# Patient Record
Sex: FEMALE | Race: WHITE | NOT HISPANIC OR LATINO | Employment: FULL TIME | URBAN - METROPOLITAN AREA
[De-identification: names, ages, dates, MRNs, and addresses within clinical notes are randomized per-mention and may not be internally consistent; named-entity substitution may affect disease eponyms.]

---

## 2017-01-09 ENCOUNTER — OFFICE VISIT (OUTPATIENT)
Dept: OBSTETRICS AND GYNECOLOGY | Facility: CLINIC | Age: 47
End: 2017-01-09
Payer: COMMERCIAL

## 2017-01-09 VITALS
WEIGHT: 167.56 LBS | SYSTOLIC BLOOD PRESSURE: 116 MMHG | DIASTOLIC BLOOD PRESSURE: 78 MMHG | BODY MASS INDEX: 27.04 KG/M2

## 2017-01-09 DIAGNOSIS — Z01.419 ENCOUNTER FOR GYNECOLOGICAL EXAMINATION WITHOUT ABNORMAL FINDING: Primary | ICD-10-CM

## 2017-01-09 DIAGNOSIS — Z79.890 POSTMENOPAUSAL HRT (HORMONE REPLACEMENT THERAPY): ICD-10-CM

## 2017-01-09 DIAGNOSIS — Z12.31 VISIT FOR SCREENING MAMMOGRAM: ICD-10-CM

## 2017-01-09 DIAGNOSIS — Z12.4 CERVICAL CANCER SCREENING: ICD-10-CM

## 2017-01-09 DIAGNOSIS — Z11.51 SCREENING FOR HPV (HUMAN PAPILLOMAVIRUS): ICD-10-CM

## 2017-01-09 PROCEDURE — 99396 PREV VISIT EST AGE 40-64: CPT | Mod: S$GLB,,, | Performed by: OBSTETRICS & GYNECOLOGY

## 2017-01-09 PROCEDURE — 87624 HPV HI-RISK TYP POOLED RSLT: CPT

## 2017-01-09 PROCEDURE — 88175 CYTOPATH C/V AUTO FLUID REDO: CPT

## 2017-01-09 PROCEDURE — 99999 PR PBB SHADOW E&M-EST. PATIENT-LVL III: CPT | Mod: PBBFAC,,, | Performed by: OBSTETRICS & GYNECOLOGY

## 2017-01-09 RX ORDER — PROGESTERONE 100 MG/1
100 CAPSULE ORAL NIGHTLY
Qty: 30 CAPSULE | Refills: 11 | Status: SHIPPED | OUTPATIENT
Start: 2017-01-09 | End: 2017-09-19 | Stop reason: SDUPTHER

## 2017-01-09 RX ORDER — ESTRADIOL 0.5 MG/.5G
1 GEL TOPICAL DAILY
Qty: 30 PACKET | Refills: 11 | Status: SHIPPED | OUTPATIENT
Start: 2017-01-09 | End: 2018-01-11 | Stop reason: SDUPTHER

## 2017-01-09 NOTE — PROGRESS NOTES
Chief Complaint   Patient presents with    Well Woman    Hot Flashes       History of Present Illness: Steph Lemus is a 46 y.o. female that presents today 1/9/2017 for well gyn visit.    Past Medical History   Diagnosis Date    Menarche      age of onset 10       Past Surgical History   Procedure Laterality Date    Dilation and curettage of uterus       for uterine polyps    Endometrial ablation         Current Outpatient Prescriptions   Medication Sig Dispense Refill    multivitamin (MULTIVITAMIN) per tablet Take 1 tablet by mouth once daily.        estradiol (DIVIGEL) 0.5 mg (0.1 %) GlPk Place 1 application onto the skin once daily. 30 packet 11    mupirocin calcium 2% (BACTROBAN) 2 % cream Apply topically 3 (three) times daily.      progesterone (PROMETRIUM) 100 MG capsule Take 1 capsule (100 mg total) by mouth nightly. 30 capsule 11    valacyclovir (VALTREX) 1000 MG tablet Take 1 tablet (1,000 mg total) by mouth 3 (three) times daily. 21 tablet 0     No current facility-administered medications for this visit.        Review of patient's allergies indicates:   Allergen Reactions    No known drug allergies        Family History   Problem Relation Age of Onset    Cancer Maternal Grandfather      lung    Diabetes Maternal Grandfather     Colon cancer Neg Hx     Ovarian cancer Neg Hx     Breast cancer Neg Hx        Social History     Social History    Marital status:      Spouse name: N/A    Number of children: N/A    Years of education: N/A     Social History Main Topics    Smoking status: Current Some Day Smoker     Packs/day: 0.30     Types: Cigarettes     Last attempt to quit: 10/2/2015    Smokeless tobacco: Never Used    Alcohol use 0.0 oz/week     0 Standard drinks or equivalent per week      Comment: Drinks wine socially    Drug use: No    Sexual activity: Yes     Partners: Male     Birth control/ protection: Other-see comments     Other Topics Concern    None     Social  History Narrative       OB History    Para Term  AB SAB TAB Ectopic Multiple Living   3 3              # Outcome Date GA Lbr Randal/2nd Weight Sex Delivery Anes PTL Lv   3 Para            2 Para            1 Para                   Review of Symptoms:  GENERAL: Denies weight gain or weight loss. Feeling well overall.   SKIN: Denies rash or lesions.   HEAD: Denies head injury or headache.   NODES: Denies enlarged lymph nodes.   CHEST: Denies chest pain or shortness of breath.   CARDIOVASCULAR: Denies palpitations or left sided chest pain.   ABDOMEN: No abdominal pain, constipation, diarrhea, nausea, vomiting or rectal bleeding.   URINARY: No frequency, dysuria, hematuria, or burning on urination.  HEMATOLOGIC: No easy bruisability or excessive bleeding.   MUSCULOSKELETAL: Denies joint pain or swelling.     Visit Vitals    /78    Wt 76 kg (167 lb 8.8 oz)     Physical Exam:  APPEARANCE: Well nourished, well developed, in no acute distress.  SKIN: Normal skin turgor, no lesions.  NECK: Neck symmetric without masses   RESPIRATORY: Normal respiratory effort with no retractions or use of accessory muscles  CARDIOVASCULAR: Peripheral vascular system with no swelling no varicosities and palpation of pulses normal  LYMPHATIC: No enlargements of the lymph nodes noted in the neck, axillae, or groin  ABDOMEN: Soft. No tenderness or masses. No hepatosplenomegaly. No hernias.  BREASTS: Symmetrical, no skin changes or visible lesions. No palpable masses, nipple discharge or adenopathy bilaterally.  PELVIC: Normal external female genitalia without lesions. Normal hair distribution. Adequate perineal body, normal urethral meatus. Urethra with no masses.  Bladder nontender. Vagina moist and well rugated without lesions or discharge. Cervix pink and without lesions. No significant cystocele or rectocele. Bimanual exam showed uterus normal size, shape, position, mobile and nontender. Adnexa without masses or  tenderness. Urethra and bladder normal. PAP DONE  EXTREMITIES: No clubbing cyanosis or edema.    ASSESSMENT/PLAN:  Encounter for gynecological examination without abnormal finding    Cervical cancer screening  -     Liquid-based pap smear, screening    Screening for HPV (human papillomavirus)  -     HPV DNA probe, amplified    Visit for screening mammogram  -     Mammo Digital Screening Bilat With CAD; Future; Expected date: 1/9/17    Postmenopausal HRT (hormone replacement therapy)  -     estradiol (DIVIGEL) 0.5 mg (0.1 %) GlPk; Place 1 application onto the skin once daily.  Dispense: 30 packet; Refill: 11  -     progesterone (PROMETRIUM) 100 MG capsule; Take 1 capsule (100 mg total) by mouth nightly.  Dispense: 30 capsule; Refill: 11          Patient was counseled today on Pap guidelines, recommendation for pelvic exams, mammograms every other year after the age of 40 and annually after the age of 50, Colonoscopy after the age of 50, Dexa Bone Scan and calcium and vitamin D supplementation in menopause and to see her PCP for other health maintenance.   FOLLOW-UP:prn

## 2017-01-09 NOTE — MR AVS SNAPSHOT
[facility]  [address] [place] [address]  Phone: [phone]                  Steph Lemus   2017 9:00 AM   Office Visit    Description:  Female : 1970   Provider:  Brittani Alatorre MD   Department:  [facility]           Reason for Visit     Well Woman           Diagnoses this Visit        Comments    Cervical cancer screening    -  Primary     Screening for HPV (human papillomavirus)         Visit for screening mammogram                To Do List           Goals (5 Years of Data)     None      [facility]     [facility] Care [facility] -  Assistance  Registered nurses in the [facility] Call Center provide clinical advisement, health education, appointment booking, and other advisory services.  Call for this free service at [phone].             Medications           Message regarding Medications     Verify the changes and/or additions to your medication regime listed below are the same as discussed with your clinician today.  If any of these changes or additions are incorrect, please notify your healthcare provider.             Verify that the below list of medications is an accurate representation of the medications you are currently taking.  If none reported, the list may be blank. If incorrect, please contact your healthcare provider. Carry this list with you in case of emergency.           Current Medications     multivitamin (MULTIVITAMIN) per tablet Take 1 tablet by mouth once daily.      mupirocin calcium 2% (BACTROBAN) 2 % cream Apply topically 3 (three) times daily.    valacyclovir (VALTREX) 1000 MG tablet Take 1 tablet (1,000 mg total) by mouth 3 (three) times daily.           Clinical Reference Information           Vital Signs - Last Recorded  Most recent update: 2017  9:13 AM by Cookie Martinez LPN    BP Wt BMI          116/78 76 kg (167 lb 8.8 oz) 27.04 kg/m2        Blood Pressure          Most Recent Value     BP  116/78      Allergies as of 1/9/2017     No Known Drug Allergies      Immunizations Administered on Date of Encounter - 1/9/2017     None      Orders Placed During Today's Visit      Normal Orders This Visit    HPV DNA probe, amplified     Liquid-based pap smear, screening     Future Labs/Procedures Expected by Expires    Mammo Digital Screening Bilat With CAD  1/9/2017 3/9/2018      Smoking Cessation     If you would like to quit smoking:   You may be eligible for free services if you are a [place] resident and started smoking cigarettes before September 1, 1988.  Call the Smoking Cessation Trust (SCT) toll free at [phone] or [phone].   Call 8-422-QUIT-NOW if you do not meet the above criteria.

## 2017-01-13 LAB — HUMAN PAPILLOMAVIRUS (HPV): NOT DETECTED

## 2017-01-17 ENCOUNTER — TELEPHONE (OUTPATIENT)
Dept: OBSTETRICS AND GYNECOLOGY | Facility: CLINIC | Age: 47
End: 2017-01-17

## 2017-01-20 ENCOUNTER — HOSPITAL ENCOUNTER (OUTPATIENT)
Dept: RADIOLOGY | Facility: CLINIC | Age: 47
Discharge: HOME | End: 2017-01-20
Attending: OBSTETRICS & GYNECOLOGY
Payer: COMMERCIAL

## 2017-01-20 DIAGNOSIS — Z12.31 VISIT FOR SCREENING MAMMOGRAM: ICD-10-CM

## 2017-01-20 PROCEDURE — 77067 SCR MAMMO BI INCL CAD: CPT | Mod: 26,,, | Performed by: RADIOLOGY

## 2017-01-20 PROCEDURE — 77067 SCR MAMMO BI INCL CAD: CPT | Mod: TC

## 2017-01-20 PROCEDURE — 77063 BREAST TOMOSYNTHESIS BI: CPT | Mod: 26,,, | Performed by: RADIOLOGY

## 2017-01-23 ENCOUNTER — OFFICE VISIT (OUTPATIENT)
Dept: PRIMARY CARE CLINIC | Facility: CLINIC | Age: 47
End: 2017-01-23
Payer: COMMERCIAL

## 2017-01-23 VITALS
HEART RATE: 91 BPM | OXYGEN SATURATION: 98 % | BODY MASS INDEX: 26.79 KG/M2 | DIASTOLIC BLOOD PRESSURE: 78 MMHG | HEIGHT: 66 IN | TEMPERATURE: 98 F | WEIGHT: 166.69 LBS | SYSTOLIC BLOOD PRESSURE: 100 MMHG

## 2017-01-23 DIAGNOSIS — R10.9 FLANK PAIN: ICD-10-CM

## 2017-01-23 DIAGNOSIS — R50.9 FEVER, UNSPECIFIED FEVER CAUSE: Primary | ICD-10-CM

## 2017-01-23 DIAGNOSIS — R82.90 ABNORMAL URINALYSIS: ICD-10-CM

## 2017-01-23 DIAGNOSIS — R10.32 LLQ ABDOMINAL PAIN: ICD-10-CM

## 2017-01-23 LAB
BACTERIA #/AREA URNS HPF: ABNORMAL /HPF
BILIRUB UR QL STRIP: ABNORMAL
CLARITY UR: CLEAR
COLOR UR: YELLOW
GLUCOSE UR QL STRIP: NEGATIVE
HGB UR QL STRIP: ABNORMAL
HYALINE CASTS #/AREA URNS LPF: 5 /LPF
KETONES UR QL STRIP: ABNORMAL
LEUKOCYTE ESTERASE UR QL STRIP: NEGATIVE
MICROSCOPIC COMMENT: ABNORMAL
NITRITE UR QL STRIP: NEGATIVE
PH UR STRIP: 6 [PH] (ref 5–8)
PROT UR QL STRIP: ABNORMAL
RBC #/AREA URNS HPF: 50 /HPF (ref 0–4)
SP GR UR STRIP: >=1.03 (ref 1–1.03)
SQUAMOUS #/AREA URNS HPF: 5 /HPF
URN SPEC COLLECT METH UR: ABNORMAL
WBC #/AREA URNS HPF: 5 /HPF (ref 0–5)

## 2017-01-23 PROCEDURE — 99214 OFFICE O/P EST MOD 30 MIN: CPT | Mod: S$GLB,,, | Performed by: NURSE PRACTITIONER

## 2017-01-23 PROCEDURE — 81000 URINALYSIS NONAUTO W/SCOPE: CPT | Mod: PO

## 2017-01-23 PROCEDURE — 99999 PR PBB SHADOW E&M-EST. PATIENT-LVL IV: CPT | Mod: PBBFAC,,, | Performed by: NURSE PRACTITIONER

## 2017-01-23 PROCEDURE — 1159F MED LIST DOCD IN RCRD: CPT | Mod: S$GLB,,, | Performed by: NURSE PRACTITIONER

## 2017-01-23 RX ORDER — SULFAMETHOXAZOLE AND TRIMETHOPRIM 800; 160 MG/1; MG/1
1 TABLET ORAL 2 TIMES DAILY
Qty: 20 TABLET | Refills: 0 | Status: SHIPPED | OUTPATIENT
Start: 2017-01-23 | End: 2017-01-23 | Stop reason: SDUPTHER

## 2017-01-23 RX ORDER — SULFAMETHOXAZOLE AND TRIMETHOPRIM 800; 160 MG/1; MG/1
1 TABLET ORAL 2 TIMES DAILY
Qty: 20 TABLET | Refills: 0 | Status: SHIPPED | OUTPATIENT
Start: 2017-01-23 | End: 2017-02-02

## 2017-01-23 NOTE — PATIENT INSTRUCTIONS
URINARY TRACT INFECTION    Urinary tract infection is a nonspecific term for an infection somewhere in the urine-making system (kidneys, ureters, bladder or urethra).  They are most often caused by bacterial infections.      You are advised to:    1.  Drink plenty of liquids.  2.  Take the antibiotic, Bactrim , as directed.  3.  Use over-the-counter Azo or prescription Pyridium for symptom relief.         Over-the-counter AZO or the prescription tablets will cause the urine to be dark       orange.    Call your regular care provider and follow up if symptoms persist for more than 2-3 days or if symptoms worsen significantly.     Go to ER right away if fever, chills, more severe back pain, nausea or vomiting develop.      Tips that may help you prevent UTIs:    Drink plenty of fluids to continually flush out bacteria trying to enter the urinary tract.  Up to 1/2 gallon today.   Dont hold urine.   Wipe from front to back after bowel movements.   If you are sexually active, urinate after sex to wash any germs out of the opening of the urinary tract.   If you are sexually active, use lubricant during sex to prevent irritation to the opening of the urinary tract.    If you have any questions, please call.  You can reach us at [phone] Monday through Friday (except holidays) 12 nooon to 6 p.m.    Thank you for using the [facility]!    MAGGI Lynch, CNP, FNP-BC  [facility]  [facility]

## 2017-01-23 NOTE — PROGRESS NOTES
Steph Lemus is a 46 y.o. female patient of Adeel Ledesma MD who presents to the clinic today for   Chief Complaint   Patient presents with    Flank Pain     since Saturday    Fever     100.4 last night    Chills    Nausea   .    HPI    Pt, who is not known to me, reports with a new problem to me, as follows:  Cloudy urine, fever, chills, headache, feeling ill, low back ache, increased freq r/t inc fluid intake    These symptoms began 2 days  ago and status is worsening.     Pt denies the following symptoms:  Dysuria    Aggravating factors include nothing .    Relieving factors include ibuprofen--helped some .    OTC Medications tried are ibuprofen.    Prescription medications taken for symptoms are none.    Pertinent history:  H/o UTI but with dysuria, not these sxs,  Last UTI approx 2 yrs ago, likely [facility] seen at [facility]    ROS    Constitutional: 100.4  Fever--now afebrile and chills gone, + fatigue, decrease change in appetite.    GI:  LLQ abd stomach ache, + nausea, no vomiting, no diarrhea, no constipation, no heartburn.    Urinary:  No dysuria, + frequency, no urgency, +bilat flank pain.     HEENT/Heart/Lung/MS/Skin:  No symptoms or no changes except body aches      ALLERGIES AND MEDICATIONS: updated and reviewed.  Review of patient's allergies indicates:   Allergen Reactions    No known drug allergies      Current Outpatient Prescriptions   Medication Sig Dispense Refill    estradiol (DIVIGEL) 0.5 mg (0.1 %) GlPk Place 1 application onto the skin once daily. 30 packet 11    multivitamin (MULTIVITAMIN) per tablet Take 1 tablet by mouth once daily.        progesterone (PROMETRIUM) 100 MG capsule Take 1 capsule (100 mg total) by mouth nightly. 30 capsule 11     No current facility-administered medications for this visit.        PHYSICAL EXAM    Alert, coop 46 y.o. female patient in no acute distress, is ill-appearing.    Vitals:    01/23/17 0947   BP: 100/78   BP Location: Right arm   Patient  "Position: Sitting   BP Method: Manual   Pulse: 91   Temp: 98 °F (36.7 °C)   TempSrc: Oral   SpO2: 98%   Weight: 75.6 kg (166 lb 10.7 oz)   Height: 5' 6" (1.676 m)    Body mass index is 26.9 kg/(m^2).  VS reviewed.  VS stable.  CC, nursing note, medications, PMH, PSH, FH and Soc hx all reviewed today.    Head:  Normocephalic, atraumatic.    EENT:  Ext nose/ears normal.               Eye lids normal, no discharge present.                       Resp:  Respirations even, unlabored    Heart:  Heart rate normal.  RRR, no MRG on ausculation.    ABD:  Soft, round, mildly tender in LLQ.  Normal BS in all 4 quadrants.  No rebound or organomegaly.              No peritoneal signs.  No CVAT    MS:  Ambulates normally.      NEURO:  Alert and oriented x 4.  Responds appropriately during interaction.    Skin:  Warm, dry, color good..    Psych:  Responds appropriately throughout the visit.               Relaxed.  Well-groomed.    Fever, unspecified fever cause  -     Urinalysis  -     Urine culture    Flank pain  -     Urinalysis  -     Urine culture    LLQ abdominal pain  -     Urinalysis  -     Urine culture    Abnormal urinalysis  -     Urine culture  -     Discontinue: sulfamethoxazole-trimethoprim 800-160mg (BACTRIM DS) 800-160 mg Tab; Take 1 tablet by mouth 2 (two) times daily.  Dispense: 20 tablet; Refill: 0  -     sulfamethoxazole-trimethoprim 800-160mg (BACTRIM DS) 800-160 mg Tab; Take 1 tablet by mouth 2 (two) times daily.  Dispense: 20 tablet; Refill: 0    Other orders  -     Urinalysis Microscopic        Pt today presents with report of very cloudy urine 2 days ago followed by fever, bilat low back ache, chills.    This is a new problem to me and the following work up is planned.    Lab & Radiological Tests Ordered: urinalysis.  The results are abnormal urine (RBC, bact).  With her fever, low back pain and cloudy urine, these are all consistent with UTI.  Urine culture pending.    I have reviewed the following " additional tests today: previous urinalysis 2010        Pt advised to perform comfort measures recommended on patient instruction sheet .    If not better in 2 days, the patient is advised to call us.  If worse or concerns, the patient is advised to call us.  Explained exam findings, diagnosis and treatment plan to patient.  Questions answered and patient states understanding.

## 2017-01-23 NOTE — MR AVS SNAPSHOT
[facility]  [address] [facility] [address] [place] [address]  Phone: [phone]                  Steph Lemus   2017 9:40 AM   Office Visit    Description:  Female : 1970   Provider:  Qing Blackman NP   Department:  [facility]           Reason for Visit     Flank Pain     Fever     Chills     Nausea           Diagnoses this Visit        Comments    Fever, unspecified fever cause    -  Primary     Flank pain         LLQ abdominal pain         Abnormal urinalysis                To Do List           Future Appointments        Provider Department Dept Phone    2017 6:20 PM Adeel Ledesma MD [facility] [phone]      Goals (5 Years of Data)     None       These Medications        Disp Refills Start End    sulfamethoxazole-trimethoprim 800-160mg (BACTRIM DS) 800-160 mg Tab 20 tablet 0 2017    Take 1 tablet by mouth 2 (two) times daily. - Oral    Pharmacy: [facility] [address] [facility] [address] Ph #: [phone]         [facility] On Call     [facility] Nurse Care [facility] -  Assistance  Registered nurses in the [facility] Center provide clinical advisement, health education, appointment booking, and other advisory services.  Call for this free service at [phone].             Medications           Message regarding Medications     Verify the changes and/or additions to your medication regime listed below are the same as discussed with your clinician today.  If any of these changes or additions are incorrect, please notify your healthcare provider.        START taking these NEW medications        Refills    sulfamethoxazole-trimethoprim 800-160mg (BACTRIM DS) 800-160 mg Tab 0    Sig: Take 1 tablet by mouth 2 (two) times daily.    Class: Normal    Route: Oral      STOP taking these medications     mupirocin calcium 2% (BACTROBAN) 2 % cream Apply topically 3 (three) times daily.     "valacyclovir (VALTREX) 1000 MG tablet Take 1 tablet (1,000 mg total) by mouth 3 (three) times daily.           Verify that the below list of medications is an accurate representation of the medications you are currently taking.  If none reported, the list may be blank. If incorrect, please contact your healthcare provider. Carry this list with you in case of emergency.           Current Medications     estradiol (DIVIGEL) 0.5 mg (0.1 %) GlPk Place 1 application onto the skin once daily.    multivitamin (MULTIVITAMIN) per tablet Take 1 tablet by mouth once daily.      progesterone (PROMETRIUM) 100 MG capsule Take 1 capsule (100 mg total) by mouth nightly.    sulfamethoxazole-trimethoprim 800-160mg (BACTRIM DS) 800-160 mg Tab Take 1 tablet by mouth 2 (two) times daily.           Clinical Reference Information           Vital Signs - Last Recorded  Most recent update: 1/23/2017  9:51 AM by Paz Armenta LPN    BP Pulse Temp Ht Wt SpO2    100/78 (BP Location: Right arm, Patient Position: Sitting, BP Method: Manual) 91 98 °F (36.7 °C) (Oral) 5' 6" (1.676 m) 75.6 kg (166 lb 10.7 oz) 98%    BMI                26.9 kg/m2          Blood Pressure          Most Recent Value    BP  100/78      Allergies as of 1/23/2017     No Known Drug Allergies      Immunizations Administered on Date of Encounter - 1/23/2017     None      Orders Placed During Today's Visit      Normal Orders This Visit    Urinalysis Microscopic     Urinalysis     Urine culture       Instructions    URINARY TRACT INFECTION    Urinary tract infection is a nonspecific term for an infection somewhere in the urine-making system (kidneys, ureters, bladder or urethra).  They are most often caused by bacterial infections.      You are advised to:    1.  Drink plenty of liquids.  2.  Take the antibiotic, Bactrim , as directed.  3.  Use over-the-counter Azo or prescription Pyridium for symptom relief.         Over-the-counter AZO or the prescription tablets " will cause the urine to be dark       orange.    Call your regular care provider and follow up if symptoms persist for more than 2-3 days or if symptoms worsen significantly.     Go to ER right away if fever, chills, more severe back pain, nausea or vomiting develop.      Tips that may help you prevent UTIs:    Drink plenty of fluids to continually flush out bacteria trying to enter the urinary tract.  Up to 1/2 gallon today.   Dont hold urine.   Wipe from front to back after bowel movements.   If you are sexually active, urinate after sex to wash any germs out of the opening of the urinary tract.   If you are sexually active, use lubricant during sex to prevent irritation to the opening of the urinary tract.    If you have any questions, please call.  You can reach us at [phone] Monday through Friday (except holidays) 12 nooon to 6 p.m.    Thank you for using the [facility]!    MAGGI Lynch, CNP, FNP-BC  [facility]  [facility]             Smoking Cessation     If you would like to quit smoking:   You may be eligible for free services if you are a [place] resident and started smoking cigarettes before September 1, 1988.  Call the Smoking Cessation Trust (SCT) toll free at [phone] or [phone] [phone].   Call 8-983-QUIT-NOW if you do not meet the above criteria.

## 2017-01-23 NOTE — Clinical Note
Adeel Ledesma MD,  I saw your patient today in the [facility].  If you have any questions, please do not hesitate to contact me.  Thank you!  ALIYA Lynch

## 2017-02-06 ENCOUNTER — OFFICE VISIT (OUTPATIENT)
Dept: FAMILY MEDICINE | Facility: CLINIC | Age: 47
End: 2017-02-06
Payer: COMMERCIAL

## 2017-02-06 VITALS
DIASTOLIC BLOOD PRESSURE: 70 MMHG | RESPIRATION RATE: 18 BRPM | WEIGHT: 173.5 LBS | BODY MASS INDEX: 28 KG/M2 | SYSTOLIC BLOOD PRESSURE: 130 MMHG | HEART RATE: 56 BPM | OXYGEN SATURATION: 97 %

## 2017-02-06 DIAGNOSIS — E78.5 HYPERLIPIDEMIA, UNSPECIFIED HYPERLIPIDEMIA TYPE: ICD-10-CM

## 2017-02-06 DIAGNOSIS — N39.0 URINARY TRACT INFECTION WITHOUT HEMATURIA, SITE UNSPECIFIED: Primary | ICD-10-CM

## 2017-02-06 PROCEDURE — 99214 OFFICE O/P EST MOD 30 MIN: CPT | Mod: S$GLB,,, | Performed by: FAMILY MEDICINE

## 2017-02-06 PROCEDURE — 99999 PR PBB SHADOW E&M-EST. PATIENT-LVL III: CPT | Mod: PBBFAC,,, | Performed by: FAMILY MEDICINE

## 2017-02-06 NOTE — MR AVS SNAPSHOT
[facility]  [address] [facility] [address]  [place] [address]  Phone: [phone]  Fax: [phone]                  Steph Lemus   2017 6:20 PM   Office Visit    Description:  Female : 1970   Provider:  Adeel Ledesma MD   Department:  [facility]           Diagnoses this Visit        Comments    Hyperlipidemia, unspecified hyperlipidemia type    -  Primary     Urinary tract infection without hematuria, site unspecified                To Do List           Goals (5 Years of Data)     None      [facility]     [facility] Nurse Care [facility] -  Assistance  Registered nurses in the [facility] On Call Center provide clinical advisement, health education, appointment booking, and other advisory services.  Call for this free service at [phone].             Medications           Message regarding Medications     Verify the changes and/or additions to your medication regime listed below are the same as discussed with your clinician today.  If any of these changes or additions are incorrect, please notify your healthcare provider.             Verify that the below list of medications is an accurate representation of the medications you are currently taking.  If none reported, the list may be blank. If incorrect, please contact your healthcare provider. Carry this list with you in case of emergency.           Current Medications     estradiol (DIVIGEL) 0.5 mg (0.1 %) GlPk Place 1 application onto the skin once daily.    multivitamin (MULTIVITAMIN) per tablet Take 1 tablet by mouth once daily.      progesterone (PROMETRIUM) 100 MG capsule Take 1 capsule (100 mg total) by mouth nightly.           Clinical Reference Information           Your Vitals Were     BP Pulse Resp Weight SpO2 BMI    130/70 56 18 78.7 kg (173 lb 8 oz) 97% 28 kg/m2      Blood Pressure          Most Recent Value    BP  130/70      Allergies as of 2017     No Known Drug Allergies       Immunizations Administered on Date of Encounter - 2/6/2017     None      Orders Placed During Today's Visit     Future Labs/Procedures Expected by Expires    Urine culture  2/6/2017 4/7/2018    Comprehensive metabolic panel  8/5/2017 4/7/2018    Lipid panel  8/5/2017 2/6/2018      Smoking Cessation     If you would like to quit smoking:   You may be eligible for free services if you are a [place] resident and started smoking cigarettes before September 1, 1988.  Call the Smoking Cessation Trust ([facility]) toll free at [phone] or [phone] [phone].   Call 4-803-QUIT-NOW if you do not meet the above criteria.            Language Assistance Services     ATTENTION: Language assistance services are available, free of charge. Please call [phone].      ATENCIÓN: Si bethla venessa, tiene a wilde disposición servicios gratuitos de asistencia lingüística. Llame al [phone].     CHÚ Ý: N?u b?n nói Ti?ng Vi?t, có các d?ch v? h? tr? ngôn ng? mi?n phí dành cho b?n. G?i s? [phone].         [facility] complies with applicable Federal civil rights laws and does not discriminate on the basis of race, color, national origin, age, disability, or sex.

## 2017-02-07 ENCOUNTER — LAB VISIT (OUTPATIENT)
Dept: LAB | Facility: HOSPITAL | Age: 47
End: 2017-02-07
Attending: FAMILY MEDICINE
Payer: COMMERCIAL

## 2017-02-07 ENCOUNTER — PATIENT MESSAGE (OUTPATIENT)
Dept: FAMILY MEDICINE | Facility: CLINIC | Age: 47
End: 2017-02-07

## 2017-02-07 DIAGNOSIS — R10.9 FLANK PAIN: Primary | ICD-10-CM

## 2017-02-07 DIAGNOSIS — E78.5 HYPERLIPIDEMIA, UNSPECIFIED HYPERLIPIDEMIA TYPE: ICD-10-CM

## 2017-02-07 LAB
ALBUMIN SERPL BCP-MCNC: 3.6 G/DL
ALP SERPL-CCNC: 65 U/L
ALT SERPL W/O P-5'-P-CCNC: 33 U/L
ANION GAP SERPL CALC-SCNC: 6 MMOL/L
AST SERPL-CCNC: 26 U/L
BILIRUB SERPL-MCNC: 0.3 MG/DL
BUN SERPL-MCNC: 12 MG/DL
CALCIUM SERPL-MCNC: 9 MG/DL
CHLORIDE SERPL-SCNC: 108 MMOL/L
CHOLEST/HDLC SERPL: 3.7 {RATIO}
CO2 SERPL-SCNC: 27 MMOL/L
CREAT SERPL-MCNC: 0.8 MG/DL
EST. GFR  (AFRICAN AMERICAN): >60 ML/MIN/1.73 M^2
EST. GFR  (NON AFRICAN AMERICAN): >60 ML/MIN/1.73 M^2
GLUCOSE SERPL-MCNC: 82 MG/DL
HDL/CHOLESTEROL RATIO: 26.9 %
HDLC SERPL-MCNC: 227 MG/DL
HDLC SERPL-MCNC: 61 MG/DL
LDLC SERPL CALC-MCNC: 145.2 MG/DL
NONHDLC SERPL-MCNC: 166 MG/DL
POTASSIUM SERPL-SCNC: 4.7 MMOL/L
PROT SERPL-MCNC: 6.4 G/DL
SODIUM SERPL-SCNC: 141 MMOL/L
TRIGL SERPL-MCNC: 104 MG/DL

## 2017-02-07 PROCEDURE — 80053 COMPREHEN METABOLIC PANEL: CPT

## 2017-02-07 PROCEDURE — 80061 LIPID PANEL: CPT

## 2017-02-07 PROCEDURE — 36415 COLL VENOUS BLD VENIPUNCTURE: CPT | Mod: PO

## 2017-02-08 NOTE — TELEPHONE ENCOUNTER
----- Message from Imelda Bernard sent at 2/8/2017  8:55 AM CST -----  Patient requesting to speak with nurse concerning issues she is experiencing with back pain/please call back at [phone] to advise.

## 2017-02-09 ENCOUNTER — TELEPHONE (OUTPATIENT)
Dept: FAMILY MEDICINE | Facility: CLINIC | Age: 47
End: 2017-02-09

## 2017-02-09 ENCOUNTER — HOSPITAL ENCOUNTER (OUTPATIENT)
Dept: RADIOLOGY | Facility: HOSPITAL | Age: 47
Discharge: HOME | End: 2017-02-09
Attending: FAMILY MEDICINE
Payer: COMMERCIAL

## 2017-02-09 DIAGNOSIS — R10.9 FLANK PAIN: ICD-10-CM

## 2017-02-09 PROCEDURE — 74176 CT ABD & PELVIS W/O CONTRAST: CPT | Mod: 26,,, | Performed by: RADIOLOGY

## 2017-02-09 PROCEDURE — 74176 CT ABD & PELVIS W/O CONTRAST: CPT | Mod: TC,PO

## 2017-02-09 NOTE — TELEPHONE ENCOUNTER
----- Message from Imelda Bernard sent at 2/9/2017  1:19 PM CST -----  Patient stated she left several messages requesting test results and discuss issues she is having with lower back pain/has not heard from anyone/please call patient back at [phone] to advise.

## 2017-02-09 NOTE — TELEPHONE ENCOUNTER
----- Message from Imelda Bernard sent at 2/9/2017  1:19 PM CST -----  Patient stated she left several messages requesting test results and discuss issues she is having with lower back pain/has not heard from anyone/please call patient back at [phone] to advise.

## 2017-02-09 NOTE — TELEPHONE ENCOUNTER
----- Message from Monisha Rodriguez sent at 2/9/2017  9:53 AM CST -----  Contact: self: [phone]  Patient is calling because she is having back pains and she would like to get her test results.  Please call back.

## 2017-02-13 NOTE — PROGRESS NOTES
HISTORY OF PRESENT ILLNESS:  A pleasant female here today.  She has had urinary   tract infection and hyperlipidemia.  She has no history of kidney stones.  He is   a smoker, .    PAST MEDICAL, SURGICAL AND SOCIAL HISTORY:  Reviewed.      PHYSICAL EXAMINATION:  ABDOMINAL:  No CVA, suprapubic or abdominal tenderness.  CHEST:  Clear.  VITAL SIGNS:  Normal.  HEART:  Regular rhythm.  No murmur or gallop.  SKIN:  No acute skin rash.    ASSESSMENT:  Urinary tract infection and hyperlipidemia.    PLAN:  We discussed diet, exercise, weight loss, ____ CMP and lipids.  We will   repeat urine culture.  We will follow up by phone, pending test results.      BARRETT/BHAVESH  dd: 02/12/2017 21:07:27 (CST)  td: 02/12/2017 21:43:12 (CST)  Doc ID   #[id number]  Job ID #[id number]    CC:

## 2017-06-09 ENCOUNTER — OFFICE VISIT (OUTPATIENT)
Dept: OBSTETRICS AND GYNECOLOGY | Facility: CLINIC | Age: 47
End: 2017-06-09
Payer: COMMERCIAL

## 2017-06-09 ENCOUNTER — TELEPHONE (OUTPATIENT)
Dept: OBSTETRICS AND GYNECOLOGY | Facility: CLINIC | Age: 47
End: 2017-06-09

## 2017-06-09 VITALS
WEIGHT: 170 LBS | SYSTOLIC BLOOD PRESSURE: 124 MMHG | HEIGHT: 66 IN | BODY MASS INDEX: 27.32 KG/M2 | DIASTOLIC BLOOD PRESSURE: 78 MMHG

## 2017-06-09 DIAGNOSIS — N63.10 BREAST MASS, RIGHT: Primary | ICD-10-CM

## 2017-06-09 PROCEDURE — 99999 PR PBB SHADOW E&M-EST. PATIENT-LVL III: CPT | Mod: PBBFAC,,, | Performed by: OBSTETRICS & GYNECOLOGY

## 2017-06-09 PROCEDURE — 99213 OFFICE O/P EST LOW 20 MIN: CPT | Mod: S$GLB,,, | Performed by: OBSTETRICS & GYNECOLOGY

## 2017-06-09 RX ORDER — PRAVASTATIN SODIUM 10 MG/1
10 TABLET ORAL
COMMUNITY
End: 2018-01-08

## 2017-06-09 NOTE — TELEPHONE ENCOUNTER
----- Message from Adarsh SCHREIBER Frisard sent at 6/9/2017  8:31 AM CDT -----  Contact: same  Patient called in and wanted to see if Dr. Alatorre could see her today Friday 6/9/17 as she has found a lump in her breast.  Patient call back number is [phone]

## 2017-06-09 NOTE — PROGRESS NOTES
Chief Complaint   Patient presents with    Breast Mass     felt lump in right breast last night       History of Present Illness: Steph Lemus is a 46 y.o. female that presents today 2017 for   Chief Complaint   Patient presents with    Breast Mass     felt lump in right breast last night         Past Medical History:   Diagnosis Date    Menarche     age of onset 10       Past Surgical History:   Procedure Laterality Date    DILATION AND CURETTAGE OF UTERUS      for uterine polyps    ENDOMETRIAL ABLATION         Current Outpatient Prescriptions   Medication Sig Dispense Refill    multivitamin (MULTIVITAMIN) per tablet Take 1 tablet by mouth once daily.        pravastatin (PRAVACHOL) 10 MG tablet Take 10 mg by mouth every Mon, Wed, Fri.      estradiol (DIVIGEL) 0.5 mg (0.1 %) GlPk Place 1 application onto the skin once daily. 30 packet 11    progesterone (PROMETRIUM) 100 MG capsule Take 1 capsule (100 mg total) by mouth nightly. 30 capsule 11     No current facility-administered medications for this visit.        Review of patient's allergies indicates:   Allergen Reactions    No known drug allergies        Family History   Problem Relation Age of Onset    Cancer Maternal Grandfather      lung    Diabetes Maternal Grandfather     Colon cancer Neg Hx     Ovarian cancer Neg Hx     Breast cancer Neg Hx        Social History   Substance Use Topics    Smoking status: Current Some Day Smoker     Packs/day: 0.30     Types: Cigarettes     Last attempt to quit: 10/2/2015    Smokeless tobacco: Never Used    Alcohol use 0.0 oz/week      Comment: Drinks wine socially       OB History    Para Term  AB Living   3 3       SAB TAB Ectopic Multiple Live Births             # Outcome Date GA Lbr Randal/2nd Weight Sex Delivery Anes PTL Lv   3 Para            2 Para            1 Para                   Review of Symptoms:  GENERAL: Denies weight gain or weight loss. Feeling well overall.   SKIN:  "Denies rash or lesions.   HEAD: Denies head injury or headache.   NODES: Denies enlarged lymph nodes.   CHEST: Denies chest pain or shortness of breath.   CARDIOVASCULAR: Denies palpitations or left sided chest pain.   ABDOMEN: No abdominal pain, constipation, diarrhea, nausea, vomiting or rectal bleeding.   URINARY: No frequency, dysuria, hematuria, or burning on urination.  HEMATOLOGIC: No easy bruisability or excessive bleeding.   MUSCULOSKELETAL: Denies joint pain or swelling.     /78   Ht 5' 6" (1.676 m)   Wt 77.1 kg (169 lb 15.6 oz)   Physical Exam:  APPEARANCE: Well nourished, well developed, in no acute distress.  SKIN: Normal skin turgor, no lesions.  NECK: Neck symmetric without masses   RESPIRATORY: Normal respiratory effort with no retractions or use of accessory muscles  CARDIOVASCULAR: Peripheral vascular system with no swelling no varicosities and palpation of pulses normal  LYMPHATIC: No enlargements of the lymph nodes noted in the neck, axillae, or groin  ABDOMEN: Soft. No tenderness or masses. No hepatosplenomegaly. No hernias.  BREASTS: Symmetrical, no skin changes or visible lesions. ++ 10 o'clock 1 cm mobile and 2 cm from areola++  palpable masses, no nipple discharge or adenopathy bilaterally.  EXTREMITIES: No clubbing cyanosis or edema.    ASSESSMENT/PLAN:  Breast mass, right  -     US Breast Right Complete; Future; Expected date: 06/09/2017  -     Mammo Digital Diagnostic Right With CAD; Future; Expected date: 06/09/2017        15 minutes spent today with this patient. Greater than half spent in counseling today.     "

## 2017-06-16 ENCOUNTER — HOSPITAL ENCOUNTER (OUTPATIENT)
Dept: RADIOLOGY | Facility: HOSPITAL | Age: 47
Discharge: HOME | End: 2017-06-16
Attending: OBSTETRICS & GYNECOLOGY
Payer: COMMERCIAL

## 2017-06-16 DIAGNOSIS — N63.10 BREAST MASS, RIGHT: ICD-10-CM

## 2017-06-16 PROCEDURE — 76642 ULTRASOUND BREAST LIMITED: CPT | Mod: 26,RT,, | Performed by: RADIOLOGY

## 2017-06-16 PROCEDURE — 77061 BREAST TOMOSYNTHESIS UNI: CPT | Mod: 26,,, | Performed by: RADIOLOGY

## 2017-06-16 PROCEDURE — 77065 DX MAMMO INCL CAD UNI: CPT | Mod: 26,,, | Performed by: RADIOLOGY

## 2017-06-16 PROCEDURE — 76642 ULTRASOUND BREAST LIMITED: CPT | Mod: TC,PO,RT

## 2017-06-16 PROCEDURE — 77061 BREAST TOMOSYNTHESIS UNI: CPT | Mod: TC

## 2017-06-19 DIAGNOSIS — R92.8 ABNORMAL MAMMOGRAM: Primary | ICD-10-CM

## 2017-09-19 ENCOUNTER — TELEPHONE (OUTPATIENT)
Dept: OBSTETRICS AND GYNECOLOGY | Facility: CLINIC | Age: 47
End: 2017-09-19

## 2017-09-19 DIAGNOSIS — Z79.890 POSTMENOPAUSAL HRT (HORMONE REPLACEMENT THERAPY): ICD-10-CM

## 2017-09-19 RX ORDER — ESTRADIOL 0.5 MG/1
0.5 TABLET ORAL DAILY
Qty: 30 TABLET | Refills: 11 | Status: SHIPPED | OUTPATIENT
Start: 2017-09-19 | End: 2018-01-08

## 2017-09-19 RX ORDER — PROGESTERONE 100 MG/1
100 CAPSULE ORAL NIGHTLY
Qty: 30 CAPSULE | Refills: 11 | Status: SHIPPED | OUTPATIENT
Start: 2017-09-19 | End: 2018-04-09 | Stop reason: SDUPTHER

## 2017-09-19 NOTE — TELEPHONE ENCOUNTER
Patient is requesting estradiol orally as suggested by pharmacist. She does not like the gel and the pill form will be cheaper. Please advise.

## 2017-09-19 NOTE — TELEPHONE ENCOUNTER
----- Message from Rea Lewis sent at 9/19/2017 11:51 AM CDT -----  Contact: Patient  Patient called advising that she has been speaking with Cookie throughout the day, 9/19/17.  She would like to speak with Cookie again.  Please call patient back at [phone].  Thank you!

## 2017-09-19 NOTE — TELEPHONE ENCOUNTER
----- Message from Nazario Corona sent at 9/19/2017 10:39 AM CDT -----  Contact: pt   Pt is requesting a callback(following up with a rejecting msg that pharmacy sent over)  Call Back#[phone]  Thanks

## 2018-01-08 ENCOUNTER — LAB VISIT (OUTPATIENT)
Dept: LAB | Facility: HOSPITAL | Age: 48
End: 2018-01-08
Attending: FAMILY MEDICINE
Payer: COMMERCIAL

## 2018-01-08 ENCOUNTER — OFFICE VISIT (OUTPATIENT)
Dept: FAMILY MEDICINE | Facility: CLINIC | Age: 48
End: 2018-01-08
Payer: COMMERCIAL

## 2018-01-08 VITALS
SYSTOLIC BLOOD PRESSURE: 96 MMHG | HEART RATE: 62 BPM | HEIGHT: 66 IN | BODY MASS INDEX: 29.12 KG/M2 | DIASTOLIC BLOOD PRESSURE: 72 MMHG | WEIGHT: 181.19 LBS

## 2018-01-08 DIAGNOSIS — Z00.00 ROUTINE HEALTH MAINTENANCE: Primary | ICD-10-CM

## 2018-01-08 DIAGNOSIS — Z00.00 ROUTINE HEALTH MAINTENANCE: ICD-10-CM

## 2018-01-08 LAB
ALBUMIN SERPL BCP-MCNC: 3.9 G/DL
ALP SERPL-CCNC: 63 U/L
ALT SERPL W/O P-5'-P-CCNC: 33 U/L
ANION GAP SERPL CALC-SCNC: 5 MMOL/L
AST SERPL-CCNC: 29 U/L
BILIRUB SERPL-MCNC: 0.5 MG/DL
BUN SERPL-MCNC: 18 MG/DL
CALCIUM SERPL-MCNC: 9.5 MG/DL
CHLORIDE SERPL-SCNC: 106 MMOL/L
CHOLEST SERPL-MCNC: 266 MG/DL
CHOLEST/HDLC SERPL: 3.8 {RATIO}
CO2 SERPL-SCNC: 29 MMOL/L
CREAT SERPL-MCNC: 0.9 MG/DL
EST. GFR  (AFRICAN AMERICAN): >60 ML/MIN/1.73 M^2
EST. GFR  (NON AFRICAN AMERICAN): >60 ML/MIN/1.73 M^2
GLUCOSE SERPL-MCNC: 86 MG/DL
HDLC SERPL-MCNC: 70 MG/DL
HDLC SERPL: 26.3 %
LDLC SERPL CALC-MCNC: 178 MG/DL
NONHDLC SERPL-MCNC: 196 MG/DL
POTASSIUM SERPL-SCNC: 4.4 MMOL/L
PROT SERPL-MCNC: 7.1 G/DL
SODIUM SERPL-SCNC: 140 MMOL/L
TRIGL SERPL-MCNC: 90 MG/DL

## 2018-01-08 PROCEDURE — 80053 COMPREHEN METABOLIC PANEL: CPT

## 2018-01-08 PROCEDURE — 90686 IIV4 VACC NO PRSV 0.5 ML IM: CPT | Mod: S$GLB,,, | Performed by: FAMILY MEDICINE

## 2018-01-08 PROCEDURE — 99999 PR PBB SHADOW E&M-EST. PATIENT-LVL III: CPT | Mod: PBBFAC,,, | Performed by: FAMILY MEDICINE

## 2018-01-08 PROCEDURE — 36415 COLL VENOUS BLD VENIPUNCTURE: CPT | Mod: PO

## 2018-01-08 PROCEDURE — 99396 PREV VISIT EST AGE 40-64: CPT | Mod: 25,S$GLB,, | Performed by: FAMILY MEDICINE

## 2018-01-08 PROCEDURE — 80061 LIPID PANEL: CPT

## 2018-01-08 PROCEDURE — 90471 IMMUNIZATION ADMIN: CPT | Mod: S$GLB,,, | Performed by: FAMILY MEDICINE

## 2018-01-08 NOTE — PROGRESS NOTES
HPI  Steph Lemus is a 47 y.o. female with multiple medical diagnoses as listed in the medical history and problem list that presents for Establish Care (hm due: pneumovax, flu)  .      HPI  Here today for routine health maintenance and to establish with a new physician.    PAST MEDICAL HISTORY:  Past Medical History:   Diagnosis Date    Hyperlipidemia        PAST SURGICAL HISTORY:  Past Surgical History:   Procedure Laterality Date    DILATION AND CURETTAGE OF UTERUS      for uterine polyps    ENDOMETRIAL ABLATION         SOCIAL HISTORY:  Social History     Social History    Marital status:      Spouse name: N/A    Number of children: N/A    Years of education: N/A     Occupational History    Not on file.     Social History Main Topics    Smoking status: Former Smoker     Packs/day: 0.30     Types: Cigarettes     Quit date: 6/8/2017    Smokeless tobacco: Never Used    Alcohol use 0.0 oz/week      Comment: Drinks wine socially    Drug use: No    Sexual activity: Yes     Partners: Male     Birth control/ protection: Other-see comments     Other Topics Concern    Not on file     Social History Narrative    No narrative on file       FAMILY HISTORY:  Family History   Problem Relation Age of Onset    Cancer Maternal Grandfather      lung    Diabetes Maternal Grandfather     Colon cancer Neg Hx     Ovarian cancer Neg Hx     Breast cancer Neg Hx        ALLERGIES AND MEDICATIONS: updated and reviewed.  Review of patient's allergies indicates:   Allergen Reactions    No known drug allergies      Current Outpatient Prescriptions   Medication Sig Dispense Refill    estradiol (DIVIGEL) 0.5 mg (0.1 %) GlPk Place 1 application onto the skin once daily. 30 packet 11    multivitamin (MULTIVITAMIN) per tablet Take 1 tablet by mouth once daily.        progesterone (PROMETRIUM) 100 MG capsule Take 1 capsule (100 mg total) by mouth nightly. 30 capsule 11     No current facility-administered  "medications for this visit.        ROS  Review of Systems   Constitutional: Positive for unexpected weight change. Negative for activity change.   HENT: Negative for hearing loss, rhinorrhea and trouble swallowing.    Eyes: Negative for discharge and visual disturbance.   Respiratory: Negative for chest tightness and wheezing.    Cardiovascular: Negative for chest pain and palpitations.   Gastrointestinal: Negative for blood in stool, constipation, diarrhea and vomiting.   Endocrine: Negative for polydipsia and polyuria.   Genitourinary: Negative for difficulty urinating, dysuria, hematuria and menstrual problem.   Musculoskeletal: Negative for arthralgias, joint swelling and neck pain.   Neurological: Negative for weakness and headaches.   Psychiatric/Behavioral: Negative for confusion and dysphoric mood.       Physical Exam  Vitals:    01/08/18 0827   BP: 96/72   Pulse: 62    Body mass index is 29.25 kg/m².  Weight: 82.2 kg (181 lb 3.5 oz)   Height: 5' 6" (167.6 cm)     Physical Exam   Constitutional: She is oriented to person, place, and time. She appears well-developed and well-nourished.   HENT:   Head: Normocephalic and atraumatic.   Right Ear: External ear normal.   Left Ear: External ear normal.   Nose: Nose normal.   Mouth/Throat: Oropharynx is clear and moist.   Eyes: Conjunctivae and EOM are normal. Pupils are equal, round, and reactive to light. No scleral icterus.   Neck: Normal range of motion. Neck supple. No JVD present. No thyromegaly present.   Cardiovascular: Normal rate, regular rhythm and normal heart sounds.  Exam reveals no gallop and no friction rub.    No murmur heard.  Pulmonary/Chest: Effort normal and breath sounds normal. She has no wheezes. She has no rales.   Abdominal: Soft. Bowel sounds are normal. She exhibits no distension and no mass. There is no tenderness. There is no rebound and no guarding.   Musculoskeletal: Normal range of motion. She exhibits no edema.   Lymphadenopathy:    "  She has no cervical adenopathy.   Neurological: She is alert and oriented to person, place, and time. She has normal strength. No cranial nerve deficit or sensory deficit.   Skin: Skin is warm and dry. No rash noted.   Vitals reviewed.      Health Maintenance       Date Due Completion Date    Pneumococcal PPSV23 (Medium Risk) (1) 07/17/1986 ---    Influenza Vaccine 08/01/2017 9/6/2016 (Declined)    Override on 9/6/2016: Declined (dont want today)    Mammogram 06/16/2019 6/16/2017    Pap Smear with HPV Cotest 01/09/2020 1/9/2017    Lipid Panel 02/07/2022 2/7/2017    TETANUS VACCINE 09/01/2026 9/1/2016          Assessment & Plan    Routine health maintenance  -     Comprehensive metabolic panel; Future; Expected date: 01/08/2018  -     Lipid panel; Future; Expected date: 01/08/2018  - Health maintenance reviewed  - Diet and exercise education.        Return in about 1 year (around 1/8/2019).

## 2018-01-09 DIAGNOSIS — E78.5 HYPERLIPIDEMIA, UNSPECIFIED HYPERLIPIDEMIA TYPE: Primary | ICD-10-CM

## 2018-01-09 RX ORDER — ATORVASTATIN CALCIUM 10 MG/1
10 TABLET, FILM COATED ORAL DAILY
Qty: 30 TABLET | Refills: 11 | Status: SHIPPED | OUTPATIENT
Start: 2018-01-09 | End: 2018-11-06 | Stop reason: SDUPTHER

## 2018-01-11 ENCOUNTER — PATIENT MESSAGE (OUTPATIENT)
Dept: OBSTETRICS AND GYNECOLOGY | Facility: CLINIC | Age: 48
End: 2018-01-11

## 2018-01-11 DIAGNOSIS — Z79.890 POSTMENOPAUSAL HRT (HORMONE REPLACEMENT THERAPY): ICD-10-CM

## 2018-01-12 ENCOUNTER — HOSPITAL ENCOUNTER (OUTPATIENT)
Dept: RADIOLOGY | Facility: HOSPITAL | Age: 48
Discharge: HOME | End: 2018-01-12
Attending: OBSTETRICS & GYNECOLOGY
Payer: COMMERCIAL

## 2018-01-12 DIAGNOSIS — R92.8 ABNORMAL MAMMOGRAM: ICD-10-CM

## 2018-01-12 DIAGNOSIS — Z12.31 VISIT FOR SCREENING MAMMOGRAM: ICD-10-CM

## 2018-01-12 DIAGNOSIS — N63.10 BREAST MASS, RIGHT: ICD-10-CM

## 2018-01-12 PROCEDURE — 77062 BREAST TOMOSYNTHESIS BI: CPT | Mod: TC,PO

## 2018-01-12 PROCEDURE — 77062 BREAST TOMOSYNTHESIS BI: CPT | Mod: 26,,, | Performed by: RADIOLOGY

## 2018-01-12 PROCEDURE — 77066 DX MAMMO INCL CAD BI: CPT | Mod: 26,,, | Performed by: RADIOLOGY

## 2018-01-12 RX ORDER — ESTRADIOL 0.5 MG/.5G
1 GEL TOPICAL DAILY
Qty: 30 PACKET | Refills: 3 | Status: SHIPPED | OUTPATIENT
Start: 2018-01-12 | End: 2018-05-10 | Stop reason: SDUPTHER

## 2018-03-02 ENCOUNTER — OFFICE VISIT (OUTPATIENT)
Dept: FAMILY MEDICINE | Facility: CLINIC | Age: 48
End: 2018-03-02
Payer: COMMERCIAL

## 2018-03-02 VITALS
WEIGHT: 180.75 LBS | HEIGHT: 66 IN | HEART RATE: 72 BPM | BODY MASS INDEX: 29.05 KG/M2 | SYSTOLIC BLOOD PRESSURE: 110 MMHG | DIASTOLIC BLOOD PRESSURE: 80 MMHG

## 2018-03-02 DIAGNOSIS — H61.23 BILATERAL IMPACTED CERUMEN: ICD-10-CM

## 2018-03-02 DIAGNOSIS — J01.00 ACUTE NON-RECURRENT MAXILLARY SINUSITIS: Primary | ICD-10-CM

## 2018-03-02 DIAGNOSIS — H65.02 ACUTE SEROUS OTITIS MEDIA OF LEFT EAR, RECURRENCE NOT SPECIFIED: ICD-10-CM

## 2018-03-02 PROCEDURE — 96372 THER/PROPH/DIAG INJ SC/IM: CPT | Mod: S$GLB,,, | Performed by: FAMILY MEDICINE

## 2018-03-02 PROCEDURE — 99999 PR PBB SHADOW E&M-EST. PATIENT-LVL III: CPT | Mod: PBBFAC,,, | Performed by: PHYSICIAN ASSISTANT

## 2018-03-02 PROCEDURE — 99214 OFFICE O/P EST MOD 30 MIN: CPT | Mod: 25,S$GLB,, | Performed by: PHYSICIAN ASSISTANT

## 2018-03-02 RX ORDER — BETAMETHASONE SODIUM PHOSPHATE AND BETAMETHASONE ACETATE 3; 3 MG/ML; MG/ML
6 INJECTION, SUSPENSION INTRA-ARTICULAR; INTRALESIONAL; INTRAMUSCULAR; SOFT TISSUE
Status: COMPLETED | OUTPATIENT
Start: 2018-03-02 | End: 2018-03-02

## 2018-03-02 RX ORDER — METHYLPREDNISOLONE 4 MG/1
TABLET ORAL
Qty: 1 PACKAGE | Refills: 0 | Status: SHIPPED | OUTPATIENT
Start: 2018-03-02 | End: 2018-03-23

## 2018-03-02 RX ORDER — DOXYCYCLINE 100 MG/1
100 CAPSULE ORAL 2 TIMES DAILY
Qty: 20 CAPSULE | Refills: 0 | Status: SHIPPED | OUTPATIENT
Start: 2018-03-02 | End: 2018-03-12

## 2018-03-02 RX ADMIN — BETAMETHASONE SODIUM PHOSPHATE AND BETAMETHASONE ACETATE 6 MG: 3; 3 INJECTION, SUSPENSION INTRA-ARTICULAR; INTRALESIONAL; INTRAMUSCULAR; SOFT TISSUE at 11:03

## 2018-03-02 NOTE — PROGRESS NOTES
Subjective:       Patient ID: Steph Lemus is a 47 y.o. female    Chief Complaint: Nasal Congestion (congestion and both ears clogged.Symptoms for 2 weeks.No fever,chills or bodyaches.Austin taking Mucinex D at night as needed)    HPI  Mrs Lemus presents today CO nasal congestion, sinus pressure and ear popping that began two weeks ago.  She has been taking Mucinex D over the counter with some relief.      Review of Systems   Constitutional: Negative for chills and fever.   HENT: Positive for congestion, postnasal drip and sinus pressure.    Eyes: Negative for visual disturbance.   Respiratory: Positive for cough.    Cardiovascular: Negative for chest pain.   Gastrointestinal: Negative for abdominal pain, diarrhea, nausea and vomiting.   Skin: Negative for rash.   Neurological: Negative for headaches.        Objective:   Physical Exam   Constitutional: She is oriented to person, place, and time. She appears well-developed and well-nourished. No distress.   HENT:   Head: Normocephalic and atraumatic.   Bilateral cerumen impaction   Eyes: EOM are normal. Pupils are equal, round, and reactive to light.   Neck: Normal range of motion. Neck supple.   Cardiovascular: Normal rate, regular rhythm, normal heart sounds and intact distal pulses.  Exam reveals no gallop and no friction rub.    No murmur heard.  Pulmonary/Chest: Effort normal and breath sounds normal. No respiratory distress. She has no wheezes. She has no rales. She exhibits no tenderness.   Musculoskeletal: Normal range of motion.   Neurological: She is alert and oriented to person, place, and time.   Skin: Skin is warm and dry. No rash noted. She is not diaphoretic.   Psychiatric: She has a normal mood and affect. Her behavior is normal. Judgment and thought content normal.        Assessment:       1. Acute non-recurrent maxillary sinusitis  doxycycline (VIBRAMYCIN) 100 MG Cap    methylPREDNISolone (MEDROL DOSEPACK) 4 mg tablet   2. Acute serous otitis  media of left ear, recurrence not specified  betamethasone acetate-betamethasone sodium phosphate injection 6 mg   3. Bilateral impacted cerumen          Plan:       Acute non-recurrent maxillary sinusitis  -     doxycycline (VIBRAMYCIN) 100 MG Cap; Take 1 capsule (100 mg total) by mouth 2 (two) times daily.  Dispense: 20 capsule; Refill: 0  -     methylPREDNISolone (MEDROL DOSEPACK) 4 mg tablet; use as directed  Dispense: 1 Package; Refill: 0    Acute serous otitis media of left ear, recurrence not specified  -     betamethasone acetate-betamethasone sodium phosphate injection 6 mg; Inject 1 mL (6 mg total) into the muscle one time.    Bilateral impacted cerumen  - wax was partially removed today, recommended patient use Debrox over the counter   - recommended follow up with her ENT

## 2018-04-09 ENCOUNTER — LAB VISIT (OUTPATIENT)
Dept: LAB | Facility: HOSPITAL | Age: 48
End: 2018-04-09
Attending: FAMILY MEDICINE
Payer: COMMERCIAL

## 2018-04-09 DIAGNOSIS — E78.5 HYPERLIPIDEMIA, UNSPECIFIED HYPERLIPIDEMIA TYPE: ICD-10-CM

## 2018-04-09 DIAGNOSIS — Z79.890 POSTMENOPAUSAL HRT (HORMONE REPLACEMENT THERAPY): ICD-10-CM

## 2018-04-09 LAB
AST SERPL-CCNC: 18 U/L
CHOLEST SERPL-MCNC: 169 MG/DL
CHOLEST/HDLC SERPL: 2.7 {RATIO}
HDLC SERPL-MCNC: 62 MG/DL
HDLC SERPL: 36.7 %
LDLC SERPL CALC-MCNC: 95 MG/DL
NONHDLC SERPL-MCNC: 107 MG/DL
TRIGL SERPL-MCNC: 60 MG/DL

## 2018-04-09 PROCEDURE — 84450 TRANSFERASE (AST) (SGOT): CPT

## 2018-04-09 PROCEDURE — 80061 LIPID PANEL: CPT

## 2018-04-09 PROCEDURE — 36415 COLL VENOUS BLD VENIPUNCTURE: CPT | Mod: PO

## 2018-04-09 RX ORDER — PROGESTERONE 100 MG/1
100 CAPSULE ORAL NIGHTLY
Qty: 30 CAPSULE | Refills: 11 | Status: SHIPPED | OUTPATIENT
Start: 2018-04-09 | End: 2018-06-18

## 2018-04-09 NOTE — TELEPHONE ENCOUNTER
Spoke with pt scheduled appt to discuss HRT with Dr. Alatorre.   Sent refill request for progesterone to Dr. Alatorre

## 2018-04-09 NOTE — TELEPHONE ENCOUNTER
----- Message from aMri Morales sent at 4/9/2018  9:28 AM CDT -----  Patient states she is wanting to speak to office regarding problems with her hormones, please call [phone]

## 2018-04-19 ENCOUNTER — OFFICE VISIT (OUTPATIENT)
Dept: OBSTETRICS AND GYNECOLOGY | Facility: CLINIC | Age: 48
End: 2018-04-19
Payer: COMMERCIAL

## 2018-04-19 ENCOUNTER — LAB VISIT (OUTPATIENT)
Dept: LAB | Facility: HOSPITAL | Age: 48
End: 2018-04-19
Attending: OBSTETRICS & GYNECOLOGY
Payer: COMMERCIAL

## 2018-04-19 VITALS — BODY MASS INDEX: 29.07 KG/M2 | WEIGHT: 180.13 LBS

## 2018-04-19 DIAGNOSIS — R63.5 WEIGHT GAIN: Primary | ICD-10-CM

## 2018-04-19 DIAGNOSIS — R53.83 FATIGUE, UNSPECIFIED TYPE: ICD-10-CM

## 2018-04-19 DIAGNOSIS — Z79.890 POSTMENOPAUSAL HRT (HORMONE REPLACEMENT THERAPY): ICD-10-CM

## 2018-04-19 DIAGNOSIS — R23.2 HOT FLASHES: ICD-10-CM

## 2018-04-19 DIAGNOSIS — Z78.0 MENOPAUSE: ICD-10-CM

## 2018-04-19 DIAGNOSIS — L70.0 ACNE VULGARIS: ICD-10-CM

## 2018-04-19 DIAGNOSIS — R63.5 WEIGHT GAIN: ICD-10-CM

## 2018-04-19 LAB
ESTRADIOL SERPL-MCNC: 43 PG/ML
TSH SERPL DL<=0.005 MIU/L-ACNC: 1.8 UIU/ML

## 2018-04-19 PROCEDURE — 84443 ASSAY THYROID STIM HORMONE: CPT

## 2018-04-19 PROCEDURE — 99213 OFFICE O/P EST LOW 20 MIN: CPT | Mod: S$GLB,,, | Performed by: OBSTETRICS & GYNECOLOGY

## 2018-04-19 PROCEDURE — 36415 COLL VENOUS BLD VENIPUNCTURE: CPT | Mod: PO

## 2018-04-19 PROCEDURE — 82670 ASSAY OF TOTAL ESTRADIOL: CPT

## 2018-04-19 PROCEDURE — 99999 PR PBB SHADOW E&M-EST. PATIENT-LVL II: CPT | Mod: PBBFAC,,, | Performed by: OBSTETRICS & GYNECOLOGY

## 2018-04-19 RX ORDER — MEDROXYPROGESTERONE ACETATE 5 MG/1
5 TABLET ORAL DAILY
Qty: 30 TABLET | Refills: 11 | Status: SHIPPED | OUTPATIENT
Start: 2018-04-19 | End: 2018-06-18 | Stop reason: SDUPTHER

## 2018-04-19 NOTE — PROGRESS NOTES
Chief Complaint   Patient presents with    Weight Gain     pt states shes gained 20# since August 2018    Medication Management     discuss progesterone, is she getting too much, co-worker only takes 10mg     Acne     chest       History of Present Illness: Steph Lemus is a 47 y.o. female that presents today 4/19/2018 for   Chief Complaint   Patient presents with    Weight Gain     pt states shes gained 20# since August 2018    Medication Management     discuss progesterone, is she getting too much, co-worker only takes 10mg     Acne     chest         Past Medical History:   Diagnosis Date    Hyperlipidemia        Past Surgical History:   Procedure Laterality Date    DILATION AND CURETTAGE OF UTERUS      for uterine polyps    ENDOMETRIAL ABLATION         Current Outpatient Prescriptions   Medication Sig Dispense Refill    atorvastatin (LIPITOR) 10 MG tablet Take 1 tablet (10 mg total) by mouth once daily. 30 tablet 11    DIVIGEL 0.5 mg/0.5 gram (0.1 %) GlPk PLACE 1 APPLICATION ONTO THE SKIN ONCE DAILY. 30 packet 3    multivitamin (MULTIVITAMIN) per tablet Take 1 tablet by mouth once daily.        progesterone (PROMETRIUM) 100 MG capsule Take 1 capsule (100 mg total) by mouth nightly. 30 capsule 11    medroxyPROGESTERone (PROVERA) 5 MG tablet Take 1 tablet (5 mg total) by mouth once daily. 30 tablet 11     No current facility-administered medications for this visit.        Review of patient's allergies indicates:   Allergen Reactions    No known drug allergies        Family History   Problem Relation Age of Onset    Cancer Maternal Grandfather      lung    Diabetes Maternal Grandfather     BRCA 1/2 Cousin     Colon cancer Neg Hx     Ovarian cancer Neg Hx     Breast cancer Neg Hx        Social History   Substance Use Topics    Smoking status: Former Smoker     Packs/day: 0.30     Types: Cigarettes     Quit date: 6/8/2017    Smokeless tobacco: Never Used    Alcohol use 0.0 oz/week       Comment: Drinks wine socially       OB History    Para Term  AB Living   3 3 3         SAB TAB Ectopic Multiple Live Births                  # Outcome Date GA Lbr Randal/2nd Weight Sex Delivery Anes PTL Lv   3 Term            2 Term            1 Term                   Review of Symptoms:  GENERAL:++ weight gain no weight loss. Feeling well overall.   SKIN: Denies rash or lesions.   HEAD: Denies head injury or headache.   NODES: Denies enlarged lymph nodes.   CHEST: Denies chest pain or shortness of breath.   CARDIOVASCULAR: Denies palpitations or left sided chest pain.   ABDOMEN: No abdominal pain, constipation, diarrhea, nausea, vomiting or rectal bleeding.   URINARY: No frequency, dysuria, hematuria, or burning on urination.  HEMATOLOGIC: No easy bruisability or excessive bleeding.   MUSCULOSKELETAL: Denies joint pain or swelling.     Wt 81.7 kg (180 lb 1.9 oz)   Physical Exam:  APPEARANCE: Well nourished, well developed, in no acute distress.  SKIN: Normal skin turgor, no lesions.  NECK: Neck symmetric without masses   RESPIRATORY: Normal respiratory effort with no retractions or use of accessory muscles  CARDIOVASCULAR: Peripheral vascular system with no swelling no varicosities and palpation of pulses normal  LYMPHATIC: No enlargements of the lymph nodes noted in the neck, axillae, or groin  ABDOMEN: Soft. No tenderness or masses. No hepatosplenomegaly. No hernias.EXTREMITIES: No clubbing cyanosis or edema.    ASSESSMENT/PLAN:  Weight gain  -     TSH; Future; Expected date: 2018  -     medroxyPROGESTERone (PROVERA) 5 MG tablet; Take 1 tablet (5 mg total) by mouth once daily.  Dispense: 30 tablet; Refill: 11    Postmenopausal HRT (hormone replacement therapy)    Menopause    Acne vulgaris    Hot flashes  -     medroxyPROGESTERone (PROVERA) 5 MG tablet; Take 1 tablet (5 mg total) by mouth once daily.  Dispense: 30 tablet; Refill: 11  -     Estradiol; Future; Expected date: 2018    Fatigue,  unspecified type  -     TSH; Future; Expected date: 04/19/2018        15 minutes spent today with this patient. Greater than half spent in counseling today.

## 2018-05-10 DIAGNOSIS — Z79.890 POSTMENOPAUSAL HRT (HORMONE REPLACEMENT THERAPY): ICD-10-CM

## 2018-05-11 RX ORDER — ESTRADIOL 0.5 MG/.5G
1 GEL TOPICAL DAILY
Qty: 30 PACKET | Refills: 3 | Status: SHIPPED | OUTPATIENT
Start: 2018-05-11 | End: 2018-06-18 | Stop reason: SDUPTHER

## 2018-06-18 ENCOUNTER — OFFICE VISIT (OUTPATIENT)
Dept: OBSTETRICS AND GYNECOLOGY | Facility: CLINIC | Age: 48
End: 2018-06-18
Payer: COMMERCIAL

## 2018-06-18 VITALS
WEIGHT: 179 LBS | BODY MASS INDEX: 28.77 KG/M2 | SYSTOLIC BLOOD PRESSURE: 122 MMHG | HEIGHT: 66 IN | DIASTOLIC BLOOD PRESSURE: 72 MMHG

## 2018-06-18 DIAGNOSIS — Z78.0 MENOPAUSE: ICD-10-CM

## 2018-06-18 DIAGNOSIS — R63.5 WEIGHT GAIN: ICD-10-CM

## 2018-06-18 DIAGNOSIS — Z01.419 ENCOUNTER FOR GYNECOLOGICAL EXAMINATION WITHOUT ABNORMAL FINDING: Primary | ICD-10-CM

## 2018-06-18 DIAGNOSIS — Z79.890 POSTMENOPAUSAL HRT (HORMONE REPLACEMENT THERAPY): ICD-10-CM

## 2018-06-18 DIAGNOSIS — R23.2 HOT FLASHES: ICD-10-CM

## 2018-06-18 DIAGNOSIS — Z12.31 VISIT FOR SCREENING MAMMOGRAM: ICD-10-CM

## 2018-06-18 PROCEDURE — 99396 PREV VISIT EST AGE 40-64: CPT | Mod: S$GLB,,, | Performed by: OBSTETRICS & GYNECOLOGY

## 2018-06-18 PROCEDURE — 99999 PR PBB SHADOW E&M-EST. PATIENT-LVL III: CPT | Mod: PBBFAC,,, | Performed by: OBSTETRICS & GYNECOLOGY

## 2018-06-18 RX ORDER — ESTRADIOL 0.5 MG/.5G
1 GEL TOPICAL DAILY
Qty: 30 PACKET | Refills: 11 | Status: SHIPPED | OUTPATIENT
Start: 2018-06-18 | End: 2018-09-18

## 2018-06-18 RX ORDER — MEDROXYPROGESTERONE ACETATE 5 MG/1
5 TABLET ORAL DAILY
Qty: 30 TABLET | Refills: 11 | Status: SHIPPED | OUTPATIENT
Start: 2018-06-18 | End: 2018-09-18

## 2018-06-18 NOTE — PROGRESS NOTES
Chief Complaint   Patient presents with    Well Woman       History of Present Illness: Steph Lemus is a 47 y.o. female that presents today 2018 for well gyn visit.    Past Medical History:   Diagnosis Date    Hyperlipidemia        Past Surgical History:   Procedure Laterality Date    DILATION AND CURETTAGE OF UTERUS      for uterine polyps    ENDOMETRIAL ABLATION      TONSILLECTOMY         Current Outpatient Prescriptions   Medication Sig Dispense Refill    atorvastatin (LIPITOR) 10 MG tablet Take 1 tablet (10 mg total) by mouth once daily. 30 tablet 11    estradiol (DIVIGEL) 0.5 mg/0.5 gram (0.1 %) GlPk Place 1 application onto the skin once daily. 30 packet 11    medroxyPROGESTERone (PROVERA) 5 MG tablet Take 1 tablet (5 mg total) by mouth once daily. 30 tablet 11    multivitamin (MULTIVITAMIN) per tablet Take 1 tablet by mouth once daily.         No current facility-administered medications for this visit.        Review of patient's allergies indicates:   Allergen Reactions    No known drug allergies        Family History   Problem Relation Age of Onset    Cancer Maternal Grandfather         lung    Diabetes Maternal Grandfather     BRCA 1/2 Cousin     Colon cancer Neg Hx     Ovarian cancer Neg Hx     Breast cancer Neg Hx        Social History     Social History    Marital status:      Spouse name: N/A    Number of children: N/A    Years of education: N/A     Social History Main Topics    Smoking status: Former Smoker     Packs/day: 0.30     Types: Cigarettes     Quit date: 2017    Smokeless tobacco: Never Used    Alcohol use 0.0 oz/week      Comment: Drinks wine socially    Drug use: No    Sexual activity: Yes     Partners: Male     Birth control/ protection: Other-see comments     Other Topics Concern    None     Social History Narrative    None       OB History    Para Term  AB Living   3 3 3         SAB TAB Ectopic Multiple Live Births            "3      # Outcome Date GA Lbr Randal/2nd Weight Sex Delivery Anes PTL Lv   3 Term            2 Term            1 Term                   Review of Symptoms:  GENERAL: Denies weight gain or weight loss. Feeling well overall.   SKIN: Denies rash or lesions.   HEAD: Denies head injury or headache.   NODES: Denies enlarged lymph nodes.   CHEST: Denies chest pain or shortness of breath.   CARDIOVASCULAR: Denies palpitations or left sided chest pain.   ABDOMEN: No abdominal pain, constipation, diarrhea, nausea, vomiting or rectal bleeding.   URINARY: No frequency, dysuria, hematuria, or burning on urination.  HEMATOLOGIC: No easy bruisability or excessive bleeding.   MUSCULOSKELETAL: Denies joint pain or swelling.     /72   Ht 5' 6" (1.676 m)   Wt 81.2 kg (179 lb 0.2 oz)   Physical Exam:  APPEARANCE: Well nourished, well developed, in no acute distress.  SKIN: Normal skin turgor, no lesions.  NECK: Neck symmetric without masses   RESPIRATORY: Normal respiratory effort with no retractions or use of accessory muscles  CARDIOVASCULAR: Peripheral vascular system with no swelling no varicosities and palpation of pulses normal  LYMPHATIC: No enlargements of the lymph nodes noted in the neck, axillae, or groin  ABDOMEN: Soft. No tenderness or masses. No hepatosplenomegaly. No hernias.  BREASTS: Symmetrical, no skin changes or visible lesions. No palpable masses, nipple discharge or adenopathy bilaterally.  PELVIC: Normal external female genitalia without lesions. Normal hair distribution. Adequate perineal body, normal urethral meatus. Urethra with no masses.  Bladder nontender. Vagina moist and well rugated without lesions or discharge. Cervix pink and without lesions. No significant cystocele or rectocele. Bimanual exam showed uterus normal size, shape, position, mobile and nontender. Adnexa without masses or tenderness. Urethra and bladder normal.   EXTREMITIES: No clubbing cyanosis or edema.    ASSESSMENT/PLAN:  Encounter " for gynecological examination without abnormal finding    Postmenopausal HRT (hormone replacement therapy)  -     estradiol (DIVIGEL) 0.5 mg/0.5 gram (0.1 %) GlPk; Place 1 application onto the skin once daily.  Dispense: 30 packet; Refill: 11  -     medroxyPROGESTERone (PROVERA) 5 MG tablet; Take 1 tablet (5 mg total) by mouth once daily.  Dispense: 30 tablet; Refill: 11    Menopause    Hot flashes  -     estradiol (DIVIGEL) 0.5 mg/0.5 gram (0.1 %) GlPk; Place 1 application onto the skin once daily.  Dispense: 30 packet; Refill: 11  -     medroxyPROGESTERone (PROVERA) 5 MG tablet; Take 1 tablet (5 mg total) by mouth once daily.  Dispense: 30 tablet; Refill: 11    Visit for screening mammogram    Weight gain  -     estradiol (DIVIGEL) 0.5 mg/0.5 gram (0.1 %) GlPk; Place 1 application onto the skin once daily.  Dispense: 30 packet; Refill: 11  -     medroxyPROGESTERone (PROVERA) 5 MG tablet; Take 1 tablet (5 mg total) by mouth once daily.  Dispense: 30 tablet; Refill: 11          Patient was counseled today on Pap guidelines, recommendation for pelvic exams, mammograms every other year after the age of 40 and annually after the age of 50, Colonoscopy after the age of 50, Dexa Bone Scan and calcium and vitamin D supplementation in menopause and to see her PCP for other health maintenance.   FOLLOW-UP:prn

## 2018-07-20 DIAGNOSIS — Z12.11 COLON CANCER SCREENING: ICD-10-CM

## 2018-09-18 ENCOUNTER — PATIENT MESSAGE (OUTPATIENT)
Dept: OBSTETRICS AND GYNECOLOGY | Facility: CLINIC | Age: 48
End: 2018-09-18

## 2018-09-18 RX ORDER — ESTRADIOL 0.25 MG/.25G
1 GEL TOPICAL DAILY
Qty: 30 PACKET | Refills: 11 | Status: SHIPPED | OUTPATIENT
Start: 2018-09-18 | End: 2019-06-10

## 2018-09-18 RX ORDER — MEDROXYPROGESTERONE ACETATE 2.5 MG/1
2.5 TABLET ORAL DAILY
Qty: 30 TABLET | Refills: 11 | Status: SHIPPED | OUTPATIENT
Start: 2018-09-18 | End: 2019-06-10

## 2018-10-09 ENCOUNTER — PATIENT MESSAGE (OUTPATIENT)
Dept: ADMINISTRATIVE | Facility: HOSPITAL | Age: 48
End: 2018-10-09

## 2018-11-06 RX ORDER — ATORVASTATIN CALCIUM 10 MG/1
10 TABLET, FILM COATED ORAL DAILY
Qty: 30 TABLET | Refills: 10 | Status: SHIPPED | OUTPATIENT
Start: 2018-11-06 | End: 2018-11-07 | Stop reason: SDUPTHER

## 2018-11-07 RX ORDER — ATORVASTATIN CALCIUM 10 MG/1
10 TABLET, FILM COATED ORAL DAILY
Qty: 30 TABLET | Refills: 10 | Status: SHIPPED | OUTPATIENT
Start: 2018-11-07 | End: 2019-07-27 | Stop reason: SDUPTHER

## 2018-12-26 ENCOUNTER — LAB VISIT (OUTPATIENT)
Dept: LAB | Facility: HOSPITAL | Age: 48
End: 2018-12-26
Attending: FAMILY MEDICINE
Payer: COMMERCIAL

## 2018-12-26 DIAGNOSIS — Z12.11 COLON CANCER SCREENING: ICD-10-CM

## 2018-12-26 LAB — HEMOCCULT STL QL IA: NEGATIVE

## 2018-12-26 PROCEDURE — 82274 ASSAY TEST FOR BLOOD FECAL: CPT

## 2019-01-21 ENCOUNTER — OFFICE VISIT (OUTPATIENT)
Dept: FAMILY MEDICINE | Facility: CLINIC | Age: 49
End: 2019-01-21
Payer: COMMERCIAL

## 2019-01-21 ENCOUNTER — LAB VISIT (OUTPATIENT)
Dept: LAB | Facility: HOSPITAL | Age: 49
End: 2019-01-21
Attending: FAMILY MEDICINE
Payer: COMMERCIAL

## 2019-01-21 VITALS
WEIGHT: 171.94 LBS | DIASTOLIC BLOOD PRESSURE: 72 MMHG | HEART RATE: 70 BPM | SYSTOLIC BLOOD PRESSURE: 106 MMHG | BODY MASS INDEX: 27.63 KG/M2 | HEIGHT: 66 IN

## 2019-01-21 DIAGNOSIS — Z00.00 ROUTINE HEALTH MAINTENANCE: Primary | ICD-10-CM

## 2019-01-21 DIAGNOSIS — E78.5 HYPERLIPIDEMIA, UNSPECIFIED HYPERLIPIDEMIA TYPE: ICD-10-CM

## 2019-01-21 DIAGNOSIS — Z00.00 ROUTINE HEALTH MAINTENANCE: ICD-10-CM

## 2019-01-21 LAB
ALBUMIN SERPL BCP-MCNC: 3.8 G/DL
ALP SERPL-CCNC: 65 U/L
ALT SERPL W/O P-5'-P-CCNC: 25 U/L
ANION GAP SERPL CALC-SCNC: 8 MMOL/L
AST SERPL-CCNC: 22 U/L
BILIRUB SERPL-MCNC: 0.3 MG/DL
BUN SERPL-MCNC: 18 MG/DL
CALCIUM SERPL-MCNC: 9.2 MG/DL
CHLORIDE SERPL-SCNC: 108 MMOL/L
CHOLEST SERPL-MCNC: 142 MG/DL
CHOLEST/HDLC SERPL: 2.4 {RATIO}
CO2 SERPL-SCNC: 25 MMOL/L
CREAT SERPL-MCNC: 0.8 MG/DL
EST. GFR  (AFRICAN AMERICAN): >60 ML/MIN/1.73 M^2
EST. GFR  (NON AFRICAN AMERICAN): >60 ML/MIN/1.73 M^2
GLUCOSE SERPL-MCNC: 93 MG/DL
HDLC SERPL-MCNC: 60 MG/DL
HDLC SERPL: 42.3 %
LDLC SERPL CALC-MCNC: 73.4 MG/DL
NONHDLC SERPL-MCNC: 82 MG/DL
POTASSIUM SERPL-SCNC: 4.1 MMOL/L
PROT SERPL-MCNC: 6.8 G/DL
SODIUM SERPL-SCNC: 141 MMOL/L
TRIGL SERPL-MCNC: 43 MG/DL
TSH SERPL DL<=0.005 MIU/L-ACNC: 1.04 UIU/ML

## 2019-01-21 PROCEDURE — 99999 PR PBB SHADOW E&M-EST. PATIENT-LVL III: CPT | Mod: PBBFAC,,, | Performed by: FAMILY MEDICINE

## 2019-01-21 PROCEDURE — 99396 PR PREVENTIVE VISIT,EST,40-64: ICD-10-PCS | Mod: S$GLB,,, | Performed by: FAMILY MEDICINE

## 2019-01-21 PROCEDURE — 36415 COLL VENOUS BLD VENIPUNCTURE: CPT | Mod: PO

## 2019-01-21 PROCEDURE — 80053 COMPREHEN METABOLIC PANEL: CPT

## 2019-01-21 PROCEDURE — 99999 PR PBB SHADOW E&M-EST. PATIENT-LVL III: ICD-10-PCS | Mod: PBBFAC,,, | Performed by: FAMILY MEDICINE

## 2019-01-21 PROCEDURE — 84443 ASSAY THYROID STIM HORMONE: CPT

## 2019-01-21 PROCEDURE — 80061 LIPID PANEL: CPT

## 2019-01-21 PROCEDURE — 99396 PREV VISIT EST AGE 40-64: CPT | Mod: S$GLB,,, | Performed by: FAMILY MEDICINE

## 2019-01-21 NOTE — PROGRESS NOTES
HPI  Steph Lemus is a 48 y.o. female with multiple medical diagnoses as listed in the medical history and problem list that presents for Annual Exam  .      HPI  Here today for routine health maintenance.    PAST MEDICAL HISTORY:  Past Medical History:   Diagnosis Date    Hyperlipidemia        PAST SURGICAL HISTORY:  Past Surgical History:   Procedure Laterality Date    DILATION AND CURETTAGE OF UTERUS      for uterine polyps    ENDOMETRIAL ABLATION      TONSILLECTOMY         SOCIAL HISTORY:  Social History     Socioeconomic History    Marital status:      Spouse name: Not on file    Number of children: Not on file    Years of education: Not on file    Highest education level: Not on file   Social Needs    Financial resource strain: Not on file    Food insecurity - worry: Not on file    Food insecurity - inability: Not on file    Transportation needs - medical: Not on file    Transportation needs - non-medical: Not on file   Occupational History    Not on file   Tobacco Use    Smoking status: Former Smoker     Packs/day: 0.30     Types: Cigarettes     Last attempt to quit: 2017     Years since quittin.6    Smokeless tobacco: Never Used   Substance and Sexual Activity    Alcohol use: Yes     Alcohol/week: 0.0 oz     Comment: Drinks wine socially    Drug use: No    Sexual activity: Yes     Partners: Male     Birth control/protection: Other-see comments   Other Topics Concern    Not on file   Social History Narrative    Not on file       FAMILY HISTORY:  Family History   Problem Relation Age of Onset    Cancer Maternal Grandfather         lung    Diabetes Maternal Grandfather     BRCA 1/2 Cousin     Colon cancer Neg Hx     Ovarian cancer Neg Hx     Breast cancer Neg Hx        ALLERGIES AND MEDICATIONS: updated and reviewed.  Review of patient's allergies indicates:   Allergen Reactions    No known drug allergies      Current Outpatient Medications   Medication Sig  "Dispense Refill    atorvastatin (LIPITOR) 10 MG tablet TAKE 1 TABLET (10 MG TOTAL) BY MOUTH ONCE DAILY. 30 tablet 10    estradiol (DIVIGEL) 0.25 mg/0.25 gram (0.1 %) GlPk Place 1 application onto the skin once daily. 30 packet 11    medroxyPROGESTERone (PROVERA) 2.5 MG tablet Take 1 tablet (2.5 mg total) by mouth once daily. 30 tablet 11    multivitamin (MULTIVITAMIN) per tablet Take 1 tablet by mouth once daily.         No current facility-administered medications for this visit.        ROS  Review of Systems   Constitutional: Negative for fatigue, fever and unexpected weight change.   HENT: Negative for congestion, hearing loss, rhinorrhea and sore throat.    Eyes: Negative for visual disturbance.   Respiratory: Negative for cough, chest tightness, shortness of breath and wheezing.    Cardiovascular: Negative for chest pain, palpitations and leg swelling.   Gastrointestinal: Negative for abdominal distention, abdominal pain, blood in stool, constipation, diarrhea, nausea and vomiting.   Genitourinary: Negative for difficulty urinating, dysuria, frequency, hematuria, menstrual problem, pelvic pain, urgency and vaginal bleeding.   Musculoskeletal: Negative for back pain, joint swelling and neck pain.   Skin: Negative for rash.   Neurological: Negative for dizziness, tremors, weakness, light-headedness, numbness and headaches.   Psychiatric/Behavioral: Negative for confusion, dysphoric mood and sleep disturbance. The patient is not nervous/anxious.        Physical Exam  Vitals:    01/21/19 0900   BP: 106/72   Pulse: 70    Body mass index is 27.75 kg/m².  Weight: 78 kg (171 lb 15.3 oz)   Height: 5' 6" (167.6 cm)     Physical Exam   Constitutional: She is oriented to person, place, and time. She appears well-developed and well-nourished.   HENT:   Head: Normocephalic and atraumatic.   Right Ear: External ear normal.   Left Ear: External ear normal.   Nose: Nose normal.   Mouth/Throat: Oropharynx is clear and moist. "   Eyes: Conjunctivae and EOM are normal. Pupils are equal, round, and reactive to light. No scleral icterus.   Neck: Normal range of motion. Neck supple. No JVD present. No thyromegaly present.   Cardiovascular: Normal rate, regular rhythm and normal heart sounds. Exam reveals no gallop and no friction rub.   No murmur heard.  Pulmonary/Chest: Effort normal and breath sounds normal. She has no wheezes. She has no rales.   Abdominal: Soft. Bowel sounds are normal. She exhibits no distension and no mass. There is no tenderness. There is no rebound and no guarding.   Musculoskeletal: Normal range of motion. She exhibits no edema.   Lymphadenopathy:     She has no cervical adenopathy.   Neurological: She is alert and oriented to person, place, and time. She has normal strength. No cranial nerve deficit or sensory deficit.   Skin: Skin is warm and dry. No rash noted.   Vitals reviewed.      Health Maintenance       Date Due Completion Date    Influenza Vaccine 08/01/2018 1/8/2018    Override on 9/6/2016: Declined (dont want today)    Fecal Occult Blood Test (FOBT)/[facility] 12/26/2019 12/26/2018    Pap Smear with HPV Cotest 01/09/2020 1/9/2017    Mammogram 01/12/2020 1/12/2018    Lipid Panel 04/09/2023 4/9/2018    TETANUS VACCINE 09/01/2026 9/1/2016          Assessment & Plan    Routine health maintenance  -     Comprehensive metabolic panel; Future; Expected date: 01/21/2019  -     Lipid panel; Future; Expected date: 01/21/2019  -     TSH; Future; Expected date: 01/21/2019  -     Case request GI: COLONOSCOPY  - Health maintenance reviewed  - Diet and exercise education.    Hyperlipidemia, unspecified hyperlipidemia type  - Continue current therapy        Follow-up in about 1 year (around 1/21/2020).

## 2019-04-08 ENCOUNTER — PATIENT MESSAGE (OUTPATIENT)
Dept: OBSTETRICS AND GYNECOLOGY | Facility: CLINIC | Age: 49
End: 2019-04-08

## 2019-04-12 ENCOUNTER — ANESTHESIA EVENT (OUTPATIENT)
Dept: ENDOSCOPY | Facility: HOSPITAL | Age: 49
End: 2019-04-12
Payer: COMMERCIAL

## 2019-04-15 ENCOUNTER — HOSPITAL ENCOUNTER (OUTPATIENT)
Facility: HOSPITAL | Age: 49
Discharge: HOME | End: 2019-04-15
Attending: INTERNAL MEDICINE | Admitting: INTERNAL MEDICINE
Payer: COMMERCIAL

## 2019-04-15 ENCOUNTER — ANESTHESIA (OUTPATIENT)
Dept: ENDOSCOPY | Facility: HOSPITAL | Age: 49
End: 2019-04-15
Payer: COMMERCIAL

## 2019-04-15 VITALS
HEIGHT: 66 IN | SYSTOLIC BLOOD PRESSURE: 133 MMHG | BODY MASS INDEX: 26.52 KG/M2 | DIASTOLIC BLOOD PRESSURE: 81 MMHG | TEMPERATURE: 98 F | WEIGHT: 165 LBS | HEART RATE: 60 BPM | RESPIRATION RATE: 10 BRPM | OXYGEN SATURATION: 100 %

## 2019-04-15 DIAGNOSIS — Z12.11 COLON CANCER SCREENING: ICD-10-CM

## 2019-04-15 LAB
B-HCG UR QL: NEGATIVE
CTP QC/QA: YES

## 2019-04-15 PROCEDURE — D9220A PRA ANESTHESIA: Mod: ANES,,, | Performed by: ANESTHESIOLOGY

## 2019-04-15 PROCEDURE — G0121 COLON CA SCRN NOT HI RSK IND: HCPCS | Mod: PO | Performed by: INTERNAL MEDICINE

## 2019-04-15 PROCEDURE — D9220A PRA ANESTHESIA: ICD-10-PCS | Mod: ANES,,, | Performed by: ANESTHESIOLOGY

## 2019-04-15 PROCEDURE — 25000003 PHARM REV CODE 250: Mod: PO | Performed by: INTERNAL MEDICINE

## 2019-04-15 PROCEDURE — 81025 URINE PREGNANCY TEST: CPT | Mod: PO | Performed by: INTERNAL MEDICINE

## 2019-04-15 PROCEDURE — G0121 COLON CA SCRN NOT HI RSK IND: HCPCS | Mod: ,,, | Performed by: INTERNAL MEDICINE

## 2019-04-15 PROCEDURE — 37000009 HC ANESTHESIA EA ADD 15 MINS: Mod: PO | Performed by: INTERNAL MEDICINE

## 2019-04-15 PROCEDURE — G0121 COLON CA SCRN NOT HI RSK IND: ICD-10-PCS | Mod: ,,, | Performed by: INTERNAL MEDICINE

## 2019-04-15 PROCEDURE — 63600175 PHARM REV CODE 636 W HCPCS: Mod: PO | Performed by: NURSE ANESTHETIST, CERTIFIED REGISTERED

## 2019-04-15 PROCEDURE — D9220A PRA ANESTHESIA: Mod: CRNA,,, | Performed by: NURSE ANESTHETIST, CERTIFIED REGISTERED

## 2019-04-15 PROCEDURE — D9220A PRA ANESTHESIA: ICD-10-PCS | Mod: CRNA,,, | Performed by: NURSE ANESTHETIST, CERTIFIED REGISTERED

## 2019-04-15 PROCEDURE — 37000008 HC ANESTHESIA 1ST 15 MINUTES: Mod: PO | Performed by: INTERNAL MEDICINE

## 2019-04-15 RX ORDER — PROPOFOL 10 MG/ML
VIAL (ML) INTRAVENOUS
Status: DISCONTINUED | OUTPATIENT
Start: 2019-04-15 | End: 2019-04-15

## 2019-04-15 RX ORDER — SODIUM CHLORIDE 0.9 % (FLUSH) 0.9 %
10 SYRINGE (ML) INJECTION
Status: DISCONTINUED | OUTPATIENT
Start: 2019-04-15 | End: 2019-04-15 | Stop reason: HOSPADM

## 2019-04-15 RX ORDER — SODIUM CHLORIDE, SODIUM LACTATE, POTASSIUM CHLORIDE, CALCIUM CHLORIDE 600; 310; 30; 20 MG/100ML; MG/100ML; MG/100ML; MG/100ML
INJECTION, SOLUTION INTRAVENOUS CONTINUOUS
Status: DISCONTINUED | OUTPATIENT
Start: 2019-04-15 | End: 2019-04-15 | Stop reason: HOSPADM

## 2019-04-15 RX ORDER — LIDOCAINE HCL/PF 100 MG/5ML
SYRINGE (ML) INTRAVENOUS
Status: DISCONTINUED | OUTPATIENT
Start: 2019-04-15 | End: 2019-04-15

## 2019-04-15 RX ADMIN — LIDOCAINE HYDROCHLORIDE 100 MG: 20 INJECTION, SOLUTION INTRAVENOUS at 09:04

## 2019-04-15 RX ADMIN — SODIUM CHLORIDE, SODIUM LACTATE, POTASSIUM CHLORIDE, AND CALCIUM CHLORIDE: .6; .31; .03; .02 INJECTION, SOLUTION INTRAVENOUS at 09:04

## 2019-04-15 RX ADMIN — PROPOFOL 100 MG: 10 INJECTION, EMULSION INTRAVENOUS at 09:04

## 2019-04-15 RX ADMIN — PROPOFOL 20 MG: 10 INJECTION, EMULSION INTRAVENOUS at 09:04

## 2019-04-15 NOTE — TRANSFER OF CARE
"Anesthesia Transfer of Care Note    Patient: Steph Lemus    Procedure(s) Performed: Procedure(s) (LRB):  COLONOSCOPY (N/A)    Patient location: PACU    Anesthesia Type: general    Transport from OR: Transported from OR on room air with adequate spontaneous ventilation    Post pain: adequate analgesia    Post assessment: no apparent anesthetic complications and tolerated procedure well    Post vital signs: stable    Level of consciousness: sedated    Nausea/Vomiting: no nausea/vomiting    Complications: none    Transfer of care protocol was followed      Last vitals:   Visit Vitals  /66 (BP Location: Right arm, Patient Position: Sitting)   Pulse 65   Temp 36.5 °C (97.7 °F) (Skin)   Resp 16   Ht 5' 6" (1.676 m)   Wt 74.8 kg (165 lb)   LMP 03/22/2019 (Approximate)   SpO2 98%   Breastfeeding? No   BMI 26.63 kg/m²     "

## 2019-04-15 NOTE — [employer] PATIENT INSTRUCTIONS
Discharge Summary/Instructions for after Colonoscopy without   Biopsy/Polypectomy  Steph Lemus    Monday, April 15, 2019  Tad Suarez MD  RESTRICTIONS ON ACTIVITY:  - Do not drive a car or operate machinery until the day after the procedure.      - The following day: return to full activity including work.  - For  3 days: No heavy lifting, straining or running.  - Diet: You may eat solid foods, but no gassy foods (i.e. beans, broccoli,   cabbage, etc).  TREATMENT FOR COMMON SIDE EFFECTS:  - Mild abdominal pain and bloating or excessive gas: rest, eat lightly and   use a heating pad.  SYMPTOMS TO WATCH FOR AND REPORT TO YOUR PHYSICIAN:  1. Severe abdominal pain.  2. Fever within 24 hours after a procedure.  3. A large amount of rectal bleeding. (A small amount of blood from the   rectum is not serious, especially if hemorrhoids are present.  3.  Because air was put into your colon during the procedure, expelling   large amounts of air from your rectum is normal.  4.  You may not have a bowel movement for 1-3 days because of the   colonoscopy prep.  This is normal.  5.  Call immediately if you notice any of the following:   Chills and/or fever over 101   Persistent vomiting   Severe abdominal pain, other than gas cramps   Severe chest pain   Black, tarry stools   Any bleeding - exceeding one tablespoon  Your doctor recommends these additional instructions:  Eat a high fiber diet.   Take a PROBIOTIC, such as a carton of GREEK YOGURT (Chobani or Oikos,  or   Activia or Dannon);  or tablets of ALIGN or CULTURELLE or RAYMUNDO-Q (all   non-prescription), every day for a month.   And repeat this 3-4 times a   year.  Your physician has recommended a repeat colonoscopy in 10 years for   screening purposes.  None  If you have any questions or problems, please call your physician.  EMERGENCY PHONE NUMBER: [phone]  LAB RESULTS: Call in two (2) weeks for lab results,  [phone]  ___________________________________________  Nurse Signature  ___________________________________________  Patient/Designated Responsible Party Signature  Tad Suarez MD  4/15/2019 9:55:53 AM  This report has been verified and signed electronically.  [employer]

## 2019-04-15 NOTE — H&P
History & Physical - Short Stay  Gastroenterology      SUBJECTIVE:     Procedure: Colonoscopy    Chief Complaint/Indication for Procedure: Screening    History of Present Illness:  Asymptomatic  Office Visit     1/21/2019  [facility]      Rico Ramirez MD   Family Medicine   Routine health maintenance +1 more   Dx   Annual Exam   Reason for Visit      Progress Notes     Expand All Collapse All    HPI  Steph Lemus is a 48 y.o. female with multiple medical diagnoses as listed in the medical history and problem list that presents for Annual Exam  .       HPI  Here today for routine health maintenance.     Assessment & Plan     Routine health maintenance  -     Comprehensive metabolic panel; Future; Expected date: 01/21/2019  -     Lipid panel; Future; Expected date: 01/21/2019  -     TSH; Future; Expected date: 01/21/2019  -     Case request GI: COLONOSCOPY  -           Health maintenance reviewed  -           Diet and exercise education.     Hyperlipidemia, unspecified hyperlipidemia type  -           Continue current therapy           Follow-up in about 1 year (around 1/21/2020).                  PTA Medications   Medication Sig    atorvastatin (LIPITOR) 10 MG tablet TAKE 1 TABLET (10 MG TOTAL) BY MOUTH ONCE DAILY.    estradiol (DIVIGEL) 0.25 mg/0.25 gram (0.1 %) GlPk Place 1 application onto the skin once daily.    medroxyPROGESTERone (PROVERA) 2.5 MG tablet Take 1 tablet (2.5 mg total) by mouth once daily.    multivitamin (MULTIVITAMIN) per tablet Take 1 tablet by mouth once daily.         Review of patient's allergies indicates:   Allergen Reactions    No known drug allergies         Past Medical History:   Diagnosis Date    Hormone replacement therapy     Hyperlipidemia      Past Surgical History:   Procedure Laterality Date    DILATION AND CURETTAGE OF UTERUS      for uterine polyps    ENDOMETRIAL ABLATION      TONSILLECTOMY       Family History   Problem Relation Age of  "Onset    Diabetes Maternal Grandfather     Cancer Maternal Grandfather     BRCA 1/2 Cousin     Colon cancer Neg Hx     Ovarian cancer Neg Hx     Breast cancer Neg Hx      Social History     Tobacco Use    Smoking status: Former Smoker     Packs/day: 0.25     Types: Cigarettes    Smokeless tobacco: Never Used    Tobacco comment: 2 cigs per day   Substance Use Topics    Alcohol use: Yes     Alcohol/week: 0.0 oz     Comment: Drinks wine socially    Drug use: No         OBJECTIVE:     Vital Signs (Most Recent)  Temp: 97.7 °F (36.5 °C) (04/15/19 0905)  Pulse: 65 (04/15/19 0905)  Resp: 16 (04/15/19 0905)  BP: 121/66 (04/15/19 0905)  SpO2: 98 % (04/15/19 0905)    Physical Exam:  :Ht 5' 6" (1.676 m)   Wt 78 kg (171 lb 15.3 oz)   BMI 27.75 kg/m² GENERAL:  Comfortable, in no acute distress.                             HEENT EXAM:  Nonicteric.  No adenopathy.  Oropharynx is clear.               NECK:  Supple.                                                               LUNGS:  Clear.                                                               CARDIAC:  Regular rate and rhythm.  S1, S2.  No murmur.         ABDOMEN:  Soft, positive bowel sounds, nontender.  No hepatosplenomegaly or masses.  No rebound or guarding.          EXTREMITIES:  No edema.     MENTAL STATUS:  Alert and oriented.    ASSESSMENT/PLAN:     Assessment: Colorectal cancer screening    Plan: Colonoscopy    Anesthesia Plan:   MAC / General Anaesthesia    ASA Grade: ASA 2 - Patient with mild systemic disease with no functional limitations    MALLAMPATI SCORE: I (soft palate, uvula, fauces, and tonsillar pillars visible)    "

## 2019-04-15 NOTE — ANESTHESIA POSTPROCEDURE EVALUATION
Anesthesia Post Evaluation    Patient: Steph Lemus    Procedure(s) Performed: Procedure(s) (LRB):  COLONOSCOPY (N/A)    Final Anesthesia Type: general  Patient location during evaluation: PACU  Patient participation: Yes- Able to Participate  Level of consciousness: awake and alert  Post-procedure vital signs: reviewed and stable  Pain management: adequate  Airway patency: patent  PONV status at discharge: No PONV  Anesthetic complications: no      Cardiovascular status: hemodynamically stable and blood pressure returned to baseline  Respiratory status: unassisted, spontaneous ventilation and room air  Hydration status: euvolemic  Follow-up not needed.          Vitals Value Taken Time   /81 4/15/2019 10:06 AM   Temp 36.5 °C (97.7 °F) 4/15/2019  9:45 AM   Pulse 60 4/15/2019 10:06 AM   Resp 10 4/15/2019 10:06 AM   SpO2 100 % 4/15/2019 10:06 AM         Event Time     Out of Recovery 10:08:31          Pain/Cordelia Score: Cordelia Score: 10 (4/15/2019 10:01 AM)

## 2019-04-15 NOTE — DISCHARGE INSTRUCTIONS
Recovery After Procedural Sedation (Adult)  You have been given medicine by vein to make you sleep during your surgery. This may have included both a pain medicine and sleeping medicine. Most of the effects have worn off. But you may still have some drowsiness for the next 6 to 8 hours.  Home care  Follow these guidelines when you get home:  · For the next 8 hours, you should be watched by a responsible adult. This person should make sure your condition is not getting worse.  · Don't drink any alcohol for the next 24 hours.  · Don't drive, operate dangerous machinery, or make important business or personal decisions during the next 24 hours.  Note: Your healthcare provider may tell you not to take any medicine by mouth for pain or sleep in the next 4 hours. These medicines may react with the medicines you were given in the hospital. This could cause a much stronger response than usual.  Follow-up care  Follow up with your healthcare provider if you are not alert and back to your usual level of activity within 12 hours.  When to seek medical advice  Call your healthcare provider right away if any of these occur:  · Drowsiness gets worse  · Weakness or dizziness gets worse  · Repeated vomiting  · You can't be awakened   Date Last Reviewed: 10/18/2016  © 9138-5750 [employer]. [address] [place] [address], [place] [address]. All rights reserved. This information is not intended as a substitute for professional medical care. Always follow your healthcare professional's instructions.        High-Fiber Diet  Fiber is in fruits, vegetables, cereals, and grains. Fiber passes through your body undigested. A high-fiber diet helps food move through your intestinal tract. The added bulk is helpful in preventing constipation. In people with diverticulosis, fiber helps clean out the pouches along the colon wall. It also prevents new pouches from forming. A high-fiber diet reduces the risk of colon cancer. It also lowers  blood cholesterol and prevents high blood sugar in people with diabetes.    The fiber-rich foods listed below should be part of your diet. If you are not used to high-fiber foods, start with 1 or 2 foods from this list. Every 3 to 4 days add a new one to your diet. Do this until you are eating 4 high-fiber foods per day. This should give you 20 to 35 grams of fiber a day. It is also important to drink a lot of water when you are on this diet. You should have 6 to 8 glasses of water a day. Water makes the fiber swell and increases the benefit.  Foods high in dietary fiber  The following foods are high in dietary fiber:  · Breads. Breads made with 100% whole-wheat flour; anita, wheat, or rye crackers; whole-grain tortillas, bran muffins.  · Cereals. Whole-grain and bran cereals with bran (shredded wheat, wheat flakes, raisin bran, corn bran); oatmeal, rolled oats, granola, and brown rice.  · Fruits. Fresh fruits and their edible skins (pears, prunes, raisins, berries, apples, and apricots); bananas, citrus fruit, mangoes, pineapple; and prune juice.  · Nuts. Any nuts and seeds.  · Vegetables. Best served raw or lightly cooked. All types, especially: green peas, celery, eggplant, potatoes, spinach, broccoli, [place] sprouts, winter squash, carrots, cauliflower, soybeans, lentils, and fresh and dried beans of all kinds.  · Other. Popcorn, any spices.  Date Last Reviewed: 8/1/2016  © 6492-3581 [employer]. [address] [place] [address], [place] [address]. All rights reserved. This information is not intended as a substitute for professional medical care. Always follow your healthcare professional's instructions.

## 2019-04-15 NOTE — ANESTHESIA PREPROCEDURE EVALUATION
04/15/2019  Steph Lemus is a 48 y.o., female.    Anesthesia Evaluation      I have reviewed the Medications.     Review of Systems  Anesthesia Hx:  No problems with previous Anesthesia   Social:  Non-Smoker, No Alcohol Use    Cardiovascular:   hyperlipidemia    Pulmonary:  Pulmonary Normal    Renal/:  Renal/ Normal     Hepatic/GI:  Hepatic/GI Normal    Neurological:  Neurology Normal    Endocrine:  Endocrine Normal        Physical Exam  General:  Well nourished    Airway/Jaw/Neck:  Airway Findings: Mouth Opening: Normal Tongue: Normal  General Airway Assessment: Adult, Average  Mallampati: II  Jaw/Neck Findings:  Neck ROM: Normal ROM       Chest/Lungs:  Chest/Lungs Findings: Clear to auscultation, Normal Respiratory Rate     Heart/Vascular:  Heart Findings: Rate: Normal  Rhythm: Regular Rhythm  Sounds: Normal  Heart murmur: negative       Mental Status:  Mental Status Findings:  Cooperative, Alert and Oriented         Anesthesia Plan  Type of Anesthesia, risks & benefits discussed:  Anesthesia Type:  general  Patient's Preference:   Intra-op Monitoring Plan:   Intra-op Monitoring Plan Comments:   Post Op Pain Control Plan:   Post Op Pain Control Plan Comments:   Induction:   IV  Beta Blocker:  Patient is not currently on a Beta-Blocker (No further documentation required).       Informed Consent: Patient understands risks and agrees with Anesthesia plan.  Questions answered. Anesthesia consent signed with patient.  ASA Score: 2     Day of Surgery Review of History & Physical:        Anesthesia Plan Notes: Propofol general        Ready For Surgery From Anesthesia Perspective.

## 2019-04-15 NOTE — BRIEF OP NOTE
Discharge Note  Short Stay      SUMMARY     Admit Date: 4/15/2019    Attending Physician: Tad Suarez Jr., MD     Discharge Physician: Tad Suarez Jr., MD    Discharge Date: 4/15/2019 9:57 AM    Final Diagnosis: Screening [Z13.9]  Impression:          - Diverticulosis in the sigmoid colon.                       - Non-bleeding internal hemorrhoids.                       - The examination was otherwise normal.                       - The examined portion of the ileum was normal.                       - No specimens collected.  Recommendation:      - Discharge patient to home.                       - High fiber diet.                       - Take a PROBIOTIC, such as a carton of GREEK YOGURT                        (Chobani or Oikos, or Activia or Dannon); or tablets                        of ALIGN or CULTURELLE or RAYMUNDO-Q (all                        non-prescription), every day for a month.                       - Repeat colonoscopy in 10 years for screening                        purposes.                       - Continue present medications.                       - Patient has a contact number available for                        emergencies. The signs and symptoms of potential                        delayed complications were discussed with the                        patient. Return to normal activities tomorrow.                        Written discharge instructions were provided to the                        patient.                       - Return to normal activities tomorrow.  Tad Suarez MD  4/15/2019  Disposition: HOME OR SELF CARE    Patient Instructions:       Discharge Procedure Orders (must include Diet, Follow-up, Activity)    Follow Up:  Follow up with PCP as per your routine.  Please follow a high fiber diet.  Activity as tolerated.    No driving day of procedure.    PROBIOTICS:  Now that your colon is so cleaned out, now is a good time for a round of PROBIOTICS.  Eat a container of  Greek Yogurt, such as OIKOS or CHOBANI,  Or Activia or Dannon    Greek Yogurt.    Or Take a similar Probiotic product such as Align or Culturelle or Aggie-Q, every day for a month.                  (The products listed are non-prescription, but you may need to ask the pharmacist for their location.)  Repeat this 3-4 times a year.

## 2019-06-10 ENCOUNTER — HOSPITAL ENCOUNTER (OUTPATIENT)
Dept: RADIOLOGY | Facility: HOSPITAL | Age: 49
Discharge: HOME | End: 2019-06-10
Attending: OBSTETRICS & GYNECOLOGY
Payer: COMMERCIAL

## 2019-06-10 ENCOUNTER — OFFICE VISIT (OUTPATIENT)
Dept: OBSTETRICS AND GYNECOLOGY | Facility: CLINIC | Age: 49
End: 2019-06-10
Payer: COMMERCIAL

## 2019-06-10 VITALS — BODY MASS INDEX: 27.32 KG/M2 | WEIGHT: 170 LBS | HEIGHT: 66 IN

## 2019-06-10 VITALS — BODY MASS INDEX: 27.39 KG/M2 | WEIGHT: 170.44 LBS | HEIGHT: 66 IN

## 2019-06-10 DIAGNOSIS — Z12.39 BREAST CANCER SCREENING: ICD-10-CM

## 2019-06-10 DIAGNOSIS — Z78.0 MENOPAUSE: ICD-10-CM

## 2019-06-10 DIAGNOSIS — Z12.4 ENCOUNTER FOR PAP SMEAR OF CERVIX WITH HPV DNA COTESTING: Primary | ICD-10-CM

## 2019-06-10 DIAGNOSIS — N39.3 STRESS INCONTINENCE OF URINE: ICD-10-CM

## 2019-06-10 DIAGNOSIS — Z79.890 HORMONE REPLACEMENT THERAPY (HRT): ICD-10-CM

## 2019-06-10 PROCEDURE — 77063 BREAST TOMOSYNTHESIS BI: CPT | Mod: 26,,, | Performed by: RADIOLOGY

## 2019-06-10 PROCEDURE — 99999 PR PBB SHADOW E&M-EST. PATIENT-LVL III: ICD-10-PCS | Mod: PBBFAC,,, | Performed by: OBSTETRICS & GYNECOLOGY

## 2019-06-10 PROCEDURE — 99396 PREV VISIT EST AGE 40-64: CPT | Mod: S$GLB,,, | Performed by: OBSTETRICS & GYNECOLOGY

## 2019-06-10 PROCEDURE — 99999 PR PBB SHADOW E&M-EST. PATIENT-LVL III: CPT | Mod: PBBFAC,,, | Performed by: OBSTETRICS & GYNECOLOGY

## 2019-06-10 PROCEDURE — 77067 SCR MAMMO BI INCL CAD: CPT | Mod: 26,,, | Performed by: RADIOLOGY

## 2019-06-10 PROCEDURE — 87624 HPV HI-RISK TYP POOLED RSLT: CPT

## 2019-06-10 PROCEDURE — 77063 MAMMO DIGITAL SCREENING BILAT WITH TOMOSYNTHESIS_CAD: ICD-10-PCS | Mod: 26,,, | Performed by: RADIOLOGY

## 2019-06-10 PROCEDURE — 77067 MAMMO DIGITAL SCREENING BILAT WITH TOMOSYNTHESIS_CAD: ICD-10-PCS | Mod: 26,,, | Performed by: RADIOLOGY

## 2019-06-10 PROCEDURE — 77067 SCR MAMMO BI INCL CAD: CPT | Mod: TC,PN

## 2019-06-10 PROCEDURE — 99396 PR PREVENTIVE VISIT,EST,40-64: ICD-10-PCS | Mod: S$GLB,,, | Performed by: OBSTETRICS & GYNECOLOGY

## 2019-06-10 PROCEDURE — 88175 CYTOPATH C/V AUTO FLUID REDO: CPT

## 2019-06-10 RX ORDER — ESTRADIOL 0.5 MG/.5G
1 GEL TOPICAL DAILY
Qty: 30 PACKET | Refills: 11 | Status: SHIPPED | OUTPATIENT
Start: 2019-06-10 | End: 2019-07-15

## 2019-06-10 RX ORDER — MEDROXYPROGESTERONE ACETATE 5 MG/1
5 TABLET ORAL DAILY
Qty: 30 TABLET | Refills: 11 | Status: SHIPPED | OUTPATIENT
Start: 2019-06-10 | End: 2019-07-15 | Stop reason: SDUPTHER

## 2019-06-10 NOTE — PROGRESS NOTES
Chief Complaint   Patient presents with    Well Woman    Mammogram       History of Present Illness: Steph Lemus is a 48 y.o. female that presents today 6/10/2019 for well gyn visit. She reports that she is having trouble with pressure in her bladder with lifting and jumping. She reports at times she has leakage.     Past Medical History:   Diagnosis Date    Hormone replacement therapy     Hyperlipidemia        Past Surgical History:   Procedure Laterality Date    COLONOSCOPY N/A 4/15/2019    Performed by Tad Suarez Jr., MD at [facility] ENDO    DILATION AND CURETTAGE OF UTERUS      for uterine polyps    ENDOMETRIAL ABLATION      TONSILLECTOMY         Current Outpatient Medications   Medication Sig Dispense Refill    atorvastatin (LIPITOR) 10 MG tablet TAKE 1 TABLET (10 MG TOTAL) BY MOUTH ONCE DAILY. 30 tablet 10    multivitamin (MULTIVITAMIN) per tablet Take 1 tablet by mouth once daily.        estradiol (DIVIGEL) 0.5 mg/0.5 gram (0.1 %) GlPk Place 1 packet onto the skin once daily. 30 packet 11    medroxyPROGESTERone (PROVERA) 5 MG tablet Take 1 tablet (5 mg total) by mouth once daily. 30 tablet 11    mirabegron (MYRBETRIQ) 25 mg Tb24 ER tablet Take 1 tablet (25 mg total) by mouth once daily. 30 tablet 11     No current facility-administered medications for this visit.        Review of patient's allergies indicates:   Allergen Reactions    No known drug allergies        Family History   Problem Relation Age of Onset    Diabetes Maternal Grandfather     Cancer Maternal Grandfather     BRCA 1/2 Cousin     Colon cancer Neg Hx     Ovarian cancer Neg Hx     Breast cancer Neg Hx        Social History     Socioeconomic History    Marital status:      Spouse name: Not on file    Number of children: Not on file    Years of education: Not on file    Highest education level: Not on file   Occupational History    Not on file   Social Needs    Financial resource strain: Not on file     "Food insecurity:     Worry: Not on file     Inability: Not on file    Transportation needs:     Medical: Not on file     Non-medical: Not on file   Tobacco Use    Smoking status: Former Smoker     Packs/day: 0.25     Types: Cigarettes    Smokeless tobacco: Never Used    Tobacco comment: 2 cigs per day   Substance and Sexual Activity    Alcohol use: Yes     Alcohol/week: 0.0 oz     Comment: Drinks wine socially    Drug use: No    Sexual activity: Yes     Partners: Male     Birth control/protection: Other-see comments   Lifestyle    Physical activity:     Days per week: Not on file     Minutes per session: Not on file    Stress: Not on file   Relationships    Social connections:     Talks on phone: Not on file     Gets together: Not on file     Attends [employer] service: Not on file     Active member of club or organization: Not on file     Attends meetings of clubs or organizations: Not on file     Relationship status: Not on file   Other Topics Concern    Not on file   Social History Narrative    Not on file       OB History    Para Term  AB Living   3 3 3         SAB TAB Ectopic Multiple Live Births           3      # Outcome Date GA Lbr Randal/2nd Weight Sex Delivery Anes PTL Lv   3 Term            2 Term            1 Term                Review of Symptoms:  GENERAL: Denies weight gain or weight loss. Feeling well overall.   SKIN: Denies rash or lesions.   HEAD: Denies head injury or headache.   NODES: Denies enlarged lymph nodes.   CHEST: Denies chest pain or shortness of breath.   CARDIOVASCULAR: Denies palpitations or left sided chest pain.   ABDOMEN: No abdominal pain, constipation, diarrhea, nausea, vomiting or rectal bleeding.   URINARY: No frequency, dysuria, hematuria, or burning on urination.  HEMATOLOGIC: No easy bruisability or excessive bleeding.   MUSCULOSKELETAL: Denies joint pain or swelling.     Ht 5' 6" (1.676 m)   Wt 77.3 kg (170 lb 6.7 oz)   Physical Exam:  APPEARANCE: " Well nourished, well developed, in no acute distress.  SKIN: Normal skin turgor, no lesions.  NECK: Neck symmetric without masses   RESPIRATORY: Normal respiratory effort with no retractions or use of accessory muscles  CARDIOVASCULAR: Peripheral vascular system with no swelling no varicosities and palpation of pulses normal  LYMPHATIC: No enlargements of the lymph nodes noted in the neck, axillae, or groin  ABDOMEN: Soft. No tenderness or masses. No hepatosplenomegaly. No hernias.  BREASTS: Symmetrical, no skin changes or visible lesions. No palpable masses, nipple discharge or adenopathy bilaterally.  PELVIC: Normal external female genitalia without lesions. Normal hair distribution. Adequate perineal body, normal urethral meatus. Urethra with no masses.  Bladder nontender. Vagina moist and well rugated without lesions or discharge. Cervix pink and without lesions. No significant cystocele or rectocele. Bimanual exam showed uterus normal size, shape, position, mobile and nontender. Adnexa without masses or tenderness. Urethra and bladder normal.   EXTREMITIES: No clubbing cyanosis or edema.    ASSESSMENT/PLAN:  Encounter for Pap smear of cervix with HPV DNA cotesting  -     Liquid-based pap smear, screening  -     HPV High Risk Genotypes, PCR    Breast cancer screening  -     Cancel: Mammo Digital Screening Bilat With CAD; Future; Expected date: 06/10/2019    Hormone replacement therapy (HRT)  -     estradiol (DIVIGEL) 0.5 mg/0.5 gram (0.1 %) GlPk; Place 1 packet onto the skin once daily.  Dispense: 30 packet; Refill: 11  -     medroxyPROGESTERone (PROVERA) 5 MG tablet; Take 1 tablet (5 mg total) by mouth once daily.  Dispense: 30 tablet; Refill: 11    Menopause    Stress incontinence of urine  -     mirabegron (MYRBETRIQ) 25 mg Tb24 ER tablet; Take 1 tablet (25 mg total) by mouth once daily.  Dispense: 30 tablet; Refill: 11          Patient was counseled today on Pelvic exams and Pap Smear guidelines.   We  discussed STD screening if at high risk for a STD.  We discussed recommendation for breast cancer screening with mammogram every other year after the age of 40 and annually after the age of 50.    We discussed colon cancer screening when indicated.   Osteoporosis screening discussed when indicated.   She was advised to see her primary care physician for all other health maintenance.     FOLLOW-UP with me for next routine visit.

## 2019-06-18 LAB
HPV HR 12 DNA CVX QL NAA+PROBE: NEGATIVE
HPV16 AG SPEC QL: NEGATIVE
HPV18 DNA SPEC QL NAA+PROBE: NEGATIVE

## 2019-07-15 ENCOUNTER — PATIENT MESSAGE (OUTPATIENT)
Dept: OBSTETRICS AND GYNECOLOGY | Facility: CLINIC | Age: 49
End: 2019-07-15

## 2019-07-15 DIAGNOSIS — Z79.890 HORMONE REPLACEMENT THERAPY (HRT): ICD-10-CM

## 2019-07-15 RX ORDER — OXYBUTYNIN CHLORIDE 15 MG/1
15 TABLET, EXTENDED RELEASE ORAL DAILY
Qty: 30 TABLET | Refills: 2 | Status: SHIPPED | OUTPATIENT
Start: 2019-07-15 | End: 2019-10-24 | Stop reason: SDUPTHER

## 2019-07-15 RX ORDER — MEDROXYPROGESTERONE ACETATE 5 MG/1
5 TABLET ORAL DAILY
Qty: 30 TABLET | Refills: 11 | Status: SHIPPED | OUTPATIENT
Start: 2019-07-15 | End: 2020-06-12 | Stop reason: SDUPTHER

## 2019-07-15 RX ORDER — ESTRADIOL 0.25 MG/.25G
1 GEL TOPICAL DAILY
Qty: 28 PACKET | Refills: 11 | Status: SHIPPED | OUTPATIENT
Start: 2019-07-15 | End: 2020-06-12 | Stop reason: SDUPTHER

## 2019-07-15 NOTE — TELEPHONE ENCOUNTER
divigel too high dose. Can she reduce or change med?  She has been on HRT for 2 years. She is reluctant to be on anything.      myrbetriq helpful but expensive even with coupon and having high blood pressure with it.   Desires to change  To another med

## 2019-07-22 ENCOUNTER — OFFICE VISIT (OUTPATIENT)
Dept: FAMILY MEDICINE | Facility: CLINIC | Age: 49
End: 2019-07-22
Payer: COMMERCIAL

## 2019-07-22 VITALS
HEIGHT: 66 IN | BODY MASS INDEX: 28.34 KG/M2 | HEART RATE: 70 BPM | DIASTOLIC BLOOD PRESSURE: 80 MMHG | TEMPERATURE: 98 F | OXYGEN SATURATION: 96 % | SYSTOLIC BLOOD PRESSURE: 110 MMHG | WEIGHT: 176.38 LBS

## 2019-07-22 DIAGNOSIS — N39.0 URINARY TRACT INFECTION WITHOUT HEMATURIA, SITE UNSPECIFIED: Primary | ICD-10-CM

## 2019-07-22 DIAGNOSIS — B37.9 ANTIBIOTIC-INDUCED YEAST INFECTION: ICD-10-CM

## 2019-07-22 DIAGNOSIS — T36.95XA ANTIBIOTIC-INDUCED YEAST INFECTION: ICD-10-CM

## 2019-07-22 DIAGNOSIS — R10.30 LOWER ABDOMINAL PAIN: ICD-10-CM

## 2019-07-22 LAB
BILIRUB SERPL-MCNC: ABNORMAL MG/DL
BLOOD URINE, POC: ABNORMAL
COLOR, POC UA: ABNORMAL
GLUCOSE UR QL STRIP: 50
KETONES UR QL STRIP: ABNORMAL
LEUKOCYTE ESTERASE URINE, POC: ABNORMAL
NITRITE, POC UA: ABNORMAL
PH, POC UA: 5
PROTEIN, POC: ABNORMAL
SPECIFIC GRAVITY, POC UA: 1.02
UROBILINOGEN, POC UA: NORMAL

## 2019-07-22 PROCEDURE — 99999 PR PBB SHADOW E&M-EST. PATIENT-LVL III: ICD-10-PCS | Mod: PBBFAC,,, | Performed by: PHYSICIAN ASSISTANT

## 2019-07-22 PROCEDURE — 87077 CULTURE AEROBIC IDENTIFY: CPT

## 2019-07-22 PROCEDURE — 87086 URINE CULTURE/COLONY COUNT: CPT

## 2019-07-22 PROCEDURE — 87186 SC STD MICRODIL/AGAR DIL: CPT

## 2019-07-22 PROCEDURE — 99999 PR PBB SHADOW E&M-EST. PATIENT-LVL III: CPT | Mod: PBBFAC,,, | Performed by: PHYSICIAN ASSISTANT

## 2019-07-22 PROCEDURE — 81002 POCT URINE DIPSTICK WITHOUT MICROSCOPE: ICD-10-PCS | Mod: S$GLB,,, | Performed by: PHYSICIAN ASSISTANT

## 2019-07-22 PROCEDURE — 99214 PR OFFICE/OUTPT VISIT, EST, LEVL IV, 30-39 MIN: ICD-10-PCS | Mod: 25,S$GLB,, | Performed by: PHYSICIAN ASSISTANT

## 2019-07-22 PROCEDURE — 3008F PR BODY MASS INDEX (BMI) DOCUMENTED: ICD-10-PCS | Mod: CPTII,S$GLB,, | Performed by: PHYSICIAN ASSISTANT

## 2019-07-22 PROCEDURE — 87088 URINE BACTERIA CULTURE: CPT

## 2019-07-22 PROCEDURE — 81002 URINALYSIS NONAUTO W/O SCOPE: CPT | Mod: S$GLB,,, | Performed by: PHYSICIAN ASSISTANT

## 2019-07-22 PROCEDURE — 3008F BODY MASS INDEX DOCD: CPT | Mod: CPTII,S$GLB,, | Performed by: PHYSICIAN ASSISTANT

## 2019-07-22 PROCEDURE — 99214 OFFICE O/P EST MOD 30 MIN: CPT | Mod: 25,S$GLB,, | Performed by: PHYSICIAN ASSISTANT

## 2019-07-22 RX ORDER — FLUCONAZOLE 150 MG/1
150 TABLET ORAL ONCE
Qty: 2 TABLET | Refills: 0 | Status: SHIPPED | OUTPATIENT
Start: 2019-07-22 | End: 2019-07-24

## 2019-07-22 RX ORDER — FLUCONAZOLE 150 MG/1
150 TABLET ORAL ONCE
Qty: 2 TABLET | Refills: 0 | Status: SHIPPED | OUTPATIENT
Start: 2019-07-22 | End: 2019-07-22 | Stop reason: SDUPTHER

## 2019-07-22 RX ORDER — SULFAMETHOXAZOLE AND TRIMETHOPRIM 800; 160 MG/1; MG/1
1 TABLET ORAL 2 TIMES DAILY
Qty: 14 TABLET | Refills: 0 | Status: SHIPPED | OUTPATIENT
Start: 2019-07-22 | End: 2019-07-22 | Stop reason: SDUPTHER

## 2019-07-22 RX ORDER — SULFAMETHOXAZOLE AND TRIMETHOPRIM 800; 160 MG/1; MG/1
1 TABLET ORAL 2 TIMES DAILY
Qty: 14 TABLET | Refills: 0 | Status: SHIPPED | OUTPATIENT
Start: 2019-07-22 | End: 2019-07-29

## 2019-07-22 NOTE — PROGRESS NOTES
"Subjective:      Patient ID: Steph Lemus is a 49 y.o. female.    Chief Complaint: Urinary Tract Infection (lower back pain, urinary frequency, burning, and pain in lower abdomen started Saturday)    Patient is new to me, here today for possible UTI    Urinary Tract Infection    This is a new problem. The current episode started in the past 7 days (2-3days). The problem has been gradually worsening. Associated symptoms include frequency and hematuria. Pertinent negatives include no chills, nausea, vomiting or constipation. Treatments tried: otc meds. The treatment provided moderate relief.     Review of Systems   Constitutional: Negative for chills, diaphoresis and fever.   Gastrointestinal: Negative for abdominal pain, constipation, diarrhea, nausea and vomiting.   Genitourinary: Positive for dysuria, frequency, hematuria and pelvic pain.        +cloudy urine   Musculoskeletal: Positive for back pain (low back).   Skin: Negative for color change, pallor and wound.   Neurological: Negative for dizziness, light-headedness and headaches.       Objective:   /80 (BP Location: Left arm, Patient Position: Sitting)   Pulse 70   Temp 98.4 °F (36.9 °C) (Oral)   Ht 5' 6" (1.676 m)   Wt 80 kg (176 lb 5.9 oz)   SpO2 96%   BMI 28.47 kg/m²   Physical Exam   Constitutional: She appears well-developed and well-nourished. She does not appear ill. No distress.   HENT:   Head: Normocephalic and atraumatic.   Cardiovascular: Normal rate, regular rhythm and normal heart sounds.   No murmur heard.  Pulmonary/Chest: Effort normal and breath sounds normal. No respiratory distress. She has no decreased breath sounds.   Abdominal: Soft. Normal appearance and bowel sounds are normal. There is no tenderness. There is no CVA tenderness.   Skin: Skin is warm, dry and intact. No rash noted.   Psychiatric: She has a normal mood and affect. Her speech is normal and behavior is normal. Thought content normal.     Assessment:    "   1. Urinary tract infection without hematuria, site unspecified    2. Lower abdominal pain    3. Antibiotic-induced yeast infection       Plan:   Urinary tract infection without hematuria, site unspecified  -     sulfamethoxazole-trimethoprim 800-160mg (BACTRIM DS) 800-160 mg Tab; Take 1 tablet by mouth 2 (two) times daily. for 7 days  Dispense: 14 tablet; Refill: 0    Lower abdominal pain  -     POCT urine dipstick without microscope  -     Urine culture    Antibiotic-induced yeast infection  -     fluconazole (DIFLUCAN) 150 MG Tab; Take 1 tablet (150 mg total) by mouth once. Repeat in 3 days if symptoms still persist. for 1 dose  Dispense: 2 tablet; Refill: 0    Ensure adequate hydration    Discussed worsening signs/symptoms and when to return to clinic or go to ED.   Patient expresses understanding and agrees with treatment plan.

## 2019-07-24 LAB — BACTERIA UR CULT: ABNORMAL

## 2019-07-28 RX ORDER — ATORVASTATIN CALCIUM 10 MG/1
TABLET, FILM COATED ORAL
Qty: 90 TABLET | Refills: 1 | Status: SHIPPED | OUTPATIENT
Start: 2019-07-28 | End: 2020-01-31

## 2019-10-25 RX ORDER — OXYBUTYNIN CHLORIDE 15 MG/1
TABLET, EXTENDED RELEASE ORAL
Qty: 90 TABLET | Refills: 0 | Status: SHIPPED | OUTPATIENT
Start: 2019-10-25 | End: 2020-02-20

## 2020-01-30 DIAGNOSIS — E78.5 HYPERLIPIDEMIA, UNSPECIFIED HYPERLIPIDEMIA TYPE: Primary | ICD-10-CM

## 2020-01-31 RX ORDER — ATORVASTATIN CALCIUM 10 MG/1
TABLET, FILM COATED ORAL
Qty: 90 TABLET | Refills: 0 | Status: SHIPPED | OUTPATIENT
Start: 2020-01-31 | End: 2020-06-12

## 2020-01-31 NOTE — PROGRESS NOTES
Refill Authorization Note     is requesting a refill authorization.    Brief assessment and rationale for refill: APPROVE: needs labs/appt(ANNUAL)     Medication-related problems identified:   Requires appointment  Requires labs    Medication Therapy Plan: Needs appt(ANNUAL); NTBO(cmp, lipid); Approve 3 more months                              Comments:   Requested Prescriptions   Pending Prescriptions Disp Refills    atorvastatin (LIPITOR) 10 MG tablet [Pharmacy Med Name: ATORVASTATIN 10 MG TABLET] 90 tablet 0     Sig: TAKE 1 TABLET BY MOUTH ONCE DAILY       Cardiovascular:  Antilipid - Statins Failed - 1/31/2020 12:55 PM        Failed - Lipid Panel completed in last 360 days     Lab Results   Component Value Date    CHOL 142 01/21/2019    HDL 60 01/21/2019    LDLCALC 73.4 01/21/2019    TRIG 43 01/21/2019             Failed - ALT is 94 or below and within 360 days     ALT   Date Value Ref Range Status   01/21/2019 25 10 - 44 U/L Final   01/08/2018 33 10 - 44 U/L Final   02/07/2017 33 10 - 44 U/L Final              Failed - AST is 54 or below and within 360 days     AST   Date Value Ref Range Status   01/21/2019 22 10 - 40 U/L Final   04/09/2018 18 10 - 40 U/L Final   01/08/2018 29 10 - 40 U/L Final              Passed - Patient is at least 18 years old        Passed - Office visit in past 12 months or future 90 days     Recent Outpatient Visits            6 months ago Urinary tract infection without hematuria, site unspecified    [facility] Helena Patel PA-C    7 months ago Encounter for Pap smear of cervix with HPV DNA cotesting    [facility] Brittani Alatorre MD    1 year ago Routine health maintenance    [facility] Rico Ramirez MD    1 year ago Encounter for gynecological examination without abnormal finding    [facility] Brittani Alatorre MD    1 year ago Weight gain    [facility] [place]

## 2020-01-31 NOTE — TELEPHONE ENCOUNTER
Please schedule patient for labs     (CMP, LIPID)    Needs Appointment(ANNUAL)    Please also check with your provider if any further labs need to be added and scheduled together.     Thanks!

## 2020-02-20 ENCOUNTER — OFFICE VISIT (OUTPATIENT)
Dept: FAMILY MEDICINE | Facility: CLINIC | Age: 50
End: 2020-02-20
Payer: COMMERCIAL

## 2020-02-20 VITALS
BODY MASS INDEX: 28.88 KG/M2 | WEIGHT: 179.69 LBS | HEART RATE: 67 BPM | SYSTOLIC BLOOD PRESSURE: 136 MMHG | HEIGHT: 66 IN | OXYGEN SATURATION: 97 % | DIASTOLIC BLOOD PRESSURE: 80 MMHG

## 2020-02-20 DIAGNOSIS — Z00.00 PREVENTATIVE HEALTH CARE: Primary | ICD-10-CM

## 2020-02-20 DIAGNOSIS — R00.2 PALPITATIONS: ICD-10-CM

## 2020-02-20 DIAGNOSIS — E78.5 HYPERLIPIDEMIA, UNSPECIFIED HYPERLIPIDEMIA TYPE: ICD-10-CM

## 2020-02-20 PROCEDURE — 93005 ELECTROCARDIOGRAM TRACING: CPT | Mod: S$GLB,,, | Performed by: FAMILY MEDICINE

## 2020-02-20 PROCEDURE — 93010 EKG 12-LEAD: ICD-10-PCS | Mod: S$GLB,,, | Performed by: INTERNAL MEDICINE

## 2020-02-20 PROCEDURE — 93010 ELECTROCARDIOGRAM REPORT: CPT | Mod: S$GLB,,, | Performed by: INTERNAL MEDICINE

## 2020-02-20 PROCEDURE — 99999 PR PBB SHADOW E&M-EST. PATIENT-LVL III: CPT | Mod: PBBFAC,,, | Performed by: FAMILY MEDICINE

## 2020-02-20 PROCEDURE — 93005 EKG 12-LEAD: ICD-10-PCS | Mod: S$GLB,,, | Performed by: FAMILY MEDICINE

## 2020-02-20 PROCEDURE — 99999 PR PBB SHADOW E&M-EST. PATIENT-LVL III: ICD-10-PCS | Mod: PBBFAC,,, | Performed by: FAMILY MEDICINE

## 2020-02-20 PROCEDURE — 99396 PREV VISIT EST AGE 40-64: CPT | Mod: S$GLB,,, | Performed by: FAMILY MEDICINE

## 2020-02-20 PROCEDURE — 99396 PR PREVENTIVE VISIT,EST,40-64: ICD-10-PCS | Mod: S$GLB,,, | Performed by: FAMILY MEDICINE

## 2020-02-20 NOTE — PROGRESS NOTES
THIS DOCUMENT WAS MADE IN PART WITH VOICE RECOGNITION SOFTWARE.  OCCASIONALLY THIS SOFTWARE WILL MISINTERPRET WORDS OR PHRASES.      Steph Lemus  1970    Steph was seen today for [facility] care.    Diagnoses and all orders for this visit:    Preventative health care  -     EKG 12-lead; Future  -     Comprehensive metabolic panel; Future  -     Lipid panel; Future  -     TSH; Future  -     CBC auto differential; Future  -     Vitamin D; Future    Hyperlipidemia, unspecified hyperlipidemia type  -     EKG 12-lead; Future   Check lipids. Continue Lipitor. She's had a few aches and pains but nothing persistent suggesting this is a side effect from Lipitor but if symptoms return we could hold for a brief time and see if it makes a difference    Palpitations  -     EKG 12-lead; Future   EKG normal, symptoms resolved. I asked her to monitor them, if anything returns it may need to consider Holter monitor or additional evaluation.      If pain return then hold lipitor, but nothing    Subjective     Chief Complaint   Patient presents with    [facility]     Pt also c/o having a fluttery heart beat for a few weeks        HPI:    Patient new to me.  Established within the system.  Previous PCP unavailable for routine appointments.  She has a history of hyperlipidemia, maintained on low-dose Lipitor.  She has had no ischemic cardiac history.  She does report she has had some fluttering or palpitations recently  She is currently on HRT    Several weeks ago she was having frequent palpitations, almost daily.  No obvious trigger.  No high caffeine intake.  There was no chest pain or shortness of breath.  Did not disrupt any of her activities.  Seems better now.    She has had a few musculoskeletal complaints including elbow pain and recent injection.  Concern whether the Lipitor could be affecting this.  But she does not have diffuse muscle aches.  Prior to the elbow she was hurting somewhere else but there  has been no diffuse or persistent muscle pains.    Active Ambulatory Problems     Diagnosis Date Noted    Hyperlipidemia 01/21/2019    Colon cancer screening 04/15/2019     Resolved Ambulatory Problems     Diagnosis Date Noted    No Resolved Ambulatory Problems     Past Medical History:   Diagnosis Date    Hormone replacement therapy        Current Outpatient Medications on File Prior to Visit   Medication Sig Dispense Refill    atorvastatin (LIPITOR) 10 MG tablet TAKE 1 TABLET BY MOUTH ONCE DAILY 90 tablet 0    estradiol (DIVIGEL) 0.25 mg/0.25 gram (0.1 %) GlPk Place 1 Package onto the skin once daily. 28 packet 11    medroxyPROGESTERone (PROVERA) 5 MG tablet Take 1 tablet (5 mg total) by mouth once daily. 30 tablet 11    multivitamin (MULTIVITAMIN) per tablet Take 1 tablet by mouth once daily.        [DISCONTINUED] oxybutynin (DITROPAN XL) 15 MG TR24 TAKE 1 TABLET BY MOUTH EVERY DAY 90 tablet 0     No current facility-administered medications on file prior to visit.        Review of patient's allergies indicates:   Allergen Reactions    No known drug allergies        Family History   Problem Relation Age of Onset    Diabetes Maternal Grandfather     Cancer Maternal Grandfather     BRCA 1/2 Cousin     Colon cancer Neg Hx     Ovarian cancer Neg Hx     Breast cancer Neg Hx        Social History     Tobacco Use    Smoking status: Former Smoker     Packs/day: 0.25     Types: Cigarettes    Smokeless tobacco: Never Used    Tobacco comment: 2 cigs per day   Substance Use Topics    Alcohol use: Yes     Alcohol/week: 0.0 standard drinks     Frequency: 2-3 times a week     Drinks per session: 1 or 2     Binge frequency: Never     Comment: Drinks wine socially    Drug use: No         Review of Systems   Constitutional: Negative for activity change and unexpected weight change.   HENT: Negative for hearing loss, rhinorrhea and trouble swallowing.    Eyes: Positive for visual disturbance. Negative for  discharge.   Respiratory: Negative for chest tightness and wheezing.    Cardiovascular: Positive for palpitations. Negative for chest pain.   Gastrointestinal: Negative for blood in stool, constipation, diarrhea and vomiting.   Endocrine: Negative for polydipsia and polyuria.   Genitourinary: Negative for difficulty urinating, dysuria, hematuria and menstrual problem.   Musculoskeletal: Positive for arthralgias. Negative for joint swelling and neck pain.   Neurological: Negative for weakness and headaches.   Psychiatric/Behavioral: Negative for confusion and dysphoric mood.       Objective     Physical Exam   Constitutional: She is oriented to person, place, and time. She appears well-developed and well-nourished.  Non-toxic appearance. No distress.   HENT:   Head: Normocephalic and atraumatic.   Right Ear: Tympanic membrane, external ear and ear canal normal.   Left Ear: Tympanic membrane, external ear and ear canal normal.   Nose: Nose normal.   Mouth/Throat: Oropharynx is clear and moist. No oropharyngeal exudate.   Eyes: Pupils are equal, round, and reactive to light. Conjunctivae and EOM are normal. No scleral icterus.   Neck: Normal range of motion. Neck supple. No thyromegaly present.   Cardiovascular: Normal rate, regular rhythm, normal heart sounds and intact distal pulses. PMI is not displaced. Exam reveals no gallop and no friction rub.   No murmur heard.  Pulmonary/Chest: Effort normal and breath sounds normal. No respiratory distress. She has no wheezes. She has no rales.   Abdominal: Soft. Bowel sounds are normal. She exhibits no distension and no mass. There is no tenderness. There is no rebound and no guarding.   Musculoskeletal: She exhibits no edema.   Lymphadenopathy:     She has no cervical adenopathy.   Neurological: She is alert and oriented to person, place, and time. No cranial nerve deficit. She exhibits normal muscle tone.   Skin: Skin is dry. No rash noted. No pallor.   Psychiatric: She  "has a normal mood and affect. Her behavior is normal.   Vitals reviewed.      Vitals:    02/20/20 1732   BP: 136/80   Pulse: 67   SpO2: 97%   Weight: 81.5 kg (179 lb 10.8 oz)   Height: 5' 6" (1.676 m)       MOST RECENT LABS IN OUR ELECTRONIC MEDICAL RECORD:     Results for orders placed or performed in visit on 07/22/19   Urine culture   Result Value Ref Range    Urine Culture, Routine ESCHERICHIA COLI  10,000 - 49,999 cfu/ml   (A)        Susceptibility    Escherichia coli - CULTURE, URINE     Amp/Sulbactam 16/8 Intermediate mcg/mL     Amikacin <=16 Sensitive mcg/mL     Ampicillin >16 Resistant mcg/mL     Amox/K Clav'ate >16/8 Resistant mcg/mL     Ceftriaxone <=8 Sensitive mcg/mL     Cefazolin >16 Resistant mcg/mL     Ciprofloxacin <=1 Sensitive mcg/mL     Cefepime <=8 Sensitive mcg/mL     Nitrofurantoin <=32 Sensitive mcg/mL     Gentamicin >8 Resistant mcg/mL     Levofloxacin <=2 Sensitive mcg/mL     Piperacillin/Tazo <=16 Sensitive mcg/mL     Trimeth/Sulfa <=2/38 Sensitive mcg/mL     Tetracycline >8 Resistant mcg/mL     Tobramycin <=4 Sensitive mcg/mL   POCT urine dipstick without microscope   Result Value Ref Range    Color, UA dark yellow     Spec Grav UA 1.020     pH, UA 5     WBC, UA ++     Nitrite, UA neg     Protein trace     Glucose, UA 50     Ketones, UA neg     Urobilinogen, UA normal     Bilirubin neg     Blood, UA about 250          Age specific and appropriate preventative healthcare discussed/ health maintenance reviewed. Discussed healthy diet and regular exercise. Discussed age-appropriate preventative screening tests and recommendations. Discussed recommended follow-up based on age and conditions.  "

## 2020-02-21 ENCOUNTER — LAB VISIT (OUTPATIENT)
Dept: LAB | Facility: HOSPITAL | Age: 50
End: 2020-02-21
Attending: FAMILY MEDICINE
Payer: COMMERCIAL

## 2020-02-21 DIAGNOSIS — Z00.00 PREVENTATIVE HEALTH CARE: ICD-10-CM

## 2020-02-21 LAB
25(OH)D3+25(OH)D2 SERPL-MCNC: 33 NG/ML (ref 30–96)
ALBUMIN SERPL BCP-MCNC: 4.3 G/DL (ref 3.5–5.2)
ALP SERPL-CCNC: 62 U/L (ref 55–135)
ALT SERPL W/O P-5'-P-CCNC: 33 U/L (ref 10–44)
ANION GAP SERPL CALC-SCNC: 7 MMOL/L (ref 8–16)
AST SERPL-CCNC: 22 U/L (ref 10–40)
BASOPHILS # BLD AUTO: 0.02 K/UL (ref 0–0.2)
BASOPHILS NFR BLD: 0.4 % (ref 0–1.9)
BILIRUB SERPL-MCNC: 0.5 MG/DL (ref 0.1–1)
BUN SERPL-MCNC: 24 MG/DL (ref 6–20)
CALCIUM SERPL-MCNC: 9.6 MG/DL (ref 8.7–10.5)
CHLORIDE SERPL-SCNC: 108 MMOL/L (ref 95–110)
CHOLEST SERPL-MCNC: 190 MG/DL (ref 120–199)
CHOLEST/HDLC SERPL: 2.9 {RATIO} (ref 2–5)
CO2 SERPL-SCNC: 26 MMOL/L (ref 23–29)
CREAT SERPL-MCNC: 0.9 MG/DL (ref 0.5–1.4)
DIFFERENTIAL METHOD: ABNORMAL
EOSINOPHIL # BLD AUTO: 0 K/UL (ref 0–0.5)
EOSINOPHIL NFR BLD: 0.7 % (ref 0–8)
ERYTHROCYTE [DISTWIDTH] IN BLOOD BY AUTOMATED COUNT: 13.3 % (ref 11.5–14.5)
EST. GFR  (AFRICAN AMERICAN): >60 ML/MIN/1.73 M^2
EST. GFR  (NON AFRICAN AMERICAN): >60 ML/MIN/1.73 M^2
GLUCOSE SERPL-MCNC: 95 MG/DL (ref 70–110)
HCT VFR BLD AUTO: 44.4 % (ref 37–48.5)
HDLC SERPL-MCNC: 66 MG/DL (ref 40–75)
HDLC SERPL: 34.7 % (ref 20–50)
HGB BLD-MCNC: 14.2 G/DL (ref 12–16)
IMM GRANULOCYTES # BLD AUTO: 0.01 K/UL (ref 0–0.04)
IMM GRANULOCYTES NFR BLD AUTO: 0.2 % (ref 0–0.5)
LDLC SERPL CALC-MCNC: 109.4 MG/DL (ref 63–159)
LYMPHOCYTES # BLD AUTO: 1.7 K/UL (ref 1–4.8)
LYMPHOCYTES NFR BLD: 30.7 % (ref 18–48)
MCH RBC QN AUTO: 31.3 PG (ref 27–31)
MCHC RBC AUTO-ENTMCNC: 32 G/DL (ref 32–36)
MCV RBC AUTO: 98 FL (ref 82–98)
MONOCYTES # BLD AUTO: 0.4 K/UL (ref 0.3–1)
MONOCYTES NFR BLD: 7.9 % (ref 4–15)
NEUTROPHILS # BLD AUTO: 3.3 K/UL (ref 1.8–7.7)
NEUTROPHILS NFR BLD: 60.1 % (ref 38–73)
NONHDLC SERPL-MCNC: 124 MG/DL
NRBC BLD-RTO: 0 /100 WBC
PLATELET # BLD AUTO: 249 K/UL (ref 150–350)
PMV BLD AUTO: 9.6 FL (ref 9.2–12.9)
POTASSIUM SERPL-SCNC: 4.7 MMOL/L (ref 3.5–5.1)
PROT SERPL-MCNC: 7.2 G/DL (ref 6–8.4)
RBC # BLD AUTO: 4.54 M/UL (ref 4–5.4)
SODIUM SERPL-SCNC: 141 MMOL/L (ref 136–145)
TRIGL SERPL-MCNC: 73 MG/DL (ref 30–150)
TSH SERPL DL<=0.005 MIU/L-ACNC: 1.12 UIU/ML (ref 0.4–4)
WBC # BLD AUTO: 5.44 K/UL (ref 3.9–12.7)

## 2020-02-21 PROCEDURE — 82306 VITAMIN D 25 HYDROXY: CPT

## 2020-02-21 PROCEDURE — 80053 COMPREHEN METABOLIC PANEL: CPT

## 2020-02-21 PROCEDURE — 85025 COMPLETE CBC W/AUTO DIFF WBC: CPT

## 2020-02-21 PROCEDURE — 36415 COLL VENOUS BLD VENIPUNCTURE: CPT | Mod: PO

## 2020-02-21 PROCEDURE — 80061 LIPID PANEL: CPT

## 2020-02-21 PROCEDURE — 84443 ASSAY THYROID STIM HORMONE: CPT

## 2020-04-30 ENCOUNTER — PATIENT MESSAGE (OUTPATIENT)
Dept: FAMILY MEDICINE | Facility: CLINIC | Age: 50
End: 2020-04-30

## 2020-06-10 ENCOUNTER — PATIENT OUTREACH (OUTPATIENT)
Dept: ADMINISTRATIVE | Facility: OTHER | Age: 50
End: 2020-06-10

## 2020-06-11 DIAGNOSIS — Z12.39 BREAST CANCER SCREENING: Primary | ICD-10-CM

## 2020-06-12 ENCOUNTER — OFFICE VISIT (OUTPATIENT)
Dept: OBSTETRICS AND GYNECOLOGY | Facility: CLINIC | Age: 50
End: 2020-06-12
Payer: COMMERCIAL

## 2020-06-12 ENCOUNTER — HOSPITAL ENCOUNTER (OUTPATIENT)
Dept: RADIOLOGY | Facility: HOSPITAL | Age: 50
Discharge: HOME | End: 2020-06-12
Attending: OBSTETRICS & GYNECOLOGY
Payer: COMMERCIAL

## 2020-06-12 VITALS
WEIGHT: 184.31 LBS | SYSTOLIC BLOOD PRESSURE: 126 MMHG | BODY MASS INDEX: 29.62 KG/M2 | HEIGHT: 66 IN | DIASTOLIC BLOOD PRESSURE: 82 MMHG

## 2020-06-12 DIAGNOSIS — Z79.890 POSTMENOPAUSAL HRT (HORMONE REPLACEMENT THERAPY): ICD-10-CM

## 2020-06-12 DIAGNOSIS — Z12.31 SCREENING MAMMOGRAM, ENCOUNTER FOR: ICD-10-CM

## 2020-06-12 DIAGNOSIS — R23.2 HOT FLASHES: ICD-10-CM

## 2020-06-12 DIAGNOSIS — Z01.419 ENCOUNTER FOR GYNECOLOGICAL EXAMINATION WITHOUT ABNORMAL FINDING: Primary | ICD-10-CM

## 2020-06-12 DIAGNOSIS — Z78.0 MENOPAUSE: ICD-10-CM

## 2020-06-12 DIAGNOSIS — Z79.890 HORMONE REPLACEMENT THERAPY (HRT): ICD-10-CM

## 2020-06-12 DIAGNOSIS — N39.3 STRESS INCONTINENCE OF URINE: ICD-10-CM

## 2020-06-12 PROCEDURE — 77063 MAMMO DIGITAL SCREENING BILAT WITH TOMOSYNTHESIS_CAD: ICD-10-PCS | Mod: 26,,, | Performed by: RADIOLOGY

## 2020-06-12 PROCEDURE — 99999 PR PBB SHADOW E&M-EST. PATIENT-LVL III: CPT | Mod: PBBFAC,,, | Performed by: OBSTETRICS & GYNECOLOGY

## 2020-06-12 PROCEDURE — 99396 PR PREVENTIVE VISIT,EST,40-64: ICD-10-PCS | Mod: S$GLB,,, | Performed by: OBSTETRICS & GYNECOLOGY

## 2020-06-12 PROCEDURE — 99999 PR PBB SHADOW E&M-EST. PATIENT-LVL III: ICD-10-PCS | Mod: PBBFAC,,, | Performed by: OBSTETRICS & GYNECOLOGY

## 2020-06-12 PROCEDURE — 77067 SCR MAMMO BI INCL CAD: CPT | Mod: 26,,, | Performed by: RADIOLOGY

## 2020-06-12 PROCEDURE — 77067 MAMMO DIGITAL SCREENING BILAT WITH TOMOSYNTHESIS_CAD: ICD-10-PCS | Mod: 26,,, | Performed by: RADIOLOGY

## 2020-06-12 PROCEDURE — 77067 SCR MAMMO BI INCL CAD: CPT | Mod: TC,PN

## 2020-06-12 PROCEDURE — 99396 PREV VISIT EST AGE 40-64: CPT | Mod: S$GLB,,, | Performed by: OBSTETRICS & GYNECOLOGY

## 2020-06-12 PROCEDURE — 77063 BREAST TOMOSYNTHESIS BI: CPT | Mod: 26,,, | Performed by: RADIOLOGY

## 2020-06-12 RX ORDER — ESTRADIOL 0.25 MG/.25G
1 GEL TOPICAL DAILY
Qty: 28 PACKET | Refills: 11 | Status: SHIPPED | OUTPATIENT
Start: 2020-06-12 | End: 2021-06-12

## 2020-06-12 RX ORDER — AMOXICILLIN 500 MG
CAPSULE ORAL DAILY
COMMUNITY
End: 2020-08-20

## 2020-06-12 RX ORDER — MEDROXYPROGESTERONE ACETATE 5 MG/1
5 TABLET ORAL DAILY
Qty: 30 TABLET | Refills: 11 | Status: SHIPPED | OUTPATIENT
Start: 2020-06-12 | End: 2021-07-02 | Stop reason: SDUPTHER

## 2020-06-12 NOTE — PROGRESS NOTES
Chief Complaint   Patient presents with    Well Woman    Mammogram       History of Present Illness: Steph Lemus is a 49 y.o. female that presents today 6/12/2020 for well gyn visit.    Past Medical History:   Diagnosis Date    Hormone replacement therapy     Hyperlipidemia        Past Surgical History:   Procedure Laterality Date    COLONOSCOPY N/A 4/15/2019    Procedure: COLONOSCOPY;  Surgeon: Tad Suarez Jr., MD;  Location: [facility];  Service: Endoscopy;  Laterality: N/A;    DILATION AND CURETTAGE OF UTERUS      for uterine polyps    ENDOMETRIAL ABLATION      TONSILLECTOMY         Current Outpatient Medications   Medication Sig Dispense Refill    estradioL (DIVIGEL) 0.25 mg/0.25 gram (0.1 %) GlPk Place 1 Package onto the skin once daily. 28 packet 11    medroxyPROGESTERone (PROVERA) 5 MG tablet Take 1 tablet (5 mg total) by mouth once daily. 30 tablet 11    multivitamin (MULTIVITAMIN) per tablet Take 1 tablet by mouth once daily.        omega-3 fatty acids/fish oil (FISH OIL-OMEGA-3 FATTY ACIDS) 300-1,000 mg capsule Take by mouth once daily.       No current facility-administered medications for this visit.        Review of patient's allergies indicates:   Allergen Reactions    No known drug allergies        Family History   Problem Relation Age of Onset    Diabetes Maternal Grandfather     Cancer Maternal Grandfather     BRCA 1/2 Cousin     Colon cancer Neg Hx     Ovarian cancer Neg Hx     Breast cancer Neg Hx        Social History     Socioeconomic History    Marital status:      Spouse name: Not on file    Number of children: Not on file    Years of education: Not on file    Highest education level: Not on file   Occupational History    Not on file   Social Needs    Financial resource strain: Not hard at all    Food insecurity:     Worry: Never true     Inability: Never true    Transportation needs:     Medical: No     Non-medical: No   Tobacco Use    Smoking  "status: Former Smoker     Packs/day: 0.25     Types: Cigarettes    Smokeless tobacco: Never Used    Tobacco comment: 2 cigs per day   Substance and Sexual Activity    Alcohol use: Yes     Alcohol/week: 0.0 standard drinks     Frequency: 2-3 times a week     Drinks per session: 1 or 2     Binge frequency: Never     Comment: Drinks wine socially    Drug use: No    Sexual activity: Yes     Partners: Male     Birth control/protection: Other-see comments   Lifestyle    Physical activity:     Days per week: 2 days     Minutes per session: 30 min    Stress: Not at all   Relationships    Social connections:     Talks on phone: More than three times a week     Gets together: More than three times a week     Attends Gnosticism service: Not on file     Active member of club or organization: No     Attends meetings of clubs or organizations: Never     Relationship status:    Other Topics Concern    Not on file   Social History Narrative    Not on file       OB History    Para Term  AB Living   3 3 3         SAB TAB Ectopic Multiple Live Births           3      # Outcome Date GA Lbr Randal/2nd Weight Sex Delivery Anes PTL Lv   3 Term            2 Term            1 Term                Review of Symptoms:  GENERAL: Denies weight gain or weight loss. Feeling well overall.   SKIN: Denies rash or lesions.   HEAD: Denies head injury or headache.   NODES: Denies enlarged lymph nodes.   CHEST: Denies chest pain or shortness of breath.   CARDIOVASCULAR: Denies palpitations or left sided chest pain.   ABDOMEN: No abdominal pain, constipation, diarrhea, nausea, vomiting or rectal bleeding.   URINARY: No frequency, dysuria, hematuria, or burning on urination.  HEMATOLOGIC: No easy bruisability or excessive bleeding.   MUSCULOSKELETAL: Denies joint pain or swelling.     /82   Ht 5' 6" (1.676 m)   Wt 83.6 kg (184 lb 4.9 oz)   Physical Exam:  APPEARANCE: Well nourished, well developed, in no acute " distress.  SKIN: Normal skin turgor, no lesions.  NECK: Neck symmetric without masses   RESPIRATORY: Normal respiratory effort with no retractions or use of accessory muscles  CARDIOVASCULAR: Peripheral vascular system with no swelling no varicosities and palpation of pulses normal  LYMPHATIC: No enlargements of the lymph nodes noted in the neck, axillae, or groin  ABDOMEN: Soft. No tenderness or masses. No hepatosplenomegaly. No hernias.  BREASTS: Symmetrical, no skin changes or visible lesions. No palpable masses, nipple discharge or adenopathy bilaterally.  PELVIC: Normal external female genitalia without lesions. Normal hair distribution. Adequate perineal body, normal urethral meatus. Urethra with no masses.  Bladder nontender. Vagina moist and well rugated without lesions or discharge. Cervix pink and without lesions. No significant cystocele or rectocele. Bimanual exam showed uterus normal size, shape, position, mobile and nontender. Adnexa without masses or tenderness. Urethra and bladder normal.   EXTREMITIES: No clubbing cyanosis or edema.    ASSESSMENT/PLAN:  Encounter for gynecological examination without abnormal finding    Hot flashes    Hormone replacement therapy (HRT)  -     medroxyPROGESTERone (PROVERA) 5 MG tablet; Take 1 tablet (5 mg total) by mouth once daily.  Dispense: 30 tablet; Refill: 11  -     estradioL (DIVIGEL) 0.25 mg/0.25 gram (0.1 %) GlPk; Place 1 Package onto the skin once daily.  Dispense: 28 packet; Refill: 11    Menopause    Stress incontinence of urine    Postmenopausal HRT (hormone replacement therapy)          Patient was counseled today on Pelvic exams and Pap Smear guidelines.   We discussed STD screening if at high risk for a STD.  We discussed recommendation for breast cancer screening with mammogram every other year after the age of 40 and annually after the age of 50.    We discussed colon cancer screening when indicated.   Osteoporosis screening discussed when indicated.    She was advised to see her primary care physician for all other health maintenance.     FOLLOW-UP with me for next routine visit.

## 2020-08-09 ENCOUNTER — PATIENT MESSAGE (OUTPATIENT)
Dept: FAMILY MEDICINE | Facility: CLINIC | Age: 50
End: 2020-08-09

## 2020-08-11 ENCOUNTER — TELEPHONE (OUTPATIENT)
Dept: FAMILY MEDICINE | Facility: CLINIC | Age: 50
End: 2020-08-11

## 2020-08-11 PROBLEM — R04.0 RECURRENT EPISTAXIS: Status: ACTIVE | Noted: 2020-08-11

## 2020-08-11 NOTE — TELEPHONE ENCOUNTER
----- Message from Mena Trevizo sent at 8/11/2020 12:37 PM CDT -----  Regarding: PT  Contact: PT  PT wanted to see if the doctor could call in some zofran or some kind of nausea medicine for her today. She has an ENT appointment this afternoon for a nose bleed and she's very nauseous. Please call back     Callback: [phone]

## 2020-08-12 NOTE — TELEPHONE ENCOUNTER
Spoke with pt. She reports that at Dr. Solis's follow appt yest. The artery that he cauterized he states is normally associated with high blood pressures and stated that her BP may be spiking. Pt reports that she did exhibit headache after having nose bleeds over 3 day spay, but no s/s exhibited associated with her bp elevating that she knows of. Informed pt to monitor her BP twice a day and keep a BP log. Schedule pt for f/u appt on 8/20/20. Notified pt to bring in her BP log for review at her appointment. Pt verbalized understanding. Dr. Strauss notified and aware.

## 2020-08-12 NOTE — TELEPHONE ENCOUNTER
See my chart message regarding nose bleeds and report of high blood pressure.  Every reading that I see in the chart looks very good so I do not know if she has had reading elsewhere that are elevated.    If her blood pressure is high I will be happy to start something and get her in clinic.  Although it currently says she is in the hospital right now so I need to know if this is the case and whether not they discharged her on anything for blood pressure.    Again based on the readings in the chart, I am hesitant to start her on a blood pressure medicine unless I have proof that it has actually been elevated.

## 2020-08-12 NOTE — TELEPHONE ENCOUNTER
Just FYI. Please review pt's [employer] message of update after she had f/u with Dr. Surinder Acevedo.   According to pt's chart, it appears as though pt was admitted to the hospital yesterday evening after sending this [employer] message and is currently still inpatient.

## 2020-08-19 NOTE — TELEPHONE ENCOUNTER
----- Message from Imelda Bernard sent at 1/17/2017  9:31 AM CST -----  [facility] pharmacy at [phone] called regarding medication: Progesterone 100 mg capsules for above patient. They are requesting Rx prior authorization or fax to [phone]. Thanks!  
PA submitted, pharmacy notified.   
details…

## 2020-08-20 ENCOUNTER — OFFICE VISIT (OUTPATIENT)
Dept: FAMILY MEDICINE | Facility: CLINIC | Age: 50
End: 2020-08-20
Payer: COMMERCIAL

## 2020-08-20 ENCOUNTER — LAB VISIT (OUTPATIENT)
Dept: LAB | Facility: HOSPITAL | Age: 50
End: 2020-08-20
Attending: FAMILY MEDICINE
Payer: COMMERCIAL

## 2020-08-20 VITALS
WEIGHT: 177.94 LBS | DIASTOLIC BLOOD PRESSURE: 76 MMHG | HEIGHT: 66 IN | TEMPERATURE: 98 F | HEART RATE: 60 BPM | SYSTOLIC BLOOD PRESSURE: 118 MMHG | BODY MASS INDEX: 28.6 KG/M2 | RESPIRATION RATE: 16 BRPM

## 2020-08-20 DIAGNOSIS — R03.0 ELEVATED BP WITHOUT DIAGNOSIS OF HYPERTENSION: Primary | ICD-10-CM

## 2020-08-20 DIAGNOSIS — R51.9 INTRACTABLE HEADACHE, UNSPECIFIED CHRONICITY PATTERN, UNSPECIFIED HEADACHE TYPE: ICD-10-CM

## 2020-08-20 DIAGNOSIS — E78.5 HYPERLIPIDEMIA, UNSPECIFIED HYPERLIPIDEMIA TYPE: ICD-10-CM

## 2020-08-20 DIAGNOSIS — R04.0 EPISTAXIS: ICD-10-CM

## 2020-08-20 LAB
CHOLEST SERPL-MCNC: 244 MG/DL (ref 120–199)
CHOLEST/HDLC SERPL: 4.8 {RATIO} (ref 2–5)
HDLC SERPL-MCNC: 51 MG/DL (ref 40–75)
HDLC SERPL: 20.9 % (ref 20–50)
LDLC SERPL CALC-MCNC: 171.6 MG/DL (ref 63–159)
NONHDLC SERPL-MCNC: 193 MG/DL
TRIGL SERPL-MCNC: 107 MG/DL (ref 30–150)

## 2020-08-20 PROCEDURE — 80061 LIPID PANEL: CPT

## 2020-08-20 PROCEDURE — 99215 PR OFFICE/OUTPT VISIT, EST, LEVL V, 40-54 MIN: ICD-10-PCS | Mod: S$GLB,,, | Performed by: FAMILY MEDICINE

## 2020-08-20 PROCEDURE — 3008F PR BODY MASS INDEX (BMI) DOCUMENTED: ICD-10-PCS | Mod: CPTII,S$GLB,, | Performed by: FAMILY MEDICINE

## 2020-08-20 PROCEDURE — 36415 COLL VENOUS BLD VENIPUNCTURE: CPT | Mod: PN

## 2020-08-20 PROCEDURE — 99999 PR PBB SHADOW E&M-EST. PATIENT-LVL III: ICD-10-PCS | Mod: PBBFAC,,, | Performed by: FAMILY MEDICINE

## 2020-08-20 PROCEDURE — 99999 PR PBB SHADOW E&M-EST. PATIENT-LVL III: CPT | Mod: PBBFAC,,, | Performed by: FAMILY MEDICINE

## 2020-08-20 PROCEDURE — 3008F BODY MASS INDEX DOCD: CPT | Mod: CPTII,S$GLB,, | Performed by: FAMILY MEDICINE

## 2020-08-20 PROCEDURE — 99215 OFFICE O/P EST HI 40 MIN: CPT | Mod: S$GLB,,, | Performed by: FAMILY MEDICINE

## 2020-08-20 RX ORDER — ACETAZOLAMIDE 250 MG/1
250 TABLET ORAL 2 TIMES DAILY
COMMUNITY
Start: 2020-08-13 | End: 2020-09-21

## 2020-08-20 RX ORDER — CEFDINIR 300 MG/1
CAPSULE ORAL
COMMUNITY
Start: 2020-08-13 | End: 2020-09-21 | Stop reason: ALTCHOICE

## 2020-08-20 NOTE — PROGRESS NOTES
THIS DOCUMENT WAS MADE IN PART WITH VOICE RECOGNITION SOFTWARE.  OCCASIONALLY THIS SOFTWARE WILL MISINTERPRET WORDS OR PHRASES.      Steph Lemus  1970    Steph was seen today for recurrent nose bleeds.    Diagnoses and all orders for this visit:    Elevated BP without diagnosis of hypertension  Her blood pressure appears to be fine and it appears that the elevations were directly related to the stress of the nose bleeds etc. But she will continue to monitor these    Intractable headache, unspecified chronicity pattern, unspecified headache type  Persistent headache particularly on the left side, again may be related to the nasal procedure but these headaches were also present to a lesser degree before so she will proceed with the CT scan that I had already requested based on previous telephone correspondence    Epistaxis  Appears resolved    Hyperlipidemia, unspecified hyperlipidemia type  -     Lipid Panel; Future  Addendum, lipids did return elevated and I did send in a prescription for Crestor.  We will repeat labs in a few months and she will report any problems or side effects should they occur    Total time greater than 40 min.  More than half was face-to-face counseling regarding the above findings and concerns    Subjective     Chief Complaint   Patient presents with    recurrent nose bleeds     Patient reports she had ER visits on 08/08/2020 and 08/11/2020 for sever nose bleeds       HPI  Recently difficulty with heavy reoccurring nose bleeds, ultimately required procedural intervention.  There were a few episodes of elevated blood pressure so this was a concern however most blood pressure readings have been very good and she has been following these as we instructed.    Was on fish oil stopped now because this may affect bleeding.    Hyperlipidemia, I had advised her to stop the statin medication because of suspected symptoms.  There has been some improvement but not complete resolution  "so uncertain if the statin was the only trigger for the symptoms.  However we need to repeat labs and if needed she would be willing to restart a different statin.  Most recently had been on Lipitor.  Remotely she had been on pravastatin.      Active Ambulatory Problems     Diagnosis Date Noted    Hyperlipidemia 01/21/2019    Colon cancer screening 04/15/2019    Recurrent epistaxis 08/11/2020     Resolved Ambulatory Problems     Diagnosis Date Noted    No Resolved Ambulatory Problems     Past Medical History:   Diagnosis Date    Hormone replacement therapy          Review of Systems   Constitutional: Positive for activity change. Negative for fever and unexpected weight change.   HENT: Positive for nosebleeds (None recently).    Respiratory: Negative.    Cardiovascular: Negative.    Gastrointestinal: Negative.    Genitourinary: Negative.    Neurological: Positive for headaches.       Objective     Physical Exam  Vitals signs reviewed.   Constitutional:       Appearance: She is well-developed. She is not diaphoretic.   HENT:      Head: Normocephalic and atraumatic.   Eyes:      General: No scleral icterus.  Cardiovascular:      Rate and Rhythm: Normal rate and regular rhythm.      Heart sounds: Normal heart sounds. No murmur.   Pulmonary:      Effort: Pulmonary effort is normal. No respiratory distress.      Breath sounds: Normal breath sounds.   Skin:     General: Skin is dry.      Findings: No rash.   Neurological:      Mental Status: She is alert and oriented to person, place, and time.   Psychiatric:         Behavior: Behavior normal.       Vitals:    08/20/20 1401   BP: 118/76   BP Location: Left arm   Patient Position: Sitting   BP Method: Medium (Manual)   Pulse: 60   Resp: 16   Temp: 97.7 °F (36.5 °C)   TempSrc: Temporal   Weight: 80.7 kg (177 lb 14.6 oz)   Height: 5' 6" (1.676 m)       MOST RECENT LABS IN OUR ELECTRONIC MEDICAL RECORD:     Results for orders placed or performed in visit on 08/20/20 "   Lipid Panel   Result Value Ref Range    Cholesterol 244 (H) 120 - 199 mg/dL    Triglycerides 107 30 - 150 mg/dL    HDL 51 40 - 75 mg/dL    LDL Cholesterol 171.6 (H) 63.0 - 159.0 mg/dL    Hdl/Cholesterol Ratio 20.9 20.0 - 50.0 %    Total Cholesterol/HDL Ratio 4.8 2.0 - 5.0    Non-HDL Cholesterol 193 mg/dL

## 2020-08-21 ENCOUNTER — HOSPITAL ENCOUNTER (OUTPATIENT)
Dept: RADIOLOGY | Facility: HOSPITAL | Age: 50
Discharge: HOME | End: 2020-08-21
Attending: FAMILY MEDICINE
Payer: COMMERCIAL

## 2020-08-21 DIAGNOSIS — R51.9 INTRACTABLE HEADACHE, UNSPECIFIED CHRONICITY PATTERN, UNSPECIFIED HEADACHE TYPE: ICD-10-CM

## 2020-08-21 PROCEDURE — 25500020 PHARM REV CODE 255: Mod: PO | Performed by: FAMILY MEDICINE

## 2020-08-21 PROCEDURE — 70470 CT HEAD WITH AND WITHOUT: ICD-10-PCS | Mod: 26,,, | Performed by: RADIOLOGY

## 2020-08-21 PROCEDURE — 70470 CT HEAD/BRAIN W/O & W/DYE: CPT | Mod: TC,PO

## 2020-08-21 PROCEDURE — 70470 CT HEAD/BRAIN W/O & W/DYE: CPT | Mod: 26,,, | Performed by: RADIOLOGY

## 2020-08-21 RX ADMIN — IOHEXOL 75 ML: 350 INJECTION, SOLUTION INTRAVENOUS at 10:08

## 2020-08-24 ENCOUNTER — TELEPHONE (OUTPATIENT)
Dept: FAMILY MEDICINE | Facility: CLINIC | Age: 50
End: 2020-08-24

## 2020-08-24 DIAGNOSIS — E78.5 HYPERLIPIDEMIA, UNSPECIFIED HYPERLIPIDEMIA TYPE: Primary | ICD-10-CM

## 2020-08-24 RX ORDER — ROSUVASTATIN CALCIUM 5 MG/1
5 TABLET, COATED ORAL DAILY
Qty: 90 TABLET | Refills: 3 | Status: SHIPPED | OUTPATIENT
Start: 2020-08-24 | End: 2021-07-27

## 2020-08-25 ENCOUNTER — TELEPHONE (OUTPATIENT)
Dept: FAMILY MEDICINE | Facility: CLINIC | Age: 50
End: 2020-08-25

## 2020-08-25 NOTE — TELEPHONE ENCOUNTER
Reviewed with pt, she expressed understanding.    Ear Star Wedge Flap Text: The defect edges were debeveled with a #15 blade scalpel.  Given the location of the defect and the proximity to free margins (helical rim) an ear star wedge flap was deemed most appropriate.  Using a sterile surgical marker, the appropriate flap was drawn incorporating the defect and placing the expected incisions between the helical rim and antihelix where possible.  The area thus outlined was incised through and through with a #15 scalpel blade.

## 2020-08-25 NOTE — TELEPHONE ENCOUNTER
----- Message from Milagros Hernandez sent at 8/25/2020 10:08 AM CDT -----  Contact: self  Type:  Patient Returning Call    Who Called:  patient  Who Left Message for Patient:  Jody  Does the patient know what this is regarding?:  about her meds  Best Call Back Number:  [phone] (home)   Additional Information:  Thank you

## 2020-09-21 ENCOUNTER — OFFICE VISIT (OUTPATIENT)
Dept: FAMILY MEDICINE | Facility: CLINIC | Age: 50
End: 2020-09-21
Payer: COMMERCIAL

## 2020-09-21 VITALS
TEMPERATURE: 98 F | WEIGHT: 178.56 LBS | SYSTOLIC BLOOD PRESSURE: 112 MMHG | HEIGHT: 66 IN | DIASTOLIC BLOOD PRESSURE: 68 MMHG | OXYGEN SATURATION: 99 % | BODY MASS INDEX: 28.7 KG/M2 | HEART RATE: 57 BPM

## 2020-09-21 DIAGNOSIS — R10.31 RIGHT LOWER QUADRANT ABDOMINAL PAIN: ICD-10-CM

## 2020-09-21 PROBLEM — K35.80 ACUTE APPENDICITIS: Status: ACTIVE | Noted: 2020-09-21

## 2020-09-21 PROCEDURE — 99213 OFFICE O/P EST LOW 20 MIN: CPT | Mod: S$GLB,,, | Performed by: FAMILY MEDICINE

## 2020-09-21 PROCEDURE — 3008F BODY MASS INDEX DOCD: CPT | Mod: CPTII,S$GLB,, | Performed by: FAMILY MEDICINE

## 2020-09-21 PROCEDURE — 99213 PR OFFICE/OUTPT VISIT, EST, LEVL III, 20-29 MIN: ICD-10-PCS | Mod: S$GLB,,, | Performed by: FAMILY MEDICINE

## 2020-09-21 PROCEDURE — 3008F PR BODY MASS INDEX (BMI) DOCUMENTED: ICD-10-PCS | Mod: CPTII,S$GLB,, | Performed by: FAMILY MEDICINE

## 2020-09-21 PROCEDURE — 99999 PR PBB SHADOW E&M-EST. PATIENT-LVL III: CPT | Mod: PBBFAC,,, | Performed by: FAMILY MEDICINE

## 2020-09-21 PROCEDURE — 99999 PR PBB SHADOW E&M-EST. PATIENT-LVL III: ICD-10-PCS | Mod: PBBFAC,,, | Performed by: FAMILY MEDICINE

## 2020-09-21 NOTE — PROGRESS NOTES
"Subjective:       Patient ID: Steph Lemus is a 50 y.o. female.    Chief Complaint: Abdominal Pain (ocurring since 9/18/2020; pt c/o bloating, diarrhea, and a dull ache in the center of her abdomen )    HPI  Patient recalls that she had significant stomach cramps and pain following by diarrhea. Onset 3 days ago. Has felt more bloated and also with epigastric and RUQ pain. Pain described as more of an aching sensation.   Tender to palpation.  Afebrile, no chills. Diarrhea has since resolved, stools are a normal color, no blood visible.  No UGI, no nausea, heartburn. No bladder concerns of frequency.   No dietary changes in the past month.   She does have periodic hormone fluctuations c/w cycles (s/p ablation, no bleeding).     Cramping and diarrhea has occurred previously, but did not linger.   She has taken gas-x thus far without relief.     BP: she experienced a few episodes of epistaxis and ENT noted that this can occur frequently with elev BP. She did keep a BP log which was wnl.    Had cautery of nasal artery on 8/11/20.     Review of Systems:  Review of Systems   Constitutional: Negative for chills, fatigue and fever.   HENT: Positive for sinus pressure. Negative for congestion and nosebleeds (resolved).    Respiratory: Negative for cough and shortness of breath.    Gastrointestinal: Positive for abdominal pain and diarrhea. Negative for blood in stool and nausea.   Genitourinary: Negative for difficulty urinating and dysuria.   Neurological: Positive for headaches. Negative for dizziness.       Objective:     Vitals:    09/21/20 1534   BP: 112/68   Pulse: (!) 57   Temp: 97.9 °F (36.6 °C)   TempSrc: Temporal   SpO2: 99%   Weight: 81 kg (178 lb 9.2 oz)   Height: 5' 6" (1.676 m)          Physical Exam  Vitals signs and nursing note reviewed.   Constitutional:       General: She is not in acute distress.     Appearance: She is well-developed.   HENT:      Head: Normocephalic and atraumatic.      Right Ear: " Tympanic membrane and external ear normal.      Left Ear: Tympanic membrane and external ear normal.      Nose: Nose normal.      Mouth/Throat:      Pharynx: No oropharyngeal exudate.   Eyes:      Conjunctiva/sclera: Conjunctivae normal.      Pupils: Pupils are equal, round, and reactive to light.   Neck:      Musculoskeletal: Neck supple.      Thyroid: No thyromegaly.   Cardiovascular:      Rate and Rhythm: Normal rate and regular rhythm.   Pulmonary:      Effort: Pulmonary effort is normal. No respiratory distress.      Breath sounds: Normal breath sounds. No wheezing.   Abdominal:      General: Bowel sounds are normal. There is no distension.      Palpations: Abdomen is soft. There is no mass.      Tenderness: There is abdominal tenderness in the right lower quadrant. There is guarding. There is no rebound. Positive signs include McBurney's sign.   Musculoskeletal:      Right lower leg: No edema.      Left lower leg: No edema.   Lymphadenopathy:      Cervical: No cervical adenopathy.   Skin:     General: Skin is warm and dry.   Neurological:      General: No focal deficit present.      Mental Status: She is alert and oriented to person, place, and time.      Cranial Nerves: No cranial nerve deficit.   Psychiatric:         Mood and Affect: Mood normal.         Behavior: Behavior normal.           Assessment & Plan:  Right lower quadrant abdominal pain  -     CT Abdomen Pelvis With Contrast; Future; Expected date: 09/21/2020    recommend stat imaging or ER review. Discussed with pt who was in agreement with plan to r/o appendicitis.

## 2020-12-02 ENCOUNTER — PATIENT MESSAGE (OUTPATIENT)
Dept: OBSTETRICS AND GYNECOLOGY | Facility: CLINIC | Age: 50
End: 2020-12-02

## 2020-12-02 DIAGNOSIS — Z79.890 HORMONE REPLACEMENT THERAPY (HRT): ICD-10-CM

## 2020-12-02 RX ORDER — ESTRADIOL 0.5 MG/.5G
1 GEL TOPICAL DAILY
Qty: 30 PACKET | Refills: 11 | Status: SHIPPED | OUTPATIENT
Start: 2020-12-02 | End: 2021-07-02 | Stop reason: SDUPTHER

## 2020-12-09 ENCOUNTER — PATIENT OUTREACH (OUTPATIENT)
Dept: ADMINISTRATIVE | Facility: OTHER | Age: 50
End: 2020-12-09

## 2020-12-09 NOTE — PROGRESS NOTES
LINKS immunization registry not responding  Care Everywhere updated  Health Maintenance updated  Chart reviewed for overdue Proactive [facility] Encounters ([facility]) health maintenance testing (CRS, Breast Ca, Diabetic Eye Exam)   Orders entered:N/A

## 2020-12-10 ENCOUNTER — OFFICE VISIT (OUTPATIENT)
Dept: CARDIOLOGY | Facility: CLINIC | Age: 50
End: 2020-12-10
Payer: COMMERCIAL

## 2020-12-10 VITALS
BODY MASS INDEX: 29.27 KG/M2 | HEART RATE: 64 BPM | DIASTOLIC BLOOD PRESSURE: 88 MMHG | SYSTOLIC BLOOD PRESSURE: 127 MMHG | WEIGHT: 182.13 LBS | HEIGHT: 66 IN

## 2020-12-10 DIAGNOSIS — R00.2 PALPITATIONS: ICD-10-CM

## 2020-12-10 DIAGNOSIS — E78.5 HYPERLIPIDEMIA, UNSPECIFIED HYPERLIPIDEMIA TYPE: Primary | ICD-10-CM

## 2020-12-10 PROCEDURE — 3008F PR BODY MASS INDEX (BMI) DOCUMENTED: ICD-10-PCS | Mod: CPTII,S$GLB,, | Performed by: INTERNAL MEDICINE

## 2020-12-10 PROCEDURE — 99204 OFFICE O/P NEW MOD 45 MIN: CPT | Mod: S$GLB,,, | Performed by: INTERNAL MEDICINE

## 2020-12-10 PROCEDURE — 3008F BODY MASS INDEX DOCD: CPT | Mod: CPTII,S$GLB,, | Performed by: INTERNAL MEDICINE

## 2020-12-10 PROCEDURE — 99204 PR OFFICE/OUTPT VISIT, NEW, LEVL IV, 45-59 MIN: ICD-10-PCS | Mod: S$GLB,,, | Performed by: INTERNAL MEDICINE

## 2020-12-10 PROCEDURE — 1126F PR PAIN SEVERITY QUANTIFIED, NO PAIN PRESENT: ICD-10-PCS | Mod: S$GLB,,, | Performed by: INTERNAL MEDICINE

## 2020-12-10 PROCEDURE — 99999 PR PBB SHADOW E&M-EST. PATIENT-LVL III: CPT | Mod: PBBFAC,,, | Performed by: INTERNAL MEDICINE

## 2020-12-10 PROCEDURE — 1126F AMNT PAIN NOTED NONE PRSNT: CPT | Mod: S$GLB,,, | Performed by: INTERNAL MEDICINE

## 2020-12-10 PROCEDURE — 99999 PR PBB SHADOW E&M-EST. PATIENT-LVL III: ICD-10-PCS | Mod: PBBFAC,,, | Performed by: INTERNAL MEDICINE

## 2020-12-10 NOTE — PROGRESS NOTES
Subjective:    Patient ID:  Steph Lemus is a 50 y.o. female who presents for evaluation of dyslipidemia    HPI  She comes with no complaints, no chest pain, no shortness of breath      Review of Systems   Constitution: Negative for decreased appetite, malaise/fatigue, weight gain and weight loss.   Cardiovascular: Negative for chest pain, dyspnea on exertion, leg swelling, palpitations and syncope.   Respiratory: Negative for cough and shortness of breath.    Gastrointestinal: Negative.    Neurological: Negative for weakness.   All other systems reviewed and are negative.       Objective:      Physical Exam   Constitutional: She is oriented to person, place, and time. She appears well-developed and well-nourished.   HENT:   Head: Normocephalic.   Eyes: Pupils are equal, round, and reactive to light.   Neck: Normal range of motion. Neck supple. No JVD present. Carotid bruit is not present. No thyromegaly present.   Cardiovascular: Normal rate, regular rhythm, normal heart sounds, intact distal pulses and normal pulses. PMI is not displaced. Exam reveals no gallop.   No murmur heard.  Pulmonary/Chest: Effort normal and breath sounds normal.   Abdominal: Soft. Normal appearance. She exhibits no mass. There is no hepatosplenomegaly. There is no abdominal tenderness.   Musculoskeletal: Normal range of motion.         General: No edema.   Neurological: She is alert and oriented to person, place, and time. She has normal strength and normal reflexes. No sensory deficit.   Skin: Skin is warm and intact.   Psychiatric: She has a normal mood and affect.   Nursing note and vitals reviewed.        Assessment:       1. Hyperlipidemia, unspecified hyperlipidemia type    2. Palpitations         Plan:     Stress echo/ccfd, carotid doppler  Call with results

## 2020-12-17 ENCOUNTER — CLINICAL SUPPORT (OUTPATIENT)
Dept: CARDIOLOGY | Facility: CLINIC | Age: 50
End: 2020-12-17
Attending: INTERNAL MEDICINE
Payer: COMMERCIAL

## 2020-12-17 VITALS — WEIGHT: 182 LBS | HEIGHT: 66 IN | BODY MASS INDEX: 29.25 KG/M2

## 2020-12-17 DIAGNOSIS — R00.2 PALPITATIONS: ICD-10-CM

## 2020-12-17 DIAGNOSIS — E78.5 HYPERLIPIDEMIA, UNSPECIFIED HYPERLIPIDEMIA TYPE: ICD-10-CM

## 2020-12-17 PROCEDURE — 93880 CV US DOPPLER CAROTID (CUPID ONLY): ICD-10-PCS | Mod: S$GLB,,, | Performed by: INTERNAL MEDICINE

## 2020-12-17 PROCEDURE — 93351 STRESS TTE COMPLETE: CPT | Mod: S$GLB,,, | Performed by: INTERNAL MEDICINE

## 2020-12-17 PROCEDURE — 99999 PR PBB SHADOW E&M-EST. PATIENT-LVL I: CPT | Mod: PBBFAC,,,

## 2020-12-17 PROCEDURE — 93880 EXTRACRANIAL BILAT STUDY: CPT | Mod: S$GLB,,, | Performed by: INTERNAL MEDICINE

## 2020-12-17 PROCEDURE — 93351 STRESS ECHO (CUPID ONLY): ICD-10-PCS | Mod: S$GLB,,, | Performed by: INTERNAL MEDICINE

## 2020-12-17 PROCEDURE — 99999 PR PBB SHADOW E&M-EST. PATIENT-LVL I: ICD-10-PCS | Mod: PBBFAC,,,

## 2020-12-18 LAB
LEFT ARM DIASTOLIC BLOOD PRESSURE: 70 MMHG
LEFT ARM SYSTOLIC BLOOD PRESSURE: 125 MMHG
LEFT CBA DIAS: 27 CM/S
LEFT CBA SYS: 78 CM/S
LEFT CCA DIST DIAS: 21 CM/S
LEFT CCA DIST SYS: 81 CM/S
LEFT CCA MID DIAS: 25 CM/S
LEFT CCA MID SYS: 90 CM/S
LEFT CCA PROX DIAS: 25 CM/S
LEFT CCA PROX SYS: 95 CM/S
LEFT ECA DIAS: 12 CM/S
LEFT ECA SYS: 69 CM/S
LEFT ICA DIST DIAS: 36 CM/S
LEFT ICA DIST SYS: 84 CM/S
LEFT ICA MID DIAS: 35 CM/S
LEFT ICA MID SYS: 83 CM/S
LEFT ICA PROX DIAS: 22 CM/S
LEFT ICA PROX SYS: 49 CM/S
LEFT VERTEBRAL DIAS: 19 CM/S
LEFT VERTEBRAL SYS: 63 CM/S
OHS CV CAROTID RIGHT ICA EDV HIGHEST: 34
OHS CV CAROTID ULTRASOUND LEFT ICA/CCA RATIO: 1.04
OHS CV CAROTID ULTRASOUND RIGHT ICA/CCA RATIO: 0.97
OHS CV PV CAROTID LEFT HIGHEST CCA: 95
OHS CV PV CAROTID LEFT HIGHEST ICA: 84
OHS CV PV CAROTID RIGHT HIGHEST CCA: 110
OHS CV PV CAROTID RIGHT HIGHEST ICA: 87
OHS CV US CAROTID LEFT HIGHEST EDV: 36
RIGHT ARM DIASTOLIC BLOOD PRESSURE: 70 MMHG
RIGHT ARM SYSTOLIC BLOOD PRESSURE: 130 MMHG
RIGHT CBA DIAS: 15 CM/S
RIGHT CBA SYS: 86 CM/S
RIGHT CCA DIST DIAS: 16 CM/S
RIGHT CCA DIST SYS: 90 CM/S
RIGHT CCA MID DIAS: 18 CM/S
RIGHT CCA MID SYS: 101 CM/S
RIGHT CCA PROX DIAS: 25 CM/S
RIGHT CCA PROX SYS: 110 CM/S
RIGHT ECA DIAS: 14 CM/S
RIGHT ECA SYS: 103 CM/S
RIGHT ICA DIST DIAS: 24 CM/S
RIGHT ICA DIST SYS: 64 CM/S
RIGHT ICA MID DIAS: 34 CM/S
RIGHT ICA MID SYS: 87 CM/S
RIGHT ICA PROX DIAS: 21 CM/S
RIGHT ICA PROX SYS: 79 CM/S
RIGHT VERTEBRAL DIAS: 19 CM/S
RIGHT VERTEBRAL SYS: 60 CM/S

## 2020-12-21 LAB
ASCENDING AORTA: 2.15 CM
AV INDEX (PROSTH): 0.91
AV MEAN GRADIENT: 5 MMHG
AV PEAK GRADIENT: 8 MMHG
AV VALVE AREA: 2.74 CM2
AV VELOCITY RATIO: 0.83
BSA FOR ECHO PROCEDURE: 1.96 M2
CV ECHO LV RWT: 0.4 CM
CV STRESS BASE HR: 72 BPM
DIASTOLIC BLOOD PRESSURE: 92 MMHG
DOP CALC AO PEAK VEL: 1.45 M/S
DOP CALC AO VTI: 31.59 CM
DOP CALC LVOT AREA: 3 CM2
DOP CALC LVOT DIAMETER: 1.96 CM
DOP CALC LVOT PEAK VEL: 1.21 M/S
DOP CALC LVOT STROKE VOLUME: 86.61 CM3
DOP CALCLVOT PEAK VEL VTI: 28.72 CM
E WAVE DECELERATION TIME: 174.59 MSEC
E/A RATIO: 1.06
E/E' RATIO: 5.92 M/S
ECHO LV POSTERIOR WALL: 0.97 CM (ref 0.6–1.1)
FRACTIONAL SHORTENING: 41 % (ref 28–44)
INTERVENTRICULAR SEPTUM: 0.91 CM (ref 0.6–1.1)
IVRT: 91.34 MSEC
LA MAJOR: 4.22 CM
LA MINOR: 4.61 CM
LA WIDTH: 2.98 CM
LEFT ATRIUM SIZE: 3.58 CM
LEFT ATRIUM VOLUME INDEX: 20.8 ML/M2
LEFT ATRIUM VOLUME: 39.96 CM3
LEFT INTERNAL DIMENSION IN SYSTOLE: 2.86 CM (ref 2.1–4)
LEFT VENTRICLE DIASTOLIC VOLUME INDEX: 58.05 ML/M2
LEFT VENTRICLE DIASTOLIC VOLUME: 111.55 ML
LEFT VENTRICLE MASS INDEX: 84 G/M2
LEFT VENTRICLE SYSTOLIC VOLUME INDEX: 16.3 ML/M2
LEFT VENTRICLE SYSTOLIC VOLUME: 31.26 ML
LEFT VENTRICULAR INTERNAL DIMENSION IN DIASTOLE: 4.88 CM (ref 3.5–6)
LEFT VENTRICULAR MASS: 160.93 G
LV LATERAL E/E' RATIO: 5.07 M/S
LV SEPTAL E/E' RATIO: 7.1 M/S
MV A" WAVE DURATION": 10.28 MSEC
MV PEAK A VEL: 0.67 M/S
MV PEAK E VEL: 0.71 M/S
OHS CV CPX 1 MINUTE RECOVERY HEART RATE: 113 BPM
OHS CV CPX 85 PERCENT MAX PREDICTED HEART RATE MALE: 136
OHS CV CPX ESTIMATED METS: 10
OHS CV CPX MAX PREDICTED HEART RATE: 160
OHS CV CPX PATIENT IS FEMALE: 1
OHS CV CPX PATIENT IS MALE: 0
OHS CV CPX PEAK DIASTOLIC BLOOD PRESSURE: 64 MMHG
OHS CV CPX PEAK HEAR RATE: 162 BPM
OHS CV CPX PEAK RATE PRESSURE PRODUCT: NORMAL
OHS CV CPX PEAK SYSTOLIC BLOOD PRESSURE: 191 MMHG
OHS CV CPX PERCENT MAX PREDICTED HEART RATE ACHIEVED: 101
OHS CV CPX RATE PRESSURE PRODUCT PRESENTING: 9288
PISA MRMAX VEL: 0.04 M/S
PISA TR MAX VEL: 2.49 M/S
PULM VEIN S/D RATIO: 1.02
PV PEAK D VEL: 0.55 M/S
PV PEAK S VEL: 0.56 M/S
RA MAJOR: 4.27 CM
RA PRESSURE: 3 MMHG
RA WIDTH: 2.92 CM
RIGHT VENTRICULAR END-DIASTOLIC DIMENSION: 3.17 CM
RV TISSUE DOPPLER FREE WALL SYSTOLIC VELOCITY 1 (APICAL 4 CHAMBER VIEW): 14.3 CM/S
SINUS: 2.13 CM
STJ: 1.96 CM
STRESS ECHO POST EXERCISE DUR MIN: 5 MINUTES
STRESS ECHO POST EXERCISE DUR SEC: 51 SECONDS
SYSTOLIC BLOOD PRESSURE: 129 MMHG
TDI LATERAL: 0.14 M/S
TDI SEPTAL: 0.1 M/S
TDI: 0.12 M/S
TR MAX PG: 25 MMHG
TRICUSPID ANNULAR PLANE SYSTOLIC EXCURSION: 2.19 CM
TV REST PULMONARY ARTERY PRESSURE: 28 MMHG

## 2021-02-09 ENCOUNTER — OFFICE VISIT (OUTPATIENT)
Dept: FAMILY MEDICINE | Facility: CLINIC | Age: 51
End: 2021-02-09
Payer: COMMERCIAL

## 2021-02-09 ENCOUNTER — LAB VISIT (OUTPATIENT)
Dept: LAB | Facility: HOSPITAL | Age: 51
End: 2021-02-09
Attending: FAMILY MEDICINE
Payer: COMMERCIAL

## 2021-02-09 VITALS
SYSTOLIC BLOOD PRESSURE: 122 MMHG | HEIGHT: 66 IN | HEART RATE: 68 BPM | DIASTOLIC BLOOD PRESSURE: 82 MMHG | BODY MASS INDEX: 29.5 KG/M2 | TEMPERATURE: 98 F | WEIGHT: 183.56 LBS | RESPIRATION RATE: 14 BRPM

## 2021-02-09 DIAGNOSIS — R23.2 HOT FLASHES: ICD-10-CM

## 2021-02-09 DIAGNOSIS — Z00.00 ROUTINE GENERAL MEDICAL EXAMINATION AT A HEALTH CARE FACILITY: ICD-10-CM

## 2021-02-09 DIAGNOSIS — J34.89 SORE IN NOSE: Primary | ICD-10-CM

## 2021-02-09 LAB
ALBUMIN SERPL BCP-MCNC: 4.4 G/DL (ref 3.5–5.2)
ALP SERPL-CCNC: 73 U/L (ref 55–135)
ALT SERPL W/O P-5'-P-CCNC: 30 U/L (ref 10–44)
ANION GAP SERPL CALC-SCNC: 11 MMOL/L (ref 8–16)
AST SERPL-CCNC: 22 U/L (ref 10–40)
BILIRUB SERPL-MCNC: 0.5 MG/DL (ref 0.1–1)
BUN SERPL-MCNC: 15 MG/DL (ref 6–20)
CALCIUM SERPL-MCNC: 9.4 MG/DL (ref 8.7–10.5)
CHLORIDE SERPL-SCNC: 107 MMOL/L (ref 95–110)
CHOLEST SERPL-MCNC: 170 MG/DL (ref 120–199)
CHOLEST/HDLC SERPL: 2.7 {RATIO} (ref 2–5)
CO2 SERPL-SCNC: 25 MMOL/L (ref 23–29)
CREAT SERPL-MCNC: 0.9 MG/DL (ref 0.5–1.4)
ERYTHROCYTE [DISTWIDTH] IN BLOOD BY AUTOMATED COUNT: 14 % (ref 11.5–14.5)
EST. GFR  (AFRICAN AMERICAN): >60 ML/MIN/1.73 M^2
EST. GFR  (NON AFRICAN AMERICAN): >60 ML/MIN/1.73 M^2
ESTRADIOL SERPL-MCNC: 12 PG/ML
FSH SERPL-ACNC: 62.8 MIU/ML
GLUCOSE SERPL-MCNC: 88 MG/DL (ref 70–110)
HCT VFR BLD AUTO: 44.7 % (ref 37–48.5)
HDLC SERPL-MCNC: 64 MG/DL (ref 40–75)
HDLC SERPL: 37.6 % (ref 20–50)
HGB BLD-MCNC: 14.4 G/DL (ref 12–16)
LDLC SERPL CALC-MCNC: 88.4 MG/DL (ref 63–159)
MCH RBC QN AUTO: 31.6 PG (ref 27–31)
MCHC RBC AUTO-ENTMCNC: 32.2 G/DL (ref 32–36)
MCV RBC AUTO: 98 FL (ref 82–98)
NONHDLC SERPL-MCNC: 106 MG/DL
PLATELET # BLD AUTO: 282 K/UL (ref 150–350)
PMV BLD AUTO: 9.6 FL (ref 9.2–12.9)
POTASSIUM SERPL-SCNC: 3.9 MMOL/L (ref 3.5–5.1)
PROT SERPL-MCNC: 7.3 G/DL (ref 6–8.4)
RBC # BLD AUTO: 4.56 M/UL (ref 4–5.4)
SODIUM SERPL-SCNC: 143 MMOL/L (ref 136–145)
TRIGL SERPL-MCNC: 88 MG/DL (ref 30–150)
TSH SERPL DL<=0.005 MIU/L-ACNC: 1.44 UIU/ML (ref 0.4–4)
WBC # BLD AUTO: 5.89 K/UL (ref 3.9–12.7)

## 2021-02-09 PROCEDURE — 83001 ASSAY OF GONADOTROPIN (FSH): CPT

## 2021-02-09 PROCEDURE — 99214 PR OFFICE/OUTPT VISIT, EST, LEVL IV, 30-39 MIN: ICD-10-PCS | Mod: S$GLB,,, | Performed by: FAMILY MEDICINE

## 2021-02-09 PROCEDURE — 3008F PR BODY MASS INDEX (BMI) DOCUMENTED: ICD-10-PCS | Mod: CPTII,S$GLB,, | Performed by: FAMILY MEDICINE

## 2021-02-09 PROCEDURE — 82670 ASSAY OF TOTAL ESTRADIOL: CPT

## 2021-02-09 PROCEDURE — 1126F PR PAIN SEVERITY QUANTIFIED, NO PAIN PRESENT: ICD-10-PCS | Mod: S$GLB,,, | Performed by: FAMILY MEDICINE

## 2021-02-09 PROCEDURE — 99999 PR PBB SHADOW E&M-EST. PATIENT-LVL IV: ICD-10-PCS | Mod: PBBFAC,,, | Performed by: FAMILY MEDICINE

## 2021-02-09 PROCEDURE — 80061 LIPID PANEL: CPT

## 2021-02-09 PROCEDURE — 36415 COLL VENOUS BLD VENIPUNCTURE: CPT | Mod: PN

## 2021-02-09 PROCEDURE — 84443 ASSAY THYROID STIM HORMONE: CPT

## 2021-02-09 PROCEDURE — 3008F BODY MASS INDEX DOCD: CPT | Mod: CPTII,S$GLB,, | Performed by: FAMILY MEDICINE

## 2021-02-09 PROCEDURE — 99999 PR PBB SHADOW E&M-EST. PATIENT-LVL IV: CPT | Mod: PBBFAC,,, | Performed by: FAMILY MEDICINE

## 2021-02-09 PROCEDURE — 99214 OFFICE O/P EST MOD 30 MIN: CPT | Mod: S$GLB,,, | Performed by: FAMILY MEDICINE

## 2021-02-09 PROCEDURE — 85027 COMPLETE CBC AUTOMATED: CPT

## 2021-02-09 PROCEDURE — 80053 COMPREHEN METABOLIC PANEL: CPT

## 2021-02-09 PROCEDURE — 1126F AMNT PAIN NOTED NONE PRSNT: CPT | Mod: S$GLB,,, | Performed by: FAMILY MEDICINE

## 2021-02-09 RX ORDER — CLINDAMYCIN HYDROCHLORIDE 300 MG/1
300 CAPSULE ORAL EVERY 8 HOURS
Qty: 30 CAPSULE | Refills: 0 | Status: SHIPPED | OUTPATIENT
Start: 2021-02-09 | End: 2021-07-02 | Stop reason: ALTCHOICE

## 2021-02-11 ENCOUNTER — PATIENT MESSAGE (OUTPATIENT)
Dept: FAMILY MEDICINE | Facility: CLINIC | Age: 51
End: 2021-02-11

## 2021-02-11 DIAGNOSIS — R31.21 ASYMPTOMATIC MICROSCOPIC HEMATURIA: Primary | ICD-10-CM

## 2021-03-30 ENCOUNTER — IMMUNIZATION (OUTPATIENT)
Dept: FAMILY MEDICINE | Facility: CLINIC | Age: 51
End: 2021-03-30
Payer: COMMERCIAL

## 2021-03-30 DIAGNOSIS — Z23 NEED FOR VACCINATION: Primary | ICD-10-CM

## 2021-03-30 PROCEDURE — 91300 COVID-19, MRNA, LNP-S, PF, 30 MCG/0.3 ML DOSE VACCINE: CPT | Mod: PBBFAC | Performed by: FAMILY MEDICINE

## 2021-04-20 ENCOUNTER — IMMUNIZATION (OUTPATIENT)
Dept: FAMILY MEDICINE | Facility: CLINIC | Age: 51
End: 2021-04-20
Payer: COMMERCIAL

## 2021-04-20 DIAGNOSIS — Z23 NEED FOR VACCINATION: Primary | ICD-10-CM

## 2021-04-20 PROCEDURE — 91300 COVID-19, MRNA, LNP-S, PF, 30 MCG/0.3 ML DOSE VACCINE: CPT | Mod: PBBFAC | Performed by: FAMILY MEDICINE

## 2021-04-20 PROCEDURE — 0002A COVID-19, MRNA, LNP-S, PF, 30 MCG/0.3 ML DOSE VACCINE: CPT | Mod: PBBFAC | Performed by: FAMILY MEDICINE

## 2021-06-23 DIAGNOSIS — Z12.31 OTHER SCREENING MAMMOGRAM: ICD-10-CM

## 2021-07-01 ENCOUNTER — PATIENT OUTREACH (OUTPATIENT)
Dept: ADMINISTRATIVE | Facility: OTHER | Age: 51
End: 2021-07-01

## 2021-07-02 ENCOUNTER — OFFICE VISIT (OUTPATIENT)
Dept: OBSTETRICS AND GYNECOLOGY | Facility: CLINIC | Age: 51
End: 2021-07-02
Payer: COMMERCIAL

## 2021-07-02 VITALS
HEIGHT: 66 IN | BODY MASS INDEX: 29.87 KG/M2 | DIASTOLIC BLOOD PRESSURE: 72 MMHG | WEIGHT: 185.88 LBS | SYSTOLIC BLOOD PRESSURE: 120 MMHG

## 2021-07-02 DIAGNOSIS — Z01.419 ENCOUNTER FOR GYNECOLOGICAL EXAMINATION WITHOUT ABNORMAL FINDING: Primary | ICD-10-CM

## 2021-07-02 DIAGNOSIS — Z79.890 HORMONE REPLACEMENT THERAPY (HRT): ICD-10-CM

## 2021-07-02 PROCEDURE — 1126F AMNT PAIN NOTED NONE PRSNT: CPT | Mod: S$GLB,,, | Performed by: OBSTETRICS & GYNECOLOGY

## 2021-07-02 PROCEDURE — 88175 CYTOPATH C/V AUTO FLUID REDO: CPT | Performed by: OBSTETRICS & GYNECOLOGY

## 2021-07-02 PROCEDURE — 3008F PR BODY MASS INDEX (BMI) DOCUMENTED: ICD-10-PCS | Mod: CPTII,S$GLB,, | Performed by: OBSTETRICS & GYNECOLOGY

## 2021-07-02 PROCEDURE — 99999 PR PBB SHADOW E&M-EST. PATIENT-LVL III: ICD-10-PCS | Mod: PBBFAC,,, | Performed by: OBSTETRICS & GYNECOLOGY

## 2021-07-02 PROCEDURE — 99999 PR PBB SHADOW E&M-EST. PATIENT-LVL III: CPT | Mod: PBBFAC,,, | Performed by: OBSTETRICS & GYNECOLOGY

## 2021-07-02 PROCEDURE — 99396 PREV VISIT EST AGE 40-64: CPT | Mod: S$GLB,,, | Performed by: OBSTETRICS & GYNECOLOGY

## 2021-07-02 PROCEDURE — 99396 PR PREVENTIVE VISIT,EST,40-64: ICD-10-PCS | Mod: S$GLB,,, | Performed by: OBSTETRICS & GYNECOLOGY

## 2021-07-02 PROCEDURE — 3008F BODY MASS INDEX DOCD: CPT | Mod: CPTII,S$GLB,, | Performed by: OBSTETRICS & GYNECOLOGY

## 2021-07-02 PROCEDURE — 87624 HPV HI-RISK TYP POOLED RSLT: CPT | Performed by: OBSTETRICS & GYNECOLOGY

## 2021-07-02 PROCEDURE — 1126F PR PAIN SEVERITY QUANTIFIED, NO PAIN PRESENT: ICD-10-PCS | Mod: S$GLB,,, | Performed by: OBSTETRICS & GYNECOLOGY

## 2021-07-02 RX ORDER — ESTRADIOL 0.75 MG/.75G
1 GEL TOPICAL DAILY
Qty: 90 PACKET | Refills: 3 | Status: SHIPPED | OUTPATIENT
Start: 2021-07-02 | End: 2021-12-21 | Stop reason: SDUPTHER

## 2021-07-02 RX ORDER — MEDROXYPROGESTERONE ACETATE 5 MG/1
5 TABLET ORAL DAILY
Qty: 30 TABLET | Refills: 11 | Status: SHIPPED | OUTPATIENT
Start: 2021-07-02 | End: 2021-12-21 | Stop reason: SDUPTHER

## 2021-07-02 RX ORDER — ESTRADIOL 0.5 MG/.5G
1 GEL TOPICAL DAILY
Qty: 30 PACKET | Refills: 11 | Status: SHIPPED | OUTPATIENT
Start: 2021-07-02 | End: 2021-07-02

## 2021-07-07 ENCOUNTER — PATIENT MESSAGE (OUTPATIENT)
Dept: ADMINISTRATIVE | Facility: HOSPITAL | Age: 51
End: 2021-07-07

## 2021-07-08 ENCOUNTER — HOSPITAL ENCOUNTER (OUTPATIENT)
Dept: RADIOLOGY | Facility: HOSPITAL | Age: 51
Discharge: HOME | End: 2021-07-08
Attending: FAMILY MEDICINE
Payer: COMMERCIAL

## 2021-07-08 DIAGNOSIS — Z12.31 OTHER SCREENING MAMMOGRAM: ICD-10-CM

## 2021-07-08 PROCEDURE — 77063 MAMMO DIGITAL SCREENING BILAT WITH TOMO: ICD-10-PCS | Mod: 26,,, | Performed by: RADIOLOGY

## 2021-07-08 PROCEDURE — 77067 MAMMO DIGITAL SCREENING BILAT WITH TOMO: ICD-10-PCS | Mod: 26,,, | Performed by: RADIOLOGY

## 2021-07-08 PROCEDURE — 77067 SCR MAMMO BI INCL CAD: CPT | Mod: 26,,, | Performed by: RADIOLOGY

## 2021-07-08 PROCEDURE — 77063 BREAST TOMOSYNTHESIS BI: CPT | Mod: 26,,, | Performed by: RADIOLOGY

## 2021-07-08 PROCEDURE — 77067 SCR MAMMO BI INCL CAD: CPT | Mod: TC,PN

## 2021-07-09 LAB
FINAL PATHOLOGIC DIAGNOSIS: NORMAL
Lab: NORMAL

## 2021-07-14 LAB
HPV HR 12 DNA SPEC QL NAA+PROBE: NEGATIVE
HPV16 AG SPEC QL: NEGATIVE
HPV18 DNA SPEC QL NAA+PROBE: NEGATIVE

## 2021-07-30 ENCOUNTER — PATIENT MESSAGE (OUTPATIENT)
Dept: FAMILY MEDICINE | Facility: CLINIC | Age: 51
End: 2021-07-30

## 2021-07-30 ENCOUNTER — OFFICE VISIT (OUTPATIENT)
Dept: FAMILY MEDICINE | Facility: CLINIC | Age: 51
End: 2021-07-30
Payer: COMMERCIAL

## 2021-07-30 ENCOUNTER — TELEPHONE (OUTPATIENT)
Dept: FAMILY MEDICINE | Facility: CLINIC | Age: 51
End: 2021-07-30

## 2021-07-30 VITALS
WEIGHT: 187.63 LBS | SYSTOLIC BLOOD PRESSURE: 118 MMHG | TEMPERATURE: 99 F | RESPIRATION RATE: 16 BRPM | BODY MASS INDEX: 30.16 KG/M2 | DIASTOLIC BLOOD PRESSURE: 84 MMHG | HEIGHT: 66 IN | OXYGEN SATURATION: 100 % | HEART RATE: 71 BPM

## 2021-07-30 DIAGNOSIS — N30.91 HEMORRHAGIC CYSTITIS: ICD-10-CM

## 2021-07-30 DIAGNOSIS — R30.0 DYSURIA: Primary | ICD-10-CM

## 2021-07-30 LAB
BILIRUB SERPL-MCNC: ABNORMAL MG/DL
BLOOD URINE, POC: 250
CLARITY, POC UA: ABNORMAL
COLOR, POC UA: ABNORMAL
GLUCOSE UR QL STRIP: ABNORMAL
KETONES UR QL STRIP: ABNORMAL
LEUKOCYTE ESTERASE URINE, POC: ABNORMAL
NITRITE, POC UA: ABNORMAL
PH, POC UA: 5
PROTEIN, POC: ABNORMAL
SPECIFIC GRAVITY, POC UA: 1.02
UROBILINOGEN, POC UA: ABNORMAL

## 2021-07-30 PROCEDURE — 1159F MED LIST DOCD IN RCRD: CPT | Mod: CPTII,S$GLB,, | Performed by: FAMILY MEDICINE

## 2021-07-30 PROCEDURE — 3008F BODY MASS INDEX DOCD: CPT | Mod: CPTII,S$GLB,, | Performed by: FAMILY MEDICINE

## 2021-07-30 PROCEDURE — 3074F SYST BP LT 130 MM HG: CPT | Mod: CPTII,S$GLB,, | Performed by: FAMILY MEDICINE

## 2021-07-30 PROCEDURE — 1160F PR REVIEW ALL MEDS BY PRESCRIBER/CLIN PHARMACIST DOCUMENTED: ICD-10-PCS | Mod: CPTII,S$GLB,, | Performed by: FAMILY MEDICINE

## 2021-07-30 PROCEDURE — 3079F DIAST BP 80-89 MM HG: CPT | Mod: CPTII,S$GLB,, | Performed by: FAMILY MEDICINE

## 2021-07-30 PROCEDURE — 3008F PR BODY MASS INDEX (BMI) DOCUMENTED: ICD-10-PCS | Mod: CPTII,S$GLB,, | Performed by: FAMILY MEDICINE

## 2021-07-30 PROCEDURE — 81002 URINALYSIS NONAUTO W/O SCOPE: CPT | Mod: S$GLB,,, | Performed by: FAMILY MEDICINE

## 2021-07-30 PROCEDURE — 3074F PR MOST RECENT SYSTOLIC BLOOD PRESSURE < 130 MM HG: ICD-10-PCS | Mod: CPTII,S$GLB,, | Performed by: FAMILY MEDICINE

## 2021-07-30 PROCEDURE — 87077 CULTURE AEROBIC IDENTIFY: CPT | Performed by: FAMILY MEDICINE

## 2021-07-30 PROCEDURE — 87088 URINE BACTERIA CULTURE: CPT | Performed by: FAMILY MEDICINE

## 2021-07-30 PROCEDURE — 1126F PR PAIN SEVERITY QUANTIFIED, NO PAIN PRESENT: ICD-10-PCS | Mod: CPTII,S$GLB,, | Performed by: FAMILY MEDICINE

## 2021-07-30 PROCEDURE — 99999 PR PBB SHADOW E&M-EST. PATIENT-LVL IV: ICD-10-PCS | Mod: PBBFAC,,, | Performed by: FAMILY MEDICINE

## 2021-07-30 PROCEDURE — 99214 PR OFFICE/OUTPT VISIT, EST, LEVL IV, 30-39 MIN: ICD-10-PCS | Mod: S$GLB,,, | Performed by: FAMILY MEDICINE

## 2021-07-30 PROCEDURE — 1160F RVW MEDS BY RX/DR IN RCRD: CPT | Mod: CPTII,S$GLB,, | Performed by: FAMILY MEDICINE

## 2021-07-30 PROCEDURE — 99999 PR PBB SHADOW E&M-EST. PATIENT-LVL IV: CPT | Mod: PBBFAC,,, | Performed by: FAMILY MEDICINE

## 2021-07-30 PROCEDURE — 81002 POCT URINE DIPSTICK WITHOUT MICROSCOPE: ICD-10-PCS | Mod: S$GLB,,, | Performed by: FAMILY MEDICINE

## 2021-07-30 PROCEDURE — 1126F AMNT PAIN NOTED NONE PRSNT: CPT | Mod: CPTII,S$GLB,, | Performed by: FAMILY MEDICINE

## 2021-07-30 PROCEDURE — 1159F PR MEDICATION LIST DOCUMENTED IN MEDICAL RECORD: ICD-10-PCS | Mod: CPTII,S$GLB,, | Performed by: FAMILY MEDICINE

## 2021-07-30 PROCEDURE — 87086 URINE CULTURE/COLONY COUNT: CPT | Performed by: FAMILY MEDICINE

## 2021-07-30 PROCEDURE — 3079F PR MOST RECENT DIASTOLIC BLOOD PRESSURE 80-89 MM HG: ICD-10-PCS | Mod: CPTII,S$GLB,, | Performed by: FAMILY MEDICINE

## 2021-07-30 PROCEDURE — 99214 OFFICE O/P EST MOD 30 MIN: CPT | Mod: S$GLB,,, | Performed by: FAMILY MEDICINE

## 2021-07-30 PROCEDURE — 87186 SC STD MICRODIL/AGAR DIL: CPT | Performed by: FAMILY MEDICINE

## 2021-07-30 RX ORDER — PHENAZOPYRIDINE HYDROCHLORIDE 200 MG/1
200 TABLET, FILM COATED ORAL
Qty: 15 TABLET | Refills: 0 | Status: SHIPPED | OUTPATIENT
Start: 2021-07-30 | End: 2021-08-09

## 2021-07-30 RX ORDER — CIPROFLOXACIN 500 MG/1
500 TABLET ORAL EVERY 12 HOURS
Qty: 14 TABLET | Refills: 0 | Status: SHIPPED | OUTPATIENT
Start: 2021-07-30 | End: 2021-08-14

## 2021-08-02 LAB — BACTERIA UR CULT: ABNORMAL

## 2021-08-09 ENCOUNTER — LAB VISIT (OUTPATIENT)
Dept: LAB | Facility: HOSPITAL | Age: 51
End: 2021-08-09
Attending: FAMILY MEDICINE
Payer: COMMERCIAL

## 2021-08-09 DIAGNOSIS — N30.91 HEMORRHAGIC CYSTITIS: ICD-10-CM

## 2021-08-09 LAB
BACTERIA #/AREA URNS AUTO: ABNORMAL /HPF
BILIRUB UR QL STRIP: NEGATIVE
CLARITY UR REFRACT.AUTO: CLEAR
COLOR UR AUTO: YELLOW
GLUCOSE UR QL STRIP: NEGATIVE
HGB UR QL STRIP: ABNORMAL
KETONES UR QL STRIP: NEGATIVE
LEUKOCYTE ESTERASE UR QL STRIP: NEGATIVE
MICROSCOPIC COMMENT: ABNORMAL
NITRITE UR QL STRIP: NEGATIVE
PH UR STRIP: 7 [PH] (ref 5–8)
PROT UR QL STRIP: NEGATIVE
RBC #/AREA URNS AUTO: 5 /HPF (ref 0–4)
SP GR UR STRIP: 1.01 (ref 1–1.03)
SQUAMOUS #/AREA URNS AUTO: 1 /HPF
URN SPEC COLLECT METH UR: ABNORMAL
WBC #/AREA URNS AUTO: 0 /HPF (ref 0–5)

## 2021-08-09 PROCEDURE — 81001 URINALYSIS AUTO W/SCOPE: CPT | Performed by: FAMILY MEDICINE

## 2021-08-11 ENCOUNTER — PATIENT MESSAGE (OUTPATIENT)
Dept: FAMILY MEDICINE | Facility: CLINIC | Age: 51
End: 2021-08-11

## 2021-08-11 DIAGNOSIS — R31.29 MICROSCOPIC HEMATURIA: Primary | ICD-10-CM

## 2021-08-13 ENCOUNTER — PATIENT MESSAGE (OUTPATIENT)
Dept: FAMILY MEDICINE | Facility: CLINIC | Age: 51
End: 2021-08-13

## 2021-08-13 DIAGNOSIS — R30.0 DYSURIA: Primary | ICD-10-CM

## 2021-08-14 RX ORDER — SULFAMETHOXAZOLE AND TRIMETHOPRIM 800; 160 MG/1; MG/1
1 TABLET ORAL 2 TIMES DAILY
Qty: 10 TABLET | Refills: 0 | Status: SHIPPED | OUTPATIENT
Start: 2021-08-14 | End: 2021-09-09

## 2021-09-09 ENCOUNTER — OFFICE VISIT (OUTPATIENT)
Dept: UROLOGY | Facility: CLINIC | Age: 51
End: 2021-09-09
Payer: COMMERCIAL

## 2021-09-09 VITALS
HEIGHT: 66 IN | DIASTOLIC BLOOD PRESSURE: 80 MMHG | HEART RATE: 61 BPM | SYSTOLIC BLOOD PRESSURE: 132 MMHG | WEIGHT: 187.63 LBS | BODY MASS INDEX: 30.16 KG/M2

## 2021-09-09 DIAGNOSIS — N39.0 FREQUENT UTI: ICD-10-CM

## 2021-09-09 DIAGNOSIS — N39.3 STRESS INCONTINENCE, FEMALE: Primary | ICD-10-CM

## 2021-09-09 DIAGNOSIS — R31.29 MICROSCOPIC HEMATURIA: ICD-10-CM

## 2021-09-09 LAB
BILIRUB SERPL-MCNC: NORMAL MG/DL
BLOOD URINE, POC: NORMAL
CLARITY, POC UA: CLEAR
COLOR, POC UA: YELLOW
GLUCOSE UR QL STRIP: NORMAL
KETONES UR QL STRIP: NORMAL
LEUKOCYTE ESTERASE URINE, POC: NORMAL
NITRITE, POC UA: NORMAL
PH, POC UA: 5
PROTEIN, POC: NORMAL
SPECIFIC GRAVITY, POC UA: 1.03
UROBILINOGEN, POC UA: NORMAL

## 2021-09-09 PROCEDURE — 3008F PR BODY MASS INDEX (BMI) DOCUMENTED: ICD-10-PCS | Mod: CPTII,S$GLB,, | Performed by: UROLOGY

## 2021-09-09 PROCEDURE — 99999 PR PBB SHADOW E&M-EST. PATIENT-LVL III: ICD-10-PCS | Mod: PBBFAC,,, | Performed by: UROLOGY

## 2021-09-09 PROCEDURE — 3079F PR MOST RECENT DIASTOLIC BLOOD PRESSURE 80-89 MM HG: ICD-10-PCS | Mod: CPTII,S$GLB,, | Performed by: UROLOGY

## 2021-09-09 PROCEDURE — 81002 POCT URINE DIPSTICK WITHOUT MICROSCOPE: ICD-10-PCS | Mod: S$GLB,,, | Performed by: UROLOGY

## 2021-09-09 PROCEDURE — 99204 PR OFFICE/OUTPT VISIT, NEW, LEVL IV, 45-59 MIN: ICD-10-PCS | Mod: S$GLB,,, | Performed by: UROLOGY

## 2021-09-09 PROCEDURE — 3008F BODY MASS INDEX DOCD: CPT | Mod: CPTII,S$GLB,, | Performed by: UROLOGY

## 2021-09-09 PROCEDURE — 88112 PR  CYTOPATH, CELL ENHANCE TECH: ICD-10-PCS | Mod: 26,,, | Performed by: PATHOLOGY

## 2021-09-09 PROCEDURE — 99999 PR PBB SHADOW E&M-EST. PATIENT-LVL III: CPT | Mod: PBBFAC,,, | Performed by: UROLOGY

## 2021-09-09 PROCEDURE — 3075F PR MOST RECENT SYSTOLIC BLOOD PRESS GE 130-139MM HG: ICD-10-PCS | Mod: CPTII,S$GLB,, | Performed by: UROLOGY

## 2021-09-09 PROCEDURE — 1160F PR REVIEW ALL MEDS BY PRESCRIBER/CLIN PHARMACIST DOCUMENTED: ICD-10-PCS | Mod: CPTII,S$GLB,, | Performed by: UROLOGY

## 2021-09-09 PROCEDURE — 1159F PR MEDICATION LIST DOCUMENTED IN MEDICAL RECORD: ICD-10-PCS | Mod: CPTII,S$GLB,, | Performed by: UROLOGY

## 2021-09-09 PROCEDURE — 81002 URINALYSIS NONAUTO W/O SCOPE: CPT | Mod: S$GLB,,, | Performed by: UROLOGY

## 2021-09-09 PROCEDURE — 88112 CYTOPATH CELL ENHANCE TECH: CPT | Performed by: PATHOLOGY

## 2021-09-09 PROCEDURE — 88112 CYTOPATH CELL ENHANCE TECH: CPT | Mod: 26,,, | Performed by: PATHOLOGY

## 2021-09-09 PROCEDURE — 99204 OFFICE O/P NEW MOD 45 MIN: CPT | Mod: S$GLB,,, | Performed by: UROLOGY

## 2021-09-09 PROCEDURE — 3075F SYST BP GE 130 - 139MM HG: CPT | Mod: CPTII,S$GLB,, | Performed by: UROLOGY

## 2021-09-09 PROCEDURE — 1160F RVW MEDS BY RX/DR IN RCRD: CPT | Mod: CPTII,S$GLB,, | Performed by: UROLOGY

## 2021-09-09 PROCEDURE — 1159F MED LIST DOCD IN RCRD: CPT | Mod: CPTII,S$GLB,, | Performed by: UROLOGY

## 2021-09-09 PROCEDURE — 3079F DIAST BP 80-89 MM HG: CPT | Mod: CPTII,S$GLB,, | Performed by: UROLOGY

## 2021-09-10 ENCOUNTER — HOSPITAL ENCOUNTER (OUTPATIENT)
Dept: RADIOLOGY | Facility: HOSPITAL | Age: 51
Discharge: HOME | End: 2021-09-10
Attending: UROLOGY
Payer: COMMERCIAL

## 2021-09-10 DIAGNOSIS — R31.29 MICROSCOPIC HEMATURIA: ICD-10-CM

## 2021-09-10 PROCEDURE — 76770 US KIDNEY: ICD-10-PCS | Mod: 26,,, | Performed by: RADIOLOGY

## 2021-09-10 PROCEDURE — 76770 US EXAM ABDO BACK WALL COMP: CPT | Mod: TC,PO

## 2021-09-10 PROCEDURE — 76770 US EXAM ABDO BACK WALL COMP: CPT | Mod: 26,,, | Performed by: RADIOLOGY

## 2021-09-13 LAB
FINAL PATHOLOGIC DIAGNOSIS: NORMAL
Lab: NORMAL

## 2021-11-22 ENCOUNTER — IMMUNIZATION (OUTPATIENT)
Dept: FAMILY MEDICINE | Facility: CLINIC | Age: 51
End: 2021-11-22
Payer: COMMERCIAL

## 2021-11-22 DIAGNOSIS — Z23 NEED FOR VACCINATION: Primary | ICD-10-CM

## 2021-11-22 PROCEDURE — 0004A COVID-19, MRNA, LNP-S, PF, 30 MCG/0.3 ML DOSE VACCINE: CPT | Mod: PBBFAC | Performed by: FAMILY MEDICINE

## 2021-11-30 ENCOUNTER — PROCEDURE VISIT (OUTPATIENT)
Dept: UROLOGY | Facility: CLINIC | Age: 51
End: 2021-11-30
Payer: COMMERCIAL

## 2021-11-30 VITALS — HEIGHT: 66 IN | WEIGHT: 192.88 LBS | BODY MASS INDEX: 31 KG/M2

## 2021-11-30 DIAGNOSIS — R33.9 INCOMPLETE BLADDER EMPTYING: ICD-10-CM

## 2021-11-30 DIAGNOSIS — R31.29 MICROSCOPIC HEMATURIA: ICD-10-CM

## 2021-11-30 LAB
BILIRUB SERPL-MCNC: NORMAL MG/DL
BLOOD URINE, POC: NORMAL
CLARITY, POC UA: CLEAR
COLOR, POC UA: YELLOW
GLUCOSE UR QL STRIP: NORMAL
KETONES UR QL STRIP: 15
LEUKOCYTE ESTERASE URINE, POC: NORMAL
NITRITE, POC UA: NORMAL
PH, POC UA: 5.5
POC RESIDUAL URINE VOLUME: 2 ML (ref 0–100)
PROTEIN, POC: NORMAL
SPECIFIC GRAVITY, POC UA: >1.03
UROBILINOGEN, POC UA: 0.2

## 2021-11-30 PROCEDURE — 52000 CYSTOURETHROSCOPY: CPT | Mod: S$GLB,,, | Performed by: UROLOGY

## 2021-11-30 PROCEDURE — 81002 URINALYSIS NONAUTO W/O SCOPE: CPT | Mod: S$GLB,,, | Performed by: UROLOGY

## 2021-11-30 PROCEDURE — 81002 POCT URINE DIPSTICK WITHOUT MICROSCOPE: ICD-10-PCS | Mod: S$GLB,,, | Performed by: UROLOGY

## 2021-11-30 PROCEDURE — 51798 US URINE CAPACITY MEASURE: CPT | Mod: S$GLB,,, | Performed by: UROLOGY

## 2021-11-30 PROCEDURE — 51798 PR MEAS,POST-VOID RES,US,NON-IMAGING: ICD-10-PCS | Mod: S$GLB,,, | Performed by: UROLOGY

## 2021-11-30 PROCEDURE — 52000 PR CYSTOURETHROSCOPY: ICD-10-PCS | Mod: S$GLB,,, | Performed by: UROLOGY

## 2021-12-20 ENCOUNTER — PATIENT OUTREACH (OUTPATIENT)
Dept: ADMINISTRATIVE | Facility: OTHER | Age: 51
End: 2021-12-20
Payer: COMMERCIAL

## 2021-12-21 ENCOUNTER — OFFICE VISIT (OUTPATIENT)
Dept: OBSTETRICS AND GYNECOLOGY | Facility: CLINIC | Age: 51
End: 2021-12-21
Payer: COMMERCIAL

## 2021-12-21 VITALS
BODY MASS INDEX: 30.51 KG/M2 | WEIGHT: 189.81 LBS | DIASTOLIC BLOOD PRESSURE: 68 MMHG | SYSTOLIC BLOOD PRESSURE: 104 MMHG | HEIGHT: 66 IN

## 2021-12-21 DIAGNOSIS — N89.8 VAGINAL ITCHING: Primary | ICD-10-CM

## 2021-12-21 DIAGNOSIS — Z79.890 HORMONE REPLACEMENT THERAPY (HRT): ICD-10-CM

## 2021-12-21 PROCEDURE — 99999 PR PBB SHADOW E&M-EST. PATIENT-LVL III: CPT | Mod: PBBFAC,,, | Performed by: OBSTETRICS & GYNECOLOGY

## 2021-12-21 PROCEDURE — 99999 PR PBB SHADOW E&M-EST. PATIENT-LVL III: ICD-10-PCS | Mod: PBBFAC,,, | Performed by: OBSTETRICS & GYNECOLOGY

## 2021-12-21 PROCEDURE — 87481 CANDIDA DNA AMP PROBE: CPT | Mod: 59 | Performed by: OBSTETRICS & GYNECOLOGY

## 2021-12-21 PROCEDURE — 99213 PR OFFICE/OUTPT VISIT, EST, LEVL III, 20-29 MIN: ICD-10-PCS | Mod: S$GLB,,, | Performed by: OBSTETRICS & GYNECOLOGY

## 2021-12-21 PROCEDURE — 99213 OFFICE O/P EST LOW 20 MIN: CPT | Mod: S$GLB,,, | Performed by: OBSTETRICS & GYNECOLOGY

## 2021-12-21 RX ORDER — ESTRADIOL 0.75 MG/.75G
1 GEL TOPICAL DAILY
Qty: 90 PACKET | Refills: 3 | Status: SHIPPED | OUTPATIENT
Start: 2021-12-21 | End: 2022-07-14 | Stop reason: SDUPTHER

## 2021-12-21 RX ORDER — MEDROXYPROGESTERONE ACETATE 5 MG/1
5 TABLET ORAL DAILY
Qty: 30 TABLET | Refills: 11 | Status: SHIPPED | OUTPATIENT
Start: 2021-12-21 | End: 2022-07-14 | Stop reason: SDUPTHER

## 2021-12-21 RX ORDER — FLUCONAZOLE 150 MG/1
150 TABLET ORAL
Qty: 3 TABLET | Refills: 0 | Status: SHIPPED | OUTPATIENT
Start: 2021-12-21 | End: 2022-08-13

## 2022-01-03 LAB
BACTERIAL VAGINOSIS DNA: NEGATIVE
CANDIDA GLABRATA DNA: NEGATIVE
CANDIDA KRUSEI DNA: NEGATIVE
CANDIDA RRNA VAG QL PROBE: POSITIVE
T VAGINALIS RRNA GENITAL QL PROBE: NEGATIVE

## 2022-01-04 ENCOUNTER — PATIENT MESSAGE (OUTPATIENT)
Dept: OBSTETRICS AND GYNECOLOGY | Facility: CLINIC | Age: 52
End: 2022-01-04
Payer: COMMERCIAL

## 2022-02-20 ENCOUNTER — PATIENT MESSAGE (OUTPATIENT)
Dept: FAMILY MEDICINE | Facility: CLINIC | Age: 52
End: 2022-02-20
Payer: COMMERCIAL

## 2022-02-20 DIAGNOSIS — N30.00 ACUTE CYSTITIS WITHOUT HEMATURIA: ICD-10-CM

## 2022-02-20 DIAGNOSIS — R30.0 DYSURIA: Primary | ICD-10-CM

## 2022-02-21 ENCOUNTER — TELEPHONE (OUTPATIENT)
Dept: FAMILY MEDICINE | Facility: CLINIC | Age: 52
End: 2022-02-21
Payer: COMMERCIAL

## 2022-02-21 ENCOUNTER — LAB VISIT (OUTPATIENT)
Dept: LAB | Facility: HOSPITAL | Age: 52
End: 2022-02-21
Attending: FAMILY MEDICINE
Payer: COMMERCIAL

## 2022-02-21 DIAGNOSIS — R31.21 ASYMPTOMATIC MICROSCOPIC HEMATURIA: ICD-10-CM

## 2022-02-21 DIAGNOSIS — R30.0 DYSURIA: Primary | ICD-10-CM

## 2022-02-21 PROCEDURE — 87086 URINE CULTURE/COLONY COUNT: CPT | Performed by: FAMILY MEDICINE

## 2022-02-21 PROCEDURE — 87088 URINE BACTERIA CULTURE: CPT | Performed by: FAMILY MEDICINE

## 2022-02-21 PROCEDURE — 87077 CULTURE AEROBIC IDENTIFY: CPT | Performed by: FAMILY MEDICINE

## 2022-02-21 PROCEDURE — 87186 SC STD MICRODIL/AGAR DIL: CPT | Performed by: FAMILY MEDICINE

## 2022-02-21 PROCEDURE — 81001 URINALYSIS AUTO W/SCOPE: CPT | Performed by: FAMILY MEDICINE

## 2022-02-21 RX ORDER — SULFAMETHOXAZOLE AND TRIMETHOPRIM 800; 160 MG/1; MG/1
1 TABLET ORAL 2 TIMES DAILY
Qty: 10 TABLET | Refills: 0 | Status: SHIPPED | OUTPATIENT
Start: 2022-02-21 | End: 2022-06-02

## 2022-02-21 RX ORDER — PHENAZOPYRIDINE HYDROCHLORIDE 100 MG/1
100 TABLET, FILM COATED ORAL 3 TIMES DAILY PRN
Qty: 5 TABLET | Refills: 0 | Status: SHIPPED | OUTPATIENT
Start: 2022-02-21 | End: 2022-03-03

## 2022-02-21 NOTE — TELEPHONE ENCOUNTER
Sent bactrim and pyridium.  Would like to see the UA before she starts the abx if possible.  Needs to be seen if persistent/recurs despite abx course.

## 2022-02-21 NOTE — TELEPHONE ENCOUNTER
Order pended for u/a. Please se Dr Farley's last clinic note, 7/30/21, and advise if pt needs to be seen.

## 2022-02-21 NOTE — TELEPHONE ENCOUNTER
----- Message from Vilma Patel sent at 2/21/2022  9:53 AM CST -----  Regarding: Call back  Contact: [phone]  Type:  Patient Call Back    Who Called:pt  What this is regarding?: needs something for UTI  Would the patient rather a call back or a response via [facility]? Call back  Best Call Back Number:[phone]  Additional Information:     Advised to call back directly if there are further questions, or if these symptoms fail to improve as anticipated or worsen.

## 2022-02-22 LAB
BACTERIA #/AREA URNS AUTO: ABNORMAL /HPF
BILIRUB UR QL STRIP: NEGATIVE
CLARITY UR REFRACT.AUTO: ABNORMAL
COLOR UR AUTO: ABNORMAL
GLUCOSE UR QL STRIP: NEGATIVE
HGB UR QL STRIP: ABNORMAL
HYALINE CASTS UR QL AUTO: 0 /LPF
KETONES UR QL STRIP: NEGATIVE
LEUKOCYTE ESTERASE UR QL STRIP: ABNORMAL
MICROSCOPIC COMMENT: ABNORMAL
NITRITE UR QL STRIP: POSITIVE
PH UR STRIP: 5 [PH] (ref 5–8)
PROT UR QL STRIP: ABNORMAL
RBC #/AREA URNS AUTO: 56 /HPF (ref 0–4)
SP GR UR STRIP: 1.03 (ref 1–1.03)
SQUAMOUS #/AREA URNS AUTO: 4 /HPF
URN SPEC COLLECT METH UR: ABNORMAL
WBC #/AREA URNS AUTO: >100 /HPF (ref 0–5)
WBC CLUMPS UR QL AUTO: ABNORMAL
YEAST UR QL AUTO: ABNORMAL

## 2022-02-24 DIAGNOSIS — R31.29 MICROSCOPIC HEMATURIA: Primary | ICD-10-CM

## 2022-02-24 DIAGNOSIS — N30.00 ACUTE CYSTITIS WITHOUT HEMATURIA: ICD-10-CM

## 2022-02-24 LAB — BACTERIA UR CULT: ABNORMAL

## 2022-02-25 ENCOUNTER — PATIENT MESSAGE (OUTPATIENT)
Dept: FAMILY MEDICINE | Facility: CLINIC | Age: 52
End: 2022-02-25
Payer: COMMERCIAL

## 2022-02-25 DIAGNOSIS — R31.29 MICROSCOPIC HEMATURIA: Primary | ICD-10-CM

## 2022-02-25 DIAGNOSIS — N30.91 HEMORRHAGIC CYSTITIS: ICD-10-CM

## 2022-05-13 ENCOUNTER — TELEPHONE (OUTPATIENT)
Dept: OBSTETRICS AND GYNECOLOGY | Facility: CLINIC | Age: 52
End: 2022-05-13
Payer: COMMERCIAL

## 2022-05-13 DIAGNOSIS — Z12.31 VISIT FOR SCREENING MAMMOGRAM: Primary | ICD-10-CM

## 2022-05-13 NOTE — TELEPHONE ENCOUNTER
----- Message from Michelle Crocker sent at 5/13/2022  3:20 PM CDT -----  Contact: pt [phone]  Type: Needs Medical Advice  Who Called:  Pt     Best Call Back Number: [phone]     Additional Information: Pt calling to have orders put in for mammo. Pls call back

## 2022-06-01 ENCOUNTER — TELEPHONE (OUTPATIENT)
Dept: FAMILY MEDICINE | Facility: CLINIC | Age: 52
End: 2022-06-01
Payer: COMMERCIAL

## 2022-06-01 ENCOUNTER — PATIENT MESSAGE (OUTPATIENT)
Dept: FAMILY MEDICINE | Facility: CLINIC | Age: 52
End: 2022-06-01
Payer: COMMERCIAL

## 2022-06-01 NOTE — TELEPHONE ENCOUNTER
----- Message from Ann Marie Newsome sent at 6/1/2022 10:03 AM CDT -----  Contact: pt  Type: Needs Medical Advice    Who Called: pt  Best Call Back Number: [phone]   Requesting a call back regarding - pt has covid and is asking for treatment please call .  Please Advise- Thank you

## 2022-06-02 ENCOUNTER — OFFICE VISIT (OUTPATIENT)
Dept: FAMILY MEDICINE | Facility: CLINIC | Age: 52
End: 2022-06-02
Payer: COMMERCIAL

## 2022-06-02 ENCOUNTER — TELEPHONE (OUTPATIENT)
Dept: FAMILY MEDICINE | Facility: CLINIC | Age: 52
End: 2022-06-02
Payer: COMMERCIAL

## 2022-06-02 DIAGNOSIS — U07.1 COVID: Primary | ICD-10-CM

## 2022-06-02 PROBLEM — Z12.11 COLON CANCER SCREENING: Status: RESOLVED | Noted: 2019-04-15 | Resolved: 2022-06-02

## 2022-06-02 PROBLEM — K35.80 ACUTE APPENDICITIS: Status: RESOLVED | Noted: 2020-09-21 | Resolved: 2022-06-02

## 2022-06-02 PROCEDURE — 1159F PR MEDICATION LIST DOCUMENTED IN MEDICAL RECORD: ICD-10-PCS | Mod: CPTII,95,, | Performed by: FAMILY MEDICINE

## 2022-06-02 PROCEDURE — 1159F MED LIST DOCD IN RCRD: CPT | Mod: CPTII,95,, | Performed by: FAMILY MEDICINE

## 2022-06-02 PROCEDURE — 1160F PR REVIEW ALL MEDS BY PRESCRIBER/CLIN PHARMACIST DOCUMENTED: ICD-10-PCS | Mod: CPTII,95,, | Performed by: FAMILY MEDICINE

## 2022-06-02 PROCEDURE — 1160F RVW MEDS BY RX/DR IN RCRD: CPT | Mod: CPTII,95,, | Performed by: FAMILY MEDICINE

## 2022-06-02 PROCEDURE — 99213 PR OFFICE/OUTPT VISIT, EST, LEVL III, 20-29 MIN: ICD-10-PCS | Mod: 95,,, | Performed by: FAMILY MEDICINE

## 2022-06-02 PROCEDURE — 99213 OFFICE O/P EST LOW 20 MIN: CPT | Mod: 95,,, | Performed by: FAMILY MEDICINE

## 2022-06-02 NOTE — PROGRESS NOTES
Subjective:       Patient ID: Steph Lemus is a 51 y.o. female.    Chief Complaint: URI      The patient location is: home, [place]  The chief complaint leading to consultation is: covid  Visit type: Virtual visit with synchronous audio and video  Total time spent with patient: 10mins  Each patient to whom he or she provides medical services by telemedicine is:  (1) informed of the relationship between the physician and patient and the respective role of any other health care provider with respect to management of the patient; and (2) notified that he or she may decline to receive medical services by telemedicine and may withdraw from such care at any time.    Notes: patient seen for +covid yesterday. Sx onset 5/31.   Reviewed current recs re home care, sx monitoring.    Review of Systems   Constitutional: Positive for chills and fatigue. Negative for unexpected weight change.   HENT: Positive for postnasal drip, rhinorrhea and sore throat.    Gastrointestinal: Negative for diarrhea and nausea.   Musculoskeletal: Negative for gait problem and joint swelling.   Allergic/Immunologic: Negative for environmental allergies.   Neurological: Positive for headaches. Negative for numbness.       Objective:        Physical Exam  Vitals and nursing note reviewed.   Constitutional:       General: She is not in acute distress.     Appearance: She is well-developed.   HENT:      Head: Normocephalic and atraumatic.   Eyes:      General: No scleral icterus.        Right eye: No discharge.         Left eye: No discharge.      Conjunctiva/sclera: Conjunctivae normal.   Pulmonary:      Effort: Pulmonary effort is normal. No respiratory distress.   Skin:     General: Skin is warm and dry.   Neurological:      General: No focal deficit present.      Mental Status: She is alert and oriented to person, place, and time.   Psychiatric:         Mood and Affect: Mood normal.         Behavior: Behavior normal.             Assessment:    COVID    Expected course of illness and sx tx incl otc med use reviewed. Notify MD if sx persist or worsen.     Patient aware of limitations to assessment and exam of telemedicine. Deemed appropriate at this time given current covid-19 concerns.     Answers for HPI/ROS submitted by the patient on 6/1/2022  Chronicity: new  Onset: yesterday  Progression since onset: unchanged  Frequency: every few minutes  Cough characteristics: productive of purulent sputum  nasal congestion: Yes  Aggravated by: nothing  Risk factors for lung disease: smoking/tobacco exposure  asthma: No  bronchiectasis: No  bronchitis: No  COPD: No  emphysema: No  pneumonia: No  Treatments tried: prescription cough suppressant  Improvement on treatment: moderate

## 2022-06-02 NOTE — TELEPHONE ENCOUNTER
----- Message from Jennifer Rdz sent at 6/2/2022 10:28 AM CDT -----  .Type:  Patient Call Back    Who Called:PT       Does the patient know what this is regarding?: VIRTUAL VISIT    Would the patient rather a call back YES    Best Call Back Number: [phone]    Additional Information: Thank You

## 2022-06-02 NOTE — TELEPHONE ENCOUNTER
Pt was calling with issues regarding VV>   Addressed in visit and patient was able to complete visit.

## 2022-06-03 ENCOUNTER — PATIENT MESSAGE (OUTPATIENT)
Dept: FAMILY MEDICINE | Facility: CLINIC | Age: 52
End: 2022-06-03
Payer: COMMERCIAL

## 2022-06-03 RX ORDER — METHYLPREDNISOLONE 4 MG/1
TABLET ORAL
Qty: 21 EACH | Refills: 0 | Status: SHIPPED | OUTPATIENT
Start: 2022-06-03 | End: 2022-06-24

## 2022-06-03 RX ORDER — DOXYCYCLINE HYCLATE 100 MG
100 TABLET ORAL 2 TIMES DAILY
Qty: 20 TABLET | Refills: 0 | Status: SHIPPED | OUTPATIENT
Start: 2022-06-03 | End: 2022-08-13

## 2022-06-29 ENCOUNTER — PATIENT MESSAGE (OUTPATIENT)
Dept: FAMILY MEDICINE | Facility: CLINIC | Age: 52
End: 2022-06-29

## 2022-06-29 ENCOUNTER — OFFICE VISIT (OUTPATIENT)
Dept: FAMILY MEDICINE | Facility: CLINIC | Age: 52
End: 2022-06-29
Payer: COMMERCIAL

## 2022-06-29 DIAGNOSIS — J01.90 ACUTE BACTERIAL SINUSITIS: Primary | ICD-10-CM

## 2022-06-29 DIAGNOSIS — B96.89 ACUTE BACTERIAL SINUSITIS: Primary | ICD-10-CM

## 2022-06-29 PROCEDURE — 99213 OFFICE O/P EST LOW 20 MIN: CPT | Mod: 95,,, | Performed by: NURSE PRACTITIONER

## 2022-06-29 PROCEDURE — 99213 PR OFFICE/OUTPT VISIT, EST, LEVL III, 20-29 MIN: ICD-10-PCS | Mod: 95,,, | Performed by: NURSE PRACTITIONER

## 2022-06-29 RX ORDER — AMOXICILLIN 875 MG/1
875 TABLET, FILM COATED ORAL EVERY 12 HOURS
Qty: 14 TABLET | Refills: 0 | Status: SHIPPED | OUTPATIENT
Start: 2022-06-29 | End: 2022-07-06

## 2022-06-29 NOTE — Clinical Note
Melodie Hanks MD,  I saw your patient today in [facility]. If you have any questions, please do not hesitate to contact me.  Thank you!  ALIYA Lynch, [facility]

## 2022-06-29 NOTE — PROGRESS NOTES
Steph Lemus is a 51 y.o. female patient of Melodie Hanks MD who presents to the clinic today for a Virtual Visit.    The patient location is: [place], [place]  The chief complaint leading to consultation is: sinus pressure and d/c recurrence  Visit type: audiovisual  Total time spent with patient: 17 minutes.  Each patient to whom he or she provides medical services by telemedicine is:  (1) informed of the relationship between the physician and patient and the respective role of any other health care provider with respect to management of the patient; and (2) notified that he or she may decline to receive medical services by telemedicine and may withdraw from such care at any time.    19 minutes of total time spent on the encounter, which includes face to face time and non-face to face time preparing to see the patient (eg, review of tests), Obtaining and/or reviewing separately obtained history, Documenting clinical information in the electronic or other health record, Independently interpreting results (not separately reported) and communicating results to the patient/family/caregiver, or Care coordination (not separately reported).     HPI    Pt, who is not known to me, reports a new problem to me: 6/2/2022 she had Covid and sinus infection.  Started the steroid right away and didn't start the AB for a few days.  4 days later she took it.  Completed about 2 weeks ago.  Felt like she got better but then worse again.  Feeling pressure.  Last night it was bad and throbbing.  Uvula hurts, thick.  Not clearing up her nose at all.  Did saline spray.  Blew nose this morning and blew out thick, brown mucus.    Seemed to get better when on AB.  Now worse.      Answers for HPI/ROS submitted by the patient on 6/29/2022  Chronicity: recurrent  Onset: 1 to 4 weeks ago  Progression since onset: waxing and waning  Pain worse on: left  Fever: no fever  Fever duration: less than 1 day  Pain - numeric: 5/10  abdominal  pain: No  congestion: Yes  cough: No  diarrhea: No  drooling: No  ear discharge: No  ear pain: No  headaches: Yes  hoarse voice: Yes  neck pain: No  plugged ear sensation: No  shortness of breath: No  stridor: No  swollen glands: No  trouble swallowing: No  vomiting: No  strep: No  mono: No  Treatments tried: gargles  Improvement on treatment: moderate  Pain severity: moderate        These symptoms began 6/2/2022  ago and status is recurrent.  After taking the Ab the symptoms seemed to clear but now back..     Symptoms are + acute.    Pt denies the following symptoms:  Fever, cough, ear pain, problems breathing.    Aggravating factors include lying down .    Relieving factors include antibiotic .    OTC Medications tried are none.    Prescription medications taken for symptoms are doxycycline.    Pertinent medical history:  Had Covid 4 weeks ago.  Took doxycycline and steroid.  Felt like it got better and then worse again.  Had a bad nose bleed 2 years ago.  Had to be cauterized.  Took about 2 months and 2-3 rounds of AB to clear up.  Since then left side affected.  Covid really worsening her sxs.  .    Smoking status:  nonsmoker    ROS    Constitutional:   No  fever, + fatigue.    Head:   + headache  Ears:   No pain.  Eyes:  No sxs  Nose:   + sinus pain, + congestion, no runny nose, no post nasal drip.  Throat:  No ST pain.    Heart:  No palpitations, chest pain.    Lungs:  No difficulty breathing, no coughing.    GI/:  No sxs    MS:  No new bone, joint or muscle problems.    Skin:  No rashes, itching.    Past Medical History:   Diagnosis Date    Hormone replacement therapy     Hyperlipidemia        Current Outpatient Medications:     albuterol (PROVENTIL/VENTOLIN HFA) 90 mcg/actuation inhaler, Inhale 1-2 puffs into the lungs every 6 (six) hours as needed for Wheezing. Rescue, Disp: 6.7 g, Rfl: 0    doxycycline (VIBRA-TABS) 100 MG tablet, Take 1 tablet (100 mg total) by mouth 2 (two) times daily., Disp: 20  tablet, Rfl: 0    estradioL (DIVIGEL) 0.75 mg/0.75 gram (0.1%) GlPk, Place 1 packet onto the skin once daily., Disp: 90 packet, Rfl: 3    fluconazole (DIFLUCAN) 150 MG Tab, Take 1 tablet (150 mg total) by mouth Every 3 (three) days., Disp: 3 tablet, Rfl: 0    medroxyPROGESTERone (PROVERA) 5 MG tablet, Take 1 tablet (5 mg total) by mouth once daily., Disp: 30 tablet, Rfl: 11    multivitamin (THERAGRAN) per tablet, Take 1 tablet by mouth once daily., Disp: , Rfl:     rosuvastatin (CRESTOR) 5 MG tablet, TAKE 1 TABLET BY MOUTH EVERY DAY, Disp: 90 tablet, Rfl: 3      PHYSICAL EXAM    There were no vitals filed for this visit.  Vital signs not taken per patient.  Patient's questionnaire (if available), medications & PMH all reviewed today.    Gen:  Alert, coop 51 y.o. female patient in no acute distress, is not ill-appearing.           Well developed, well-nourished  Psych:   Behavior and affect appropriate for the situation and setting.  HENT:   Normocephalic, atraumatic.  Symmetrical facial movements.    Eyes without visible discharge or swelling.  + pain on palpation to frontal sinuses on patient self-exam.  + pain on palpation to maxillary sinuses on patient self-exam.  No cervical lymph nodes tender to palpation on patient self-exam.  No sneezing during the visit.  Voice steady, no hoarseness noted.  Resp:   Respiratory effort symmetrical.  No retractions  No increased work of breathing  No audible wheezes  Talks in full sentences.  No coughing during the interaction today.  CV:   No randi oral cyanosis  MSK:  Head and upper extremity movements smooth and even.  Neuro:  Responds promptly to questions showing good insight.  Skin:   No observed rashes/bruises    Acute bacterial sinusitis  -     amoxicillin (AMOXIL) 875 MG tablet; Take 1 tablet (875 mg total) by mouth every 12 (twelve) hours. for 7 days  Dispense: 14 tablet; Refill: 0  -     Ambulatory referral/consult to ENT; Future; Expected date:  07/06/2022          Pt today presents with report of improving sinus sxs that are now worse again a short time after completing her doxycycline. .  Additionally, she started with symptoms when she had Covid.  Has had some problems with sinus infections clearing up since a left-sided nose bleed and resultant cauterization 2 years ago.      This is a new problem to me.  No work up is planned.    Referred to ENT .    Pt advised to perform comfort measures recommended on patient instruction sheet, which were reviewed at the time of the visit..    If not better in 3-4 days, the patient is advised to call here, PCP office or go for an in-person/follow up evaluation.  If worse or concerns, the patient is advised to call for advise to this office or the PCP office or [facility] or go to the nearest URGENT CARE or ER.  Explained exam findings, diagnosis and treatment plan to patient.  Questions answered and patient states understanding.

## 2022-06-29 NOTE — PATIENT INSTRUCTIONS
Call [phone] for a ENT referral.    Start the new antibiotic.   Continue nasal saline spray.  Increase water intake.     Warm compress over sinus.  Vaporizer.    If you are not better in 3-4 days or are worse at any time, go for an in-person evaluation to your PCP office, an URGENT CARE or the Emergency Department.    You have been evaluated today via a telehealth visit.  While providing good convenience and increased access to care, it is not always possible to fully evaluate a medical problem through type of patient care encounter.  If your symptoms change or worsen or the treatment is not effective, please follow-up with an in-person visit to your primary care center, at an urgent care or the emergency room.  Thank you for choosing [facility]!    MAGGI Lynch, CNP, FNP-BC  [facility]

## 2022-07-14 ENCOUNTER — OFFICE VISIT (OUTPATIENT)
Dept: OBSTETRICS AND GYNECOLOGY | Facility: CLINIC | Age: 52
End: 2022-07-14
Payer: COMMERCIAL

## 2022-07-14 ENCOUNTER — HOSPITAL ENCOUNTER (OUTPATIENT)
Dept: RADIOLOGY | Facility: HOSPITAL | Age: 52
Discharge: HOME | End: 2022-07-14
Attending: OBSTETRICS & GYNECOLOGY
Payer: COMMERCIAL

## 2022-07-14 VITALS
BODY MASS INDEX: 29.65 KG/M2 | HEIGHT: 66 IN | WEIGHT: 184.5 LBS | SYSTOLIC BLOOD PRESSURE: 124 MMHG | DIASTOLIC BLOOD PRESSURE: 82 MMHG

## 2022-07-14 DIAGNOSIS — Z01.419 ENCOUNTER FOR GYNECOLOGICAL EXAMINATION WITHOUT ABNORMAL FINDING: Primary | ICD-10-CM

## 2022-07-14 DIAGNOSIS — Z12.31 VISIT FOR SCREENING MAMMOGRAM: ICD-10-CM

## 2022-07-14 DIAGNOSIS — Z79.890 HORMONE REPLACEMENT THERAPY (HRT): ICD-10-CM

## 2022-07-14 PROCEDURE — 99396 PR PREVENTIVE VISIT,EST,40-64: ICD-10-PCS | Mod: S$GLB,,, | Performed by: OBSTETRICS & GYNECOLOGY

## 2022-07-14 PROCEDURE — 3079F PR MOST RECENT DIASTOLIC BLOOD PRESSURE 80-89 MM HG: ICD-10-PCS | Mod: CPTII,S$GLB,, | Performed by: OBSTETRICS & GYNECOLOGY

## 2022-07-14 PROCEDURE — 77067 SCR MAMMO BI INCL CAD: CPT | Mod: TC,PN

## 2022-07-14 PROCEDURE — 3079F DIAST BP 80-89 MM HG: CPT | Mod: CPTII,S$GLB,, | Performed by: OBSTETRICS & GYNECOLOGY

## 2022-07-14 PROCEDURE — 3074F PR MOST RECENT SYSTOLIC BLOOD PRESSURE < 130 MM HG: ICD-10-PCS | Mod: CPTII,S$GLB,, | Performed by: OBSTETRICS & GYNECOLOGY

## 2022-07-14 PROCEDURE — 3008F PR BODY MASS INDEX (BMI) DOCUMENTED: ICD-10-PCS | Mod: CPTII,S$GLB,, | Performed by: OBSTETRICS & GYNECOLOGY

## 2022-07-14 PROCEDURE — 1159F MED LIST DOCD IN RCRD: CPT | Mod: CPTII,S$GLB,, | Performed by: OBSTETRICS & GYNECOLOGY

## 2022-07-14 PROCEDURE — 77063 BREAST TOMOSYNTHESIS BI: CPT | Mod: 26,,, | Performed by: RADIOLOGY

## 2022-07-14 PROCEDURE — 99396 PREV VISIT EST AGE 40-64: CPT | Mod: S$GLB,,, | Performed by: OBSTETRICS & GYNECOLOGY

## 2022-07-14 PROCEDURE — 99999 PR PBB SHADOW E&M-EST. PATIENT-LVL III: ICD-10-PCS | Mod: PBBFAC,,, | Performed by: OBSTETRICS & GYNECOLOGY

## 2022-07-14 PROCEDURE — 77067 MAMMO DIGITAL SCREENING BILAT WITH TOMO: ICD-10-PCS | Mod: 26,,, | Performed by: RADIOLOGY

## 2022-07-14 PROCEDURE — 77067 SCR MAMMO BI INCL CAD: CPT | Mod: 26,,, | Performed by: RADIOLOGY

## 2022-07-14 PROCEDURE — 99999 PR PBB SHADOW E&M-EST. PATIENT-LVL III: CPT | Mod: PBBFAC,,, | Performed by: OBSTETRICS & GYNECOLOGY

## 2022-07-14 PROCEDURE — 3074F SYST BP LT 130 MM HG: CPT | Mod: CPTII,S$GLB,, | Performed by: OBSTETRICS & GYNECOLOGY

## 2022-07-14 PROCEDURE — 77063 MAMMO DIGITAL SCREENING BILAT WITH TOMO: ICD-10-PCS | Mod: 26,,, | Performed by: RADIOLOGY

## 2022-07-14 PROCEDURE — 3008F BODY MASS INDEX DOCD: CPT | Mod: CPTII,S$GLB,, | Performed by: OBSTETRICS & GYNECOLOGY

## 2022-07-14 PROCEDURE — 1159F PR MEDICATION LIST DOCUMENTED IN MEDICAL RECORD: ICD-10-PCS | Mod: CPTII,S$GLB,, | Performed by: OBSTETRICS & GYNECOLOGY

## 2022-07-14 RX ORDER — ESTRADIOL 0.75 MG/.75G
1 GEL TOPICAL DAILY
Qty: 90 PACKET | Refills: 3 | Status: SHIPPED | OUTPATIENT
Start: 2022-07-14 | End: 2022-08-13

## 2022-07-14 RX ORDER — MEDROXYPROGESTERONE ACETATE 5 MG/1
5 TABLET ORAL DAILY
Qty: 90 TABLET | Refills: 3 | Status: SHIPPED | OUTPATIENT
Start: 2022-07-14 | End: 2022-08-13

## 2022-07-14 NOTE — PROGRESS NOTES
Chief Complaint   Patient presents with    Well Woman    Breast tender     For 3 weeks       History of Present Illness: Steph Lemus is a 51 y.o. female that presents today 7/14/2022 for well gyn visit.    Past Medical History:   Diagnosis Date    Hormone replacement therapy     Hyperlipidemia        Past Surgical History:   Procedure Laterality Date    COLONOSCOPY N/A 4/15/2019    Procedure: COLONOSCOPY;  Surgeon: Tad Suarez Jr., MD;  Location: [facility] ENDO;  Service: Endoscopy;  Laterality: N/A;    DILATION AND CURETTAGE OF UTERUS      for uterine polyps    ENDOMETRIAL ABLATION      ENDOSCOPIC NASAL CAUTERIZATION N/A 8/11/2020    Procedure: CAUTERIZATION, NOSE, ENDOSCOPIC;  Surgeon: Surinder Acevedo MD;  Location: [facility] OR;  Service: ENT;  Laterality: N/A;    LAPAROSCOPIC APPENDECTOMY N/A 9/22/2020    Procedure: APPENDECTOMY, LAPAROSCOPIC;  Surgeon: Shai Ludwig MD;  Location: [facility] OR;  Service: General;  Laterality: N/A;    TONSILLECTOMY         Current Outpatient Medications   Medication Sig Dispense Refill    multivitamin (THERAGRAN) per tablet Take 1 tablet by mouth once daily.      rosuvastatin (CRESTOR) 5 MG tablet TAKE 1 TABLET BY MOUTH EVERY DAY 90 tablet 3    albuterol (PROVENTIL/VENTOLIN HFA) 90 mcg/actuation inhaler Inhale 1-2 puffs into the lungs every 6 (six) hours as needed for Wheezing. Rescue (Patient not taking: Reported on 7/14/2022) 6.7 g 0    doxycycline (VIBRA-TABS) 100 MG tablet Take 1 tablet (100 mg total) by mouth 2 (two) times daily. (Patient not taking: Reported on 7/14/2022) 20 tablet 0    estradioL (DIVIGEL) 0.75 mg/0.75 gram (0.1%) GlPk Place 1 packet onto the skin once daily. 90 packet 3    fluconazole (DIFLUCAN) 150 MG Tab Take 1 tablet (150 mg total) by mouth Every 3 (three) days. (Patient not taking: Reported on 7/14/2022) 3 tablet 0    medroxyPROGESTERone (PROVERA) 5 MG tablet Take 1 tablet (5 mg total) by mouth once daily. 90 tablet 3     No  current facility-administered medications for this visit.       Review of patient's allergies indicates:   Allergen Reactions    Augmentin [amoxicillin-pot clavulanate]      Stomach pains       Family History   Problem Relation Age of Onset    Diabetes Maternal Grandfather     Cancer Maternal Grandfather     Cancer Paternal Grandfather     Breast cancer Cousin 40    BRCA 1/2 Cousin     Colon cancer Neg Hx     Ovarian cancer Neg Hx        Social History     Socioeconomic History    Marital status:    Tobacco Use    Smoking status: Light Tobacco Smoker     Packs/day: 0.25     Years: 25.00     Pack years: 6.25     Types: Cigarettes    Smokeless tobacco: Never Used    Tobacco comment: 1 per week   Substance and Sexual Activity    Alcohol use: Yes     Alcohol/week: 0.0 standard drinks     Comment: Drinks wine socially    Drug use: No    Sexual activity: Yes     Partners: Male     Birth control/protection: Other-see comments     Social Determinants of Health     Financial Resource Strain: Unknown    Difficulty of Paying Living Expenses: Patient refused   Food Insecurity: Unknown    Worried About Running Out of Food in the Last Year: Patient refused    Ran Out of Food in the Last Year: Patient refused   Transportation Needs: Unknown    Lack of Transportation (Medical): Patient refused    Lack of Transportation (Non-Medical): Patient refused   Physical Activity: Unknown    Days of Exercise per Week: 2 days    Minutes of Exercise per Session: Patient refused   Stress: No Stress Concern Present    Feeling of Stress : Not at all   Social Connections: Unknown    Frequency of Communication with Friends and Family: Patient refused    Frequency of Social Gatherings with Friends and Family: Patient refused    Active Member of Clubs or Organizations: No    Attends Club or Organization Meetings: Never    Marital Status: Patient refused   Housing Stability: Unknown    Unable to Pay for Housing in  "the Last Year: Patient refused    Number of Places Lived in the Last Year: 1    Unstable Housing in the Last Year: Patient refused       OB History    Para Term  AB Living   3 3 3         SAB IAB Ectopic Multiple Live Births           3      # Outcome Date GA Lbr Randal/2nd Weight Sex Delivery Anes PTL Lv   3 Term            2 Term            1 Term                Review of Symptoms:  GENERAL: Denies weight gain or weight loss. Feeling well overall.   SKIN: Denies rash or lesions.   HEAD: Denies head injury or headache.   NODES: Denies enlarged lymph nodes.   CHEST: Denies chest pain or shortness of breath.   CARDIOVASCULAR: Denies palpitations or left sided chest pain.   ABDOMEN: No abdominal pain, constipation, diarrhea, nausea, vomiting or rectal bleeding.   URINARY: No frequency, dysuria, hematuria, or burning on urination.  HEMATOLOGIC: No easy bruisability or excessive bleeding.   MUSCULOSKELETAL: Denies joint pain or swelling.     /82   Ht 5' 6" (1.676 m)   Wt 83.7 kg (184 lb 8.4 oz)   LMP  (LMP Unknown)   Physical Exam:  APPEARANCE: Well nourished, well developed, in no acute distress.  SKIN: Normal skin turgor, no lesions.  NECK: Neck symmetric without masses   RESPIRATORY: Normal respiratory effort with no retractions or use of accessory muscles  CARDIOVASCULAR: Peripheral vascular system with no swelling no varicosities and palpation of pulses normal  LYMPHATIC: No enlargements of the lymph nodes noted in the neck, axillae, or groin  ABDOMEN: Soft. No tenderness or masses. No hepatosplenomegaly. No hernias.  BREASTS: Symmetrical, no skin changes or visible lesions. No palpable masses, nipple discharge or adenopathy bilaterally.  PELVIC: Normal external female genitalia without lesions. Normal hair distribution. Adequate perineal body, normal urethral meatus. Urethra with no masses.  Bladder nontender. Vagina moist and well rugated without lesions or discharge. Cervix pink and without " lesions. No significant cystocele or rectocele. Bimanual exam showed uterus normal size, shape, position, mobile and nontender. Adnexa without masses or tenderness. Urethra and bladder normal.   EXTREMITIES: No clubbing cyanosis or edema.    ASSESSMENT/PLAN:  Encounter for gynecological examination without abnormal finding    Hormone replacement therapy (HRT)  -     estradioL (DIVIGEL) 0.75 mg/0.75 gram (0.1%) GlPk; Place 1 packet onto the skin once daily.  Dispense: 90 packet; Refill: 3  -     medroxyPROGESTERone (PROVERA) 5 MG tablet; Take 1 tablet (5 mg total) by mouth once daily.  Dispense: 90 tablet; Refill: 3    Visit for screening mammogram          Patient was counseled today on Pelvic exams and Pap Smear guidelines.   We discussed STD screening if at high risk for a STD.  We discussed recommendation for breast cancer screening with mammogram every other year after the age of 40 and annually after the age of 50.    We discussed colon cancer screening when indicated.   Osteoporosis screening discussed when indicated.   She was advised to see her primary care physician for all other health maintenance.     FOLLOW-UP with me for next routine visit.

## 2022-07-16 ENCOUNTER — OFFICE VISIT (OUTPATIENT)
Dept: URGENT CARE | Facility: CLINIC | Age: 52
End: 2022-07-16
Payer: COMMERCIAL

## 2022-07-16 VITALS
OXYGEN SATURATION: 99 % | DIASTOLIC BLOOD PRESSURE: 85 MMHG | HEIGHT: 66 IN | SYSTOLIC BLOOD PRESSURE: 146 MMHG | RESPIRATION RATE: 16 BRPM | BODY MASS INDEX: 29.57 KG/M2 | WEIGHT: 184 LBS | HEART RATE: 68 BPM | TEMPERATURE: 99 F

## 2022-07-16 DIAGNOSIS — R05.8 POST-VIRAL COUGH SYNDROME: Primary | ICD-10-CM

## 2022-07-16 DIAGNOSIS — R05.9 COUGH: ICD-10-CM

## 2022-07-16 LAB
CTP QC/QA: YES
CTP QC/QA: YES
MOLECULAR STREP A: NEGATIVE
SARS-COV-2 RDRP RESP QL NAA+PROBE: NEGATIVE

## 2022-07-16 PROCEDURE — U0002 COVID-19 LAB TEST NON-CDC: HCPCS | Mod: QW,S$GLB,, | Performed by: PHYSICIAN ASSISTANT

## 2022-07-16 PROCEDURE — 3008F PR BODY MASS INDEX (BMI) DOCUMENTED: ICD-10-PCS | Mod: CPTII,S$GLB,, | Performed by: PHYSICIAN ASSISTANT

## 2022-07-16 PROCEDURE — 3079F DIAST BP 80-89 MM HG: CPT | Mod: CPTII,S$GLB,, | Performed by: PHYSICIAN ASSISTANT

## 2022-07-16 PROCEDURE — 1159F PR MEDICATION LIST DOCUMENTED IN MEDICAL RECORD: ICD-10-PCS | Mod: CPTII,S$GLB,, | Performed by: PHYSICIAN ASSISTANT

## 2022-07-16 PROCEDURE — 99213 OFFICE O/P EST LOW 20 MIN: CPT | Mod: S$GLB,,, | Performed by: PHYSICIAN ASSISTANT

## 2022-07-16 PROCEDURE — U0002: ICD-10-PCS | Mod: QW,S$GLB,, | Performed by: PHYSICIAN ASSISTANT

## 2022-07-16 PROCEDURE — 3008F BODY MASS INDEX DOCD: CPT | Mod: CPTII,S$GLB,, | Performed by: PHYSICIAN ASSISTANT

## 2022-07-16 PROCEDURE — 87651 STREP A DNA AMP PROBE: CPT | Mod: QW,S$GLB,, | Performed by: PHYSICIAN ASSISTANT

## 2022-07-16 PROCEDURE — 1160F PR REVIEW ALL MEDS BY PRESCRIBER/CLIN PHARMACIST DOCUMENTED: ICD-10-PCS | Mod: CPTII,S$GLB,, | Performed by: PHYSICIAN ASSISTANT

## 2022-07-16 PROCEDURE — 1160F RVW MEDS BY RX/DR IN RCRD: CPT | Mod: CPTII,S$GLB,, | Performed by: PHYSICIAN ASSISTANT

## 2022-07-16 PROCEDURE — 3077F SYST BP >= 140 MM HG: CPT | Mod: CPTII,S$GLB,, | Performed by: PHYSICIAN ASSISTANT

## 2022-07-16 PROCEDURE — 3077F PR MOST RECENT SYSTOLIC BLOOD PRESSURE >= 140 MM HG: ICD-10-PCS | Mod: CPTII,S$GLB,, | Performed by: PHYSICIAN ASSISTANT

## 2022-07-16 PROCEDURE — 3079F PR MOST RECENT DIASTOLIC BLOOD PRESSURE 80-89 MM HG: ICD-10-PCS | Mod: CPTII,S$GLB,, | Performed by: PHYSICIAN ASSISTANT

## 2022-07-16 PROCEDURE — 1159F MED LIST DOCD IN RCRD: CPT | Mod: CPTII,S$GLB,, | Performed by: PHYSICIAN ASSISTANT

## 2022-07-16 PROCEDURE — 99213 PR OFFICE/OUTPT VISIT, EST, LEVL III, 20-29 MIN: ICD-10-PCS | Mod: S$GLB,,, | Performed by: PHYSICIAN ASSISTANT

## 2022-07-16 PROCEDURE — 87651 POCT STREP A MOLECULAR: ICD-10-PCS | Mod: QW,S$GLB,, | Performed by: PHYSICIAN ASSISTANT

## 2022-07-16 RX ORDER — ALBUTEROL SULFATE 90 UG/1
2 AEROSOL, METERED RESPIRATORY (INHALATION) EVERY 6 HOURS PRN
Qty: 18 G | Refills: 0 | Status: SHIPPED | OUTPATIENT
Start: 2022-07-16 | End: 2022-08-13

## 2022-07-16 RX ORDER — BENZONATATE 200 MG/1
200 CAPSULE ORAL 3 TIMES DAILY PRN
Qty: 30 CAPSULE | Refills: 1 | Status: SHIPPED | OUTPATIENT
Start: 2022-07-16 | End: 2022-07-26

## 2022-07-16 RX ORDER — AZELASTINE 1 MG/ML
1 SPRAY, METERED NASAL 2 TIMES DAILY
Qty: 30 ML | Refills: 0 | Status: SHIPPED | OUTPATIENT
Start: 2022-07-16 | End: 2022-11-09

## 2022-07-16 NOTE — PATIENT INSTRUCTIONS

## 2022-07-16 NOTE — PROGRESS NOTES
"Subjective:       Patient ID: Steph Lemus is a 51 y.o. female.    Vitals:  height is 5' 6" (1.676 m) and weight is 83.5 kg (184 lb). Her oral temperature is 98.6 °F (37 °C). Her blood pressure is 146/85 (abnormal) and her pulse is 68. Her respiration is 16 and oxygen saturation is 99%.     Chief Complaint: Sore Throat    Pt presents today with complaints of severe sore throat that makes her tongue hurt and a cough. Pt was prescribed ammococililin. Pt has also taken mucinex DM for a week. Pt was positive for covid a month and a half ago, but has had a negative test between now and that test. Pt has been covid vaccinated. Patient no longer coughing up dark mucous. Symptoms are somewhat improved.    Sore Throat   This is a new problem. The current episode started in the past 7 days. The problem has been gradually worsening. There has been no fever. The pain is at a severity of 3/10. The pain is mild. Associated symptoms include coughing. Pertinent negatives include no congestion or shortness of breath. Treatments tried: antibotics and mucinex DM. The treatment provided no relief.       Constitution: Negative for chills and fever.   HENT: Positive for postnasal drip and sore throat. Negative for congestion.    Respiratory: Positive for cough. Negative for shortness of breath.        Objective:      Physical Exam   Constitutional: She does not appear ill. No distress.   HENT:   Head: Normocephalic and atraumatic.   Ears:   Right Ear: Tympanic membrane, external ear and ear canal normal.   Left Ear: Tympanic membrane, external ear and ear canal normal.   Nose: Right sinus exhibits no maxillary sinus tenderness and no frontal sinus tenderness. Left sinus exhibits no maxillary sinus tenderness and no frontal sinus tenderness.   Mouth/Throat: Mucous membranes are moist. No oropharyngeal exudate or posterior oropharyngeal erythema. Oropharynx is clear.   Eyes: Conjunctivae are normal. Right eye exhibits no discharge. " Left eye exhibits no discharge. Extraocular movement intact   Cardiovascular: Normal rate, regular rhythm and normal heart sounds.   No murmur heard.  Pulmonary/Chest: Effort normal and breath sounds normal. She has no wheezes. She has no rhonchi. She has no rales.   Abdominal: Normal appearance.   Musculoskeletal: Normal range of motion.         General: Normal range of motion.   Neurological: no focal deficit. She is alert.   Skin: Skin is warm, dry and not pale. jaundice  Psychiatric: Her behavior is normal. Mood, judgment and thought content normal.   Nursing note and vitals reviewed.        Assessment:       1. Post-viral cough syndrome    2. Cough          Plan:         Post-viral cough syndrome    Cough  -     POCT COVID-19 Rapid Screening  -     POCT Strep A, Molecular    Results for orders placed or performed in visit on 07/16/22   POCT COVID-19 Rapid Screening   Result Value Ref Range    POC Rapid COVID Negative Negative     Acceptable Yes    POCT Strep A, Molecular   Result Value Ref Range    Molecular Strep A, POC Negative Negative     Acceptable Yes        Other orders  -     benzonatate (TESSALON) 200 MG capsule; Take 1 capsule (200 mg total) by mouth 3 (three) times daily as needed for Cough.  Dispense: 30 capsule; Refill: 1  -     albuterol (VENTOLIN HFA) 90 mcg/actuation inhaler; Inhale 2 puffs into the lungs every 6 (six) hours as needed for Wheezing. Rescue  Dispense: 18 g; Refill: 0  -     azelastine (ASTELIN) 137 mcg (0.1 %) nasal spray; 1 spray (137 mcg total) by Nasal route 2 (two) times daily.  Dispense: 30 mL; Refill: 0    Discussed signs/symptoms, reasons would need re-evaluation.  Otherwise above prescribe medications.     Patient Instructions   You must understand that you've received an Urgent Care treatment only and that you may be released before all your medical problems are known or treated. You, the patient, will arrange for follow up care as  instructed.  Follow up with your PCP or specialty clinic as directed in the next 1-2 weeks if not improved or as needed.  You can call [phone] to schedule an appointment with the appropriate provider.  If your condition worsens we recommend that you receive another evaluation at the emergency room immediately or contact your primary medical clinics after hours call service to discuss your concerns.  Please return here or go to the Emergency Department for any concerns or worsening of condition.

## 2022-07-19 ENCOUNTER — HOSPITAL ENCOUNTER (OUTPATIENT)
Dept: RADIOLOGY | Facility: HOSPITAL | Age: 52
Discharge: HOME | End: 2022-07-19
Attending: OBSTETRICS & GYNECOLOGY
Payer: COMMERCIAL

## 2022-07-19 ENCOUNTER — TELEPHONE (OUTPATIENT)
Dept: RADIOLOGY | Facility: HOSPITAL | Age: 52
End: 2022-07-19

## 2022-07-19 DIAGNOSIS — R92.8 ABNORMAL MAMMOGRAM OF LEFT BREAST: ICD-10-CM

## 2022-07-19 PROCEDURE — 77061 MAMMO DIGITAL DIAGNOSTIC LEFT WITH TOMO: ICD-10-PCS | Mod: 26,LT,, | Performed by: RADIOLOGY

## 2022-07-19 PROCEDURE — 76642 ULTRASOUND BREAST LIMITED: CPT | Mod: 26,LT,, | Performed by: RADIOLOGY

## 2022-07-19 PROCEDURE — 77065 MAMMO DIGITAL DIAGNOSTIC LEFT WITH TOMO: ICD-10-PCS | Mod: 26,LT,, | Performed by: RADIOLOGY

## 2022-07-19 PROCEDURE — 76642 ULTRASOUND BREAST LIMITED: CPT | Mod: TC,PO,LT

## 2022-07-19 PROCEDURE — 77065 DX MAMMO INCL CAD UNI: CPT | Mod: TC,PO,LT

## 2022-07-19 PROCEDURE — 76642 US BREAST LEFT LIMITED: ICD-10-PCS | Mod: 26,LT,, | Performed by: RADIOLOGY

## 2022-07-19 PROCEDURE — 77061 BREAST TOMOSYNTHESIS UNI: CPT | Mod: 26,LT,, | Performed by: RADIOLOGY

## 2022-07-19 PROCEDURE — 77065 DX MAMMO INCL CAD UNI: CPT | Mod: 26,LT,, | Performed by: RADIOLOGY

## 2022-07-19 NOTE — TELEPHONE ENCOUNTER
Patient in today for recall diag mammo and us. Per Radiologist  - scheduled with patient and  an u/s guided left breast biopsy, 2 sites, on 8/3/22 at 8 am at [address] [facility] radiology. Discussed procedure in detail with patient/. Also discussed with and gave to patient the Breast Biopsy Patient Information booklet, urged to call back with any questions/concerns to the number listed in the booklet.    Dr. Alatorre notified via staff message

## 2022-08-03 ENCOUNTER — HOSPITAL ENCOUNTER (OUTPATIENT)
Dept: RADIOLOGY | Facility: HOSPITAL | Age: 52
Discharge: HOME | End: 2022-08-03
Attending: OBSTETRICS & GYNECOLOGY
Payer: COMMERCIAL

## 2022-08-03 DIAGNOSIS — R92.8 ABNORMAL MAMMOGRAM OF RIGHT BREAST: ICD-10-CM

## 2022-08-03 DIAGNOSIS — R92.8 ABNORMAL MAMMOGRAM OF LEFT BREAST: ICD-10-CM

## 2022-08-03 PROCEDURE — 77065 DX MAMMO INCL CAD UNI: CPT | Mod: TC,PO,LT

## 2022-08-03 PROCEDURE — 88341 PR IHC OR ICC EACH ADD'L SINGLE ANTIBODY  STAINPR: ICD-10-PCS | Mod: 26,59,, | Performed by: STUDENT IN AN ORGANIZED HEALTH CARE EDUCATION/TRAINING PROGRAM

## 2022-08-03 PROCEDURE — 77065 DX MAMMO INCL CAD UNI: CPT | Mod: 26,LT,, | Performed by: RADIOLOGY

## 2022-08-03 PROCEDURE — 25000003 PHARM REV CODE 250: Mod: PO | Performed by: OBSTETRICS & GYNECOLOGY

## 2022-08-03 PROCEDURE — 88342 CHG IMMUNOCYTOCHEMISTRY: ICD-10-PCS | Mod: 26,59,, | Performed by: STUDENT IN AN ORGANIZED HEALTH CARE EDUCATION/TRAINING PROGRAM

## 2022-08-03 PROCEDURE — 88341 IMHCHEM/IMCYTCHM EA ADD ANTB: CPT | Mod: 26,59,, | Performed by: STUDENT IN AN ORGANIZED HEALTH CARE EDUCATION/TRAINING PROGRAM

## 2022-08-03 PROCEDURE — 77065 MAMMO DIGITAL DIAGNOSTIC LEFT: ICD-10-PCS | Mod: 26,LT,, | Performed by: RADIOLOGY

## 2022-08-03 PROCEDURE — 88342 IMHCHEM/IMCYTCHM 1ST ANTB: CPT | Mod: 59 | Performed by: STUDENT IN AN ORGANIZED HEALTH CARE EDUCATION/TRAINING PROGRAM

## 2022-08-03 PROCEDURE — 88377 M/PHMTRC ALYS ISHQUANT/SEMIQ: CPT | Performed by: STUDENT IN AN ORGANIZED HEALTH CARE EDUCATION/TRAINING PROGRAM

## 2022-08-03 PROCEDURE — 19084 BX BREAST ADD LESION US IMAG: CPT | Mod: PO

## 2022-08-03 PROCEDURE — 88305 TISSUE EXAM BY PATHOLOGIST: ICD-10-PCS | Mod: 26,,, | Performed by: STUDENT IN AN ORGANIZED HEALTH CARE EDUCATION/TRAINING PROGRAM

## 2022-08-03 PROCEDURE — A4648 IMPLANTABLE TISSUE MARKER: HCPCS | Mod: PO

## 2022-08-03 PROCEDURE — 19084 BX BREAST ADD LESION US IMAG: CPT | Mod: LT,,, | Performed by: RADIOLOGY

## 2022-08-03 PROCEDURE — 88360 TUMOR IMMUNOHISTOCHEM/MANUAL: CPT | Mod: 26,,, | Performed by: STUDENT IN AN ORGANIZED HEALTH CARE EDUCATION/TRAINING PROGRAM

## 2022-08-03 PROCEDURE — 27201044 US BREAST BIOPSY WITH IMAGING 1ST SITE LEFT: Mod: PO

## 2022-08-03 PROCEDURE — 88305 TISSUE EXAM BY PATHOLOGIST: CPT | Mod: 26,,, | Performed by: STUDENT IN AN ORGANIZED HEALTH CARE EDUCATION/TRAINING PROGRAM

## 2022-08-03 PROCEDURE — 88360 TUMOR IMMUNOHISTOCHEM/MANUAL: CPT | Performed by: STUDENT IN AN ORGANIZED HEALTH CARE EDUCATION/TRAINING PROGRAM

## 2022-08-03 PROCEDURE — 88360 PR  TUMOR IMMUNOHISTOCHEM/MANUAL: ICD-10-PCS | Mod: 26,,, | Performed by: STUDENT IN AN ORGANIZED HEALTH CARE EDUCATION/TRAINING PROGRAM

## 2022-08-03 PROCEDURE — 19083 BX BREAST 1ST LESION US IMAG: CPT | Mod: LT,,, | Performed by: RADIOLOGY

## 2022-08-03 PROCEDURE — 19084 US BREAST BIOPSY WITH IMAGING EA ADDITIONAL: ICD-10-PCS | Mod: LT,,, | Performed by: RADIOLOGY

## 2022-08-03 PROCEDURE — 88342 IMHCHEM/IMCYTCHM 1ST ANTB: CPT | Mod: 26,59,, | Performed by: STUDENT IN AN ORGANIZED HEALTH CARE EDUCATION/TRAINING PROGRAM

## 2022-08-03 PROCEDURE — 19083 US BREAST BIOPSY WITH IMAGING 1ST SITE LEFT: ICD-10-PCS | Mod: LT,,, | Performed by: RADIOLOGY

## 2022-08-03 PROCEDURE — 88341 IMHCHEM/IMCYTCHM EA ADD ANTB: CPT | Performed by: STUDENT IN AN ORGANIZED HEALTH CARE EDUCATION/TRAINING PROGRAM

## 2022-08-03 PROCEDURE — 88305 TISSUE EXAM BY PATHOLOGIST: CPT | Performed by: STUDENT IN AN ORGANIZED HEALTH CARE EDUCATION/TRAINING PROGRAM

## 2022-08-03 RX ORDER — LIDOCAINE HYDROCHLORIDE 10 MG/ML
5 INJECTION INFILTRATION; PERINEURAL ONCE
Status: COMPLETED | OUTPATIENT
Start: 2022-08-03 | End: 2022-08-03

## 2022-08-03 RX ADMIN — LIDOCAINE HYDROCHLORIDE 5 ML: 10 INJECTION, SOLUTION INFILTRATION; PERINEURAL at 08:08

## 2022-08-08 ENCOUNTER — PATIENT MESSAGE (OUTPATIENT)
Dept: OBSTETRICS AND GYNECOLOGY | Facility: CLINIC | Age: 52
End: 2022-08-08
Payer: COMMERCIAL

## 2022-08-08 ENCOUNTER — TELEPHONE (OUTPATIENT)
Dept: RADIOLOGY | Facility: HOSPITAL | Age: 52
End: 2022-08-08
Payer: COMMERCIAL

## 2022-08-08 DIAGNOSIS — C50.912 MALIGNANT NEOPLASM OF LEFT BREAST IN FEMALE, ESTROGEN RECEPTOR POSITIVE, UNSPECIFIED SITE OF BREAST: Primary | ICD-10-CM

## 2022-08-08 DIAGNOSIS — Z17.0 MALIGNANT NEOPLASM OF LEFT BREAST IN FEMALE, ESTROGEN RECEPTOR POSITIVE, UNSPECIFIED SITE OF BREAST: Primary | ICD-10-CM

## 2022-08-08 NOTE — TELEPHONE ENCOUNTER
Patient's breast biopsy results revealed POSITIVE for ca.:    Final Pathologic Diagnosis 1. Breast, left, 12 o'clock, 6 cm from nipple, ultrasound-guided core needle   biopsies:   - Invasive ductal carcinoma       - Present in 4 of 6 core fragments (3 mm in greatest contiguous dimension)       - Brandon-Jimenez modified SBR score 2 + 2 + 1 = 5 of 9       - Histologic grade 1 of 3       - Mitotic index = 0.3 (average mitoses per high power field) (low)   - Biomarkers, assessed by immunohistochemistry on block 1A       - Estrogen Receptor (ER):  Positive (95%; strong intensity)       - Progesterone Receptor (ME):  Positive (70%; moderate intensity)       - HER2:  Equivocal (2+)       - HER2 FISH pending; results to follow in a supplemental report       - Ki-67 proliferation index:  Approximately 20% in hot-spot (intermediate)   - Lymphovascular invasion not identified   - Ductal carcinoma in situ       - Associated with invasive tumor       - Nuclear grade 2 of 3       - Cribriform pattern       - Central comedonecrosis: Approximately 20%   - Background breast contains fragments of fibroadenoma with myxoid change   2. Breast, left, 12 o'clock, 7 cm from nipple, ultrasound-guided core needle   biopsies:   - Fibroadenoma with myxoid change    Comment: Interp By Oj Chavarria M.D., Signed on 08/08/2022 at 11:40   Per EMR - Dr. Alatorre seen the results and has spoken to the patient.Also, Dr. Alatorre has entered a referral to Dr. Cueva. As the patient is vacation this week, I will not make another call to her for results.

## 2022-08-09 DIAGNOSIS — C50.912 INVASIVE DUCTAL CARCINOMA OF BREAST, FEMALE, LEFT: Primary | ICD-10-CM

## 2022-08-09 NOTE — PROGRESS NOTES
[facility] - Breast Surgery   History and Physical    Subjective:      Steph Lemus is a 52 y.o. female     Patient did not have any symptoms.  Abnormality was found on mammogram.    Maternal cousin with breast cancer.  Lung cancers as described below.    Patient quit smoking 1 week ago.  She is motivated to continue to abstain.  She states she does not need Chantix or smoking cessation Clinic.  Her family is going to try to quit smoking.  They understand the risk of secondhand smoke.    Stopped divigel last week    Just got back from [place] and has another vacation scheduled for labor day which she would like to make.        Menarche at age 9. . Age at first live birth 15 years old. Did  breast feed for 6 days. Menopause at 50 years old. HRT-divigel, stopped on Monday, took for 2 years  No personal history of ovarian/breast. Denies previous breast biopsy. Interested in Genetic testing.    Family history cancer:  Father- throat cancer- dx 73  Paternal Grandfather- Lung cancer- dx at 68  Paternal Uncle- Lung cancer, Dx at 60   Cousin- maternal- breast cancer dx at 36  Maternal cousin-Lung cancer- dx at 40      Former Smoker 6.25 Pk/yr quit 1 wk ago  Covid-19 vaccine to left arm            Patient Active Problem List   Diagnosis    Hyperlipidemia    Recurrent epistaxis    Palpitations    Invasive ductal carcinoma of left breast in female     Current Outpatient Medications on File Prior to Visit   Medication Sig Dispense Refill    azelastine (ASTELIN) 137 mcg (0.1 %) nasal spray 1 spray (137 mcg total) by Nasal route 2 (two) times daily. 30 mL 0    multivitamin (THERAGRAN) per tablet Take 1 tablet by mouth once daily.      rosuvastatin (CRESTOR) 5 MG tablet TAKE 1 TABLET BY MOUTH EVERY DAY 90 tablet 3     No current facility-administered medications on file prior to visit.     Review of patient's allergies indicates:   Allergen Reactions    Augmentin [amoxicillin-pot clavulanate]      Stomach  pains     Past Medical History:   Diagnosis Date    Hormone replacement therapy     Hyperlipidemia      Past Surgical History:   Procedure Laterality Date    COLONOSCOPY N/A 4/15/2019    Procedure: COLONOSCOPY;  Surgeon: Tad Suarez Jr., MD;  Location: [facility] ENDO;  Service: Endoscopy;  Laterality: N/A;    DILATION AND CURETTAGE OF UTERUS      for uterine polyps    ENDOMETRIAL ABLATION      ENDOSCOPIC NASAL CAUTERIZATION N/A 8/11/2020    Procedure: CAUTERIZATION, NOSE, ENDOSCOPIC;  Surgeon: Surinder Acevedo MD;  Location: [facility] OR;  Service: ENT;  Laterality: N/A;    LAPAROSCOPIC APPENDECTOMY N/A 9/22/2020    Procedure: APPENDECTOMY, LAPAROSCOPIC;  Surgeon: Shai Ludwig MD;  Location: [facility] OR;  Service: General;  Laterality: N/A;    TONSILLECTOMY       Family History   Problem Relation Age of Onset    Diabetes Maternal Grandfather     Cancer Maternal Grandfather     Cancer Paternal Grandfather     Breast cancer Cousin 40    BRCA 1/2 Cousin     Colon cancer Neg Hx     Ovarian cancer Neg Hx      Social History     Socioeconomic History    Marital status:    Tobacco Use    Smoking status: Former     Packs/day: 0.25     Years: 25.00     Pack years: 6.25     Types: Cigarettes    Smokeless tobacco: Never    Tobacco comments:     1 per week   Substance and Sexual Activity    Alcohol use: Yes     Alcohol/week: 0.0 standard drinks     Comment: Drinks wine socially    Drug use: No    Sexual activity: Yes     Partners: Male     Birth control/protection: Other-see comments     Social Determinants of Health     Financial Resource Strain: Low Risk     Difficulty of Paying Living Expenses: Not hard at all   Food Insecurity: No Food Insecurity    Worried About Running Out of Food in the Last Year: Never true    Ran Out of Food in the Last Year: Never true   Transportation Needs: No Transportation Needs    Lack of Transportation (Medical): No    Lack of Transportation (Non-Medical): No   Physical Activity:  "Insufficiently Active    Days of Exercise per Week: 1 day    Minutes of Exercise per Session: 30 min   Stress: No Stress Concern Present    Feeling of Stress : Only a little   Social Connections: Unknown    Frequency of Communication with Friends and Family: More than three times a week    Frequency of Social Gatherings with Friends and Family: More than three times a week    Active Member of Clubs or Organizations: No    Attends Club or Organization Meetings: Never    Marital Status:    Housing Stability: Low Risk     Unable to Pay for Housing in the Last Year: No    Number of Places Lived in the Last Year: 1    Unstable Housing in the Last Year: No         Review of Systems    No CP, No SOB      Objective:      BP (!) 142/82 (BP Location: Left arm, Patient Position: Sitting)   Pulse 61   Temp 97.3 °F (36.3 °C) (Temporal)   Resp 16   Ht 5' 6" (1.676 m)   Wt 85 kg (187 lb 6.3 oz)   BMI 30.25 kg/m²     Constitutional: NAD AAOX4  HEENT: EOMI, nonicteric sclera  NECK: no mass, no thyromegaly, no lymphadenopathy  CV: regular rate  PULM: good respiratory effort  ABDOMEN: soft, Nontender, nondistended. No guarding. No rebound.  MUSCULOSKELETAL: Good ROM  SKIN: No rashes or ulcerations seen.   NEUROLOGIC: CN grossly intact. Motor, sensory grossly intact    Breast exam   pendulous  Right: No mass, discharge, dimpling, lymphadenopathy  Left:  No discharge, dimpling, lymphadenopathy  Considerable bruising post biopsy - Left 1200 5cm FN 2cm mass, movable.  [facility] Outpatient Visit on 08/03/2022   Component Date Value Ref Range Status    Final Pathologic Diagnosis 08/03/2022    Corrected                    Value:1. Breast, left, 12 o'clock, 6 cm from nipple, ultrasound-guided core needle  biopsies:  - Invasive ductal carcinoma      - Present in 4 of 6 core fragments (3 mm in greatest contiguous dimension)      - Brandon-Jimenez modified SBR score 2 + 2 + 1 = 5 of 9      - Histologic grade 1 of 3     " " - Mitotic index = 0.3 (average mitoses per high power field) (low)  - Biomarkers, assessed by immunohistochemistry on block 1A      - Estrogen Receptor (ER):  Positive (95%; strong intensity)      - Progesterone Receptor (NY):  Positive (70%; moderate intensity)      - HER2:  Equivocal (2+)      - HER2 FISH pending; results to follow in a supplemental report      - Ki-67 proliferation index:  Approximately 20% in hot-spot (intermediate)  - Lymphovascular invasion not identified  - Ductal carcinoma in situ      - Associated with invasive tumor      - Nuclear grade 2 of 3      - Cribriform pattern      - Central comedonecrosis: Approximately 20%  - Background breast contains fragments of fibroadenoma w                          ith myxoid change  2. Breast, left, 12 o'clock, 7 cm from nipple, ultrasound-guided core needle  biopsies:  - Fibroadenoma with myxoid change      Interp By Oj Chavarria M.D., Signed on 08/19/2022 at 10:25    Supplemental Diagnosis 08/03/2022    Corrected                    Value:HER2, Breast Tumor, FISH, Tissue  Result Summary  Negative  Interpretation  There is no evidence of HER2 (ERBB2) gene amplification in this tumor sample.  Mumtaz Pickett M.D.  Report attached  Performing location:  [place] [address]      Gross 08/03/2022    Corrected                    Value:Patient MRN:  [id number]  1:  The specimen is received in formalin with proper patient identification,  labeled "left breast 12:00 6CFN". The specimen consists of a 1.5 x 1.5 x 0.3  cm aggregate of yellow-tan white-tan soft tissue which is entirely submitted  in cassette [id number].  The specimen is collected on is 8/3/2022 and placed in formalin on 8/3/2022  at 0845.  2:  The specimen is received in formalin with proper patient identification,  labeled "left breast 12:00 7CFN". The specimen consists of a 2.3 x 1.8 x 0.8  cm aggregate of tan-brown to " yellow-tan soft tissue which is entirely  submitted in cassette [id number].  The specimen is collected on is 8/3/2022 and placed in formalin on 8/3/2022  at 0851.  Michelle Alatorre MS, PA ([facility])      Microscopic Exam 08/03/2022    Corrected                    Value:1. Four of the six total core fragments are involved by invasive ductal  carcinoma, arranged mainly in tubules and cribriforming glands (glandular  differentiation score 2). The invasive carcinoma demonstrates moderate  nuclear atypia (score 2) and low mitotic rate (score 1).  No lymphovascular  invasion is identified. There is also an associated component of DCIS which  demonstrates cribriform architecture, central comedonecrosis, and moderate  nuclear grade (score 2). Immunohistochemistry for p63 and CK5/6 confirms  absence of myoepithelial cells in the invasive component. The background  breast tissue shows benign changes as listed in the diagnosis above.  2. The core biopsies show fragments of fibroadenoma with myxoid stromal  changes and without significantly increased stromal cellularity, stromal  nuclear pleomorphism, stromal mitotic activity, stromal heterogeneity, or  heterologous elements.  Immunohistochemistry is performed for p63, which  shows intact myoepithelial cell lini                          ng surrounding glands.  Other changes are  present as described in the above diagnosis. No atypical hyperplasia or  carcinoma is identified.  Immunohistochemical stains are performed with appropriate and reactive  controls.      Comment 08/03/2022    Corrected                    Value:This case was reviewed in consultation with Dr. Mckeon, who concurs with the  above findings.      Disclaimer 08/03/2022    Corrected                    Value:Unless the case is a 'gross only' or additional testing only, the final  diagnosis for each specimen is based on a microscopic examination of  appropriate tissue sections.  ER immunohistochemical staining  (clone SP1, DAB detection method) is  performed on formalin-fixed, paraffin embedded tissue sections. The  percentage of cell nuclei stained and the strength of staining is reported  (weak, moderate, strong), using the 2010 ACSO/CAP scoring guidelines. Tumors  used for determing predictive markers are fixed in10% neutral buffered  formalin for 6-72 hours. It has been cleared by the U.S. FDA for use as an  IVD test. This assay has not been validated on decalcified tissues. Results  should be interpreted with caution given the likelihood of false negativity  on decalcified specimens.  HER-2/emmanuelle IHC (4B5) clone, DAB detection method) is done on 10% buffered  formalin-fixed (for 6-72 hrs), paraffin embedded tissue sections. The scoring  is completed on a 4-tiered scoring system of membran                          e staining using the 2014  ASCO/CAP scoring guidelines. It has been cleared by the [place].S. FDA for use as  an IVD test. This assay has not been validated on decalcified tissues.  Results should be interpreted with caution given the likelihood of false  negativity on decalcified specimens.  PgR immunohistochemical staining (clone 1E2, DAB detection method) is  performed on formalin-fixed, paraffin embedded tissue sections. The  percentage of cell nuclei stained and the strength of staining is report  (weak, moderate, strong), using 2010 ASCO/CAP scoring guidelines. Tumors used  for determing predictive markers are fixed in 10% neutral buffered formalin  for 6-72 hours. It has been cleared by the [place].S[place] FDA for use as an IVD test.  This assay has not been validated on decalcified tissues. Results should be  interpreted with caution given the likelihood of false negativity on  decalcified specimens.  ER immunohistochemical staining (clone SP1, DAB detection method) is  performed on formalin-fi                          xed, paraffin embedded tissue sections. The  percentage of cell nuclei stained and the strength of  staining is reported  (weak, moderate, strong), using the 2010 ACSO/CAP scoring guidelines. Tumors  used for determing predictive markers are fixed in10% neutral buffered  formalin for 6-72 hours. It has been cleared by the U.S. FDA for use as an  IVD test. This assay has not been validated on decalcified tissues. Results  should be interpreted with caution given the likelihood of false negativity  on decalcified specimens.  HER-2/emmanuelle IHC (4B5) clone, DAB detection method) is done on 10% buffered  formalin-fixed (for 6-72 hrs), paraffin embedded tissue sections. The scoring  is completed on a 4-tiered scoring system of membrane staining using the 2014  ASCO/CAP scoring guidelines. It has been cleared by the U.S. FDA for use as  an IVD test. This assay has not been validated on decalcified tissues.  Results should be interpreted with caution given the likelihood of false  negativity on dec                          alcified specimens.  PgR immunohistochemical staining (clone 1E2, DAB detection method) is  performed on formalin-fixed, paraffin embedded tissue sections. The  percentage of cell nuclei stained and the strength of staining is report  (weak, moderate, strong), using 2010 ASCO/CAP scoring guidelines. Tumors used  for determing predictive markers are fixed in 10% neutral buffered formalin  for 6-72 hours. It has been cleared by the U.S. FDA for use as an IVD test.  This assay has not been validated on decalcified tissues. Results should be  interpreted with caution given the likelihood of false negativity on  decalcified specimens.     Office Visit on 07/16/2022   Component Date Value Ref Range Status    POC Rapid COVID 07/16/2022 Negative  Negative Final     Acceptable 07/16/2022 Yes   Final    Molecular Strep A, POC 07/16/2022 Negative  Negative Final     Acceptable 07/16/2022 Yes   Final         Patient Active Problem List   Diagnosis    Hyperlipidemia    Recurrent epistaxis     Palpitations    Invasive ductal carcinoma of left breast in female        High complexity of problems addressed - breast cancer, treatment options, expectations, effects of treatment, risks, benefits. Complex data reviewed, independent interpretation of tests, discussion of management with another health care provider.    Alternative efficacious methods of treatment of breast cancer were given.  Options including lumpectomy, mastectomy, reconstruction options with advantages/disadvantages of each, provisions assuring coverage of reconstructive surgery costs, how the patient may access reconstructive care including the potential to be transferred to a facility that provides reconstructive care or choosing reconstruction after completion of breast cancer surgery and treatment.  LD[facility], [facility] breast cancer brochure given.          Imaging and Chronology:     7/8/21 scr bilat [facility]  Cat 1    7/14/22 bilat [facility]  Right neg  Left 1200 anterior asymmetry    7/19/22 left diag [facility], left US limited [facility]  Left 1200 7cm FN 1.8cm mass  5mm from L1 8mm satellite mass  Both 2.2cm  nml LN    8/3/22 left 1200 6cm FN US biopsy  Grade 1 (2,2,1) IDC  ER 95 KS 70 Her 2 2+ FISH neg Ki 20  No LVI    Left 1200 7cm FN US biopsy  Fibroadenoma    8/15/22 MRI [facility]  Right neg  Left 1200 2cm mass. 1.5cm deep to dermis  No LN    8/15/22 genetics  2 VUS CDKN1C and SDHD  Referred to genetic counselor    Covid with sequelae  Operation delayed until better    Med onc catttie    11/11/22 CTA chest  No acute findings    Not candidate for endocrine bridge with covid symptoms    11/11/22 left diag MMG, left US limited  Left 1200 anterior 2.1cm distortion, no significant change  Left axilla LN borderline cortical thickening 3mm    11/22/22 left axilla US LN biopsy  benign          Assessment/Plan:      Cancer Staging   Invasive ductal carcinoma of left breast in female  Staging form: Breast, AJCC 8th Edition  - Clinical stage from 8/13/2022: Stage IA  (cT1c, cN0, cM0, G1, ER+, AZ+, HER2-) - Signed by Simona Cueva MD on 8/27/2022      Left 12:00 6 cm from nipple grade 1 IDC strongly ERPR positive HER2 fish neg Ki 20  Second lesion left 12:00 7 cm from nipple fibroadenoma with myxoid changes  Nearby satellite mass 5 mm from the IDC lesion.  It measures 8 mm. Both measure 2.2 cm.    Lymph nodes appear negative by imaging.        She has all surgical options.  Lumpectomy.  Would do wide lumpectomy.  Will consider excision of the skin.  Also would more than likely excise the fibroadenoma as well.  It would be 2 site left saviscout lumpectomy and left sentinel node biopsy.  She would get radiation.      Discussed at tumor conf - 2nd 6mm lesion L2 is 5mm from index lesion L1, inferior and medial to index lesion. Can micah bracket if needed.      Also gave the option of left mastectomy versus bilateral mastectomy with reconstruction.  Discussed the different types of reconstruction.  Discussed the pros and cons of this.    Genetics done - 2 VUS. Referral genetic counselor.     Candida recon referral for opinion.    If mastectomy done, will consider marking the skin where there was close.  Versus removal of this skin.  Left 12:00 6 cm from nipple    Nipples appear uninvolved but she does have pendulous breasts.    She wants to think about everything, see what Her 2 shows, genetics.    Continue to not smoke.     Endocrine      Final recs after final surg path    Saw in [facility] with Dr Coelho medical oncologist and Dr Peña radiation oncologist.    I have given her all options - lumpectomy XRT, unilateral or bilateral mastectomy +/- reconstruction. I have explained procedure, risks, benefits, postop expectations. All questions answered. Risks include but are not limited to infection, bleeding, injury to nerves, vessels, numbness, weakness, difficulty lifting arm, swelling of arm (lymphedema), inability to remove all lesion, residual breast tissue,  recurrence of malignancy, need for further procedure, loss of skin flap, seroma (fluid collection), inability to find sentinel lymph node, need for axillary dissection, chronic pain, radioactive substance may be given, allergic reaction, staining of the skin, difficulty with future imaging.    Explained smoking increases risks of complications - infection, perforation, increased pain, chronic pain, stricture, ischemia, recurrent problems, poor healing, flap failure, seromas, pulmonary complications, ventilator, clots, pulmonary embolism, thrombosis, cancers, heart attack, stroke, death.    Addendum 8/27/22  2 VUS, referral genetic counselor  KRYSTINA corcoran  Meets with Dr Tirado recon surgeon 8/31/22    Addendum 9/2/22  Decided on  Bilat mastectomy, left SLNB, RUTH recon    Addendum 10/18/22  Dx with covid morning of surgery 10/6/22. Initially no sxs, but has had CP and SOB. She is being followed by PCP.   Ideally should wait for surgery 6 weeks from covid since sxs.   not safe to do surgery 11/3. Should be at least 6 weeks from covid which is 11/17 or later based on sxs  She is grade 1 T1 stage 1 disease    Addendum 12/3/22  Still ill with covid symptoms, not candidate for endocrine therapy with covid symptoms  Imaging shows stable disease. LN biopsy benign  Plan to proceed with surgery once covid symptoms improve  Echo planned 12/9/22

## 2022-08-11 DIAGNOSIS — C50.912 INVASIVE DUCTAL CARCINOMA OF BREAST, FEMALE, LEFT: Primary | ICD-10-CM

## 2022-08-13 PROBLEM — C50.912 INVASIVE DUCTAL CARCINOMA OF LEFT BREAST IN FEMALE: Status: ACTIVE | Noted: 2022-08-13

## 2022-08-15 ENCOUNTER — OFFICE VISIT (OUTPATIENT)
Dept: HEMATOLOGY/ONCOLOGY | Facility: CLINIC | Age: 52
End: 2022-08-15
Payer: COMMERCIAL

## 2022-08-15 ENCOUNTER — OFFICE VISIT (OUTPATIENT)
Dept: SURGERY | Facility: CLINIC | Age: 52
End: 2022-08-15
Payer: COMMERCIAL

## 2022-08-15 ENCOUNTER — OFFICE VISIT (OUTPATIENT)
Dept: RADIATION ONCOLOGY | Facility: CLINIC | Age: 52
End: 2022-08-15
Payer: COMMERCIAL

## 2022-08-15 VITALS
RESPIRATION RATE: 16 BRPM | HEIGHT: 66 IN | TEMPERATURE: 97 F | DIASTOLIC BLOOD PRESSURE: 82 MMHG | BODY MASS INDEX: 30.11 KG/M2 | RESPIRATION RATE: 16 BRPM | BODY MASS INDEX: 30.11 KG/M2 | DIASTOLIC BLOOD PRESSURE: 82 MMHG | TEMPERATURE: 97 F | WEIGHT: 187.38 LBS | HEART RATE: 61 BPM | DIASTOLIC BLOOD PRESSURE: 82 MMHG | HEART RATE: 61 BPM | SYSTOLIC BLOOD PRESSURE: 142 MMHG | HEIGHT: 66 IN | WEIGHT: 187.38 LBS | WEIGHT: 187.38 LBS | HEIGHT: 66 IN | BODY MASS INDEX: 30.11 KG/M2 | OXYGEN SATURATION: 99 % | TEMPERATURE: 97 F | RESPIRATION RATE: 16 BRPM | HEART RATE: 61 BPM | SYSTOLIC BLOOD PRESSURE: 142 MMHG | SYSTOLIC BLOOD PRESSURE: 142 MMHG

## 2022-08-15 DIAGNOSIS — C50.912 INVASIVE DUCTAL CARCINOMA OF LEFT BREAST IN FEMALE: Primary | ICD-10-CM

## 2022-08-15 DIAGNOSIS — C50.912 MALIGNANT NEOPLASM OF LEFT BREAST IN FEMALE, ESTROGEN RECEPTOR POSITIVE, UNSPECIFIED SITE OF BREAST: ICD-10-CM

## 2022-08-15 DIAGNOSIS — C50.912 INVASIVE DUCTAL CARCINOMA OF BREAST, FEMALE, LEFT: ICD-10-CM

## 2022-08-15 DIAGNOSIS — Z17.0 MALIGNANT NEOPLASM OF LEFT BREAST IN FEMALE, ESTROGEN RECEPTOR POSITIVE, UNSPECIFIED SITE OF BREAST: ICD-10-CM

## 2022-08-15 PROCEDURE — 3077F PR MOST RECENT SYSTOLIC BLOOD PRESSURE >= 140 MM HG: ICD-10-PCS | Mod: CPTII,S$GLB,, | Performed by: RADIOLOGY

## 2022-08-15 PROCEDURE — 3077F PR MOST RECENT SYSTOLIC BLOOD PRESSURE >= 140 MM HG: ICD-10-PCS | Mod: CPTII,S$GLB,, | Performed by: INTERNAL MEDICINE

## 2022-08-15 PROCEDURE — 3079F PR MOST RECENT DIASTOLIC BLOOD PRESSURE 80-89 MM HG: ICD-10-PCS | Mod: CPTII,S$GLB,, | Performed by: INTERNAL MEDICINE

## 2022-08-15 PROCEDURE — 99999 PR PBB SHADOW E&M-EST. PATIENT-LVL IV: ICD-10-PCS | Mod: PBBFAC,,, | Performed by: INTERNAL MEDICINE

## 2022-08-15 PROCEDURE — 3079F PR MOST RECENT DIASTOLIC BLOOD PRESSURE 80-89 MM HG: ICD-10-PCS | Mod: CPTII,S$GLB,, | Performed by: RADIOLOGY

## 2022-08-15 PROCEDURE — 99999 PR PBB SHADOW E&M-EST. PATIENT-LVL III: CPT | Mod: PBBFAC,,, | Performed by: SURGERY

## 2022-08-15 PROCEDURE — 99204 OFFICE O/P NEW MOD 45 MIN: CPT | Mod: S$GLB,,, | Performed by: RADIOLOGY

## 2022-08-15 PROCEDURE — 3079F PR MOST RECENT DIASTOLIC BLOOD PRESSURE 80-89 MM HG: ICD-10-PCS | Mod: CPTII,S$GLB,, | Performed by: SURGERY

## 2022-08-15 PROCEDURE — 3008F PR BODY MASS INDEX (BMI) DOCUMENTED: ICD-10-PCS | Mod: CPTII,S$GLB,, | Performed by: SURGERY

## 2022-08-15 PROCEDURE — 3008F BODY MASS INDEX DOCD: CPT | Mod: CPTII,S$GLB,, | Performed by: SURGERY

## 2022-08-15 PROCEDURE — 3077F PR MOST RECENT SYSTOLIC BLOOD PRESSURE >= 140 MM HG: ICD-10-PCS | Mod: CPTII,S$GLB,, | Performed by: SURGERY

## 2022-08-15 PROCEDURE — 99205 OFFICE O/P NEW HI 60 MIN: CPT | Mod: S$GLB,,, | Performed by: SURGERY

## 2022-08-15 PROCEDURE — 3077F SYST BP >= 140 MM HG: CPT | Mod: CPTII,S$GLB,, | Performed by: SURGERY

## 2022-08-15 PROCEDURE — 3008F PR BODY MASS INDEX (BMI) DOCUMENTED: ICD-10-PCS | Mod: CPTII,S$GLB,, | Performed by: INTERNAL MEDICINE

## 2022-08-15 PROCEDURE — 3077F SYST BP >= 140 MM HG: CPT | Mod: CPTII,S$GLB,, | Performed by: RADIOLOGY

## 2022-08-15 PROCEDURE — 3077F SYST BP >= 140 MM HG: CPT | Mod: CPTII,S$GLB,, | Performed by: INTERNAL MEDICINE

## 2022-08-15 PROCEDURE — 3079F DIAST BP 80-89 MM HG: CPT | Mod: CPTII,S$GLB,, | Performed by: SURGERY

## 2022-08-15 PROCEDURE — 3008F BODY MASS INDEX DOCD: CPT | Mod: CPTII,S$GLB,, | Performed by: INTERNAL MEDICINE

## 2022-08-15 PROCEDURE — 3008F BODY MASS INDEX DOCD: CPT | Mod: CPTII,S$GLB,, | Performed by: RADIOLOGY

## 2022-08-15 PROCEDURE — 99205 PR OFFICE/OUTPT VISIT, NEW, LEVL V, 60-74 MIN: ICD-10-PCS | Mod: S$GLB,,, | Performed by: SURGERY

## 2022-08-15 PROCEDURE — 99999 PR PBB SHADOW E&M-EST. PATIENT-LVL IV: CPT | Mod: PBBFAC,,, | Performed by: INTERNAL MEDICINE

## 2022-08-15 PROCEDURE — 3008F PR BODY MASS INDEX (BMI) DOCUMENTED: ICD-10-PCS | Mod: CPTII,S$GLB,, | Performed by: RADIOLOGY

## 2022-08-15 PROCEDURE — 99205 PR OFFICE/OUTPT VISIT, NEW, LEVL V, 60-74 MIN: ICD-10-PCS | Mod: S$GLB,,, | Performed by: INTERNAL MEDICINE

## 2022-08-15 PROCEDURE — 3079F DIAST BP 80-89 MM HG: CPT | Mod: CPTII,S$GLB,, | Performed by: RADIOLOGY

## 2022-08-15 PROCEDURE — 99999 PR PBB SHADOW E&M-EST. PATIENT-LVL III: ICD-10-PCS | Mod: PBBFAC,,, | Performed by: SURGERY

## 2022-08-15 PROCEDURE — 99999 PR PBB SHADOW E&M-EST. PATIENT-LVL IV: ICD-10-PCS | Mod: PBBFAC,,, | Performed by: RADIOLOGY

## 2022-08-15 PROCEDURE — 3079F DIAST BP 80-89 MM HG: CPT | Mod: CPTII,S$GLB,, | Performed by: INTERNAL MEDICINE

## 2022-08-15 PROCEDURE — 99204 PR OFFICE/OUTPT VISIT, NEW, LEVL IV, 45-59 MIN: ICD-10-PCS | Mod: S$GLB,,, | Performed by: RADIOLOGY

## 2022-08-15 PROCEDURE — 99999 PR PBB SHADOW E&M-EST. PATIENT-LVL IV: CPT | Mod: PBBFAC,,, | Performed by: RADIOLOGY

## 2022-08-15 PROCEDURE — 99205 OFFICE O/P NEW HI 60 MIN: CPT | Mod: S$GLB,,, | Performed by: INTERNAL MEDICINE

## 2022-08-15 NOTE — PROGRESS NOTES
CONSULT NOTE    Subjective:       Patient ID: Steph Lemus is a 52 y.o. female.  MRN: [id number]  : 1970    Chief Complaint: Breast Cancer   Stage IA (T1c, N0, M0, Grade 1, ER+/VA+/HER2 pending) Invasive ductal carcinoma of the LEFT breast    History of Present Illness:   Steph Lemus is a 52 y.o. female who is referred for newly diagnosed left breast IDC, found on screening mammogram. She is seen at part of our Multi D breast clinic with Dr. Cueva and Dr. Peña.     She did not palpate a breast mass. She denies any pain or nipple discharge.   She is a smoker, has stopped about a week ago and is motivated to stop long term.     Menarche at age 9.   . Age at first live birth 15 years old   Menopause at 50 years old. HRT-divigel now, took for 2 years     FH  Father- throat cancer- dx 73  Paternal Grandfather- Lung cancer- dx at 68  Paternal Uncle- Lung cancer, Dx at 60   Cousin- maternal- breast cancer dx at 36  Maternal cousin-Lung cancer- dx at 40    Oncology History:  22: Screening mammogram  Impression:  Left  Focal Asymmetry: Left breast focal asymmetry at the anterior 12 o'clock position. Assessment: 0 - Incomplete. Diagnostic Mammogram and/or Ultrasound is recommended.      Right  There is no mammographic evidence of malignancy in the right breast.     BI-RADS Category:   Overall: 0 - Incomplete: Needs Additional Imaging Evaluation    22:  Diagnostic mammogram:  Impression:  Left  Mass: Left breast 18 mm x 9 mm x 9 mm mass at the 12 o'clock position. Assessment: 4 - Suspicious finding. Biopsy is recommended.      An 8 mm satellite mass is present 0.5 cm remote from the aforementioned mass at 12 o'clock.      BI-RADS Category:   Overall: 4 - Suspicious     8/3/22:   1. Breast, left, 12 o'clock, 6 cm from nipple, ultrasound-guided core needle   biopsies:   - Invasive ductal carcinoma       - Present in 4 of 6 core fragments (3  mm in greatest contiguous dimension)       - Brandon-Jimenez modified SBR score 2 + 2 + 1 = 5 of 9       - Histologic grade 1 of 3       - Mitotic index = 0.3 (average mitoses per high power field) (low)   - Biomarkers, assessed by immunohistochemistry on block 1A       - Estrogen Receptor (ER):  Positive (95%; strong intensity)       - Progesterone Receptor (AR):  Positive (70%; moderate intensity)       - HER2:  Equivocal (2+)       - HER2 FISH pending; results to follow in a supplemental report       - Ki-67 proliferation index:  Approximately 20% in hot-spot (intermediate)   - Lymphovascular invasion not identified   - Ductal carcinoma in situ       - Associated with invasive tumor       - Nuclear grade 2 of 3       - Cribriform pattern       - Central comedonecrosis: Approximately 20%   - Background breast contains fragments of fibroadenoma with myxoid change   2. Breast, left, 12 o'clock, 7 cm from nipple, ultrasound-guided core needle   biopsies:   - Fibroadenoma with myxoid change     8/15/22: MRI   Impression:  Left  Mass: Left breast 15 mm x 12 mm x 20 mm mass at the 12 o'clock position. Assessment: 6 - Known biopsy, proven malignancy.      Right  There is no MR evidence of malignancy in the right breast.       History:  Past Medical History:   Diagnosis Date    Hormone replacement therapy     Hyperlipidemia       Past Surgical History:   Procedure Laterality Date    COLONOSCOPY N/A 4/15/2019    Procedure: COLONOSCOPY;  Surgeon: Tad Suarez Jr., MD;  Location: [facility] ENDO;  Service: Endoscopy;  Laterality: N/A;    DILATION AND CURETTAGE OF UTERUS      for uterine polyps    ENDOMETRIAL ABLATION      ENDOSCOPIC NASAL CAUTERIZATION N/A 8/11/2020    Procedure: CAUTERIZATION, NOSE, ENDOSCOPIC;  Surgeon: Surinder Acevedo MD;  Location: [facility] OR;  Service: ENT;  Laterality: N/A;    LAPAROSCOPIC APPENDECTOMY N/A 9/22/2020    Procedure: APPENDECTOMY, LAPAROSCOPIC;  Surgeon: Shai Ludwig MD;   Location: [facility] OR;  Service: General;  Laterality: N/A;    TONSILLECTOMY       Family History   Problem Relation Age of Onset    Diabetes Maternal Grandfather     Cancer Maternal Grandfather     Cancer Paternal Grandfather     Breast cancer Cousin 40    BRCA 1/2 Cousin     Colon cancer Neg Hx     Ovarian cancer Neg Hx       Social History     Tobacco Use    Smoking status: Light Tobacco Smoker     Packs/day: 0.25     Years: 25.00     Pack years: 6.25     Types: Cigarettes    Smokeless tobacco: Never Used    Tobacco comment: 1 per week   Substance and Sexual Activity    Alcohol use: Yes     Alcohol/week: 0.0 standard drinks     Comment: Drinks wine socially    Drug use: No    Sexual activity: Yes     Partners: Male     Birth control/protection: Other-see comments        ROS:   Review of Systems   Constitutional: Negative for fever, malaise/fatigue and weight loss.   HENT: Negative for congestion, hearing loss, nosebleeds and sore throat.    Eyes: Negative for double vision and photophobia.   Respiratory: Negative for cough, hemoptysis, sputum production, shortness of breath and wheezing.    Cardiovascular: Negative for chest pain, palpitations, orthopnea and leg swelling.   Gastrointestinal: Positive for diarrhea. Negative for abdominal pain, blood in stool, constipation, heartburn, nausea and vomiting.   Genitourinary: Negative for dysuria, frequency, hematuria and urgency.   Musculoskeletal: Negative for back pain, joint pain and myalgias.   Skin: Negative for itching and rash.   Neurological: Negative for dizziness, tingling, seizures, weakness and headaches.   Endo/Heme/Allergies: Negative for polydipsia. Does not bruise/bleed easily.   Psychiatric/Behavioral: Negative for depression and memory loss. The patient is not nervous/anxious and does not have insomnia.         Objective:     Vitals:    08/15/22 1451   BP: (!) 142/82   Pulse: 61   Resp: 16   Temp: 97.3 °F (36.3 °C)   SpO2: 99%   Weight: 85  "kg (187 lb 6.3 oz)   Height: 5' 6" (1.676 m)   PainSc: 0-No pain       Physical Examination:   Physical Exam  Vitals and nursing note reviewed.   Constitutional:       General: She is not in acute distress.     Appearance: She is not diaphoretic.   HENT:      Head: Normocephalic.      Mouth/Throat:      Pharynx: No oropharyngeal exudate.   Eyes:      General: No scleral icterus.     Conjunctiva/sclera: Conjunctivae normal.   Neck:      Thyroid: No thyromegaly.   Cardiovascular:      Rate and Rhythm: Normal rate and regular rhythm.      Heart sounds: Normal heart sounds. No murmur heard.  Pulmonary:      Effort: Pulmonary effort is normal. No respiratory distress.      Breath sounds: No stridor. No wheezing or rales.   Chest:      Chest wall: No tenderness.      Comments: + left breast mass, ecchymoses post biopsy   Abdominal:      General: Bowel sounds are normal. There is no distension.      Palpations: Abdomen is soft. There is no mass.      Tenderness: There is no abdominal tenderness. There is no rebound.   Musculoskeletal:         General: No tenderness or deformity. Normal range of motion.      Cervical back: Neck supple.   Lymphadenopathy:      Cervical: No cervical adenopathy.   Skin:     General: Skin is warm and dry.      Findings: No erythema or rash.   Neurological:      Mental Status: She is alert and oriented to person, place, and time.      Cranial Nerves: No cranial nerve deficit.      Coordination: Coordination normal.      Gait: Gait is intact.   Psychiatric:         Mood and Affect: Affect normal.         Cognition and Memory: Memory normal.         Judgment: Judgment normal.          Diagnostic Tests:  Significant Imaging: I have reviewed and interpreted all pertinent imaging results/findings.  PET Scan No results found for this or any previous visit.      Laboratory Data:  All pertinent labs have been reviewed.    Labs:   Lab Results   Component Value Date    WBC 10.76 10/14/2021    HGB 14.0 " 10/14/2021    HCT 41.3 10/14/2021    MCV 93 10/14/2021     10/14/2021       Assessment/Plan:   Invasive ductal carcinoma of left breast in female  Stage IA (T1c, N0, M0, Grade 1, ER+/MA+/HER2 2+ by IHC, FISH pending) Invasive ductal carcinoma of the LEFT breast    We discussed that she has early stage breast cancer, approaching close to 2 cm on imaging. If HER 2 positive, I would offer neoadjuvant chemotherapy and Her 2 directed therapy. If negative, she is a candidate for upfront surgery, oncotype testing and adjuvant RT. She would be a candidate for endocrine therapy.     The role of systemic therapy was discussed at length with the patient her family. Final recommendations once HER 2 results are available.     Invasive ductal carcinoma of breast, female, left  -     Ambulatory referral/consult to Hematology / Oncology       ECOG SCORE    0 - Fully active-able to carry on all pre-disease performance without restriction           Discussion:   No follow-ups on file.    Plan was discussed with the patient at length, and she verbalized understanding. Steph was given an opportunity to ask questions that were answered to her satisfaction, and she was advised to call in the interval if any problems or questions arise.    Electronically signed by Angelica Coelho MD          Answers for HPI/ROS submitted by the patient on 8/12/2022  appetite change : No  unexpected weight change: No  mouth sores: Yes  visual disturbance: No  adenopathy: No

## 2022-08-15 NOTE — PROGRESS NOTES
08/15/2022    [facility]   Radiation Oncology Consultation    ASSESSMENT  This is a 52 y.o. y/o female with Stage IA (T1c, N0, M0, Grade 1, ER+/RI+/HER2 pending) Invasive ductal carcinoma of the LEFT breast. She is seen as part of Multidisciplinary Breast Clinic prior to initiation of therapy for discussion of treatment options.       PLAN     Treatment options were discussed with the patient including mastectomy vs breast conservation.   We discussed the goals of treatment to be curative.   The risks, benefits, scheduling, alternatives to and rationale of radiation therapy were explained in detail.     She is still considering her options, and final recommendations will be informed by Her2 status and findings at the time of surgery.   We discussed the indications, course, and potential risks associated with radiation therapy, including but not limited to fatigue, local skin reaction such as hyperpigmentation, tenderness or erythema, breast pain, and potential long term toxicities such as rib fragility, lung inflammation, slightly increased risk of heart disease, and secondary malignancy.  She expressed understanding of these risks, all questions were answered to her apparent satisfaction.    She was given our contact information, and she was told that she could call our clinic at any time if she has any questions or concerns.      Radiation Treatment Details:    We plan to treat LEFT breast to a dose of 42.56 Gy in 16 fractions at 2.66 Gy per fraction delivered daily followed by a boost to the lumpectomy site to an additional 10Gy in 4 fractions.  Final recommendations will be determined by findings at surgery   We will utilize a(n) 3D with DIBH technique.    We will utilize daily CT or orthogonal image guidance due to the need for accurate daily patient alignment to treat the target volumes accurately and avoid radiation overdose to multiple regional organs at risk since we are  treating the patient with complex target volumes with multiple steep isodose gradients.        Chief Complaint   Patient presents with    Consult     Breast cancer         HPI: The patient is an otherwise healthy 52 year old female with a new diagnosis of LEFT breast cancer.    7/14/22 Screening mammogram:  LEFT focal asymmetry in anterior 12:00    7/19/22 LEFT Diagnostic mammogram/ultrasound: 1.8 x 0.9 x 0.9cm irregularly shape mass in LEFT 12:00 7cmfn; 0.8 x 0.5 x 0.6cm satellite mass 0.5cm from dominant mass (all measured together = 2.2cm)    8/3/22 LEFT biopsies: 6cmfn (dominant mass): invasive ductal carcinoma, G1, +/+/pending; Ki67 20%, no LVI, +ductal carcinoma in-situ, G2, cribriform +comedodecrosis; Biopsy of 7cmfn (satellite): fibroadenoma    8/15/22 (today) MRI Breasts:LEFT 1.5 x 1.2 x 2.0cm homogeneous mass with irregular margins in the LEFT 12:00 axis; no QUE    Possibility of pregnancy: No  History of prior irradiation: No  History of prior systemic anti-cancer therapy: No  History of collagen vascular disease: No  Implanted electronic device (pacer/defib/nerve stimulator): No      Past Medical History:   Diagnosis Date    Hormone replacement therapy     Hyperlipidemia        Past Surgical History:   Procedure Laterality Date    COLONOSCOPY N/A 4/15/2019    Procedure: COLONOSCOPY;  Surgeon: Tad Suarez Jr., MD;  Location: [facility];  Service: Endoscopy;  Laterality: N/A;    DILATION AND CURETTAGE OF UTERUS      for uterine polyps    ENDOMETRIAL ABLATION      ENDOSCOPIC NASAL CAUTERIZATION N/A 8/11/2020    Procedure: CAUTERIZATION, NOSE, ENDOSCOPIC;  Surgeon: Surinder Acevedo MD;  Location: [facility] OR;  Service: ENT;  Laterality: N/A;    LAPAROSCOPIC APPENDECTOMY N/A 9/22/2020    Procedure: APPENDECTOMY, LAPAROSCOPIC;  Surgeon: Shai Ludwig MD;  Location: [facility] OR;  Service: General;  Laterality: N/A;    TONSILLECTOMY         Social History     Tobacco Use    Smoking status: Light  "Tobacco Smoker     Packs/day: 0.25     Years: 25.00     Pack years: 6.25     Types: Cigarettes    Smokeless tobacco: Never Used    Tobacco comment: 1 per week   Substance Use Topics    Alcohol use: Yes     Alcohol/week: 0.0 standard drinks     Comment: Drinks wine socially    Drug use: No       Cancer-related family history includes Breast cancer (age of onset: 40) in her cousin; Cancer in her maternal grandfather and paternal grandfather. There is no history of Ovarian cancer.    Current Outpatient Medications on File Prior to Visit   Medication Sig Dispense Refill    azelastine (ASTELIN) 137 mcg (0.1 %) nasal spray 1 spray (137 mcg total) by Nasal route 2 (two) times daily. 30 mL 0    multivitamin (THERAGRAN) per tablet Take 1 tablet by mouth once daily.      rosuvastatin (CRESTOR) 5 MG tablet TAKE 1 TABLET BY MOUTH EVERY DAY 90 tablet 3     Current Facility-Administered Medications on File Prior to Visit   Medication Dose Route Frequency Provider Last Rate Last Admin    [COMPLETED] gadoterate meglumine injection 20 mL  20 mL Intravenous ONCE PRN Simona Cueva MD   20 mL at 08/15/22 0846       Review of patient's allergies indicates:   Allergen Reactions    Augmentin [amoxicillin-pot clavulanate]      Stomach pains       Review of Systems   Constitutional: Negative for fever.   Eyes: Negative for double vision.   Respiratory: Negative for cough.    Cardiovascular: Negative for chest pain.   Neurological: Negative for dizziness.   Psychiatric/Behavioral: The patient is nervous/anxious.         Vital Signs: BP (!) 142/82   Pulse 61   Temp 97.3 °F (36.3 °C)   Resp 16   Ht 5' 6" (1.676 m)   Wt 85 kg (187 lb 6.3 oz)   BMI 30.25 kg/m²     ECOG Performance Status: 0 - Fully Active    Physical Exam  Vitals reviewed.   Constitutional:       General: She is not in acute distress.     Appearance: Normal appearance. She is not ill-appearing or toxic-appearing.   HENT:      Head: Normocephalic and " atraumatic.      Nose: Nose normal.   Eyes:      Extraocular Movements: Extraocular movements intact.      Conjunctiva/sclera: Conjunctivae normal.      Pupils: Pupils are equal, round, and reactive to light.   Pulmonary:      Effort: Pulmonary effort is normal.   Musculoskeletal:         General: Normal range of motion.      Cervical back: Normal range of motion.   Skin:     General: Skin is warm.   Neurological:      General: No focal deficit present.      Mental Status: She is alert and oriented to person, place, and time.      Gait: Gait normal.   Psychiatric:         Mood and Affect: Mood normal.         Behavior: Behavior normal.         Thought Content: Thought content normal.         Judgment: Judgment normal.            Imaging: I have personally reviewed the patient's available images and reports and summarized pertinent findings above in HPI.     Pathology: I have personally reviewed the patient's available pathology and summarized pertinent findings above in HPI.     This case was discussed with Dr. Cueva and Dr. Coelho      - Thank you for allowing me to participate in the care of your patient.    Philomena Peña MD

## 2022-08-16 ENCOUNTER — TELEPHONE (OUTPATIENT)
Dept: HEMATOLOGY/ONCOLOGY | Facility: CLINIC | Age: 52
End: 2022-08-16
Payer: COMMERCIAL

## 2022-08-16 NOTE — NURSING
Met with the patient to discuss what the patient has learned thus far about her diagnosis and answer all questions.  Follow-up appts made and a folder with NCCN patient guidelines book on Invasive/Noninvasive Breast Cancer given to her along with copies of her imaging, biopsy, and pathology reports. Also given a copy of the [facility] Board of Medical Examiners brochure on Introductory Guide to Breast Cancer Treatment Options.  Contact information given to her for nurse navigation and she was told to call for any questions or concerns.  She verbalized understanding.  Referral placed for Dr Tirado.  All paperwork given to Leisa, his nurse.  The following was explained in detail. The patient understands that this is a voluntary test.    PURPOSE: This test analyzes a specific gene or genes for genetic changes called mutations. This test will help determine if a person has a significantly increased risk if developing certain tumors due to a mutation in a cancer predisposing gene.    TEST RESULTS & INTERPRETATION: Possible result outcomes include positive, negative, and uncertain. A positive mutation is associated with an increased risk for hereditary cancer. A negative result is interpreted that a mutation was not identified. Uncertain means a genetic change was detected but it is not known if this change is linked to cancer risk.    BENEFITS: The benefits include informed choices about health care such as screening, risk reducing surgeries and preventive medication strategies.    RISKS: Concerns regarding possible health insurance discrimination, most states and the federal government have enacted laws to prohibit genetic discrimination.    LIMITATIONS: This test analyzes only certain important genes associated with a specific hereditary cancer syndrome. Genetic testing clarifies cancer risks for only those cancers related to the genes analyzed.    FINANCIAL RESPONSIBILITY: Genetic testing of appropriate individuals  is typically reimbursed by health insurance. You are responsible for any cost of the genetic test not reimbursed by insurance.     PATIENT INSTRUCTIONS & COLLECTION PROCESS:  Saliva collected.  Place tube into plastic box and sent off through [facility].  Informed patient results can take up to 2-4 weeks.  Will call patient with results.

## 2022-08-16 NOTE — NURSING
Met with patient and family in multi disciplinary clinic today.  Explained my role as navigator.  Reviewed plan of care and answered questions.  Pending HER2 results pt will have neoadjuvant chemo or surgery.  My contact information was provided and pt was encouraged to call with any questions or concerns.  Pt and family verbalized understanding.

## 2022-08-17 ENCOUNTER — OFFICE VISIT (OUTPATIENT)
Dept: OTOLARYNGOLOGY | Facility: CLINIC | Age: 52
End: 2022-08-17
Payer: COMMERCIAL

## 2022-08-17 VITALS — HEIGHT: 66 IN | WEIGHT: 187.38 LBS | TEMPERATURE: 99 F | BODY MASS INDEX: 30.11 KG/M2

## 2022-08-17 DIAGNOSIS — R09.89 CHRONIC THROAT CLEARING: Primary | ICD-10-CM

## 2022-08-17 DIAGNOSIS — R09.A2 GLOBUS SENSATION: ICD-10-CM

## 2022-08-17 DIAGNOSIS — J32.8 OTHER CHRONIC SINUSITIS: ICD-10-CM

## 2022-08-17 DIAGNOSIS — H61.23 BILATERAL IMPACTED CERUMEN: ICD-10-CM

## 2022-08-17 PROCEDURE — 1159F MED LIST DOCD IN RCRD: CPT | Mod: CPTII,S$GLB,, | Performed by: NURSE PRACTITIONER

## 2022-08-17 PROCEDURE — 1160F RVW MEDS BY RX/DR IN RCRD: CPT | Mod: CPTII,S$GLB,, | Performed by: NURSE PRACTITIONER

## 2022-08-17 PROCEDURE — 3008F BODY MASS INDEX DOCD: CPT | Mod: CPTII,S$GLB,, | Performed by: NURSE PRACTITIONER

## 2022-08-17 PROCEDURE — 99999 PR PBB SHADOW E&M-EST. PATIENT-LVL III: CPT | Mod: PBBFAC,,, | Performed by: NURSE PRACTITIONER

## 2022-08-17 PROCEDURE — 69210 REMOVE IMPACTED EAR WAX UNI: CPT | Mod: S$GLB,,, | Performed by: NURSE PRACTITIONER

## 2022-08-17 PROCEDURE — 3008F PR BODY MASS INDEX (BMI) DOCUMENTED: ICD-10-PCS | Mod: CPTII,S$GLB,, | Performed by: NURSE PRACTITIONER

## 2022-08-17 PROCEDURE — 69210 PR REMOVAL IMPACTED CERUMEN REQUIRING INSTRUMENTATION, UNILATERAL: ICD-10-PCS | Mod: S$GLB,,, | Performed by: NURSE PRACTITIONER

## 2022-08-17 PROCEDURE — 1159F PR MEDICATION LIST DOCUMENTED IN MEDICAL RECORD: ICD-10-PCS | Mod: CPTII,S$GLB,, | Performed by: NURSE PRACTITIONER

## 2022-08-17 PROCEDURE — 1160F PR REVIEW ALL MEDS BY PRESCRIBER/CLIN PHARMACIST DOCUMENTED: ICD-10-PCS | Mod: CPTII,S$GLB,, | Performed by: NURSE PRACTITIONER

## 2022-08-17 PROCEDURE — 99203 PR OFFICE/OUTPT VISIT, NEW, LEVL III, 30-44 MIN: ICD-10-PCS | Mod: 25,S$GLB,, | Performed by: NURSE PRACTITIONER

## 2022-08-17 PROCEDURE — 99203 OFFICE O/P NEW LOW 30 MIN: CPT | Mod: 25,S$GLB,, | Performed by: NURSE PRACTITIONER

## 2022-08-17 PROCEDURE — 99999 PR PBB SHADOW E&M-EST. PATIENT-LVL III: ICD-10-PCS | Mod: PBBFAC,,, | Performed by: NURSE PRACTITIONER

## 2022-08-17 NOTE — PATIENT INSTRUCTIONS
"Mucous (Post nasal drip) Management     A fullness sensation in the back of the throat is called "globus."   Many people attribute this fullness to a sensation of increased mucous, because they can feel a sticky material in the throat that they will occasionally cough up.     Nasal  / Throat Mucous  Our body NORMALLY makes 1 liter or more of saliva (spit) and nasal mucous every day. These body fluids are important for breaking down the food we eat, protecting our teeth from cavities, and clearing out the pollen and irritants that we breathe in our nose. As our body makes these fluids, we swallow them throughout the day, where they are recycled back through the body. This process is happening all our life without us thinking about it. When an adjustment to this process causes the mucous to be increased or thicker, is when we notice it.      Some problems cause an INCREASE in these body fluids, which we perceive as irritating, excess phlegm in the throat.   However, it is not always an increase. Many times there is a change in CONSISTENCY of the mucous to make it thicker. This will cause the mucous to be held up in the throat instead of being swallowed easily.     Several factors can cause these problems:  Dryness of throat and nose which increases the mucous sticking - Causes include inadequate hydration, excess caffeine / soda / sugary drinks, medication side effects.  Acid reflux  Increased mucous production from allergies or chronic sinus drainage.      We will evaluate the cause(s) of increased or thickened mucous and consider different treatment strategies:     MANY COMMON medications cause dryness of the throat, including medications for allergy, depression/anxiety, heart, and urination issues.   There is some evidence that added sugars or processed sugars in the diet (not the kind that occur naturally in honey or ripe fruit) can increase mucus, as well as too much dairy. To avoid these refined carbohydrates, " on food labels, watch out for wheat flour (also called white, refined or enriched flour) on the ingredients list.      Recommendations for Increased Mucous Sensation     Water, water, water - this cannot be understated. Most people are not drinking enough water regularly to keep up with body's demand. Recommend AT LEAST 64 oz of water per day, and more for men (up to 100 oz.) unless a medical issue prevents this.  Reduce intake of caffeine / tea / sugary drinks or soda, which all will cause increased mucous.  If your nose is the source of mucous (if you must repeated clear your nose of mucus in order to effectively pass air through your nose daily), then nasal medications discussed by your doctor as well as nasal saline can be effective to wash away the mucous.  However if your nose is essentially open and clear (no mucus), then prevention of acid reflux is advised by avoiding late-night eating (nothing 3 hours before laying down at night), greasy and spicy foods, alcohol, and acidic foods.   Humidifier in the bedroom if you find dryness increases at night.  Smoking cessation if you use tobacco  Examination your medication list with your doctor to determine medications that may be contributing     There are NUMEROUS over the counter mucous thinning agents. Here are some suggestions that can be purchased online:  Marte's Breezer's (Sugar Free), Grether's black currant pastilles, and Entertainer's Secret Throat Relief can all help dry mouth and thickened mucous.   Biotene spray and mouthwash can help lubricate a dry mouth  Mucinex can be helpful in some cases, but stop taking if you don't notice improvement after a few weeks.

## 2022-08-17 NOTE — PROGRESS NOTES
Subjective:       Patient ID: Steph Lemus is a 52 y.o. female.    Chief Complaint: Sinus Problem and Sore Throat    HPI   Patient is new to ENT, referred by IRENA Blackman for consultation for chronic sinusitis. Patient states she has been treated for acute sinusitis 4 times in the past 12 months with antibiotics, and experiences persistent symptoms throughout. Patient states she had COVID 2 months ago, and for about a month thereafter, her tongue felt scalded, sore throat, congestion, cough, etc. All symptoms have improved significantly. Her chief concern now is pressure and fullness in her sinuses.   Patient states she had nasal cauterization procedure done 2 years ago for control of recurrent epistaxis, and since then, she hears crackling in her sinuses like they are not draining properly and like a build-up of pressure. S  Patient denies significant allergic stigmata:  No itchy, red, watery eyes; no itchy, red, watery nose; no excessive sneezing or stuffiness. She has not seen an allergist or had allergy testing done. She uses Astelin daily and uses NeilMed sinus rinse daily.   Patient denies s/s of acute bacterial sinusitis:  No mucopus from nose or throat, no facial swelling/pain, no dental pain, no diminished olfaction/taste, no headaches around the eyes, no sore throat or productive cough.   Patient reports (+)frequent throat clearing, frequent globus sensation (sensation of thick or too much mucus in the back of her throat, sensation of postnasal drip without a runny or stuffy nose). She denies dyspepsia or GERD history.     Review of Systems   Constitutional: Negative.  Negative for fever.   HENT: Positive for nasal congestion, mouth sores, postnasal drip and sore throat. Negative for dental problem, facial swelling, rhinorrhea, sinus pressure/congestion, sneezing, trouble swallowing and voice change.         Frequent throat clearing  Globus sensation (sensation of thick or too much mucus in the back of  her throat, sensation of postnasal drip without a runny or stuffy nose)   Eyes: Positive for itching.   Respiratory: Negative.  Negative for cough and choking.    Cardiovascular: Negative.    Gastrointestinal: Negative.    Endocrine: Negative.    Genitourinary: Negative.    Musculoskeletal: Negative.    Integumentary:  Negative.   Allergic/Immunologic: Negative.    Neurological: Positive for dizziness. Negative for headaches.   Hematological: Negative.    Psychiatric/Behavioral: Negative.          Objective:      Physical Exam  Vitals and nursing note reviewed.   Constitutional:       General: She is not in acute distress.     Appearance: She is well-developed. She is not ill-appearing or diaphoretic.   HENT:      Head: Normocephalic and atraumatic.      Right Ear: Hearing, tympanic membrane, ear canal and external ear normal. No middle ear effusion. Tympanic membrane is not erythematous.      Left Ear: Hearing, tympanic membrane, ear canal and external ear normal.  No middle ear effusion. Tympanic membrane is not erythematous.      Nose: Nose normal. No mucosal edema or rhinorrhea.      Right Sinus: No maxillary sinus tenderness or frontal sinus tenderness.      Left Sinus: No maxillary sinus tenderness or frontal sinus tenderness.      Mouth/Throat:      Mouth: Mucous membranes are not pale, not dry and not cyanotic. No oral lesions.      Pharynx: Uvula midline. No oropharyngeal exudate or posterior oropharyngeal erythema.   Eyes:      General: Lids are normal. No scleral icterus.        Right eye: No discharge.         Left eye: No discharge.      Conjunctiva/sclera: Conjunctivae normal.      Pupils: Pupils are equal, round, and reactive to light.   Neck:      Thyroid: No thyroid mass or thyromegaly.      Trachea: Trachea normal. No tracheal deviation.   Cardiovascular:      Rate and Rhythm: Normal rate.   Pulmonary:      Effort: Pulmonary effort is normal. No respiratory distress.      Breath sounds: No stridor.  No wheezing.   Musculoskeletal:         General: Normal range of motion.      Cervical back: Normal range of motion and neck supple.   Lymphadenopathy:      Head:      Right side of head: No submental, submandibular, tonsillar, preauricular or posterior auricular adenopathy.      Left side of head: No submental, submandibular, tonsillar, preauricular or posterior auricular adenopathy.      Cervical: No cervical adenopathy.      Right cervical: No superficial or posterior cervical adenopathy.     Left cervical: No superficial or posterior cervical adenopathy.   Skin:     General: Skin is warm and dry.      Coloration: Skin is not pale.      Findings: No lesion or rash.   Neurological:      Mental Status: She is alert and oriented to person, place, and time.      Coordination: Coordination normal.      Gait: Gait normal.   Psychiatric:         Speech: Speech normal.         Behavior: Behavior normal. Behavior is cooperative.         Thought Content: Thought content normal.         Judgment: Judgment normal.       SEPARATE PROCEDURE IN OFFICE:   Procedure: Removal of impacted cerumen, bilateral   Pre Procedure Diagnosis: Cerumen Impaction   Post Procedure Diagnosis: Cerumen Impaction   Verbal informed consent in regards to risk of trauma to ear canal, ear drum or hearing, discomfort during procedure and/or inability to remove cerumen impaction in one session or unforeseen events or complications.   No anesthesia.     Procedure in detail:   Ear canal visualized bilateral with appropriate size ear speculum utilizing Operating Head Binocular Otomicroscope   Utilizing the following:  suction cannula was used. The impacted cerumen of the ear canals was removed atraumatically. The TM and EAC were then inspected and found to be clear of wax. See description of TMs/EACs in PE above.   Complications: No   Condition: Improved/Good    Assessment:       Problem List Items Addressed This Visit    None     Visit Diagnoses      Chronic throat clearing    -  Primary    Globus sensation        Other chronic sinusitis        Relevant Orders    CT Sinuses without Contrast          Plan:     CT sinus d/t 4 rounds of antibiotics in 12 months with persistent sinusitis symptoms    Will call pt with results as soon as available  Globus handout included in AVS.   Discussed possibility of LPR which pt denied.   For ARS: continue daily Astelin and NeilMed sinus rinsing. Discussed consideration of seeing allergist for allergy testing.   Patient encouraged to return to clinic if symptoms worsen/persist and as needed for further ENT symptoms or concerns.         Answers for HPI/ROS submitted by the patient on 8/15/2022  Sinus infection(s)?: Yes

## 2022-08-19 LAB
COMMENT: NORMAL
FINAL PATHOLOGIC DIAGNOSIS: NORMAL
GROSS: NORMAL
Lab: NORMAL
MICROSCOPIC EXAM: NORMAL
SUPPLEMENTAL DIAGNOSIS: NORMAL

## 2022-08-22 ENCOUNTER — TELEPHONE (OUTPATIENT)
Dept: SURGERY | Facility: CLINIC | Age: 52
End: 2022-08-22
Payer: COMMERCIAL

## 2022-08-22 ENCOUNTER — TUMOR BOARD CONFERENCE (OUTPATIENT)
Dept: SURGERY | Facility: CLINIC | Age: 52
End: 2022-08-22
Payer: COMMERCIAL

## 2022-08-22 NOTE — TELEPHONE ENCOUNTER
Results of the Her 2/FISH report called to the patient.  She verbalized understanding.  She has an appt with Dr Tirado on 8/31.  Told her his nurse will contact us after she decides what surgery type she wants and they will set it up and notify her.

## 2022-08-23 ENCOUNTER — HOSPITAL ENCOUNTER (OUTPATIENT)
Dept: RADIOLOGY | Facility: HOSPITAL | Age: 52
Discharge: HOME | End: 2022-08-23
Attending: NURSE PRACTITIONER
Payer: COMMERCIAL

## 2022-08-23 ENCOUNTER — TELEPHONE (OUTPATIENT)
Dept: FAMILY MEDICINE | Facility: CLINIC | Age: 52
End: 2022-08-23
Payer: COMMERCIAL

## 2022-08-23 DIAGNOSIS — J32.8 OTHER CHRONIC SINUSITIS: ICD-10-CM

## 2022-08-23 PROCEDURE — 70486 CT MAXILLOFACIAL W/O DYE: CPT | Mod: TC,PO

## 2022-08-23 PROCEDURE — 70486 CT MAXILLOFACIAL W/O DYE: CPT | Mod: 26,,, | Performed by: RADIOLOGY

## 2022-08-23 PROCEDURE — 70486 CT SINUSES WITHOUT CONTRAST: ICD-10-PCS | Mod: 26,,, | Performed by: RADIOLOGY

## 2022-08-23 NOTE — PROGRESS NOTES
Interdisciplinary Breast Cancer Conference    Steph Lemus    Female    Date Presented to Tumor Board: 08/22/22    Presenting Hospital / Clinic: [facility]    Tumor Laterality: Left    Breast Tumor Site: Anterior    Presenter: Simona Cueva    Reason for Consultation: Initial Presentation    Specialties Present: Medical Oncology;Radiation Oncology;Surgical Oncology;Pathology;Navigation;Research;Integrative Oncology;Radiology    Patient Status: a new patient    Treatment to Date: None    Clinical Trial Eligibility: None available                   Cancer Staging  Invasive ductal carcinoma of left breast in female  Staging form: Breast, AJCC 8th Edition  - Clinical stage from 8/13/2022: cT1c, cN0, cM0, G1, ER+, ID+ - Unsigned           Presentation at Cancer Conference: Prospective      Recommended Therapy:  Genetic testing- referral to genetic counselor   Plastics  Surgery  Endocrine Therapy  Recommendations pending final path

## 2022-08-23 NOTE — TELEPHONE ENCOUNTER
----- Message from Matty Cadena sent at 8/23/2022  2:29 PM CDT -----  Type: Needs Medical Advice  Who Called: Pt   Symptoms (please be specific): rash, pt thinks she may have shingles  How long has patient had these symptoms:  rash started Sunday  Pharmacy name and phone #:    Best Call Back Number: [phone]  Additional Information: Pt requesting advise on a rash she has, pt thinks it maybe shingles ans requesting advise.Rash started on Sunday.

## 2022-08-24 ENCOUNTER — TELEPHONE (OUTPATIENT)
Dept: OTOLARYNGOLOGY | Facility: CLINIC | Age: 52
End: 2022-08-24
Payer: COMMERCIAL

## 2022-08-24 NOTE — TELEPHONE ENCOUNTER
----- Message from Emmy Darby NP sent at 8/24/2022  8:31 AM CDT -----  Your sinuses are all open and clear. There is no fluid or infection within any of your sinuses. Therefore your symptoms are not related to sinusitis.

## 2022-08-25 ENCOUNTER — OFFICE VISIT (OUTPATIENT)
Dept: URGENT CARE | Facility: CLINIC | Age: 52
End: 2022-08-25
Payer: COMMERCIAL

## 2022-08-25 ENCOUNTER — TELEPHONE (OUTPATIENT)
Dept: SURGERY | Facility: CLINIC | Age: 52
End: 2022-08-25
Payer: COMMERCIAL

## 2022-08-25 VITALS
OXYGEN SATURATION: 99 % | HEART RATE: 77 BPM | TEMPERATURE: 98 F | WEIGHT: 187 LBS | DIASTOLIC BLOOD PRESSURE: 81 MMHG | RESPIRATION RATE: 16 BRPM | HEIGHT: 66 IN | SYSTOLIC BLOOD PRESSURE: 125 MMHG | BODY MASS INDEX: 30.05 KG/M2

## 2022-08-25 DIAGNOSIS — L30.9 DERMATITIS: Primary | ICD-10-CM

## 2022-08-25 PROCEDURE — 1159F PR MEDICATION LIST DOCUMENTED IN MEDICAL RECORD: ICD-10-PCS | Mod: CPTII,S$GLB,, | Performed by: FAMILY MEDICINE

## 2022-08-25 PROCEDURE — 99214 OFFICE O/P EST MOD 30 MIN: CPT | Mod: S$GLB,,, | Performed by: FAMILY MEDICINE

## 2022-08-25 PROCEDURE — 3079F PR MOST RECENT DIASTOLIC BLOOD PRESSURE 80-89 MM HG: ICD-10-PCS | Mod: CPTII,S$GLB,, | Performed by: FAMILY MEDICINE

## 2022-08-25 PROCEDURE — 3079F DIAST BP 80-89 MM HG: CPT | Mod: CPTII,S$GLB,, | Performed by: FAMILY MEDICINE

## 2022-08-25 PROCEDURE — 1159F MED LIST DOCD IN RCRD: CPT | Mod: CPTII,S$GLB,, | Performed by: FAMILY MEDICINE

## 2022-08-25 PROCEDURE — 3074F SYST BP LT 130 MM HG: CPT | Mod: CPTII,S$GLB,, | Performed by: FAMILY MEDICINE

## 2022-08-25 PROCEDURE — 3074F PR MOST RECENT SYSTOLIC BLOOD PRESSURE < 130 MM HG: ICD-10-PCS | Mod: CPTII,S$GLB,, | Performed by: FAMILY MEDICINE

## 2022-08-25 PROCEDURE — 99214 PR OFFICE/OUTPT VISIT, EST, LEVL IV, 30-39 MIN: ICD-10-PCS | Mod: S$GLB,,, | Performed by: FAMILY MEDICINE

## 2022-08-25 PROCEDURE — 3008F BODY MASS INDEX DOCD: CPT | Mod: CPTII,S$GLB,, | Performed by: FAMILY MEDICINE

## 2022-08-25 PROCEDURE — 3008F PR BODY MASS INDEX (BMI) DOCUMENTED: ICD-10-PCS | Mod: CPTII,S$GLB,, | Performed by: FAMILY MEDICINE

## 2022-08-25 RX ORDER — VALACYCLOVIR HYDROCHLORIDE 1 G/1
1000 TABLET, FILM COATED ORAL 3 TIMES DAILY
Qty: 21 TABLET | Refills: 0 | Status: SHIPPED | OUTPATIENT
Start: 2022-08-25 | End: 2022-10-13

## 2022-08-25 RX ORDER — HYDROCORTISONE 25 MG/G
OINTMENT TOPICAL 2 TIMES DAILY
Qty: 28.35 G | Refills: 2 | Status: SHIPPED | OUTPATIENT
Start: 2022-08-25 | End: 2022-10-13

## 2022-08-25 NOTE — TELEPHONE ENCOUNTER
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  [facility] reviews its VUSs often, and once enough data has been collected, the VUS will likely be classified as benign or pathogenic.  At that time, healthcare providers of individuals carrying the variant are to be notified; therefore, it is important for the patient to keep her demographics up to date with my office at all times so she can be contacted.     Patient's results do not completely rule out the possibility of a hereditary cancer.  Encouraged her to call the genetic counselor at [facility] for a free virtual visit so she can obtain further information about the VUS's that she has.  She verbalized understanding.     Patient's genetic testing results folder has been placed in the outgoing-mail bin to be mailed to patient, and she should contact us if not received soon.

## 2022-08-25 NOTE — PROGRESS NOTES
"Subjective:       Patient ID: Steph Lemus is a 52 y.o. female.    Vitals:  height is 5' 6" (1.676 m) and weight is 84.8 kg (187 lb). Her temporal temperature is 97.7 °F (36.5 °C). Her blood pressure is 125/81 and her pulse is 77. Her respiration is 16 and oxygen saturation is 99%.     Chief Complaint: Rash    Pt presents ot urgent care with a rash on the back of her right calf.   She states that it started on Sunday. It has not really spread, but has not gone away.  The rash is very itchy.  It did have little blisters.  Pain scale- 0. She has tried benadryl spray for the itching.     Rash  This is a new problem. The current episode started in the past 7 days. The problem is unchanged. The affected locations include the right lower leg. The rash is characterized by blistering. She was exposed to nothing. Past treatments include nothing. The treatment provided no relief.       Skin: Positive for rash.       Objective:      Physical Exam      Physical Exam  Vitals signs and nursing note reviewed.   Constitutional:       Appearance: Pt is well-developed. Alert, NAD.  Pt is cooperative.  Non-toxic appearance.  HENT:      Head: Normocephalic and atraumatic. .      Right Ear: External ear normal.      Left Ear: External ear normal.   Eyes:      General: Lids are normal.      Conjunctiva/sclera: Conjunctivae normal. Visual tracking is normal. Right eye exhibits no exudate. Left eye exhibits no exudate. No scleral icterus.     Pupils: Pupils are equal, round  Neck:      Musculoskeletal: range of motion without pain and neck supple.      Trachea: Trachea and phonation normal.   Cardiovascular:      Rate and Rhythm: Normal Rhythm. Extremities well perfused.   Pulmonary:      Effort: Pulmonary effort is normal. No respiratory distress.     \   Abdomen: NO obvious distention.  Musculoskeletal: Normal range of motion. No ambulation issues  Skin:     General: Skin is warm and dry. Focal rash to the right lateral lower leg- " approx 3 cm clusters of vesicles. Reports burning sensation around knee  Neurological:      Mental Status:Pt is alert and oriented to person, place, and time.   Psychiatric:         Speech: Speech normal.         Behavior: Behavior normal.         Thought Content: Thought content normal.         Judgment: Judgment normal.         Assessment:       1. Dermatitis          Plan:       Pt recently dx with breast ca. Has MRI with contrast.   Did report pins and needles feeling this weekend prior to rash. Overall she is not having too bad of sx. Likely shingles versus allergic derm given sensation of burning although rash not typical vesicular appearance.   Dermatitis    Other orders  -     hydrocortisone 2.5 % ointment; Apply topically 2 (two) times daily. for 10 days  Dispense: 28.35 g; Refill: 2  -     valACYclovir (VALTREX) 1000 MG tablet; Take 1 tablet (1,000 mg total) by mouth 3 (three) times daily. for 7 days  Dispense: 21 tablet; Refill: 0

## 2022-09-01 ENCOUNTER — TELEPHONE (OUTPATIENT)
Dept: SURGERY | Facility: CLINIC | Age: 52
End: 2022-09-01
Payer: COMMERCIAL

## 2022-09-01 NOTE — TELEPHONE ENCOUNTER
Patient called and said she met with Dr Tirado yesterday and has decided on bilateral mastectomies with RUTH flaps. Told her I will tell Dr Cueva.  Gave her the phone number for Pre-op nurse visit appt at [facility].  Told her to call and set that appt up after she gets a firm date for surgery.  She verbalized understanding.

## 2022-09-09 ENCOUNTER — TELEPHONE (OUTPATIENT)
Dept: HEMATOLOGY/ONCOLOGY | Facility: CLINIC | Age: 52
End: 2022-09-09
Payer: COMMERCIAL

## 2022-09-09 ENCOUNTER — PATIENT MESSAGE (OUTPATIENT)
Dept: HEMATOLOGY/ONCOLOGY | Facility: CLINIC | Age: 52
End: 2022-09-09
Payer: COMMERCIAL

## 2022-09-09 NOTE — TELEPHONE ENCOUNTER
I spoke with nicole and scheduled her pre/post PT appts. She voiced acceptance and reviewed location.      ----- Message from Veronica Winters sent at 9/9/2022 11:45 AM CDT -----  Type:  Patient Returning Call    Who Called:  Patient   Who Left Message for Patient:  Yung  Does the patient know what this is regarding?:  yes  Best Call Back Number:  [phone]  Additional Information:  Patient returning missed call

## 2022-09-15 ENCOUNTER — PATIENT MESSAGE (OUTPATIENT)
Dept: HEMATOLOGY/ONCOLOGY | Facility: CLINIC | Age: 52
End: 2022-09-15
Payer: COMMERCIAL

## 2022-09-16 ENCOUNTER — TELEPHONE (OUTPATIENT)
Dept: HEMATOLOGY/ONCOLOGY | Facility: CLINIC | Age: 52
End: 2022-09-16
Payer: COMMERCIAL

## 2022-09-16 NOTE — TELEPHONE ENCOUNTER
Lvm for patient informing her I only had 1 appt before surgery and I held the time slot for her if she wants it.

## 2022-10-05 ENCOUNTER — CLINICAL SUPPORT (OUTPATIENT)
Dept: REHABILITATION | Facility: HOSPITAL | Age: 52
End: 2022-10-05
Payer: COMMERCIAL

## 2022-10-05 DIAGNOSIS — C50.912 INVASIVE DUCTAL CARCINOMA OF LEFT BREAST IN FEMALE: Primary | ICD-10-CM

## 2022-10-05 DIAGNOSIS — Z91.89 AT RISK FOR LYMPHEDEMA: ICD-10-CM

## 2022-10-05 PROCEDURE — 97161 PT EVAL LOW COMPLEX 20 MIN: CPT | Mod: PN

## 2022-10-05 NOTE — PATIENT INSTRUCTIONS
GARMENT RECOMMENDATIONS  Jobst Mavis Lite upper arm sleeve with silicone band at top, in size medium length is regular in 15-20 mmHg  Jobst Mavis Lite gauntlet in size medium with 15-20 mmHg  Wearing this after 6 weeks post surgery for one year during the day, can remove at night. Recommendation to lotion arm in evening and inspect skin daily.   Also wear garments during air travel.     Post Operative Breast Cancer Surgery Exercises    After surgery your shoulder and chest may feel tight and sore. Movement may cause stretching across your chest, axilla (armpit), and the incision site limiting your ability to raise your arm. Exercise will be extremely important in preventing swelling and in helping you regain movement, strength, and use of your arm.     Your post-operative exercise program can be divided into three stages. Your surgeon will inform you when to move to the next stage.     STAGE TIME PURPOSE EXERCISE   I Post-op day 1 to 4  (Drains are in) To prevent and/or reduce swelling - Positioning  - Hand and arm precautions   II Post-op day 5 to 14  (After drains are removed) To provide gentle movement without much stretching  - Shoulder shrugs  - Shoulder retraction   III Post-op day 15-6 wks  (After suture are removed) To stretch and regain full motion - Wall ladder  - Range of motion exercises  - Wand exercises       These exercises are general guideline for use following a mastectomy. Please consult your physician for additional information. Of a tissue expander has been placed, or if you have had reconstruction, you must get approval from your physician before beginning exercises.   Check with your physician prior to beginning a new stage.  Avoid elbows above shoulders until after post-op day 14.    STAGE 1      Abdominal Breathing Exercises      Get comfortable and relax your neck and shoulders. You can sit or lie down. Place one hand on your upper chest and place the other hand on your belly button. Use  your hands to feel the movements as you breathe in and out.  Take a deep breath in through your nose and feel the hand on your stomach move out. Do not let your shoulders move up. The hand on your chest should not move.   Breathe out slow and gentle through your mouth. Pucker your lips as if you were going to whistle or blow out a candle. The hand on your stomach should move in as you breathe out. Breathe out as long as you can until all the air is gone.   To help keep the lymphatic system moving well, practice two breaths every hour using the steps for abdominal breathing exercises.        Positioning    Several times a day, elevate your arm on pillows so your hand is above the level of your heart. This position will allow gravity to drain excess fluids out of your hand and arm. Try to place pillows under involved arm while in sitting position or while laying down.                STAGE 2    Shoulder Shrugs Shoulder Retraction    While sitting with arms supported, shrug both of your shoulder and then relax. Repeat 10 times, 3 times a day.   While sitting or standing, pull both shoulder back, bringing shoulder blades together. Repeat 10 times,   3 times a day.        STAGE 3  Range of Motion Exercises    To retain full motion of your shoulder, it must be moved in all planes, several times a day. Begin arm range of motion exercises with 10 reps of each, 2 times a day. Progress to 3 x 10 reps, as tolerated 2 times a day.    Wand - Shoulder Flexion       Lay on your back in a comfortable position. Raise both arms overhead, then bring back down by your side.        Wand - Shoulder Abduction       Lay on your back in a comfortable position. Raise both arms out to your side, then bring back down by your side.        Wand - Shoulder External Rotation      Lay on your back in a comfortable position. Position your arms as pictured below, with your elbows bent and your hand in the arm. Slowly lower your hand down, then bring  back up.        Wand - Shoulder Flexion      Hold onto a cane or broom with both hands approximately 14 inches apart with arms straight at your side. Raise the cane forward and upwards as far as you can go or until your elbow is near your ear. Then gradually return to the original position.        Wand - Shoulder Abduction      Hold onto the ends of a cane with both hands, then raise the involved arm sideways and upward over your head with the aid of the cane and the good arm. Keep trunk straight! Then gradually return to original position.        Wand - Shoulder External Rotation      Holding onto a cane with both hands approximately 14 inches apart at shoulder level, turn the cane clockwise and then counterclockwise as far as possible.        Wall Climbing - Shoulder Flexion      Stand facing the wall and extend the involved arm directly in front of you so that your fingertips touch the wall (at arm's length away from the wall). Creep up the side of the wall with your fingertips, taking a step toward the wall as you reach higher and higher up the wall. Repeat this procedure going down the wall, but taking a step backward this time. Begin with 5 wall climbs, then progress to 10 reps at a time, 1-2 times a day.        Wall Climbing - Shoulder Abduction     ([email or web address]  [email or web address]-[email or web address]-[email or web address]/) Repeat the above procedure, but this time position yourself perpendicular (at a right angle) to the wall so that the involved arm will extend sideways up the wall. Keep your trunk straight, not leaning toward the wall or shrugging your shoulder. Place a vishal (tape) on the wall each week to see if you are making progress. Begin with 5 wall climbs, then progress to 10 reps at a time, 1-2 times a day.        PRECAUTIONS    As a result of the removal of lymph nodes and vessels, your arm is susceptible to infection and swelling. A hot, reddened or swollen arm denotes the presence of infection  and should be brought to the attention of your physician immediately. Try to avoid cuts, scratches, pinpricks, hangnails, sunburns, insect bites, chemical irritation, and irritating detergent.    The following are helpful hints:    Avoid injections, blood draws, blood pressure, and IVs to be done to your involved arm. If you have undergone axillary dissection, then consider using the opposite only for injections, blood draws, blood pressure, and IVs.  Prevent chapping of your hand and arm by applying lotion liberally several times a day. Recommend Eucerin lotion, No massages to affected upper extremity if you have had lymph nodes dissection or radiation to lymph nodes.   Do not cut nails too close to the quick. Do not cut or pick your cuticles. Use cuticle cream or remover.  Avoid carrying heavy objects (over 5 lbs) with your involved arm.   Protect your arm from burns with small electrical appliances such as irons, curling irons, and frying pans.   Wear sunscreen in the sun.  Apply insect repellent when going to areas where you might be exposed to insect bites.   Use an electric razor for shaving.   Wear loose fitting work/rubber gloves when washing dishes, using , or using steel wool.  Wear garden gloves when gardening or arranging thorn flowers.  Do not wear tight jewelry on your affected arm.  Do not wear narrow tight straps on clothing or under garments.    IMPORTANT    If you do cut, burn, or johnson the skin, wash the area and use an antiseptic. If it becomes red, warm, or unusually hard or swollen, contact your physician immediately.     If there is swelling without redness, increased warmth or hardness, the positioning and pumping exercises as described above can help decrease the swelling. Position your hand higher than the elbow, elbow higher than the shoulder, and shoulder higher than your heart. Maintain this position for 30 minutes. Repeat as necessary.     OCCUPATIONAL AND PHYSICAL  THERAPY    Most women have enough motion in their involved arm to perform normal activities within 2 months after surgery. Any post-operative swelling or pain has probably disappeared. However; some women may develop persistent stiffness, weakness, pain, or swelling. If this is your experience, call your physician. He or she may recommend that a physical or occupational therapist work with you to minimize your problems.     CONCLUSION    Besides your exercise program, your daily routine at home can be continued and will be beneficial toward your recovery:  Getting dressed every day  Daily grooming  Cooking and light housework  Being active with family and friends    REMINDER  Pace yourself!  Strive for a balance between work and relaxation  Avoid excessive pulling and lifting which may strain your shoulder

## 2022-10-05 NOTE — PROGRESS NOTES
[facility]  Lymphedema Physical Therapy   PRE-OP EVALUATION    Visit Date: 10/5/2022     Name: Steph Lemus  Clinic Number: [id number]  Therapy Diagnosis:   Encounter Diagnoses   Name Primary?    Invasive ductal carcinoma of left breast in female Yes    At risk for lymphedema      Physician: Simona Cueva MD  Physician Orders: PT Eval and Treat  Medical Diagnosis: Invasive ductal carcinoma of left breast in female, at risk for lymphedema  Chart review pertaining to cancer hx: Bilat mastectomy, left SLNB, RUTH reconInvasive ductal carcinoma of left breast in female  Staging form: Breast, AJCC 8th Edition  - Clinical stage from 8/13/2022: cT1c, cN0, cM0, G1, ER+, WI+ - Unsigned   Recommended Therapy:  Genetic testing- referral to genetic counselor   Plastics  Surgery  Endocrine Therapy  Recommendations pending final path    Surgery date: 10/06/2022  Radiation: tbd, unlikely  Chemotherapy: tbd  Evaluation Date: 10/5/2022  Authorization: pending  Plan of Care: PT f/u 8 weeks post-op    Visit #: 1 / 3  PTA visit: n/a  Time In: 4 PM  Time Out: 5:15 PM  Total Billable Time: 70 minutes    Precautions: Standard, cancer, and avoid IV, BP, blood draws, injections in the left arm    Subjective     Pt reports: anxious about surgery  Pain: absent  Location: none  Current 0/10, Worst 0/10      Past Medical History:   Past Medical History:   Diagnosis Date    Epistaxis, recurrent     none since cauterization    Hormone replacement therapy     Hyperlipidemia     Invasive ductal carcinoma of left breast in female 09/2022       Past Surgical History:  has a past surgical history that includes Dilation and curettage of uterus; Endometrial ablation; Tonsillectomy; Colonoscopy (N/A, 04/15/2019); Endoscopic nasal cauterization (N/A, 08/11/2020); and Laparoscopic appendectomy (N/A, 09/22/2020).    Medications: has a current medication list which includes the following prescription(s): azelastine,  chlorhexidine, hydrocortisone, multivitamin, mupirocin, rosuvastatin, and valacyclovir.    Allergies:   Review of patient's allergies indicates:   Allergen Reactions    Augmentin [amoxicillin-pot clavulanate]      Stomach pains          Hand Dominance: Right  Diet: well rounded, eating fruits and vegis  Habitus: well developed, well nourished    Prior Therapy/Previous treatment included: No PT this calendar year.   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   Prior Exercise Routine: walk, rides bike    Patient's Goals: learn about lymphedema prevention, and follow post surgical instructions    Objective     Mental Status: Alert/Oriented    Observations  Posture: wnl  Joint Integrity: WFLs bilateral  Skin Integrity: intact bilateral  Edema: none bilateral    Sensation  Light Touch: intact bilateral  Proprioception: intact bilateral    A/PROM  (L) UE: WFLs  (R) UE: WFLs  Limitations:none      STRENGTH  (L) UE: WFLs  (R) UE: WFLs  Limitations:none      Baseline Measurements of BUEs  LANDMARK LEFT UE (cm) RIGHT UE (cm)   Axilla 33.1 cm 33.4 cm   W +  17 inches 30.1 cm 30.9 cm   W +  14  inches 26.0 cm 26.6 cm   Elbow 25.1 cm 26.2 cm   W + 7 inches 21.5 cm 22.8 cm   W +  5 inches 16.0 cm 15.8 cm   Wrist 18.7 cm 19.5 cm   DPC 6.0 cm 6.0 cm   Garments recommended: GARMENT RECOMMENDATIONS Provided in writing to patient.  Jobst Mavis Lite upper arm sleeve with silicone band at top, in size medium length is regular in 15-20 mmHg  Jobst Mavis Lite gauntlet in size medium with 15-20 mmHg  Wearing this after 6 weeks post surgery for one year during the day, can remove at night. Recommendation to lotion arm in evening and inspect skin daily.   Also wear garments during air travel..     Functional Mobility   Bed mobility: independent   Roll to left:  independent   Roll to right: independent   Supine to prone: independent   Scooting to edge of bed: independent   Supine to sit: independent   Sit to supine: independent   Transfers to bed: independent   Transfers to toilet: independent   Sit to stand: independent   Stand pivot: independent   Car transfers: independent     Gait Assessment  AD used: none  Assistance: independent  Distance: community distances  Endurance: WFL     Gait Pattern: wnl       Treatment       Total Treatment time separate from Evaluation: 30 minutes      Self-Care/Home Management to improve behavioral/activity modifications related to ADLs, compensatory training, safety procedures, and adaptive equipment for 15 minutes including:  Garments: PT recommend wearing garments for one year per guidelines for prophylactical assist to decrease risk for lymphedema  Skin care  Weight management  Sleep  Nutrition  Infection prevention      Therapeutic Exercise to develop ROM and flexibility for 15 minutes including:  Surgical protocol for position recommendations  Diaphragmatic Breathing  Exercises by day, complete with pictures of exercises and description for safe progression of motion    Education: Instructed on general anatomy/physiology, lymphedema information (definitions, signs, symptoms, precautions), role of therapy in multi-disciplinary team, purpose of lymphedema physical therapy and the benefits/risks of treatment, risks of refusing treatment, POC, and goals for therapy were discussed with the pt.    Written Home Exercises Provided: yes.  Exercises were reviewed and Alison was able to demonstrate them prior to the end of the session. Alison demonstrated good  understanding of the education provided.     See EMR under Patient Instructions for exercises provided 10/5/2022.    Assessment     Steph is a 52 y.o. female referred to outpatient physical therapy with a medical diagnosis of Invasive ductal carcinoma of left breast in female, at risk  for lymphedema with surgical procedure planned on 10/06/2022. Pt was seen today pre-operatively to assess strength and ROM of BUEs, to take baseline circumferential measurements of BUEs to aid in the early detection of lymphedema post-operatively, and to provide pt education on exercises/precautions post-operative. Pt does not exhibit any ROM impairments currently. This pt will benefit from skilled PT for reassessment of baseline measurements 6-8 week post-operatively and to address future impairments following surgery such as pain, limited ROM, or decreased mobility. Will need to wait until pt is medically cleared to determine if pt is safe for MLD, pneumatic compression pump, or lymphatouch treatments.      Plan of care discussed with patient: Yes  Pt's spiritual, cultural and educational needs considered and patient is agreeable to the plan of care and goals as stated below:     Anticipated barriers for therapy:  none    Medical Necessity is demonstrated by the following:  History  Co-morbidities and personal factors that may impact the plan of care Co-morbidities:   history of cancer    Personal Factors:   none     low   Examination  Body Structures and Functions, activity limitations and participation restrictions that may impact the plan of care Body Systems:    gross symmetry    Activity limitations:   Mobility  no deficits    Self care  no deficits    Domestic Life  no deficits    Participation Restrictions:   none         low   Clinical Presentation evolving clinical presentation with changing clinical characteristics moderate   Decision Making/ Complexity Score: low       GOALS  Short Term Goals: 3 months  Pt to be seen for reassessment in 6-8 weeks after surgery.  Pt will demonstrate 100% knowledge of lymphedema precautions and signs of infection.  Pt to obtain compression garments for prophylactic concerns according to APTA clinical guidelines published in Journal of Physical Therapy.    Long Term Goals:  deferred    Plan   Patient will call insurance to see if her insurance will cover garments, PT will ask for order from MD.   Plan of Care: 10/5/2022 to 12/15/2022.    Patient to be seen in 8 weeks following surgical date for 2 visits for reassessment. Patient will benefit from Outpatient Physical Therapy services which may include the following interventions: patient education, HEP, therapeutic exercises, neuromuscular re-education, therapeutic activity, manual therapy, self care/home management, modalities, gait training, decongestive massage, multi-layered bandaging, self massage, self bandaging, and assistance in obtaining appropriate compression garment.      If pt is to undergo XRT, POC must exclude MLD, pneumatic compression pump, and lymphatouch to any radiated area.       Mary Matt, PT, CLT

## 2022-10-07 ENCOUNTER — TELEPHONE (OUTPATIENT)
Dept: SURGERY | Facility: CLINIC | Age: 52
End: 2022-10-07
Payer: COMMERCIAL

## 2022-10-07 NOTE — TELEPHONE ENCOUNTER
Spoke with the patient and she states she is feeling much better. She said she had been short of breath a few days ago and just attributed it to anxiety about her surgery.  She also had a headache yesterday and on Tuesday.  She just didn't realize it could be covid.  She states she is much better today.  Told her that Dr Tirado's nurse is working on a new date and will call her as soon as they know.  She verbalized understanding.

## 2022-10-10 ENCOUNTER — TELEPHONE (OUTPATIENT)
Dept: HEMATOLOGY/ONCOLOGY | Facility: CLINIC | Age: 52
End: 2022-10-10
Payer: COMMERCIAL

## 2022-10-10 NOTE — TELEPHONE ENCOUNTER
I spoke with the patient to see when her surgery would be because her surgery was pushed back for medical reasons. We cancelled her PT post op and will schedule when we know when surgery will be. She voiced understanding.

## 2022-10-11 ENCOUNTER — TELEPHONE (OUTPATIENT)
Dept: SURGERY | Facility: CLINIC | Age: 52
End: 2022-10-11
Payer: COMMERCIAL

## 2022-10-11 NOTE — TELEPHONE ENCOUNTER
Spoke with the patient by phone and confirmed her new surgery date with her.  She knew already and is ok with the date.  Confirmed the pre-op visit info with her as well.  She will call them to see if she can do it by phone now.  Told her to please call with any questions.

## 2022-10-12 ENCOUNTER — TELEPHONE (OUTPATIENT)
Dept: FAMILY MEDICINE | Facility: CLINIC | Age: 52
End: 2022-10-12
Payer: COMMERCIAL

## 2022-10-12 NOTE — TELEPHONE ENCOUNTER
Pt tested positive for COVID on 10/6/22. Tried to reach pt. No answer. Left message for pt to call back.  Will offer VV w/ one of our providers.

## 2022-10-12 NOTE — TELEPHONE ENCOUNTER
----- Message from Blane Brown sent at 10/12/2022 12:24 PM CDT -----  Type: Needs Medical Advice  Who Called:  pt  Symptoms (please be specific):  pt said she was dx with breast cancer and she got COVID before her surgery and they had to reschedule the surgery--pt said she tested - for the COVID test yesterday but she still feeling bad and wanted to know is this normal for after COVID --please call and advise  Best Call Back Number: [phone] (home)     Additional Information: thank you

## 2022-10-13 ENCOUNTER — PATIENT MESSAGE (OUTPATIENT)
Dept: HEMATOLOGY/ONCOLOGY | Facility: CLINIC | Age: 52
End: 2022-10-13
Payer: COMMERCIAL

## 2022-10-13 ENCOUNTER — OFFICE VISIT (OUTPATIENT)
Dept: FAMILY MEDICINE | Facility: CLINIC | Age: 52
End: 2022-10-13
Payer: COMMERCIAL

## 2022-10-13 DIAGNOSIS — C50.912 INVASIVE DUCTAL CARCINOMA OF LEFT BREAST IN FEMALE: ICD-10-CM

## 2022-10-13 DIAGNOSIS — F41.9 ANXIETY: ICD-10-CM

## 2022-10-13 DIAGNOSIS — B34.9 VIRAL ILLNESS: Primary | ICD-10-CM

## 2022-10-13 DIAGNOSIS — U07.1 COVID-19: ICD-10-CM

## 2022-10-13 PROCEDURE — 99213 PR OFFICE/OUTPT VISIT, EST, LEVL III, 20-29 MIN: ICD-10-PCS | Mod: 95,,, | Performed by: PHYSICIAN ASSISTANT

## 2022-10-13 PROCEDURE — 1159F MED LIST DOCD IN RCRD: CPT | Mod: CPTII,95,, | Performed by: PHYSICIAN ASSISTANT

## 2022-10-13 PROCEDURE — 1160F PR REVIEW ALL MEDS BY PRESCRIBER/CLIN PHARMACIST DOCUMENTED: ICD-10-PCS | Mod: CPTII,95,, | Performed by: PHYSICIAN ASSISTANT

## 2022-10-13 PROCEDURE — 99213 OFFICE O/P EST LOW 20 MIN: CPT | Mod: 95,,, | Performed by: PHYSICIAN ASSISTANT

## 2022-10-13 PROCEDURE — 1160F RVW MEDS BY RX/DR IN RCRD: CPT | Mod: CPTII,95,, | Performed by: PHYSICIAN ASSISTANT

## 2022-10-13 PROCEDURE — 1159F PR MEDICATION LIST DOCUMENTED IN MEDICAL RECORD: ICD-10-PCS | Mod: CPTII,95,, | Performed by: PHYSICIAN ASSISTANT

## 2022-10-13 NOTE — PROGRESS NOTES
The patient location is: [facility]  The chief complaint leading to consultation is: positive covid 10-6-22  Pending upcoming breast cancer surgery   A lot of worry and anxiety  Still with fatigue and HA  No cough  Pt. Request med to help anxiety      Visit type: audiovisual    Face to Face time with patient: 15 min  25 minutes of total time spent on the encounter, which includes face to face time and non-face to face time preparing to see the patient (eg, review of tests), Obtaining and/or reviewing separately obtained history, Documenting clinical information in the electronic or other health record, Independently interpreting results (not separately reported) and communicating results to the patient/family/caregiver, or Care coordination (not separately reported).         Each patient to whom he or she provides medical services by telemedicine is:  (1) informed of the relationship between the physician and patient and the respective role of any other health care provider with respect to management of the patient; and (2) notified that he or she may decline to receive medical services by telemedicine and may withdraw from such care at any time.    Notes:    Answers submitted by the patient for this visit:  Review of Systems Questionnaire (Submitted on 10/12/2022)  activity change: No  unexpected weight change: No  neck pain: No  hearing loss: No  rhinorrhea: No  trouble swallowing: No  eye discharge: No  visual disturbance: No  chest tightness: Yes  wheezing: No  chest pain: No  palpitations: Yes  blood in stool: No  constipation: No  vomiting: No  diarrhea: No  polydipsia: No  polyuria: No  difficulty urinating: No  hematuria: No  menstrual problem: No  dysuria: No  joint swelling: No  arthralgias: No  headaches: Yes  weakness: Yes  confusion: No  dysphoric mood: No    Diagnoses and all orders for this visit:    Viral illness    COVID-19    Invasive ductal carcinoma of left breast in female    Anxiety     Will forward  note to Dr. Hanks for consideration of anxiety med

## 2022-10-14 ENCOUNTER — PATIENT MESSAGE (OUTPATIENT)
Dept: HEMATOLOGY/ONCOLOGY | Facility: CLINIC | Age: 52
End: 2022-10-14
Payer: COMMERCIAL

## 2022-10-14 DIAGNOSIS — C50.912 INVASIVE DUCTAL CARCINOMA OF LEFT BREAST IN FEMALE: Primary | ICD-10-CM

## 2022-10-14 DIAGNOSIS — Z91.89 AT RISK FOR LYMPHEDEMA: ICD-10-CM

## 2022-10-19 ENCOUNTER — OFFICE VISIT (OUTPATIENT)
Dept: FAMILY MEDICINE | Facility: CLINIC | Age: 52
End: 2022-10-19
Payer: COMMERCIAL

## 2022-10-19 ENCOUNTER — PATIENT MESSAGE (OUTPATIENT)
Dept: FAMILY MEDICINE | Facility: CLINIC | Age: 52
End: 2022-10-19

## 2022-10-19 VITALS
HEIGHT: 66 IN | SYSTOLIC BLOOD PRESSURE: 118 MMHG | WEIGHT: 184.31 LBS | HEART RATE: 72 BPM | OXYGEN SATURATION: 96 % | BODY MASS INDEX: 29.62 KG/M2 | DIASTOLIC BLOOD PRESSURE: 80 MMHG

## 2022-10-19 DIAGNOSIS — C50.912 INVASIVE DUCTAL CARCINOMA OF LEFT BREAST IN FEMALE: ICD-10-CM

## 2022-10-19 DIAGNOSIS — R09.81 NASAL CONGESTION: Primary | ICD-10-CM

## 2022-10-19 DIAGNOSIS — Z86.16 HISTORY OF COVID-19: ICD-10-CM

## 2022-10-19 DIAGNOSIS — F41.9 ANXIETY IN CANCER PATIENT: ICD-10-CM

## 2022-10-19 PROCEDURE — 99214 PR OFFICE/OUTPT VISIT, EST, LEVL IV, 30-39 MIN: ICD-10-PCS | Mod: S$GLB,,, | Performed by: NURSE PRACTITIONER

## 2022-10-19 PROCEDURE — 1159F PR MEDICATION LIST DOCUMENTED IN MEDICAL RECORD: ICD-10-PCS | Mod: CPTII,S$GLB,, | Performed by: NURSE PRACTITIONER

## 2022-10-19 PROCEDURE — 99214 OFFICE O/P EST MOD 30 MIN: CPT | Mod: S$GLB,,, | Performed by: NURSE PRACTITIONER

## 2022-10-19 PROCEDURE — 3074F PR MOST RECENT SYSTOLIC BLOOD PRESSURE < 130 MM HG: ICD-10-PCS | Mod: CPTII,S$GLB,, | Performed by: NURSE PRACTITIONER

## 2022-10-19 PROCEDURE — 3079F PR MOST RECENT DIASTOLIC BLOOD PRESSURE 80-89 MM HG: ICD-10-PCS | Mod: CPTII,S$GLB,, | Performed by: NURSE PRACTITIONER

## 2022-10-19 PROCEDURE — 3079F DIAST BP 80-89 MM HG: CPT | Mod: CPTII,S$GLB,, | Performed by: NURSE PRACTITIONER

## 2022-10-19 PROCEDURE — 1159F MED LIST DOCD IN RCRD: CPT | Mod: CPTII,S$GLB,, | Performed by: NURSE PRACTITIONER

## 2022-10-19 PROCEDURE — 99999 PR PBB SHADOW E&M-EST. PATIENT-LVL III: CPT | Mod: PBBFAC,,, | Performed by: NURSE PRACTITIONER

## 2022-10-19 PROCEDURE — 3074F SYST BP LT 130 MM HG: CPT | Mod: CPTII,S$GLB,, | Performed by: NURSE PRACTITIONER

## 2022-10-19 PROCEDURE — 99999 PR PBB SHADOW E&M-EST. PATIENT-LVL III: ICD-10-PCS | Mod: PBBFAC,,, | Performed by: NURSE PRACTITIONER

## 2022-10-19 RX ORDER — FLUTICASONE PROPIONATE 50 MCG
1 SPRAY, SUSPENSION (ML) NASAL DAILY
Qty: 16 G | Refills: 0 | Status: SHIPPED | OUTPATIENT
Start: 2022-10-19 | End: 2022-12-13

## 2022-10-19 RX ORDER — ALPRAZOLAM 0.5 MG/1
0.5 TABLET ORAL 2 TIMES DAILY PRN
Qty: 45 TABLET | Refills: 0 | Status: SHIPPED | OUTPATIENT
Start: 2022-10-19 | End: 2023-12-19

## 2022-10-19 RX ORDER — ALBUTEROL SULFATE 90 UG/1
1-2 AEROSOL, METERED RESPIRATORY (INHALATION) EVERY 6 HOURS PRN
Qty: 18 G | Refills: 0 | Status: ON HOLD | OUTPATIENT
Start: 2022-10-19 | End: 2023-01-08 | Stop reason: HOSPADM

## 2022-10-19 RX ORDER — ERGOCALCIFEROL 1.25 MG/1
50000 CAPSULE ORAL
COMMUNITY
End: 2023-01-26

## 2022-10-19 NOTE — PROGRESS NOTES
onasSubjective:       Patient ID: Steph Lemus is a 52 y.o. female.    Chief Complaint: Follow-up (Oct 6 tested positive for covid day of sx and still having symptons)    HPI  Patient with history of invasive ductal carcinoma of left breast scheduled for mastectomy 10/6/22--day of surgery tested positive for Covid 19. Evaluated at [facility] ED same day for shortness of breath (noted symptoms ~4 days). CXR and EKG unremarkable. DCd home.    Evaluated virtually by my colleague 10/13/22--anxiety noted. No worsening or concerning symptoms.     Presented to [facility] ED 10/15/22 for chest pain.   EKG, labs, CXR and CTA chest negative.   DCd home    Covid risk score: 0    Patient presents today for follow up   She is feeling better  Reports symptoms were very intense--chest pain and heaviness, severe headache  Notes nasal congestion, PND, ear fullness  Feels anxiety was contributing to severity of symptoms. She reports being extremely anxious the morning of surgery. Feels she has been handling well but family feels she has been anxious     Vitals:    10/19/22 0856   BP: 118/80   Pulse: 72     Review of Systems   Constitutional:  Negative for diaphoresis and fever.   HENT:  Negative for facial swelling and trouble swallowing.    Respiratory:  Negative for cough and shortness of breath.    Cardiovascular:  Negative for chest pain.   Gastrointestinal:  Negative for abdominal pain.   Genitourinary:  Negative for difficulty urinating.   Musculoskeletal:  Negative for gait problem.   Skin:  Negative for pallor and rash.   Neurological:  Negative for speech difficulty.   Psychiatric/Behavioral:  Negative for confusion. The patient is nervous/anxious.      Past Medical History:   Diagnosis Date    Epistaxis, recurrent     none since cauterization    Hormone replacement therapy     Hyperlipidemia     Invasive ductal carcinoma of left breast in female 09/2022     Objective:      Physical Exam  Vitals and nursing note reviewed.    Constitutional:       General: She is not in acute distress.     Appearance: She is not diaphoretic.   HENT:      Head: Normocephalic.      Right Ear: Hearing, tympanic membrane, ear canal and external ear normal.      Left Ear: Hearing, ear canal and external ear normal. A middle ear effusion is present.      Mouth/Throat:      Pharynx: Oropharynx is clear.      Comments: +PND  Eyes:      General:         Right eye: No discharge.         Left eye: No discharge.   Neck:      Trachea: No tracheal deviation.   Cardiovascular:      Rate and Rhythm: Normal rate.      Heart sounds: Normal heart sounds.   Pulmonary:      Effort: Pulmonary effort is normal.      Breath sounds: Normal breath sounds.   Skin:     Coloration: Skin is not pale.   Neurological:      Mental Status: She is alert and oriented to person, place, and time.   Psychiatric:         Speech: Speech normal.         Behavior: Behavior normal.         Thought Content: Thought content normal.         Judgment: Judgment normal.       Assessment:       1. Nasal congestion    2. History of COVID-19    3. Anxiety in cancer patient    4. Invasive ductal carcinoma of left breast in female        Plan:       Nasal congestion  -     fluticasone propionate (FLONASE) 50 mcg/actuation nasal spray; 1 spray (50 mcg total) by Each Nostril route once daily.  Dispense: 16 g; Refill: 0    History of COVID-19    Anxiety in cancer patient  -     ALPRAZolam (XANAX) 0.5 MG tablet; Take 1 tablet (0.5 mg total) by mouth 2 (two) times daily as needed for Anxiety.  Dispense: 45 tablet; Refill: 0    Invasive ductal carcinoma of left breast in female    Other orders  -     albuterol (PROVENTIL/VENTOLIN HFA) 90 mcg/actuation inhaler; Inhale 1-2 puffs into the lungs every 6 (six) hours as needed for Wheezing. Rescue  Dispense: 18 g; Refill: 0        Discussed with Dr Farley--xanax to pharmacy. Patient counseled to take 1/2 tab prn. Educated on safety.         Medication List with  Changes/Refills   New Medications    ALBUTEROL (PROVENTIL/VENTOLIN HFA) 90 MCG/ACTUATION INHALER    Inhale 1-2 puffs into the lungs every 6 (six) hours as needed for Wheezing. Rescue    ALPRAZOLAM (XANAX) 0.5 MG TABLET    Take 1 tablet (0.5 mg total) by mouth 2 (two) times daily as needed for Anxiety.    FLUTICASONE PROPIONATE (FLONASE) 50 MCG/ACTUATION NASAL SPRAY    1 spray (50 mcg total) by Each Nostril route once daily.   Current Medications    AZELASTINE (ASTELIN) 137 MCG (0.1 %) NASAL SPRAY    1 spray (137 mcg total) by Nasal route 2 (two) times daily.    ERGOCALCIFEROL (VITAMIN D2) 50,000 UNIT CAP    Take 50,000 Units by mouth every 7 days.    MULTIVITAMIN (THERAGRAN) PER TABLET    Take 1 tablet by mouth once daily.    ROSUVASTATIN (CRESTOR) 5 MG TABLET    TAKE 1 TABLET BY MOUTH EVERY DAY

## 2022-10-20 ENCOUNTER — TELEPHONE (OUTPATIENT)
Dept: FAMILY MEDICINE | Facility: CLINIC | Age: 52
End: 2022-10-20
Payer: COMMERCIAL

## 2022-10-20 NOTE — TELEPHONE ENCOUNTER
----- Message from Bekah Conner sent at 10/20/2022  9:30 AM CDT -----  Regarding: pt call back  Name of Who is Calling: FELIPE STEPHENS [[id number]]      What is the request in detail: Pt is requesting a call back. States symptoms are coming back and needing medical advice.         Can the clinic reply by [facility]: NO        [facility] to [facility]: [phone]

## 2022-10-21 NOTE — TELEPHONE ENCOUNTER
Spoke with patient    Yesterday she had an episode of head pressure, chest pressure, chills and diarrhea    Felt bad all day. Improved in the evening     Chest tightness main concern. Resolved today    Taking tylenol prn. Has not tried sudafed or ibuprofen     Has not picked up xanax yet    Advised to try sudafed; ibuprofen prn   xanax--take prn     education provided on supportive care, symptom monitoring and return precautions

## 2022-10-24 NOTE — TELEPHONE ENCOUNTER
Spoke w/ pt. She states she did hear back from MILADIS Toribio on 10/21/22. States she is much better today, still has the head pressure but not the elephant on her chest anymore. She is taking her sudafed, mucinex and xanax.

## 2022-11-08 ENCOUNTER — TELEPHONE (OUTPATIENT)
Dept: SURGERY | Facility: CLINIC | Age: 52
End: 2022-11-08
Payer: COMMERCIAL

## 2022-11-08 ENCOUNTER — TELEPHONE (OUTPATIENT)
Dept: HEMATOLOGY/ONCOLOGY | Facility: CLINIC | Age: 52
End: 2022-11-08
Payer: COMMERCIAL

## 2022-11-08 NOTE — NURSING
Received message from Dr. Cueva.  Pt still not feeling well and Dr. Cueva is cancelling her surgery.  She asked if Dr. Coelho would see pt to discuss options while she recovers.  Spoke to pt. Scheduled her for Thursday at 130pm.  Reviewed location and contact information.  Asked her to call with any questions or concerns. Pt thanked me and verbalized understanding.

## 2022-11-08 NOTE — TELEPHONE ENCOUNTER
Patient called and said she wanted to let us know she feels like she is still have sequelae from Covid.  She went to Dr Contreras, ENT today, because she is still having a lot of headaches, and pressure in her head.  She sleeps on several pillows, and takes Sudafed but still has persistent pain, and pressure that keeps her up at night.  And she feels her heart racing and pounding at night which might be the medicine.  He put her on Prednisone but she wanted to let us know in case that would affect her surgery which is scheduled in 9 days.  Notified Leisa with Dr Tirado, and Dr Cueva.

## 2022-11-09 ENCOUNTER — TELEPHONE (OUTPATIENT)
Dept: SURGERY | Facility: CLINIC | Age: 52
End: 2022-11-09
Payer: COMMERCIAL

## 2022-11-09 ENCOUNTER — OFFICE VISIT (OUTPATIENT)
Dept: FAMILY MEDICINE | Facility: CLINIC | Age: 52
End: 2022-11-09
Payer: COMMERCIAL

## 2022-11-09 VITALS — SYSTOLIC BLOOD PRESSURE: 118 MMHG | BODY MASS INDEX: 29.94 KG/M2 | DIASTOLIC BLOOD PRESSURE: 72 MMHG | WEIGHT: 185.5 LBS

## 2022-11-09 DIAGNOSIS — R53.83 FATIGUE, UNSPECIFIED TYPE: ICD-10-CM

## 2022-11-09 DIAGNOSIS — Z86.16 HISTORY OF COVID-19: ICD-10-CM

## 2022-11-09 DIAGNOSIS — C50.912 INVASIVE DUCTAL CARCINOMA OF LEFT BREAST IN FEMALE: ICD-10-CM

## 2022-11-09 DIAGNOSIS — R20.2 TINGLING SENSATION: ICD-10-CM

## 2022-11-09 DIAGNOSIS — R42 DIZZINESS AND GIDDINESS: Primary | ICD-10-CM

## 2022-11-09 DIAGNOSIS — R51.9 NEW ONSET OF HEADACHES: ICD-10-CM

## 2022-11-09 PROCEDURE — 3074F PR MOST RECENT SYSTOLIC BLOOD PRESSURE < 130 MM HG: ICD-10-PCS | Mod: CPTII,S$GLB,, | Performed by: INTERNAL MEDICINE

## 2022-11-09 PROCEDURE — 99214 PR OFFICE/OUTPT VISIT, EST, LEVL IV, 30-39 MIN: ICD-10-PCS | Mod: S$GLB,,, | Performed by: INTERNAL MEDICINE

## 2022-11-09 PROCEDURE — 99999 PR PBB SHADOW E&M-EST. PATIENT-LVL III: CPT | Mod: PBBFAC,,, | Performed by: INTERNAL MEDICINE

## 2022-11-09 PROCEDURE — 99214 OFFICE O/P EST MOD 30 MIN: CPT | Mod: S$GLB,,, | Performed by: INTERNAL MEDICINE

## 2022-11-09 PROCEDURE — 3008F BODY MASS INDEX DOCD: CPT | Mod: CPTII,S$GLB,, | Performed by: INTERNAL MEDICINE

## 2022-11-09 PROCEDURE — 3008F PR BODY MASS INDEX (BMI) DOCUMENTED: ICD-10-PCS | Mod: CPTII,S$GLB,, | Performed by: INTERNAL MEDICINE

## 2022-11-09 PROCEDURE — 3074F SYST BP LT 130 MM HG: CPT | Mod: CPTII,S$GLB,, | Performed by: INTERNAL MEDICINE

## 2022-11-09 PROCEDURE — 1159F PR MEDICATION LIST DOCUMENTED IN MEDICAL RECORD: ICD-10-PCS | Mod: CPTII,S$GLB,, | Performed by: INTERNAL MEDICINE

## 2022-11-09 PROCEDURE — 3078F DIAST BP <80 MM HG: CPT | Mod: CPTII,S$GLB,, | Performed by: INTERNAL MEDICINE

## 2022-11-09 PROCEDURE — 99999 PR PBB SHADOW E&M-EST. PATIENT-LVL III: ICD-10-PCS | Mod: PBBFAC,,, | Performed by: INTERNAL MEDICINE

## 2022-11-09 PROCEDURE — 1159F MED LIST DOCD IN RCRD: CPT | Mod: CPTII,S$GLB,, | Performed by: INTERNAL MEDICINE

## 2022-11-09 PROCEDURE — 1160F PR REVIEW ALL MEDS BY PRESCRIBER/CLIN PHARMACIST DOCUMENTED: ICD-10-PCS | Mod: CPTII,S$GLB,, | Performed by: INTERNAL MEDICINE

## 2022-11-09 PROCEDURE — 1160F RVW MEDS BY RX/DR IN RCRD: CPT | Mod: CPTII,S$GLB,, | Performed by: INTERNAL MEDICINE

## 2022-11-09 PROCEDURE — 3078F PR MOST RECENT DIASTOLIC BLOOD PRESSURE < 80 MM HG: ICD-10-PCS | Mod: CPTII,S$GLB,, | Performed by: INTERNAL MEDICINE

## 2022-11-09 RX ORDER — PREDNISONE 10 MG/1
TABLET ORAL
COMMUNITY
Start: 2022-11-08 | End: 2022-12-30 | Stop reason: ALTCHOICE

## 2022-11-09 NOTE — PROGRESS NOTES
"Subjective:       Patient ID: Steph Lemus is a 52 y.o. female.    Chief Complaint: Follow-up (Lingering covid symtoms 5 wks from tday, pressure headache, pressure in chest/stoamch at night while sleeping, urgency to got to the restroom. )    5 wks ago + covid. 10/6/2022 and previously in June 2022 // Is  Vaccinated.  Was seen in ER for sob, chest pressure, heart palpations. CTA & Xray negative.     Continues to have lingering symptoms.   Since dx has constant pressure between ears, can hear swishing in both ears- like BP sound.  Seen ENT (Dr Contreras) and gave her Prednisone but hasnt started yet.  Due to possible breast sx next week.      Having headache every day, pressure never resolves reduces but still present.  Has episodes with headache sensation of  "pressure"  then feels a progression of pressure/ tingles moves down to her neck and stops mid epi gastric.  Also will occasionally have tingling in her buttocks, occasional stomach bloating gas seen during this time.  Feels off balance dizzy.  Some leg weakness.  P.r.n. use of Tylenol    + dizziness mostly at night w laying down, no blurry vision     Follow-up  This is a chronic problem. The current episode started more than 1 month ago. The problem has been waxing and waning. Associated symptoms include fatigue, headaches and vertigo. Pertinent negatives include no chest pain, congestion, joint swelling, nausea, neck pain, sore throat, visual change or vomiting.     Dr Hanks patient     PMHX    Dx breast cancer 2022  Stage IA (T1c, N0, M0, Grade 1, ER+/MA+/HER2 pending) Invasive ductal carcinoma of the LEFT breast.   Plan is surgical mastectomy but waiting for COVID symptoms dizziness to resolve.      Review of Systems:  Review of Systems   Constitutional:  Positive for fatigue. Negative for appetite change.   HENT:  Negative for congestion, nosebleeds and sore throat.    Eyes:  Negative for pain.   Respiratory:  Positive for chest tightness. Negative for " choking.    Cardiovascular:  Negative for chest pain.   Gastrointestinal:  Negative for blood in stool, nausea and vomiting.   Genitourinary:  Negative for hematuria.   Musculoskeletal:  Negative for joint swelling and neck pain.   Skin:  Negative for pallor.   Neurological:  Positive for vertigo and headaches. Negative for facial asymmetry.   Hematological:  Does not bruise/bleed easily.   Psychiatric/Behavioral:  Negative for confusion.      Objective:     Vitals:    11/09/22 1109   BP: 118/72   BP Location: Right arm   Patient Position: Sitting   Weight: 84.1 kg (185 lb 8.3 oz)          Physical Exam  Constitutional:       Appearance: Normal appearance.   HENT:      Head: Normocephalic and atraumatic.   Eyes:      Extraocular Movements: Extraocular movements intact.      Pupils: Pupils are equal, round, and reactive to light.   Pulmonary:      Effort: Pulmonary effort is normal.   Neurological:      Mental Status: She is alert and oriented to person, place, and time.      Comments: Negative exam   Psychiatric:         Mood and Affect: Mood normal.       Medication List with Changes/Refills   Current Medications    ALBUTEROL (PROVENTIL/VENTOLIN HFA) 90 MCG/ACTUATION INHALER    Inhale 1-2 puffs into the lungs every 6 (six) hours as needed for Wheezing. Rescue    ALPRAZOLAM (XANAX) 0.5 MG TABLET    Take 1 tablet (0.5 mg total) by mouth 2 (two) times daily as needed for Anxiety.    ERGOCALCIFEROL (ERGOCALCIFEROL) 50,000 UNIT CAP    Take 50,000 Units by mouth every 7 days.    FLUTICASONE PROPIONATE (FLONASE) 50 MCG/ACTUATION NASAL SPRAY    1 spray (50 mcg total) by Each Nostril route once daily.    MULTIVITAMIN (THERAGRAN) PER TABLET    Take 1 tablet by mouth once daily.    PREDNISONE (DELTASONE) 10 MG TABLET    Take by mouth.    ROSUVASTATIN (CRESTOR) 5 MG TABLET    TAKE 1 TABLET BY MOUTH EVERY DAY   Discontinued Medications    AZELASTINE (ASTELIN) 137 MCG (0.1 %) NASAL SPRAY    1 spray (137 mcg total) by Nasal route  2 (two) times daily.       Assessment & Plan:  1. Dizziness and giddiness  - MRI Brain Demyelinating W W/O Contrast; Future  - POCT COVID-19 Rapid Screening  - POCT Influenza A/B Molecular    2. New onset of headaches  - MRI Brain Demyelinating W W/O Contrast; Future    3. Tingling sensation  - MRI Brain Demyelinating W W/O Contrast; Future    4. Fatigue, unspecified type  - POCT COVID-19 Rapid Screening  - POCT Influenza A/B Molecular    5. History of COVID-19    6. Invasive ductal carcinoma of left breast in female   Dizziness and giddiness  -     MRI Brain Demyelinating W W/O Contrast; Future; Expected date: 11/09/2022  -     POCT COVID-19 Rapid Screening  -     POCT Influenza A/B Molecular    New onset of headaches  -     MRI Brain Demyelinating W W/O Contrast; Future; Expected date: 11/09/2022    Tingling sensation  -     MRI Brain Demyelinating W W/O Contrast; Future; Expected date: 11/09/2022    Fatigue, unspecified type  -     POCT COVID-19 Rapid Screening  -     POCT Influenza A/B Molecular    History of COVID-19    Invasive ductal carcinoma of left breast in female      Continue to work on regular exercise, maintain healthy weight, balanced diet. Avoid unhealthy habits: smoking, excessive alcohol intake.

## 2022-11-09 NOTE — TELEPHONE ENCOUNTER
Spoke with the patient and told her surgery would have to be postponed for now.  She is in agreement and understands.  She is seeing her PCP again today and Dr Coelho tomorrow.  Told her she may speak with her about endocrine therapy.  She knows that she has to get better before having surgery.  Told her we are all in communication about her and will let her know of any changes to her plan.  She is happy with this plan and verbalized understanding.

## 2022-11-10 ENCOUNTER — LAB VISIT (OUTPATIENT)
Dept: LAB | Facility: HOSPITAL | Age: 52
End: 2022-11-10
Attending: INTERNAL MEDICINE
Payer: COMMERCIAL

## 2022-11-10 ENCOUNTER — TELEPHONE (OUTPATIENT)
Dept: RADIOLOGY | Facility: HOSPITAL | Age: 52
End: 2022-11-10
Payer: COMMERCIAL

## 2022-11-10 ENCOUNTER — OFFICE VISIT (OUTPATIENT)
Dept: HEMATOLOGY/ONCOLOGY | Facility: CLINIC | Age: 52
End: 2022-11-10
Payer: COMMERCIAL

## 2022-11-10 VITALS
HEART RATE: 67 BPM | RESPIRATION RATE: 16 BRPM | SYSTOLIC BLOOD PRESSURE: 122 MMHG | HEIGHT: 66 IN | OXYGEN SATURATION: 98 % | TEMPERATURE: 98 F | BODY MASS INDEX: 29.8 KG/M2 | WEIGHT: 185.44 LBS | DIASTOLIC BLOOD PRESSURE: 78 MMHG

## 2022-11-10 DIAGNOSIS — C50.912 INVASIVE DUCTAL CARCINOMA OF LEFT BREAST IN FEMALE: Primary | ICD-10-CM

## 2022-11-10 DIAGNOSIS — C50.912 INVASIVE DUCTAL CARCINOMA OF LEFT BREAST IN FEMALE: ICD-10-CM

## 2022-11-10 PROCEDURE — 3008F BODY MASS INDEX DOCD: CPT | Mod: CPTII,S$GLB,, | Performed by: INTERNAL MEDICINE

## 2022-11-10 PROCEDURE — 3008F PR BODY MASS INDEX (BMI) DOCUMENTED: ICD-10-PCS | Mod: CPTII,S$GLB,, | Performed by: INTERNAL MEDICINE

## 2022-11-10 PROCEDURE — 3078F DIAST BP <80 MM HG: CPT | Mod: CPTII,S$GLB,, | Performed by: INTERNAL MEDICINE

## 2022-11-10 PROCEDURE — 83001 ASSAY OF GONADOTROPIN (FSH): CPT | Performed by: INTERNAL MEDICINE

## 2022-11-10 PROCEDURE — 82670 ASSAY OF TOTAL ESTRADIOL: CPT | Performed by: INTERNAL MEDICINE

## 2022-11-10 PROCEDURE — 83002 ASSAY OF GONADOTROPIN (LH): CPT | Performed by: INTERNAL MEDICINE

## 2022-11-10 PROCEDURE — 3074F SYST BP LT 130 MM HG: CPT | Mod: CPTII,S$GLB,, | Performed by: INTERNAL MEDICINE

## 2022-11-10 PROCEDURE — 3078F PR MOST RECENT DIASTOLIC BLOOD PRESSURE < 80 MM HG: ICD-10-PCS | Mod: CPTII,S$GLB,, | Performed by: INTERNAL MEDICINE

## 2022-11-10 PROCEDURE — 3074F PR MOST RECENT SYSTOLIC BLOOD PRESSURE < 130 MM HG: ICD-10-PCS | Mod: CPTII,S$GLB,, | Performed by: INTERNAL MEDICINE

## 2022-11-10 PROCEDURE — 99214 OFFICE O/P EST MOD 30 MIN: CPT | Mod: S$GLB,,, | Performed by: INTERNAL MEDICINE

## 2022-11-10 PROCEDURE — 36415 COLL VENOUS BLD VENIPUNCTURE: CPT | Mod: PN | Performed by: INTERNAL MEDICINE

## 2022-11-10 PROCEDURE — 99999 PR PBB SHADOW E&M-EST. PATIENT-LVL IV: CPT | Mod: PBBFAC,,, | Performed by: INTERNAL MEDICINE

## 2022-11-10 PROCEDURE — 99999 PR PBB SHADOW E&M-EST. PATIENT-LVL IV: ICD-10-PCS | Mod: PBBFAC,,, | Performed by: INTERNAL MEDICINE

## 2022-11-10 PROCEDURE — 99214 PR OFFICE/OUTPT VISIT, EST, LEVL IV, 30-39 MIN: ICD-10-PCS | Mod: S$GLB,,, | Performed by: INTERNAL MEDICINE

## 2022-11-10 NOTE — TELEPHONE ENCOUNTER
[2:42 PM] Brittani Bauman  pt gem peter MRN [id number] need ultrasound asap the pt is here now     [3:00 PM] Wendy Gonzáles  11:15 am tomorrow morning    At [facility] radiololgy.    [facility] patient, Dr. Lopez ordering  + left breast mass 8/3/22, ecchymoses post biopsy   bilat mast scheduled on 11/17, delayed d/t pt illness//rc  date/time per Melodie//rc

## 2022-11-10 NOTE — PROGRESS NOTES
PROGRESS NOTE     Subjective:       Patient ID: Steph Lemus is a 52 y.o. female.  MRN: [id number]  : 1970    Chief Complaint: Breast Cancer (Follow Up/)   Stage IA (T1c, N0, M0, Grade 1, ER+/DC+/HER2 pending) Invasive ductal carcinoma of the LEFT breast    History of Present Illness:   Steph Lemus is a 52 y.o. female who is referred for newly diagnosed left breast IDC, found on screening mammogram. She is seen at part of our [facility] breast clinic with Dr. Cueva and Dr. Peña.     She did not palpate a breast mass. She denies any pain or nipple discharge.   She is a smoker, has stopped about a week ago and is motivated to stop long term.     Menarche at age 9.   . Age at first live birth 15 years old   Menopause at 50 years old. HRT-divigel now, took for 2 years     FH  Father- throat cancer- dx 73  Paternal Grandfather- Lung cancer- dx at 68  Paternal Uncle- Lung cancer, Dx at 60   Cousin- maternal- breast cancer dx at 36  Maternal cousin-Lung cancer- dx at 40    Interim history:  Dx with Covid 5 weeks ago and has has a difficult recovery. Her surgery was canceled. Did not have cough or fever but it started with severe chest pressure and tachycardia, she felt horrible. She was seen in the ER twice,(10/6 and )  ruled out for PE on 10/15    Still has significant headache and head pressure. He just saw ENT and was given steroids for possible inflammation.   Still has chest pressure and heaviness, comes and goes but overall improving.     Oncology History:  22: Screening mammogram  Impression:  Left  Focal Asymmetry: Left breast focal asymmetry at the anterior 12 o'clock position. Assessment: 0 - Incomplete. Diagnostic Mammogram and/or Ultrasound is recommended.      Right  There is no mammographic evidence of malignancy in the right breast.     BI-RADS Category:   Overall: 0 - Incomplete: Needs Additional Imaging  Evaluation    7/19/22:  Diagnostic mammogram:  Impression:  Left  Mass: Left breast 18 mm x 9 mm x 9 mm mass at the 12 o'clock position. Assessment: 4 - Suspicious finding. Biopsy is recommended.      An 8 mm satellite mass is present 0.5 cm remote from the aforementioned mass at 12 o'clock.      BI-RADS Category:   Overall: 4 - Suspicious     8/3/22:   1. Breast, left, 12 o'clock, 6 cm from nipple, ultrasound-guided core needle   biopsies:   - Invasive ductal carcinoma       - Present in 4 of 6 core fragments (3 mm in greatest contiguous dimension)       - Brandon-Jimenez modified SBR score 2 + 2 + 1 = 5 of 9       - Histologic grade 1 of 3       - Mitotic index = 0.3 (average mitoses per high power field) (low)   - Biomarkers, assessed by immunohistochemistry on block 1A       - Estrogen Receptor (ER):  Positive (95%; strong intensity)       - Progesterone Receptor (RI):  Positive (70%; moderate intensity)       - HER2:  Equivocal (2+)       - HER2 FISH pending; results to follow in a supplemental report       - Ki-67 proliferation index:  Approximately 20% in hot-spot (intermediate)   - Lymphovascular invasion not identified   - Ductal carcinoma in situ       - Associated with invasive tumor       - Nuclear grade 2 of 3       - Cribriform pattern       - Central comedonecrosis: Approximately 20%   - Background breast contains fragments of fibroadenoma with myxoid change   2. Breast, left, 12 o'clock, 7 cm from nipple, ultrasound-guided core needle   biopsies:   - Fibroadenoma with myxoid change     8/15/22: MRI   Impression:  Left  Mass: Left breast 15 mm x 12 mm x 20 mm mass at the 12 o'clock position. Assessment: 6 - Known biopsy, proven malignancy.      Right  There is no MR evidence of malignancy in the right breast.       History:  Past Medical History:   Diagnosis Date    Epistaxis, recurrent     none since cauterization    Hormone replacement therapy     Hyperlipidemia     Invasive ductal carcinoma of left  breast in female 2022      Past Surgical History:   Procedure Laterality Date    APPENDECTOMY      COLONOSCOPY N/A 04/15/2019    Procedure: COLONOSCOPY;  Surgeon: Tad Suarez Jr., MD;  Location: [facility] ENDO;  Service: Endoscopy;  Laterality: N/A;    DILATION AND CURETTAGE OF UTERUS      for uterine polyps    ENDOMETRIAL ABLATION      ENDOSCOPIC NASAL CAUTERIZATION N/A 2020    Procedure: CAUTERIZATION, NOSE, ENDOSCOPIC;  Surgeon: Surinder Acevedo MD;  Location: [facility] OR;  Service: ENT;  Laterality: N/A;    LAPAROSCOPIC APPENDECTOMY N/A 2020    Procedure: APPENDECTOMY, LAPAROSCOPIC;  Surgeon: Shai Ludwig MD;  Location: [facility] OR;  Service: General;  Laterality: N/A;    TONSILLECTOMY       Family History   Problem Relation Age of Onset    Hyperlipidemia Mother     Hypertension Mother     Diabetes Mother     Hypertension Father     Cancer Father         throat    Pacemaker/defibrilator Father     Diabetes Maternal Grandfather     Cancer Maternal Grandfather     Cancer Paternal Grandfather     Breast cancer Cousin 40    BRCA 1/2 Cousin     Colon cancer Neg Hx     Ovarian cancer Neg Hx       Social History     Tobacco Use    Smoking status: Former     Packs/day: 0.10     Years: 25.00     Pack years: 2.50     Types: Cigarettes     Quit date: 2022     Years since quittin.2    Smokeless tobacco: Never   Substance and Sexual Activity    Alcohol use: Yes     Alcohol/week: 4.0 standard drinks     Types: 4 Glasses of wine per week     Comment: 6 drinks weekly    Drug use: No    Sexual activity: Yes     Partners: Male     Birth control/protection: Other-see comments        ROS:   Review of Systems   Constitutional:  Positive for malaise/fatigue. Negative for fever and weight loss.   HENT:  Negative for congestion, hearing loss, nosebleeds and sore throat.    Eyes:  Negative for double vision and photophobia.   Respiratory:  Negative for cough, hemoptysis, sputum production, shortness of breath  "and wheezing.    Cardiovascular:  Positive for chest pain. Negative for palpitations, orthopnea and leg swelling.   Gastrointestinal:  Negative for abdominal pain, blood in stool, constipation, diarrhea, heartburn, nausea and vomiting.   Genitourinary:  Negative for dysuria, frequency, hematuria and urgency.   Musculoskeletal:  Negative for back pain, joint pain and myalgias.   Skin:  Negative for itching and rash.   Neurological:  Positive for dizziness and headaches. Negative for tingling, seizures and weakness.   Endo/Heme/Allergies:  Negative for polydipsia. Does not bruise/bleed easily.   Psychiatric/Behavioral:  Negative for depression and memory loss. The patient is not nervous/anxious and does not have insomnia.       Objective:     Vitals:    11/10/22 1353   BP: 122/78   Pulse: 67   Resp: 16   Temp: 97.5 °F (36.4 °C)   TempSrc: Temporal   SpO2: 98%   Weight: 84.1 kg (185 lb 6.5 oz)   Height: 5' 6" (1.676 m)   PainSc:   2   PainLoc: Head       Physical Examination:   Physical Exam  Vitals and nursing note reviewed.   Constitutional:       General: She is not in acute distress.     Appearance: She is not diaphoretic.   HENT:      Head: Normocephalic.      Mouth/Throat:      Pharynx: No oropharyngeal exudate.   Eyes:      General: No scleral icterus.     Conjunctiva/sclera: Conjunctivae normal.   Neck:      Thyroid: No thyromegaly.   Cardiovascular:      Rate and Rhythm: Normal rate and regular rhythm.      Heart sounds: Normal heart sounds. No murmur heard.  Pulmonary:      Effort: Pulmonary effort is normal. No respiratory distress.      Breath sounds: No stridor. No wheezing or rales.   Chest:      Chest wall: No tenderness.      Comments: + left breast mass, stable  Abdominal:      General: Bowel sounds are normal. There is no distension.      Palpations: Abdomen is soft. There is no mass.      Tenderness: There is no abdominal tenderness. There is no rebound.   Musculoskeletal:         General: No " "tenderness or deformity. Normal range of motion.      Cervical back: Neck supple.   Lymphadenopathy:      Cervical: No cervical adenopathy.   Skin:     General: Skin is warm and dry.      Findings: No erythema or rash.   Neurological:      Mental Status: She is alert and oriented to person, place, and time.      Cranial Nerves: No cranial nerve deficit.      Coordination: Coordination normal.      Gait: Gait is intact.   Psychiatric:         Mood and Affect: Affect normal.         Cognition and Memory: Memory normal.         Judgment: Judgment normal.        Diagnostic Tests:  Significant Imaging: I have reviewed and interpreted all pertinent imaging results/findings.  PET Scan No results found for this or any previous visit.      Laboratory Data:  All pertinent labs have been reviewed.    Labs:   Lab Results   Component Value Date    WBC 5.73 10/15/2022    HGB 14.3 10/15/2022    HCT 42.0 10/15/2022    MCV 91 10/15/2022     10/15/2022       Assessment/Plan:   Invasive ductal carcinoma of left breast in female  Stage IA (T1c, N0, M0, Grade 1, ER+/NC+/HER2 2+ by IHC, FISH negative, Invasive ductal carcinoma of the LEFT breast    She is a candidate for upfront surgery and has opted for b/l mastectomy with flap reconstruction.   However, surgery has been complicated due to COVID and slow recovery. We discussed "neoadjuvant" endocrine therapy while we wait for surgery, however, she is not inclined unless necessary as she is not feeling well and does not want to start a new medication. Will obtain baseline menopausal panel and dexa scan in anticipation.   Meanwhile, repeat breast usg to ensure no disease progression while awaiting surgery. She is agreeable to undergo lumpectomy first if urgent surgery is required and completion mastectomy later on. I will refer her back to Dr. Cueva to discuss surgical options.          ECOG SCORE    1 - Restricted in strenuous activity-ambulatory and able to carry out work of " a light nature           Discussion:   No follow-ups on file.    Plan was discussed with the patient at length, and she verbalized understanding. Steph was given an opportunity to ask questions that were answered to her satisfaction, and she was advised to call in the interval if any problems or questions arise.    Electronically signed by Angelica Coelho MD      Route Chart for Scheduling    Med Onc Chart Routing      Follow up with physician . Will call patient   Follow up with ALBARO    Infusion scheduling note    Injection scheduling note    Labs   Lab interval:  Fsh, lh, estradiol today   Imaging   Left breast usg asap   Pharmacy appointment    Other referrals

## 2022-11-11 ENCOUNTER — PATIENT MESSAGE (OUTPATIENT)
Dept: FAMILY MEDICINE | Facility: CLINIC | Age: 52
End: 2022-11-11
Payer: COMMERCIAL

## 2022-11-11 ENCOUNTER — HOSPITAL ENCOUNTER (OUTPATIENT)
Dept: RADIOLOGY | Facility: HOSPITAL | Age: 52
Discharge: HOME | End: 2022-11-11
Attending: INTERNAL MEDICINE
Payer: COMMERCIAL

## 2022-11-11 DIAGNOSIS — C50.912 INVASIVE DUCTAL CARCINOMA OF LEFT BREAST IN FEMALE: ICD-10-CM

## 2022-11-11 LAB
ESTRADIOL SERPL-MCNC: <10 PG/ML
FSH SERPL-ACNC: 84.95 MIU/ML
LH SERPL-ACNC: 35 MIU/ML

## 2022-11-11 PROCEDURE — 77065 MAMMO DIGITAL DIAGNOSTIC LEFT WITH TOMO: ICD-10-PCS | Mod: 26,LT,, | Performed by: RADIOLOGY

## 2022-11-11 PROCEDURE — 76642 ULTRASOUND BREAST LIMITED: CPT | Mod: 26,LT,, | Performed by: RADIOLOGY

## 2022-11-11 PROCEDURE — 76642 ULTRASOUND BREAST LIMITED: CPT | Mod: TC,PO,LT

## 2022-11-11 PROCEDURE — 77061 MAMMO DIGITAL DIAGNOSTIC LEFT WITH TOMO: ICD-10-PCS | Mod: 26,LT,, | Performed by: RADIOLOGY

## 2022-11-11 PROCEDURE — 77065 DX MAMMO INCL CAD UNI: CPT | Mod: 26,LT,, | Performed by: RADIOLOGY

## 2022-11-11 PROCEDURE — 76642 US BREAST LEFT LIMITED: ICD-10-PCS | Mod: 26,LT,, | Performed by: RADIOLOGY

## 2022-11-11 PROCEDURE — 77065 DX MAMMO INCL CAD UNI: CPT | Mod: TC,PO,LT

## 2022-11-11 PROCEDURE — 77061 BREAST TOMOSYNTHESIS UNI: CPT | Mod: 26,LT,, | Performed by: RADIOLOGY

## 2022-11-14 DIAGNOSIS — R92.8 ABNORMAL FINDING ON BREAST IMAGING: Primary | ICD-10-CM

## 2022-11-14 DIAGNOSIS — C50.912 INVASIVE DUCTAL CARCINOMA OF LEFT BREAST IN FEMALE: Primary | ICD-10-CM

## 2022-11-14 DIAGNOSIS — R92.8 ABNORMAL FINDING ON BREAST IMAGING: ICD-10-CM

## 2022-11-14 DIAGNOSIS — C50.912 INVASIVE DUCTAL CARCINOMA OF LEFT BREAST IN FEMALE: ICD-10-CM

## 2022-11-15 ENCOUNTER — PATIENT MESSAGE (OUTPATIENT)
Dept: FAMILY MEDICINE | Facility: CLINIC | Age: 52
End: 2022-11-15
Payer: COMMERCIAL

## 2022-11-15 NOTE — TELEPHONE ENCOUNTER
Spoke with pt. Notified her of appt time and date. Pt confirmed and appt scheduled per Dr. Hanks's request

## 2022-11-17 ENCOUNTER — OFFICE VISIT (OUTPATIENT)
Dept: FAMILY MEDICINE | Facility: CLINIC | Age: 52
End: 2022-11-17
Payer: COMMERCIAL

## 2022-11-17 VITALS
HEART RATE: 72 BPM | RESPIRATION RATE: 14 BRPM | BODY MASS INDEX: 29.95 KG/M2 | SYSTOLIC BLOOD PRESSURE: 114 MMHG | WEIGHT: 186.38 LBS | DIASTOLIC BLOOD PRESSURE: 78 MMHG | HEIGHT: 66 IN

## 2022-11-17 DIAGNOSIS — K29.70 GASTRITIS, PRESENCE OF BLEEDING UNSPECIFIED, UNSPECIFIED CHRONICITY, UNSPECIFIED GASTRITIS TYPE: ICD-10-CM

## 2022-11-17 DIAGNOSIS — C50.912 INVASIVE DUCTAL CARCINOMA OF LEFT BREAST IN FEMALE: ICD-10-CM

## 2022-11-17 DIAGNOSIS — R06.02 SOB (SHORTNESS OF BREATH): Primary | ICD-10-CM

## 2022-11-17 PROCEDURE — 99214 PR OFFICE/OUTPT VISIT, EST, LEVL IV, 30-39 MIN: ICD-10-PCS | Mod: S$GLB,,, | Performed by: FAMILY MEDICINE

## 2022-11-17 PROCEDURE — 3008F PR BODY MASS INDEX (BMI) DOCUMENTED: ICD-10-PCS | Mod: CPTII,S$GLB,, | Performed by: FAMILY MEDICINE

## 2022-11-17 PROCEDURE — 3078F PR MOST RECENT DIASTOLIC BLOOD PRESSURE < 80 MM HG: ICD-10-PCS | Mod: CPTII,S$GLB,, | Performed by: FAMILY MEDICINE

## 2022-11-17 PROCEDURE — 99999 PR PBB SHADOW E&M-EST. PATIENT-LVL IV: ICD-10-PCS | Mod: PBBFAC,,, | Performed by: FAMILY MEDICINE

## 2022-11-17 PROCEDURE — 3078F DIAST BP <80 MM HG: CPT | Mod: CPTII,S$GLB,, | Performed by: FAMILY MEDICINE

## 2022-11-17 PROCEDURE — 99999 PR PBB SHADOW E&M-EST. PATIENT-LVL IV: CPT | Mod: PBBFAC,,, | Performed by: FAMILY MEDICINE

## 2022-11-17 PROCEDURE — 3074F SYST BP LT 130 MM HG: CPT | Mod: CPTII,S$GLB,, | Performed by: FAMILY MEDICINE

## 2022-11-17 PROCEDURE — 99214 OFFICE O/P EST MOD 30 MIN: CPT | Mod: S$GLB,,, | Performed by: FAMILY MEDICINE

## 2022-11-17 PROCEDURE — 3074F PR MOST RECENT SYSTOLIC BLOOD PRESSURE < 130 MM HG: ICD-10-PCS | Mod: CPTII,S$GLB,, | Performed by: FAMILY MEDICINE

## 2022-11-17 PROCEDURE — 3008F BODY MASS INDEX DOCD: CPT | Mod: CPTII,S$GLB,, | Performed by: FAMILY MEDICINE

## 2022-11-17 RX ORDER — PANTOPRAZOLE SODIUM 40 MG/1
40 TABLET, DELAYED RELEASE ORAL DAILY
Qty: 30 TABLET | Refills: 11 | Status: SHIPPED | OUTPATIENT
Start: 2022-11-17 | End: 2023-07-12

## 2022-11-17 RX ORDER — FAMOTIDINE 40 MG/1
40 TABLET, FILM COATED ORAL 2 TIMES DAILY
Qty: 60 TABLET | Refills: 11 | Status: ON HOLD | OUTPATIENT
Start: 2022-11-17 | End: 2023-01-08 | Stop reason: HOSPADM

## 2022-11-17 NOTE — PROGRESS NOTES
Subjective:       Patient ID: Steph Lemus is a 52 y.o. female.    Chief Complaint: Follow-up    HPI  Oct 6, was scheduled for double mastectomy and was dx with covid on the day of surgery. Recalls that she had a funny taste in her mouth, HA, heaviness in her chest, felt presyncopal.  ER eval incl ekg was negative. She felt ok for a few days, but then had recurrence of chest heaviness, HA.   Since then, she has not had any sustained sx relief. Ongoing pressure in the head/sinuses. ENT eval was negative, they gave her prednisone, currently on day 6. HA are improving, but she notes still having a constant pressure/uncomfortable feeling in ht elower mid chest. At night, her chest tingles that spreads into her shoulders.   She does not notice any chest discomfort or sob with activity. Still taking walks in the evening without issue.     She does have xanax to use prn, which makes her sleepy/relaxes her but does not resolve that feeling.     Review of Systems:  Review of Systems   Constitutional:  Positive for fatigue. Negative for unexpected weight change.   HENT:  Negative for congestion, postnasal drip, sore throat and trouble swallowing.    Eyes:  Negative for photophobia and visual disturbance.   Respiratory:  Positive for shortness of breath. Negative for cough.    Gastrointestinal:  Positive for abdominal pain. Negative for constipation (mild if any) and diarrhea.        No gerd c/o. But increased gas, epigastric discomfort   Genitourinary:  Negative for difficulty urinating, dysuria and frequency.   Musculoskeletal:  Negative for joint swelling and myalgias.   Neurological:  Positive for headaches. Negative for light-headedness.   Psychiatric/Behavioral:  Positive for sleep disturbance. The patient is nervous/anxious.      Objective:     Vitals:    11/17/22 1406   BP: 114/78   BP Location: Right arm   Patient Position: Sitting   BP Method: Large (Manual)   Pulse: 72   Resp: 14   Weight: 84.5 kg (186 lb 6.4  "oz)   Height: 5' 6" (1.676 m)          Physical Exam  Vitals and nursing note reviewed.   Constitutional:       General: She is not in acute distress.     Appearance: Normal appearance. She is well-developed. She is obese.   HENT:      Head: Normocephalic and atraumatic.      Right Ear: Tympanic membrane and external ear normal.      Left Ear: Tympanic membrane and external ear normal.      Nose: Nose normal.      Mouth/Throat:      Pharynx: No oropharyngeal exudate.   Eyes:      Conjunctiva/sclera: Conjunctivae normal.      Pupils: Pupils are equal, round, and reactive to light.   Neck:      Thyroid: No thyromegaly.   Cardiovascular:      Rate and Rhythm: Normal rate and regular rhythm.   Pulmonary:      Effort: Pulmonary effort is normal. No respiratory distress.      Breath sounds: Normal breath sounds. No wheezing.   Abdominal:      General: Bowel sounds are normal. There is no distension.      Palpations: Abdomen is soft. There is no mass.      Tenderness: There is no abdominal tenderness. There is no guarding or rebound.   Musculoskeletal:         General: Normal range of motion.      Cervical back: Normal range of motion and neck supple.      Right lower leg: No edema.      Left lower leg: No edema.   Lymphadenopathy:      Cervical: No cervical adenopathy.   Skin:     General: Skin is warm and dry.   Neurological:      General: No focal deficit present.      Mental Status: She is alert and oriented to person, place, and time.      Cranial Nerves: No cranial nerve deficit.   Psychiatric:         Mood and Affect: Mood normal.         Behavior: Behavior normal.         Assessment & Plan:  SOB (shortness of breath)  Comments:  post-covid, based on current eval i do not anticipate anything will hold her up from scheduling surgery  Orders:  -     Echo; Future  -     Complete PFT w/ bronchodilator; Future    Gastritis, presence of bleeding unspecified, unspecified chronicity, unspecified gastritis " type  Comments:  reviewed common cause of non-cardiac chest pain, recommend coverage for gerd with ppi daily with pepcid BID    Invasive ductal carcinoma of left breast in female  Comments:  per surgery    Other orders  -     pantoprazole (PROTONIX) 40 MG tablet; Take 1 tablet (40 mg total) by mouth once daily.  Dispense: 30 tablet; Refill: 11  -     famotidine (PEPCID) 40 MG tablet; Take 1 tablet (40 mg total) by mouth 2 (two) times daily.  Dispense: 60 tablet; Refill: 11

## 2022-11-17 NOTE — Clinical Note
Hi, pt is in clinic today for her symptoms following covid. She was asking about doing a pet scan. I told her I'd check with you. Thanks, GEORGIANA

## 2022-11-19 ENCOUNTER — PATIENT MESSAGE (OUTPATIENT)
Dept: FAMILY MEDICINE | Facility: CLINIC | Age: 52
End: 2022-11-19
Payer: COMMERCIAL

## 2022-11-22 NOTE — TELEPHONE ENCOUNTER
Thanks for letting me know. I did message Dr Coelho at our appt and have not yet heard back. GEORGIANA

## 2022-11-28 ENCOUNTER — TELEPHONE (OUTPATIENT)
Dept: SURGERY | Facility: CLINIC | Age: 52
End: 2022-11-28
Payer: COMMERCIAL

## 2022-11-29 ENCOUNTER — TELEPHONE (OUTPATIENT)
Dept: SURGERY | Facility: CLINIC | Age: 52
End: 2022-11-29
Payer: COMMERCIAL

## 2022-11-29 NOTE — TELEPHONE ENCOUNTER
Called the patient and gave her the biopsy results per Dr Cueva's instructions.  She verbalized understanding.  Asked how she is feeling. She states she took steroids until about a week ago and had some relief from headaches but now they have recently started back.  She is taking pantoprazole and pepcid for her sternal pain.  She is scheduled for an Echo on 12/8 per her PCP.  Told her we will notify Dr Cueva and let her know what she says.

## 2022-11-29 NOTE — TELEPHONE ENCOUNTER
Called the patient back and told her that Dr Cueva is in agreement that she needs to feel better before surgery is to be scheduled.  She is having the echo on 12/8.  Told her we will call her after the results are back from that test and will see if she is feeling better.  Also told her Dr Coelho said she does not need to do a PET scan since her lymph node biopsy was negative.  She verbalized understanding and is in agreement with this plan.

## 2022-11-30 ENCOUNTER — PATIENT MESSAGE (OUTPATIENT)
Dept: FAMILY MEDICINE | Facility: CLINIC | Age: 52
End: 2022-11-30
Payer: COMMERCIAL

## 2022-12-09 ENCOUNTER — CLINICAL SUPPORT (OUTPATIENT)
Dept: CARDIOLOGY | Facility: HOSPITAL | Age: 52
End: 2022-12-09
Attending: FAMILY MEDICINE
Payer: COMMERCIAL

## 2022-12-09 VITALS
WEIGHT: 184.94 LBS | BODY MASS INDEX: 29.72 KG/M2 | DIASTOLIC BLOOD PRESSURE: 86 MMHG | HEIGHT: 66 IN | SYSTOLIC BLOOD PRESSURE: 149 MMHG

## 2022-12-09 DIAGNOSIS — R06.02 SOB (SHORTNESS OF BREATH): ICD-10-CM

## 2022-12-09 PROCEDURE — 93306 TTE W/DOPPLER COMPLETE: CPT | Mod: PO

## 2022-12-09 PROCEDURE — 93306 ECHO (CUPID ONLY): ICD-10-PCS | Mod: 26,,, | Performed by: INTERNAL MEDICINE

## 2022-12-09 PROCEDURE — 93306 TTE W/DOPPLER COMPLETE: CPT | Mod: 26,,, | Performed by: INTERNAL MEDICINE

## 2022-12-12 ENCOUNTER — PATIENT MESSAGE (OUTPATIENT)
Dept: FAMILY MEDICINE | Facility: CLINIC | Age: 52
End: 2022-12-12
Payer: COMMERCIAL

## 2022-12-12 DIAGNOSIS — R06.02 SOB (SHORTNESS OF BREATH): Primary | ICD-10-CM

## 2022-12-12 DIAGNOSIS — Z86.16 HISTORY OF COVID-19: ICD-10-CM

## 2022-12-12 DIAGNOSIS — R51.9 NEW ONSET OF HEADACHES: ICD-10-CM

## 2022-12-12 LAB
ASCENDING AORTA: 2.69 CM
AV INDEX (PROSTH): 0.75
AV MEAN GRADIENT: 3 MMHG
AV PEAK GRADIENT: 6 MMHG
AV VALVE AREA: 2.06 CM2
AV VELOCITY RATIO: 0.82
BSA FOR ECHO PROCEDURE: 1.98 M2
CV ECHO LV RWT: 0.34 CM
DOP CALC AO PEAK VEL: 1.23 M/S
DOP CALC AO VTI: 29.3 CM
DOP CALC LVOT AREA: 2.7 CM2
DOP CALC LVOT DIAMETER: 1.87 CM
DOP CALC LVOT PEAK VEL: 1.01 M/S
DOP CALC LVOT STROKE VOLUME: 60.39 CM3
DOP CALCLVOT PEAK VEL VTI: 22 CM
E WAVE DECELERATION TIME: 184.12 MSEC
E/A RATIO: 1.1
E/E' RATIO: 7.36 M/S
ECHO LV POSTERIOR WALL: 0.85 CM (ref 0.6–1.1)
EJECTION FRACTION: 60 %
FRACTIONAL SHORTENING: 31 % (ref 28–44)
INTERVENTRICULAR SEPTUM: 0.94 CM (ref 0.6–1.1)
IVRT: 111.32 MSEC
LA MAJOR: 5.33 CM
LA MINOR: 3.83 CM
LA WIDTH: 3.8 CM
LEFT ATRIUM SIZE: 4.46 CM
LEFT ATRIUM VOLUME INDEX: 33.3 ML/M2
LEFT ATRIUM VOLUME: 64.21 CM3
LEFT INTERNAL DIMENSION IN SYSTOLE: 3.43 CM (ref 2.1–4)
LEFT VENTRICLE DIASTOLIC VOLUME INDEX: 60.32 ML/M2
LEFT VENTRICLE DIASTOLIC VOLUME: 116.41 ML
LEFT VENTRICLE MASS INDEX: 81 G/M2
LEFT VENTRICLE SYSTOLIC VOLUME INDEX: 25.1 ML/M2
LEFT VENTRICLE SYSTOLIC VOLUME: 48.35 ML
LEFT VENTRICULAR INTERNAL DIMENSION IN DIASTOLE: 4.97 CM (ref 3.5–6)
LEFT VENTRICULAR MASS: 155.48 G
LV LATERAL E/E' RATIO: 6.57 M/S
LV SEPTAL E/E' RATIO: 8.36 M/S
LVOT MG: 2.04 MMHG
LVOT MV: 0.66 CM/S
MV PEAK A VEL: 0.84 M/S
MV PEAK E VEL: 0.92 M/S
MV STENOSIS PRESSURE HALF TIME: 53.39 MS
MV VALVE AREA P 1/2 METHOD: 4.12 CM2
PISA TR MAX VEL: 1.96 M/S
PULM VEIN S/D RATIO: 1.42
PV PEAK D VEL: 0.38 M/S
PV PEAK S VEL: 0.54 M/S
RA MAJOR: 4.51 CM
RA PRESSURE: 8 MMHG
RA WIDTH: 3.43 CM
RIGHT VENTRICULAR END-DIASTOLIC DIMENSION: 3.41 CM
RIGHT VENTRICULAR LENGTH IN DIASTOLE (APICAL 4-CHAMBER VIEW): 7.05 CM
RV MID DIAMA: 2.73 CM
RV TISSUE DOPPLER FREE WALL SYSTOLIC VELOCITY 1 (APICAL 4 CHAMBER VIEW): 0.01 CM/S
SINUS: 2.67 CM
STJ: 2.45 CM
TDI LATERAL: 0.14 M/S
TDI SEPTAL: 0.11 M/S
TDI: 0.13 M/S
TR MAX PG: 15 MMHG
TV REST PULMONARY ARTERY PRESSURE: 23 MMHG

## 2022-12-13 NOTE — TELEPHONE ENCOUNTER
Echo showed relatively normal heart function. Nothing acute.   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 She recommended reimaging in 2-3 mos to ensure size stability, but otherwise it was not an area of concern.

## 2022-12-13 NOTE — TELEPHONE ENCOUNTER
I can't really say for sure if the cyst is causing the headaches, but as it has likely been there for quite some time, it's less likely to contribute to the HA.   HA can be due to covid and/or the hormone changes. There is not a specific blood test, but we do have a long covid clinic that I willr efer her to.

## 2022-12-14 ENCOUNTER — TELEPHONE (OUTPATIENT)
Dept: SURGERY | Facility: CLINIC | Age: 52
End: 2022-12-14
Payer: COMMERCIAL

## 2022-12-14 ENCOUNTER — PATIENT MESSAGE (OUTPATIENT)
Dept: FAMILY MEDICINE | Facility: CLINIC | Age: 52
End: 2022-12-14
Payer: COMMERCIAL

## 2022-12-14 NOTE — TELEPHONE ENCOUNTER
Called the patient and told her Dr Cueva offered her surgery on 1/5/23.  She agreed to the day.  Medical clearance form faxed to Dr Hanks's office.  Told the patient they may want her to go to the office for a visit, or not, but she is agreeable to whatever she needs.  Also reminded her to call for a pre-op appt to make the nurse visit she needs.  She verbalized understanding.

## 2022-12-14 NOTE — TELEPHONE ENCOUNTER
Called the patient to see how she is feeling.  All of her tests have come back normal.  She states she has some headaches but over all feels like she is better.  She is ready to schedule her breast surgery.  Told her I will pass this info onto Dr Cueva and Dr Tirado and we will get back with her with a date as soon as we can.

## 2022-12-19 ENCOUNTER — TELEPHONE (OUTPATIENT)
Dept: FAMILY MEDICINE | Facility: CLINIC | Age: 52
End: 2022-12-19
Payer: COMMERCIAL

## 2022-12-19 ENCOUNTER — TELEPHONE (OUTPATIENT)
Dept: SURGERY | Facility: CLINIC | Age: 52
End: 2022-12-19
Payer: COMMERCIAL

## 2022-12-19 NOTE — TELEPHONE ENCOUNTER
----- Message from Jose Miguel Fontaine sent at 12/19/2022  1:16 PM CST -----  Type: Needs Medical Advice  Who Called:  Shellie/ Dr. Cueva     [facility] Call Back Number: [phone]  Additional Information: Caller states that she would like a callback regarding the status of a surgical clearance fax that was sent on 12/16/22

## 2022-12-19 NOTE — TELEPHONE ENCOUNTER
Spoke w/ Shellie. Pt had to have surg postponed a couple of times. Pt is now ready to have surg on 1/5/22, need clearance. Advised that pt should come in for appt to be cleared. She will let pt know and have her sched w/ first available, she will refax the paperwork.

## 2022-12-19 NOTE — TELEPHONE ENCOUNTER
Called the patient and told her that Silvia from Dr Hanks's office called me and said because it's been 30 days since her last visit with them she will need to be seen again before they will fill in a medical clearance form for surgery.  Told her we did send it on Friday and sent a message to Dr Hanks as well.  She will call them and see what to do.

## 2022-12-19 NOTE — TELEPHONE ENCOUNTER
----- Message from Jennifer Rdz sent at 12/19/2022  4:15 PM CST -----  .Type:  Patient Call Back    Who Called: PT       Does the patient know what this is regarding?: PT CALLED TO SPEAK WITH THE OFFICE ABOUT THE SURGERY CLEARANCE PAPERWORK     Would the patient rather a call back YES     Best Call Back Number: [phone]    Additional Information: Thank You

## 2022-12-19 NOTE — TELEPHONE ENCOUNTER
Spoke with pt. She reported that she was seen in clinic on 11/17/22. Pt requesting if there is any way to get clearance for her surgery with out having to schedule another appt d/t recently being seen in office. Notified pt that I will discuss with Dr. Hanks and then let her know what Dr. Hanks further advises and recommends. Pt ok with VV if necessary. Pt verbalized understanding.     Pre-op testing on 12/30/22.

## 2022-12-21 ENCOUNTER — DOCUMENTATION ONLY (OUTPATIENT)
Dept: SURGERY | Facility: CLINIC | Age: 52
End: 2022-12-21
Payer: COMMERCIAL

## 2022-12-21 NOTE — TELEPHONE ENCOUNTER
Spoke with pt. Notified her that Dr. Hanks signed the sx clearance. Faxed to Dr. Cueva at [facility] at [phone] with success.

## 2022-12-22 ENCOUNTER — TELEPHONE (OUTPATIENT)
Dept: HEMATOLOGY/ONCOLOGY | Facility: CLINIC | Age: 52
End: 2022-12-22
Payer: COMMERCIAL

## 2022-12-22 ENCOUNTER — TELEPHONE (OUTPATIENT)
Dept: FAMILY MEDICINE | Facility: CLINIC | Age: 52
End: 2022-12-22
Payer: COMMERCIAL

## 2022-12-22 NOTE — TELEPHONE ENCOUNTER
----- Message from Sherrell Butcher LPN sent at 12/21/2022  5:18 PM CST -----  Contact: Shellie    ----- Message -----  From: Daphne Palomo  Sent: 12/21/2022   4:49 PM CST  To: Demario Sewell Staff    Type:  Needs Medical Advice    Who Called: Shellie Guevara w/[facility]  Best Call Back Number: phone [phone] fax [phone]  Additional Information: medical clearance form for 1/5/23 MASTECTOMY, SIMPLE [1188]  BIOPSY, LYMPH NODE, SENTINEL [237] RECONSTRUCTION, BREAST, USING RUTH FREE FLAP [4513] REPAIR, NERVE USING ALLOGRAFT [0409]

## 2022-12-22 NOTE — TELEPHONE ENCOUNTER
I spoke with the patient asked asked to move her post op PT appt to after her mastectomy on January. She said this was the 3rd time rescheduling and hopefully the last. She voiced acceptance of PT appt.    ----- Message from Veronica Winters sent at 12/22/2022 10:53 AM CST -----  Type: Needs Medical Advice  Who Called:  Patient   Symptoms (please be specific):    How long has patient had these symptoms:    Pharmacy name and phone #:    Best Call Back Number: [phone]  Additional Information: Patient is requesting a call back to reschedule her appt. on 12/28 at 10:00.

## 2022-12-23 ENCOUNTER — TELEPHONE (OUTPATIENT)
Dept: FAMILY MEDICINE | Facility: CLINIC | Age: 52
End: 2022-12-23
Payer: COMMERCIAL

## 2022-12-23 NOTE — TELEPHONE ENCOUNTER
Spoke with pt. Notified that pre op clearance sent on 12/22/2022.  Pt verbalized understanding.

## 2022-12-23 NOTE — TELEPHONE ENCOUNTER
----- Message from Patrizia Leung sent at 12/23/2022 12:52 PM CST -----  Who Called: Patient    What is the reqeust in detail: Returning phone call from Nurse Ninfa. Patient mentioned that she has already received confirmation from her surgeons office that Dr lockhart has already completed the necessary clearance forms. Please advise.     Can the clinic reply by [facility]? No    Best Call Back Number: [phone]      Additional Information:

## 2023-01-13 ENCOUNTER — PATIENT MESSAGE (OUTPATIENT)
Dept: FAMILY MEDICINE | Facility: CLINIC | Age: 53
End: 2023-01-13
Payer: COMMERCIAL

## 2023-01-25 ENCOUNTER — TELEPHONE (OUTPATIENT)
Dept: HEMATOLOGY/ONCOLOGY | Facility: CLINIC | Age: 53
End: 2023-01-25
Payer: COMMERCIAL

## 2023-01-25 NOTE — NURSING
Oncotype results back.  Spoke to pt. Scheduled f/u appt with Dr. Coelho for tomorrow.  Asked her to call with any questions or concerns.  Pt verbalized understanding.

## 2023-01-26 ENCOUNTER — OFFICE VISIT (OUTPATIENT)
Dept: HEMATOLOGY/ONCOLOGY | Facility: CLINIC | Age: 53
End: 2023-01-26
Payer: COMMERCIAL

## 2023-01-26 VITALS
SYSTOLIC BLOOD PRESSURE: 120 MMHG | OXYGEN SATURATION: 98 % | DIASTOLIC BLOOD PRESSURE: 71 MMHG | HEART RATE: 69 BPM | WEIGHT: 184.19 LBS | BODY MASS INDEX: 29.6 KG/M2 | RESPIRATION RATE: 18 BRPM | HEIGHT: 66 IN | TEMPERATURE: 97 F

## 2023-01-26 DIAGNOSIS — C50.912 INVASIVE DUCTAL CARCINOMA OF LEFT BREAST IN FEMALE: Primary | ICD-10-CM

## 2023-01-26 PROCEDURE — 1160F RVW MEDS BY RX/DR IN RCRD: CPT | Mod: CPTII,S$GLB,, | Performed by: INTERNAL MEDICINE

## 2023-01-26 PROCEDURE — 99999 PR PBB SHADOW E&M-EST. PATIENT-LVL III: ICD-10-PCS | Mod: PBBFAC,,, | Performed by: INTERNAL MEDICINE

## 2023-01-26 PROCEDURE — 3078F DIAST BP <80 MM HG: CPT | Mod: CPTII,S$GLB,, | Performed by: INTERNAL MEDICINE

## 2023-01-26 PROCEDURE — 1111F PR DISCHARGE MEDS RECONCILED W/ CURRENT OUTPATIENT MED LIST: ICD-10-PCS | Mod: CPTII,S$GLB,, | Performed by: INTERNAL MEDICINE

## 2023-01-26 PROCEDURE — 1111F DSCHRG MED/CURRENT MED MERGE: CPT | Mod: CPTII,S$GLB,, | Performed by: INTERNAL MEDICINE

## 2023-01-26 PROCEDURE — 3008F PR BODY MASS INDEX (BMI) DOCUMENTED: ICD-10-PCS | Mod: CPTII,S$GLB,, | Performed by: INTERNAL MEDICINE

## 2023-01-26 PROCEDURE — 3074F SYST BP LT 130 MM HG: CPT | Mod: CPTII,S$GLB,, | Performed by: INTERNAL MEDICINE

## 2023-01-26 PROCEDURE — 3074F PR MOST RECENT SYSTOLIC BLOOD PRESSURE < 130 MM HG: ICD-10-PCS | Mod: CPTII,S$GLB,, | Performed by: INTERNAL MEDICINE

## 2023-01-26 PROCEDURE — 3008F BODY MASS INDEX DOCD: CPT | Mod: CPTII,S$GLB,, | Performed by: INTERNAL MEDICINE

## 2023-01-26 PROCEDURE — 99999 PR PBB SHADOW E&M-EST. PATIENT-LVL III: CPT | Mod: PBBFAC,,, | Performed by: INTERNAL MEDICINE

## 2023-01-26 PROCEDURE — 99214 PR OFFICE/OUTPT VISIT, EST, LEVL IV, 30-39 MIN: ICD-10-PCS | Mod: S$GLB,,, | Performed by: INTERNAL MEDICINE

## 2023-01-26 PROCEDURE — 3078F PR MOST RECENT DIASTOLIC BLOOD PRESSURE < 80 MM HG: ICD-10-PCS | Mod: CPTII,S$GLB,, | Performed by: INTERNAL MEDICINE

## 2023-01-26 PROCEDURE — 99214 OFFICE O/P EST MOD 30 MIN: CPT | Mod: S$GLB,,, | Performed by: INTERNAL MEDICINE

## 2023-01-26 PROCEDURE — 1159F MED LIST DOCD IN RCRD: CPT | Mod: CPTII,S$GLB,, | Performed by: INTERNAL MEDICINE

## 2023-01-26 PROCEDURE — 1159F PR MEDICATION LIST DOCUMENTED IN MEDICAL RECORD: ICD-10-PCS | Mod: CPTII,S$GLB,, | Performed by: INTERNAL MEDICINE

## 2023-01-26 PROCEDURE — 1160F PR REVIEW ALL MEDS BY PRESCRIBER/CLIN PHARMACIST DOCUMENTED: ICD-10-PCS | Mod: CPTII,S$GLB,, | Performed by: INTERNAL MEDICINE

## 2023-01-26 NOTE — PROGRESS NOTES
PROGRESS NOTE     Subjective:       Patient ID: Steph Lemus is a 52 y.o. female.  MRN: [id number]  : 1970    Chief Complaint: Breast Cancer     Stage IA (T1c, N0, M0, Grade 1, ER+/NM+/HER2 pending) Invasive ductal carcinoma of the LEFT breast    History of Present Illness:   Steph Lemus is a 52 y.o. female who is referred for newly diagnosed left breast IDC, found on screening mammogram.   She did not palpate a breast mass. She denies any pain or nipple discharge.   She is a smoker, has stopped about a week ago and is motivated to stop long term.     Menarche at age 9.   . Age at first live birth 15 years old   Menopause at 50 years old. HRT-divigel now, took for 2 years     FH  Father- throat cancer- dx 73  Paternal Grandfather- Lung cancer- dx at 68  Paternal Uncle- Lung cancer, Dx at 60   Cousin- maternal- breast cancer dx at 36  Maternal cousin-Lung cancer- dx at 40    Interim history:  Dx with Covid 5 in October and had a difficult recovery, surgery was delayed. . Her surgery was canceled. Had follow up imaging while she was waiting that showed stable breast mass and borderline enlarging left axillary node. It was biopsied to be benign.     On 23, she underwent b/l mastectomy and left sentinel node exam. She is recovering well, has post op soreness.     Oncology History:  22: Screening mammogram  Impression:  Left  Focal Asymmetry: Left breast focal asymmetry at the anterior 12 o'clock position. Assessment: 0 - Incomplete. Diagnostic Mammogram and/or Ultrasound is recommended.      Right  There is no mammographic evidence of malignancy in the right breast.     BI-RADS Category:   Overall: 0 - Incomplete: Needs Additional Imaging Evaluation    22:  Diagnostic mammogram:  Impression:  Left  Mass: Left breast 18 mm x 9 mm x 9 mm mass at the 12 o'clock position. Assessment: 4 - Suspicious finding. Biopsy is recommended.      An 8  mm satellite mass is present 0.5 cm remote from the aforementioned mass at 12 o'clock.      BI-RADS Category:   Overall: 4 - Suspicious     8/3/22:   1. Breast, left, 12 o'clock, 6 cm from nipple, ultrasound-guided core needle   biopsies:   - Invasive ductal carcinoma       - Present in 4 of 6 core fragments (3 mm in greatest contiguous dimension)       - Brandon-Jimenez modified SBR score 2 + 2 + 1 = 5 of 9       - Histologic grade 1 of 3       - Mitotic index = 0.3 (average mitoses per high power field) (low)   - Biomarkers, assessed by immunohistochemistry on block 1A       - Estrogen Receptor (ER):  Positive (95%; strong intensity)       - Progesterone Receptor (DE):  Positive (70%; moderate intensity)       - HER2:  Equivocal (2+)       - HER2 FISH pending; results to follow in a supplemental report       - Ki-67 proliferation index:  Approximately 20% in hot-spot (intermediate)   - Lymphovascular invasion not identified   - Ductal carcinoma in situ       - Associated with invasive tumor       - Nuclear grade 2 of 3       - Cribriform pattern       - Central comedonecrosis: Approximately 20%   - Background breast contains fragments of fibroadenoma with myxoid change   2. Breast, left, 12 o'clock, 7 cm from nipple, ultrasound-guided core needle   biopsies:   - Fibroadenoma with myxoid change     8/15/22: MRI   Impression:  Left  Mass: Left breast 15 mm x 12 mm x 20 mm mass at the 12 o'clock position. Assessment: 6 - Known biopsy, proven malignancy.      Right  There is no MR evidence of malignancy in the right breast.    1/5/23  1.  LEFT BREAST, SKIN AND NIPPLE SPARING MASTECTOMY:   - IN SITU AND INVASIVE DUCT CARCINOMA, 0.7 CM, LOW-GRADE (1, 2, 1).     - FIBROADENOMA (ADJACENT TO INVASIVE TUMOR).     - BIOPSY SITE REPAIR REACTIONS.     2.  LEFT BREAST, RE-EXCISION ANTERIOR MARGIN:   - ADIPOSE TISSUE NEGATIVE FOR TUMOR.     3.  LEFT AXILLARY SENTINEL LYMPH NODE BIOPSY:   - FIVE LYMPH NODES, ALL NEGATIVE FOR  METASTATIC CARCINOMA (0/5).     - ONE LYMPH NODE WITH CAPSULAR MERARY NEVUS.     4.  RIGHT BREAST, SKIN AND NIPPLE SPARING MASTECTOMY:   - NO TUMOR SEEN.      pT1b pN0(sn)     History:  Past Medical History:   Diagnosis Date    Acid reflux     Breast cancer 08/2022    Stage IA (T1c, N0, M0, Grade 1, ER+/UT+/HER2 pending) Invasive ductal carcinoma of the LEFT breast    Epistaxis, recurrent     none since cauterization    Hormone replacement therapy     Hyperlipidemia     Invasive ductal carcinoma of left breast in female 09/2022      Past Surgical History:   Procedure Laterality Date    APPENDECTOMY      BREAST BIOPSY Left 08/03/2022    idc    COLONOSCOPY N/A 04/15/2019    Procedure: COLONOSCOPY;  Surgeon: Tad Suarez Jr., MD;  Location: [facility];  Service: Endoscopy;  Laterality: N/A;    DILATION AND CURETTAGE OF UTERUS      for uterine polyps    ENDOMETRIAL ABLATION      ENDOSCOPIC NASAL CAUTERIZATION N/A 08/11/2020    Procedure: CAUTERIZATION, NOSE, ENDOSCOPIC;  Surgeon: Surinder Acevedo MD;  Location: [facility];  Service: ENT;  Laterality: N/A;    LAPAROSCOPIC APPENDECTOMY N/A 09/22/2020    Procedure: APPENDECTOMY, LAPAROSCOPIC;  Surgeon: Shai Ludwig MD;  Location: [facility] OR;  Service: General;  Laterality: N/A;    RECONSTRUCTION OF BREAST WITH DEEP INFERIOR EPIGASTRIC ARTERY  (RUTH) FREE FLAP Bilateral 1/5/2023    Procedure: RECONSTRUCTION, BREAST, USING RUTH FREE FLAP;  Surgeon: Michel Tirado MD;  Location: [facility] OR;  Service: Plastics;  Laterality: Bilateral;    REPAIR, NERVE USING ALLOGRAFT Bilateral 1/5/2023    Procedure: REPAIR, NERVE USING ALLOGRAFT;  Surgeon: Michel Tirado MD;  Location: [facility] OR;  Service: Plastics;  Laterality: Bilateral;    SENTINEL LYMPH NODE BIOPSY Left 1/5/2023    Procedure: BIOPSY, LYMPH NODE, SENTINEL;  Surgeon: Simona Cueva MD;  Location: [facility] OR;  Service: General;  Laterality: Left;    SIMPLE MASTECTOMY Bilateral 1/5/2023    Procedure: MASTECTOMY,  SIMPLE;  Surgeon: Simona Cueva MD;  Location: [facility];  Service: General;  Laterality: Bilateral;    TONSILLECTOMY       Family History   Problem Relation Age of Onset    Hyperlipidemia Mother     Hypertension Mother     Diabetes Mother     Hypertension Father     Cancer Father         throat    Pacemaker/defibrilator Father     Diabetes Maternal Grandfather     Cancer Maternal Grandfather     Cancer Paternal Grandfather     Breast cancer Cousin 40    BRCA 1/2 Cousin     Colon cancer Neg Hx     Ovarian cancer Neg Hx       Social History     Tobacco Use    Smoking status: Former     Packs/day: 0.10     Years: 25.00     Pack years: 2.50     Types: Cigarettes     Quit date: 2022     Years since quittin.4    Smokeless tobacco: Never   Substance and Sexual Activity    Alcohol use: Not Currently     Alcohol/week: 4.0 standard drinks     Types: 4 Glasses of wine per week     Comment: 6 drinks weekly    Drug use: No    Sexual activity: Yes     Partners: Male     Birth control/protection: Other-see comments        ROS:   Review of Systems   Constitutional:  Positive for malaise/fatigue. Negative for fever and weight loss.   HENT:  Negative for congestion, hearing loss, nosebleeds and sore throat.    Eyes:  Negative for double vision and photophobia.   Respiratory:  Negative for cough, hemoptysis, sputum production, shortness of breath and wheezing.    Cardiovascular:  Negative for chest pain, palpitations, orthopnea and leg swelling.   Gastrointestinal:  Negative for abdominal pain, blood in stool, constipation, diarrhea, heartburn, nausea and vomiting.   Genitourinary:  Negative for dysuria, frequency, hematuria and urgency.   Musculoskeletal:  Negative for back pain, joint pain and myalgias.   Skin:  Negative for itching and rash.   Neurological:  Negative for tingling, seizures, weakness and headaches.   Endo/Heme/Allergies:  Negative for polydipsia. Does not bruise/bleed easily.   Psychiatric/Behavioral:   "Negative for depression and memory loss. The patient is not nervous/anxious and does not have insomnia.       Objective:     Vitals:    01/26/23 1325   BP: 120/71   Pulse: 69   Resp: 18   Temp: 97.1 °F (36.2 °C)   TempSrc: Temporal   SpO2: 98%   Weight: 83.6 kg (184 lb 3.1 oz)   Height: 5' 6" (1.676 m)   PainSc:   2   PainLoc: Breast       Physical Examination:   Physical Exam  Vitals and nursing note reviewed.   Constitutional:       General: She is not in acute distress.     Appearance: She is not diaphoretic.   HENT:      Head: Normocephalic.      Mouth/Throat:      Pharynx: No oropharyngeal exudate.   Eyes:      General: No scleral icterus.     Conjunctiva/sclera: Conjunctivae normal.   Neck:      Thyroid: No thyromegaly.   Cardiovascular:      Rate and Rhythm: Normal rate and regular rhythm.      Heart sounds: Normal heart sounds. No murmur heard.  Pulmonary:      Effort: Pulmonary effort is normal. No respiratory distress.      Breath sounds: No stridor. No wheezing or rales.   Chest:      Chest wall: No tenderness.      Comments: + left breast mass, stable  Abdominal:      General: Bowel sounds are normal. There is no distension.      Palpations: Abdomen is soft. There is no mass.      Tenderness: There is no abdominal tenderness. There is no rebound.   Musculoskeletal:         General: No tenderness or deformity. Normal range of motion.      Cervical back: Neck supple.   Lymphadenopathy:      Cervical: No cervical adenopathy.   Skin:     General: Skin is warm and dry.      Findings: No erythema or rash.      Comments: B/l mastectomy with flap reconstruction    Neurological:      Mental Status: She is alert and oriented to person, place, and time.      Cranial Nerves: No cranial nerve deficit.      Coordination: Coordination normal.      Gait: Gait is intact.   Psychiatric:         Mood and Affect: Affect normal.         Cognition and Memory: Memory normal.         Judgment: Judgment normal.        Diagnostic " Tests:  Significant Imaging: I have reviewed and interpreted all pertinent imaging results/findings.  PET Scan No results found for this or any previous visit.      Laboratory Data:  All pertinent labs have been reviewed.    Labs:   Lab Results   Component Value Date    WBC 4.06 02/02/2023    HGB 11.9 (L) 02/02/2023    HCT 37.8 02/02/2023    MCV 97 02/02/2023     02/02/2023       Assessment/Plan:   Invasive ductal carcinoma of left breast in female  Stage IA (T1c, N0, M0, Grade 1, ER+/NH+/HER2 2+ by IHC, FISH negative, Invasive ductal carcinoma of the LEFT breast  pT1b N0 (sn)     She is s/p b/l mastectomy with flap reconstruction.   Oncotype is low risk at 7, with 3% risk of distant recurrence with AI or Tamoxifen.   She is post menopausal based on labs and could be offered with AI or Tamoxifen.     We discussed the role of adjuvant endocrine therapy to reduce to risk of recurrence by 50% in the next 10 years.   Side effects including fatigue, hot flashes, bone loss were discussed. Rare side effects including risk of thrombosis, heart disease, rash, mood changes were also discussed.     Given favorable features and low risk of recurrence, she is not eager to start endocrine therapy and would like to think about it. She will be seen in 6-8 weeks and we will finalize our plan.        ECOG SCORE    0 - Fully active-able to carry on all pre-disease performance without restriction           Discussion:   No follow-ups on file.    Plan was discussed with the patient at length, and she verbalized understanding. Steph was given an opportunity to ask questions that were answered to her satisfaction, and she was advised to call in the interval if any problems or questions arise.    Electronically signed by Angelica Coelho MD      Route Chart for Scheduling    Med Onc Chart Routing      Follow up with physician 2 months.   Follow up with ALBARO    Infusion scheduling note    Injection scheduling note    Labs     Imaging    Pharmacy appointment    Other referrals

## 2023-02-02 ENCOUNTER — LAB VISIT (OUTPATIENT)
Dept: LAB | Facility: HOSPITAL | Age: 53
End: 2023-02-02
Attending: SPECIALIST
Payer: COMMERCIAL

## 2023-02-02 DIAGNOSIS — K21.00 REFLUX ESOPHAGITIS: Primary | ICD-10-CM

## 2023-02-02 DIAGNOSIS — R10.13 ABDOMINAL PAIN, EPIGASTRIC: ICD-10-CM

## 2023-02-02 LAB
ALBUMIN SERPL BCP-MCNC: 3.9 G/DL (ref 3.5–5.2)
ALP SERPL-CCNC: 72 U/L (ref 55–135)
ALT SERPL W/O P-5'-P-CCNC: 27 U/L (ref 10–44)
AMYLASE SERPL-CCNC: 47 U/L (ref 20–110)
ANION GAP SERPL CALC-SCNC: 10 MMOL/L (ref 8–16)
AST SERPL-CCNC: 23 U/L (ref 10–40)
BASOPHILS # BLD AUTO: 0.02 K/UL (ref 0–0.2)
BASOPHILS NFR BLD: 0.5 % (ref 0–1.9)
BILIRUB SERPL-MCNC: 0.3 MG/DL (ref 0.1–1)
BUN SERPL-MCNC: 13 MG/DL (ref 6–20)
CALCIUM SERPL-MCNC: 10 MG/DL (ref 8.7–10.5)
CHLORIDE SERPL-SCNC: 107 MMOL/L (ref 95–110)
CO2 SERPL-SCNC: 25 MMOL/L (ref 23–29)
CREAT SERPL-MCNC: 0.8 MG/DL (ref 0.5–1.4)
DIFFERENTIAL METHOD: ABNORMAL
EOSINOPHIL # BLD AUTO: 0.1 K/UL (ref 0–0.5)
EOSINOPHIL NFR BLD: 1.5 % (ref 0–8)
ERYTHROCYTE [DISTWIDTH] IN BLOOD BY AUTOMATED COUNT: 13.9 % (ref 11.5–14.5)
EST. GFR  (NO RACE VARIABLE): >60 ML/MIN/1.73 M^2
GLUCOSE SERPL-MCNC: 72 MG/DL (ref 70–110)
HCT VFR BLD AUTO: 37.8 % (ref 37–48.5)
HGB BLD-MCNC: 11.9 G/DL (ref 12–16)
IMM GRANULOCYTES # BLD AUTO: 0.01 K/UL (ref 0–0.04)
IMM GRANULOCYTES NFR BLD AUTO: 0.2 % (ref 0–0.5)
LIPASE SERPL-CCNC: 20 U/L (ref 4–60)
LYMPHOCYTES # BLD AUTO: 1.4 K/UL (ref 1–4.8)
LYMPHOCYTES NFR BLD: 33.7 % (ref 18–48)
MCH RBC QN AUTO: 30.4 PG (ref 27–31)
MCHC RBC AUTO-ENTMCNC: 31.5 G/DL (ref 32–36)
MCV RBC AUTO: 97 FL (ref 82–98)
MONOCYTES # BLD AUTO: 0.4 K/UL (ref 0.3–1)
MONOCYTES NFR BLD: 9.6 % (ref 4–15)
NEUTROPHILS # BLD AUTO: 2.2 K/UL (ref 1.8–7.7)
NEUTROPHILS NFR BLD: 54.5 % (ref 38–73)
NRBC BLD-RTO: 0 /100 WBC
PLATELET # BLD AUTO: 278 K/UL (ref 150–450)
PMV BLD AUTO: 9.8 FL (ref 9.2–12.9)
POTASSIUM SERPL-SCNC: 4.5 MMOL/L (ref 3.5–5.1)
PROT SERPL-MCNC: 6.7 G/DL (ref 6–8.4)
RBC # BLD AUTO: 3.91 M/UL (ref 4–5.4)
SODIUM SERPL-SCNC: 142 MMOL/L (ref 136–145)
WBC # BLD AUTO: 4.06 K/UL (ref 3.9–12.7)

## 2023-02-02 PROCEDURE — 80053 COMPREHEN METABOLIC PANEL: CPT | Performed by: SPECIALIST

## 2023-02-02 PROCEDURE — 82150 ASSAY OF AMYLASE: CPT | Performed by: SPECIALIST

## 2023-02-02 PROCEDURE — 36415 COLL VENOUS BLD VENIPUNCTURE: CPT | Mod: PO | Performed by: SPECIALIST

## 2023-02-02 PROCEDURE — 83690 ASSAY OF LIPASE: CPT | Performed by: SPECIALIST

## 2023-02-02 PROCEDURE — 85025 COMPLETE CBC W/AUTO DIFF WBC: CPT | Performed by: SPECIALIST

## 2023-02-24 ENCOUNTER — CLINICAL SUPPORT (OUTPATIENT)
Dept: REHABILITATION | Facility: HOSPITAL | Age: 53
End: 2023-02-24
Payer: COMMERCIAL

## 2023-02-24 DIAGNOSIS — Z91.89 AT RISK FOR LYMPHEDEMA: ICD-10-CM

## 2023-02-24 DIAGNOSIS — C50.912 INVASIVE DUCTAL CARCINOMA OF LEFT BREAST IN FEMALE: ICD-10-CM

## 2023-02-24 PROCEDURE — 97164 PT RE-EVAL EST PLAN CARE: CPT | Mod: PN

## 2023-02-24 NOTE — PATIENT INSTRUCTIONS
Pelvic tilts ? Lie on your back with your knees bent and feet flat on the floor. Tighten your stomach muscles and press your lower back down to the floor. Try squeezing a pillow between the knees, holding for 5 sec.  Relax. Repeat 2-3 sets of 10.  Diaphragm breathing  3. Lower trunk rotations: lying on your back with knees bent feet flat on floor, gentle rock knees side to side in comfortable range 2-3 sets 10

## 2023-02-24 NOTE — PROGRESS NOTES
[facility]  Lymphedema Physical Therapy   PRE-OP EVALUATION    Visit Date: 2/24/2023     Name: Steph Lemus  Clinic Number: [id number]  Therapy Diagnosis:   Encounter Diagnoses   Name Primary?    Invasive ductal carcinoma of left breast in female     At risk for lymphedema      Physician: Simona Cueva MD  Physician Orders: PT Eval and Treat  Medical Diagnosis: Invasive ductal carcinoma of left breast in female, at risk for lymphedema  Chart review pertaining to cancer hx: Bilat mastectomy, left SLNB, RUTH reconInvasive ductal carcinoma of left breast in female  Staging form: Breast, AJCC 8th Edition  - Clinical stage from 8/13/2022: cT1c, cN0, cM0, G1, ER+, WA+ - Unsigned   Recommended Therapy:  Genetic testing- referral to genetic counselor   Plastics  Surgery  Endocrine Therapy  Recommendations pending final path    Surgery date: bilateral skin and nipple sparing mastectomy, left SLNB, RUTH on 01/16/2023.  Radiation: none needed  Chemotherapy: none  Evaluation Date: 10/05/2023 Prehab  Authorization: pending  Plan of Care: PT f/u 8 weeks post-op    Visit #: 2 / 3, requesting additional 2x week 6 weeks  PTA visit: n/a  Time In: 11:05 Am  Time Out: 12:05 PM  Total Billable Time: 60 minutes    Precautions: Standard, cancer, and avoid IV, BP, blood draws, injections in the left arm    Subjective     Pt reports: It has been 7 weeks since having the bilateral skin and nipple sparing mastectomy, left SLNB, RUTH on 01/16/2023. I'm having soreness to the center of chest between the breast (was told it was because of the procedure had to take cartilage from the sternum) also having discomfort to the stomach like a bubble is in there. Dr Light wants me to have an ultra sound on wed the 1st make sure no fluid still there. Still having soreness under the left axilla and numbness, putting deodarant still a challenge.  Every now and then will get a tingling sensation down  both arms and  down my sides, mainly at nighttime, getting better not as frequent.  I have been back at work, did purchase an adjustable table top to alternate sitting and standing, since sitting too long causes my abdomen to feel tight every time I stand.      Pain: current 1-2/10 at rest,   Location: left axilla, lower abdomen tightness  Current 2/10, Worst 3/10      Past Medical History:   Past Medical History:   Diagnosis Date    Acid reflux     Breast cancer 08/2022    Stage IA (T1c, N0, M0, Grade 1, ER+/ME+/HER2 pending) Invasive ductal carcinoma of the LEFT breast    Epistaxis, recurrent     none since cauterization    Hormone replacement therapy     Hyperlipidemia     Invasive ductal carcinoma of left breast in female 09/2022       Past Surgical History:  has a past surgical history that includes Dilation and curettage of uterus; Endometrial ablation; Tonsillectomy; Colonoscopy (N/A, 04/15/2019); Endoscopic nasal cauterization (N/A, 08/11/2020); Laparoscopic appendectomy (N/A, 09/22/2020); Appendectomy; Breast biopsy (Left, 08/03/2022); Simple mastectomy (Bilateral, 1/5/2023); [place] lymph node biopsy (Left, 1/5/2023); Reconstruction of breast with deep inferior epigastric artery  (RUTH) free flap (Bilateral, 1/5/2023); and repair, nerve using allograft (Bilateral, 1/5/2023).    Medications: has a current medication list which includes the following prescription(s): alprazolam, fluticasone propionate, multivitamin, pantoprazole, and rosuvastatin, and the following Facility-Administered Medications: lidocaine (pf) 10 mg/ml (1%), midazolam, midazolam, and piperacillin sodium/tazobactam.    Allergies:   Review of patient's allergies indicates:   Allergen Reactions    Augmentin [amoxicillin-pot clavulanate]      Stomach pains          Hand Dominance: Right  Diet: well rounded, eating fruits and vegis  Habitus: well developed, well nourished    Prior Therapy/Previous treatment included: No PT this calendar year.  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   Prior Exercise Routine: walk, rides bike    Patient's Goals: reduce discomfort in left armpit, strengthen core    Objective   Pt wearing abdominal binder provided by Dr. Tirado.    Mental Status: Alert/Oriented    Observations  Posture: wnl  Joint Integrity: WFLs bilateral  Skin Integrity: intact bilateral, intact abdominal scarring, decreased pliability,   Edema: bilateral UE relatively symmetrical, fullness to lower abdomen below abdominal scarring    Sensation  Light Touch: intact bilateral, numbness to lower abdomen, breast, left axilla  Proprioception: intact bilateral    A/PROM  (L) UE: WFLs, discomfort into left axilla with end range shoulder flexion as well as abduction. Mild cording noted into axilla, none palpable to upper arm.  (R) UE: WFLs  Limitations:none      STRENGTH  (L) UE: grossly 4/5  (R) UE: grossly 4/5  Limitations:none  Patient with limited core stability noted during UE MMT, pt reports has difficulty lifting objects with UE as core not feeling stable.   Functionally extra time with transitions rolling to side lying, supine to sit as well as sit to stand to full upright posture due to abdominal tightness.    Baseline Measurements of BUEs  LANDMARK LEFT UE (cm) RIGHT UE (cm) LEFT UE (cm) RIGHT   Axilla 33.1 cm 33.4 cm 34.5 34.5   W +  17 inches 30.1 cm 30.9 cm 33.6 34.1   W +  14  inches 26.0 cm 26.6 cm 29.3 30.7   Elbow 25.1 cm 26.2 cm 25.0 26.3   W + 7 inches 21.5 cm 22.8 cm 24.6 26.3   W +  5 inches 16.0 cm 15.8 cm 21.8 24.2   Wrist 18.7 cm 19.5 cm 16.0 16.3   DPC 6.0 cm 6.0 cm 18.9 19.4   thumb   5.9 6.2   Garments recommended: Has not purchased garment at this time, "didn't think my arm was swollen."  GARMENT RECOMMENDATIONS Provided in writing to patient.  Marylin Gamble " "Lite upper arm sleeve with silicone band at top, in size medium length is regular in 15-20 mmHg  Jobst Mavis Lite gauntlet in size medium with 15-20 mmHg  Wearing this after 6 weeks post surgery for one year during the day, can remove at night. Recommendation to lotion arm in evening and inspect skin daily.   Also wear garments during air travel..     Functional Mobility   Bed mobility: independent   Roll to left: MOD independent   Roll to right: MOD independent   Supine to prone: NT  Scooting to edge of bed: independent   Supine to sit: MOD independent   Sit to supine: MOD independent   Transfers to bed: independent   Transfers to toilet: independent   Sit to stand: independent   Stand pivot: independent   Car transfers: independent     Gait Assessment  AD used: none  Assistance: independent  Distance: community distances  Endurance: WFL     Gait Pattern: wnl       Treatment/Education     Time In: 11:05 Am  Time Out: 12:05 PM  Total Treatment time separate from Evaluation: 30 minutes      Therapeutic Exercise to develop ROM and flexibility for 15 minutes including:  Supine Deep Diaphragm Breathing  Supine hook lying PPT alternating Ant/post , instructing on avoid pushing through LE's, using only lower abdominals.  Supine hook lying PPT with hip add pillow squeeze hold 5 sec x 10 reps (pt stating "it helps to squeeze the pillow")  LTR's 2x10 reps  Encouraged pt to continue post surgical exercises for continued self stretch into pain free range shoulder flexion and abduction.  Mepiform silicone scar tape applied to lower abdominal scarring, instructed pt to remove if any irritation/itching noted. Can leave in place for up to 72 hrs, do skin check, remove and let skin breathe for 24 hrs then can reapply.     Per approval of Dr. Tirado following abdominal US results;  Next session:  MLD to L UE, trunk for lower abdominal swelling, gentle core strengthening, scar massage, Lympha touch to L axilla and lower abdominal " region. Monitor response to scar taping.       Education: Instructed on general anatomy/physiology, lymphedema information (definitions, signs, symptoms, precautions), role of therapy in multi-disciplinary team, purpose of lymphedema physical therapy and the benefits/risks of treatment, risks of refusing treatment, POC, and goals for therapy were discussed with the pt.    Written Home Exercises Provided: yes.  Exercises were reviewed and Alison was able to demonstrate them prior to the end of the session. Alison demonstrated good  understanding of the education provided.     See EMR under Patient Instructions for exercises provided 10/5/2022.    Assessment     Steph is a 52 y.o. female referred to outpatient physical therapy with a medical diagnosis of Invasive ductal carcinoma of left breast in female, at risk for lymphedema with surgical procedure on 10/06/2022. Pt was seen today pre-operatively to assess strength and ROM of BUEs, to take baseline circumferential measurements of BUEs to aid in the early detection of lymphedema post-operatively, and to provide pt education on exercises/precautions post-operative. Pt does not exhibit any ROM impairments currently. This pt will benefit from skilled PT for reassessment of baseline measurements 6-8 week post-operatively and to address future impairments following surgery such as pain, limited ROM, or decreased mobility. Will need to wait until pt is medically cleared to determine if pt is safe for MLD, pneumatic compression pump, or lymphatouch treatments.      Plan of care discussed with patient: Yes  Pt's spiritual, cultural and educational needs considered and patient is agreeable to the plan of care and goals as stated below:     Anticipated barriers for therapy:  none    Medical Necessity is demonstrated by the following:  History  Co-morbidities and personal factors that may impact the plan of care Co-morbidities:   history of cancer    Personal Factors:    none     low   Examination  Body Structures and Functions, activity limitations and participation restrictions that may impact the plan of care Body Systems:    gross symmetry    Activity limitations:   Mobility  no deficits    Self care  no deficits    Domestic Life  no deficits    Participation Restrictions:   none         low   Clinical Presentation evolving clinical presentation with changing clinical characteristics moderate   Decision Making/ Complexity Score: low       GOALS  Short Term Goals: 3 months  Pt to be seen for reassessment in 6-8 weeks after surgery.  Pt will demonstrate 100% knowledge of lymphedema precautions and signs of infection.  Pt to obtain compression garments for prophylactic concerns according to APTA clinical guidelines published in Journal of Physical Therapy.    Long Term Goals: deferred    Updated GOALS: 12 visits  Patient to achieve full UE ROM shoulder flexion, abduction and ER pain free for improve function for ADL's.  Patient to demonstrate bed mobility transitions supine<>sit, sit<>stand without compensatory strategies and greater ease, 0/10 pain.  3. Patient to tolerate walking program 20 min 4-5x week without residual abdominal discomfort.  4. Return to PLOF without dependency of abdominal brace.   Plan   Per approval of Dr. Tirado following abdominal US results;  Next session:  6 MWT, Lymphedema  impact scale, MLD to L UE, trunk for lower abdominal swelling, gentle core strengthening, scar massage, Lympha touch to L axilla and lower abdominal region. Monitor response to scar taping.     Plan of Care: 2/24/2023 to 05/24/2023.    Recommend patient to be seen in 2x week for 6 weeks or 12 visits. Patient will benefit from Outpatient Physical Therapy services which may include the following interventions: patient education, HEP, therapeutic exercises, neuromuscular re-education, therapeutic activity, manual therapy, self care/home management, modalities, gait training,  decongestive massage, multi-layered bandaging, self massage, self bandaging, and assistance in obtaining appropriate compression garment.          Josselin Rdz, PT, CLT

## 2023-02-27 ENCOUNTER — TELEPHONE (OUTPATIENT)
Dept: HEMATOLOGY/ONCOLOGY | Facility: CLINIC | Age: 53
End: 2023-02-27
Payer: COMMERCIAL

## 2023-02-27 DIAGNOSIS — Z91.89 AT RISK FOR LYMPHEDEMA: ICD-10-CM

## 2023-02-27 DIAGNOSIS — C50.912 INVASIVE DUCTAL CARCINOMA OF LEFT BREAST IN FEMALE: Primary | ICD-10-CM

## 2023-02-27 NOTE — TELEPHONE ENCOUNTER
Spoke with the patient to schedule PT appts. She said she is seeing Dr. Tirado 36/2 and will speak with him regarding her need for scheduling PT and will let us know. She also asked for the brand name of the tape type for her scar from PT (Mepiform Silicone Tape).

## 2023-03-01 ENCOUNTER — PATIENT MESSAGE (OUTPATIENT)
Dept: FAMILY MEDICINE | Facility: CLINIC | Age: 53
End: 2023-03-01
Payer: COMMERCIAL

## 2023-03-02 ENCOUNTER — OFFICE VISIT (OUTPATIENT)
Dept: FAMILY MEDICINE | Facility: CLINIC | Age: 53
End: 2023-03-02
Payer: COMMERCIAL

## 2023-03-02 VITALS
HEIGHT: 66 IN | DIASTOLIC BLOOD PRESSURE: 80 MMHG | WEIGHT: 181.56 LBS | BODY MASS INDEX: 29.18 KG/M2 | SYSTOLIC BLOOD PRESSURE: 144 MMHG | HEART RATE: 74 BPM

## 2023-03-02 DIAGNOSIS — R35.0 URINARY FREQUENCY: ICD-10-CM

## 2023-03-02 DIAGNOSIS — N30.00 ACUTE CYSTITIS WITHOUT HEMATURIA: Primary | ICD-10-CM

## 2023-03-02 PROCEDURE — 1160F PR REVIEW ALL MEDS BY PRESCRIBER/CLIN PHARMACIST DOCUMENTED: ICD-10-PCS | Mod: CPTII,S$GLB,, | Performed by: STUDENT IN AN ORGANIZED HEALTH CARE EDUCATION/TRAINING PROGRAM

## 2023-03-02 PROCEDURE — 3079F PR MOST RECENT DIASTOLIC BLOOD PRESSURE 80-89 MM HG: ICD-10-PCS | Mod: CPTII,S$GLB,, | Performed by: STUDENT IN AN ORGANIZED HEALTH CARE EDUCATION/TRAINING PROGRAM

## 2023-03-02 PROCEDURE — 99213 PR OFFICE/OUTPT VISIT, EST, LEVL III, 20-29 MIN: ICD-10-PCS | Mod: S$GLB,,, | Performed by: STUDENT IN AN ORGANIZED HEALTH CARE EDUCATION/TRAINING PROGRAM

## 2023-03-02 PROCEDURE — 99999 PR PBB SHADOW E&M-EST. PATIENT-LVL III: ICD-10-PCS | Mod: PBBFAC,,, | Performed by: STUDENT IN AN ORGANIZED HEALTH CARE EDUCATION/TRAINING PROGRAM

## 2023-03-02 PROCEDURE — 87086 URINE CULTURE/COLONY COUNT: CPT | Performed by: STUDENT IN AN ORGANIZED HEALTH CARE EDUCATION/TRAINING PROGRAM

## 2023-03-02 PROCEDURE — 3079F DIAST BP 80-89 MM HG: CPT | Mod: CPTII,S$GLB,, | Performed by: STUDENT IN AN ORGANIZED HEALTH CARE EDUCATION/TRAINING PROGRAM

## 2023-03-02 PROCEDURE — 3077F SYST BP >= 140 MM HG: CPT | Mod: CPTII,S$GLB,, | Performed by: STUDENT IN AN ORGANIZED HEALTH CARE EDUCATION/TRAINING PROGRAM

## 2023-03-02 PROCEDURE — 3077F PR MOST RECENT SYSTOLIC BLOOD PRESSURE >= 140 MM HG: ICD-10-PCS | Mod: CPTII,S$GLB,, | Performed by: STUDENT IN AN ORGANIZED HEALTH CARE EDUCATION/TRAINING PROGRAM

## 2023-03-02 PROCEDURE — 3008F PR BODY MASS INDEX (BMI) DOCUMENTED: ICD-10-PCS | Mod: CPTII,S$GLB,, | Performed by: STUDENT IN AN ORGANIZED HEALTH CARE EDUCATION/TRAINING PROGRAM

## 2023-03-02 PROCEDURE — 99213 OFFICE O/P EST LOW 20 MIN: CPT | Mod: S$GLB,,, | Performed by: STUDENT IN AN ORGANIZED HEALTH CARE EDUCATION/TRAINING PROGRAM

## 2023-03-02 PROCEDURE — 1160F RVW MEDS BY RX/DR IN RCRD: CPT | Mod: CPTII,S$GLB,, | Performed by: STUDENT IN AN ORGANIZED HEALTH CARE EDUCATION/TRAINING PROGRAM

## 2023-03-02 PROCEDURE — 3008F BODY MASS INDEX DOCD: CPT | Mod: CPTII,S$GLB,, | Performed by: STUDENT IN AN ORGANIZED HEALTH CARE EDUCATION/TRAINING PROGRAM

## 2023-03-02 PROCEDURE — 1159F MED LIST DOCD IN RCRD: CPT | Mod: CPTII,S$GLB,, | Performed by: STUDENT IN AN ORGANIZED HEALTH CARE EDUCATION/TRAINING PROGRAM

## 2023-03-02 PROCEDURE — 1159F PR MEDICATION LIST DOCUMENTED IN MEDICAL RECORD: ICD-10-PCS | Mod: CPTII,S$GLB,, | Performed by: STUDENT IN AN ORGANIZED HEALTH CARE EDUCATION/TRAINING PROGRAM

## 2023-03-02 PROCEDURE — 99999 PR PBB SHADOW E&M-EST. PATIENT-LVL III: CPT | Mod: PBBFAC,,, | Performed by: STUDENT IN AN ORGANIZED HEALTH CARE EDUCATION/TRAINING PROGRAM

## 2023-03-02 RX ORDER — LEVOFLOXACIN 750 MG/1
750 TABLET ORAL DAILY
Qty: 5 TABLET | Refills: 0 | Status: SHIPPED | OUTPATIENT
Start: 2023-03-02 | End: 2023-03-07

## 2023-03-02 RX ORDER — FAMOTIDINE 40 MG/1
40 TABLET, FILM COATED ORAL DAILY
COMMUNITY
End: 2023-07-12

## 2023-03-02 NOTE — PROGRESS NOTES
Plan:      Steph was seen today for urinary urgency.    Diagnoses and all orders for this visit:    Acute cystitis without hematuria  -     levoFLOXacin (LEVAQUIN) 750 MG tablet; Take 1 tablet (750 mg total) by mouth once daily. for 5 days    Urinary frequency  -     Cancel: POCT Urinalysis(Instrument)  -     CULTURE, URINE      Follow up if symptoms worsen or fail to improve.    Nisha Sheldon MD  03/02/2023    Subjective:      Patient ID: Steph Lemus is a 52 y.o. female    Chief Complaint   Patient presents with    Urinary Urgency     No abd or back pain related to urinary. Went to  clinic 2/18/23 was dx'd w/ UTI, treated w/ macrobid x 5d.     HPI  52 y.o. female with a PMHx as documented below presents to clinic today for the following:    Pt w/ recent history of UTI (about 2 weeks ago), s/p Macrobid x5 days (prescribed by Urgent Care). Today, pt reports recurrent symptoms including urinary urgency. No reported fever, chill, N/V, flank pain.    ROS  Constitutional:  Negative for chills and fever.   Respiratory:  Negative for shortness of breath.    Cardiovascular:  Negative for chest pain.   Gastrointestinal:  Negative for abdominal pain, constipation, diarrhea, nausea and vomiting.     Current Outpatient Medications   Medication Instructions    ALPRAZolam (XANAX) 0.5 mg, Oral, 2 times daily PRN    famotidine (PEPCID) 40 mg, Oral, Daily    fluticasone propionate (FLONASE) 50 mcg/actuation nasal spray SPRAY 1 SPRAY (50 MCG TOTAL) INTO INTO EACH NOSTRIL EVERY DAY    levoFLOXacin (LEVAQUIN) 750 mg, Oral, Daily    multivitamin (THERAGRAN) per tablet 1 tablet, Oral, Daily,      pantoprazole (PROTONIX) 40 mg, Oral, Daily    rosuvastatin (CRESTOR) 5 MG tablet TAKE 1 TABLET BY MOUTH EVERY DAY      Past Medical History:   Diagnosis Date    Acid reflux     Breast cancer 08/2022    Stage IA (T1c, N0, M0, Grade 1, ER+/IN+/HER2 pending) Invasive ductal carcinoma of the LEFT breast    Epistaxis, recurrent      "none since cauterization    Hormone replacement therapy     Hyperlipidemia     Invasive ductal carcinoma of left breast in female 09/2022      Objective:      Vitals:    03/02/23 1112   BP: (!) 144/80   BP Location: Right arm   Pulse: 74   Weight: 82.3 kg (181 lb 8.8 oz)   Height: 5' 6" (1.676 m)     Body mass index is 29.3 kg/m².    Physical Exam   Constitutional:       General: No acute distress.  HENT:      Head: Normocephalic and atraumatic.   Pulmonary:      Effort: Pulmonary effort is normal. No respiratory distress.   Neurological:      General: No focal deficit present.      Mental Status: Alert and oriented to person, place, and time. Mental status is at baseline.    Assessment:       1. Acute cystitis without hematuria    2. Urinary frequency        Nisha Sheldon MD  [facility]  Office: [phone]   Fax: [phone]  03/02/2023      Disclaimer: This note was partly generated using dictation software which may occasionally result in transcription errors.    Total time spent on this encounter includes face to face time and non-face to face time preparing to see the patient (eg, review of tests), obtaining and/or reviewing separately obtained history, documenting clinical information in the electronic or other health record, independently interpreting results, and communicating results to the patient/family/caregiver, or care coordinator.      "

## 2023-03-04 LAB
BACTERIA UR CULT: NORMAL
BACTERIA UR CULT: NORMAL

## 2023-03-08 ENCOUNTER — TELEPHONE (OUTPATIENT)
Dept: HEMATOLOGY/ONCOLOGY | Facility: CLINIC | Age: 53
End: 2023-03-08
Payer: COMMERCIAL

## 2023-03-09 ENCOUNTER — CLINICAL SUPPORT (OUTPATIENT)
Dept: REHABILITATION | Facility: HOSPITAL | Age: 53
End: 2023-03-09
Payer: COMMERCIAL

## 2023-03-09 DIAGNOSIS — C50.912 INVASIVE DUCTAL CARCINOMA OF LEFT BREAST IN FEMALE: ICD-10-CM

## 2023-03-09 DIAGNOSIS — Z91.89 AT RISK FOR LYMPHEDEMA: ICD-10-CM

## 2023-03-09 PROCEDURE — 97110 THERAPEUTIC EXERCISES: CPT | Mod: PN,CQ

## 2023-03-09 PROCEDURE — 97140 MANUAL THERAPY 1/> REGIONS: CPT | Mod: PN,CQ

## 2023-03-09 NOTE — PROGRESS NOTES
"[facility]  Physical Therapy Progress Note  Lymphedema     Visit Date: 3/9/2023    Name: Steph Lemus  MRN: [id number]  Therapy Diagnosis:   Encounter Diagnoses   Name Primary?    Invasive ductal carcinoma of left breast in female     At risk for lymphedema      Surgery date: bilateral skin and nipple sparing mastectomy, left SLNB, RUTH on 01/16/2023.  Radiation: none needed  Chemotherapy: none  Evaluation Date: 10/05/2023 Prehab  Authorization: pending  Plan of Care: PT f/u 8 weeks post-op     Visit #: 2/ 3, requesting additional 2x week 6 weeks; Eval + 2  PTA visit: 1  Time In: 8:25 AM (late due to traffic)  Time Out: 9:05 AM  Total Billable Time: 40 minutes     Precautions: Standard, cancer, and avoid IV, BP, blood draws, injections in the left arm      Subjective     Pt states: that she purchased Mepiform scar strips and put them on last night; wearing abdominal binder "all the time, even to sleep." Pt also wear compressive Spanx. Having spasms in center of abdomen. Had an ultrasound of abdomen and it was good, just some hematomas "which Dr Tirado said is normal." She feels good in the morning but after standing up and moving around, she gets spasms. Feel tight in armpit when she stretches left arm up.     Pain: 4-5/10   Location: central abdominal area and left axilla    Objective   Pt with Mepiform scar strips along length of abdominal incision    Treatment  Pt received manual therapy x 30 minutes as follows:   Pt received lymphatouch at 55 mmHg to abdominal area and left axilla.  STM to abdominal area with tightness noted centrally.  Manual stretching to left shoulder with tightness noted at end range.   Pt performed therapeutic exercises x 15 minutes as follows:  LTR with arms in "Y" with 10 sec hold ea x 3 reps  Diaphragmatic breathing  Side lying left shoulder abduction x 10  Pelvic tilts with TvA activation 2 x 10  PPT with TvA activation with pillow between knees; holding " 10 secs x 10 reps  Pelvic clocks  Prone PPT x 10  Clamshells x 10 bilaterally      Education Provided  [x] Progress toward goals   [x] Role of therapy   [x] Activity modification  [x] Reviewed HEP    Home Exercises Provided: Patient instructed to continue previously provided HEP.  Exercises were reviewed and Alison was able to demonstrate them prior to the end of the session.  Alison demonstrated good  understanding of the education provided.   Encouraged pt to start weaning off wearing abdominal binder; start by not wearing it to bed.     Assessment     Pt tolerated treatment well, with improved pliability in abdominal incision and left axilla. She is currently wearing an abdominal binder and Spanx; Dr Tirado wanted her to wear binder x 6 weeks. Encouraged pt to start weaning off binder and to be consistent with exercises at home. She presents with weakness in core as well as decreased mobility in abdominal area.     Steph is a 52 y.o. female referred to outpatient physical therapy with a medical diagnosis of Invasive ductal carcinoma of left breast in female, at risk for lymphedema with surgical procedure on 10/06/2022. Pt was seen today pre-operatively to assess strength and ROM of BUEs, to take baseline circumferential measurements of BUEs to aid in the early detection of lymphedema post-operatively, and to provide pt education on exercises/precautions post-operative. Pt does not exhibit any ROM impairments currently. This pt will benefit from skilled PT for reassessment of baseline measurements 6-8 week post-operatively and to address future impairments following surgery such as pain, limited ROM, or decreased mobility. Will need to wait until pt is medically cleared to determine if pt is safe for MLD, pneumatic compression pump, or lymphatouch treatments.   Pt will continue to benefit from skilled outpatient physical therapy to address the deficits listed in the problem list box on initial evaluation,  provide pt/family education and to maximize pt's level of independence in the home and community environment. Pt's spiritual, cultural and educational needs considered and pt agreeable to plan of care and goals.       Anticipated barriers for therapy: none    Goals  GOALS  Short Term Goals: 3 months  Pt to be seen for reassessment in 6-8 weeks after surgery.  Pt will demonstrate 100% knowledge of lymphedema precautions and signs of infection.  Pt to obtain compression garments for prophylactic concerns according to APTA clinical guidelines published in Journal of Physical Therapy.     Long Term Goals: deferred     Updated GOALS: 12 visits  Patient to achieve full UE ROM shoulder flexion, abduction and ER pain free for improve function for ADL's.  Patient to demonstrate bed mobility transitions supine<>sit, sit<>stand without compensatory strategies and greater ease, 0/10 pain.  3. Patient to tolerate walking program 20 min 4-5x week without residual abdominal discomfort.  4. Return to PLOF without dependency of abdominal brace     Plan     Continue with established plan of care working toward PT goals.    Adrienne Love, PTA

## 2023-03-22 ENCOUNTER — CLINICAL SUPPORT (OUTPATIENT)
Dept: REHABILITATION | Facility: HOSPITAL | Age: 53
End: 2023-03-22
Payer: COMMERCIAL

## 2023-03-22 DIAGNOSIS — C50.912 INVASIVE DUCTAL CARCINOMA OF LEFT BREAST IN FEMALE: ICD-10-CM

## 2023-03-22 DIAGNOSIS — Z91.89 AT RISK FOR LYMPHEDEMA: Primary | ICD-10-CM

## 2023-03-22 PROCEDURE — 97110 THERAPEUTIC EXERCISES: CPT | Mod: PN

## 2023-03-22 PROCEDURE — 97140 MANUAL THERAPY 1/> REGIONS: CPT | Mod: PN

## 2023-03-22 NOTE — PATIENT INSTRUCTIONS
Abdominal Brace Series (Tilts, Marches, BKFOs)    Brace core and keep pelvis stable throughout each variation. Don't forget to breathe!    1) Marches: Alternate lifting one leg at a time to table-top position  2) Bent Knee Fall Outs: Let one knee fall out to the side while keeping opposite hip stable. Do not let your pelvis rock.

## 2023-03-22 NOTE — PROGRESS NOTES
"[facility]  Physical Therapy Progress Note  Lymphedema     Visit Date: 3/22/2023    Name: Steph Lemus  MRN: [id number]  Therapy Diagnosis:   Encounter Diagnoses   Name Primary?    At risk for lymphedema Yes    Invasive ductal carcinoma of left breast in female      Surgery date: bilateral skin and nipple sparing mastectomy, left SLNB, RUTH on 01/16/2023.  Radiation: none needed  Chemotherapy: none  Evaluation Date: 10/05/2023 Prehab  Authorization: pending  Plan of Care: PT f/u 8 weeks post-op     Visit #: 2/ 3, requesting additional 2x week 6 weeks; Eval + 3  PTA visit: 0  Time In: 09:00 AM  Time Out: 09:55 AM  Total Billable Time: 55 minutes     Precautions: Standard, cancer, and avoid IV, BP, blood draws, injections in the left arm      Subjective     Pt states: Felt good after last session, I do feel like its helping. Been trying to wean off using the binder but will have it with me as toward the end of the day will still have the spasms. I've been walking daily and doing the exercises but my hips and legs just feel so weak. No pain but tightness and discomfort in the abdomen, its still uncomfortable. Stopped sleeping with the binder on.   Tightness in the left axilla is getting better.  Pt also wear compressive Spanx. Having spasms in center of abdomen. Had an ultrasound of abdomen and it was good, just some hematomas "which Dr Tirado said is normal." She feels good in the morning but after standing up and moving around, she gets spasms. Feel tight in armpit when she stretches left arm up.     Pain: 7/10   Location: central abdominal area   Objective   Pt with Mepiform scar strips along length of abdominal incision    Treatment  Pt received manual therapy x 25 minutes as follows:   Pt received lymphatouch at 55 mmHg to abdominal area and left axilla.  STM to abdominal area with tightness noted centrally.   Deferred due to time: Manual stretching to left shoulder with tightness " "noted at end range.   Pt performed therapeutic exercises x 30 minutes as follows:  Nustep L3 x 10 min   LTR with arms in "Y" with 10 sec hold ea x 3 reps  Diaphragmatic breathing  Side lying left shoulder abduction x 10  Pelvic tilts with TvA activation 2 x 10 incorporating diaphragm breath   PPT with TvA activation with ball between knees; holding 10 secs x 10 reps  PPT with single knee fall out x 10 each  Abdominal Brace Series (Marches, BKFOs):  -Brace core and keep pelvis stable throughout each variation. Don't forget to breathe!  1) Marches: Alternate lifting one leg at a time to table-top position  2) Bent Knee Fall Outs: Let one knee fall out to the side while keeping opposite hip stable. Do not let your pelvis rock.     LTR x 15  Pelvic clocks  Prone PPT x 10  Side Lying Clamshells with heel press 2 x 10 bilaterally      Education Provided  [x] Progress toward goals   [x] Role of therapy   [x] Activity modification  [x] Reviewed HEP    Home Exercises Provided: Patient instructed to continue previously provided HEP. Added core exercises to home program, see in pt instructions.   Exercises were reviewed and Alison was able to demonstrate them prior to the end of the session.  Alison demonstrated good  understanding of the education provided.   Encouraged pt to start weaning off wearing abdominal binder; start by not wearing it to bed.     Assessment     Pt tolerated treatment well, with improved pliability in abdominal incision and left axilla. Educated pt on scar massage to abdominal incision. Mepiform tape removed today after 72 hrs, no irritation noted. She presents with weakness in core, LE/hip weakness as well as decreased mobility in abdominal area.     She is currently wearing an abdominal binder and Spanx; Dr Tirado wanted her to wear binder x 6 weeks. Encouraged pt to start weaning off binder and to be consistent with exercises at home.   Steph is a 52 y.o. female referred to outpatient physical " therapy with a medical diagnosis of Invasive ductal carcinoma of left breast in female, at risk for lymphedema with surgical procedure on 10/06/2022. Pt was seen today pre-operatively to assess strength and ROM of BUEs, to take baseline circumferential measurements of BUEs to aid in the early detection of lymphedema post-operatively, and to provide pt education on exercises/precautions post-operative. Pt does not exhibit any ROM impairments currently. This pt will benefit from skilled PT for reassessment of baseline measurements 6-8 week post-operatively and to address future impairments following surgery such as pain, limited ROM, or decreased mobility. Will need to wait until pt is medically cleared to determine if pt is safe for MLD, pneumatic compression pump, or lymphatouch treatments.   Pt will continue to benefit from skilled outpatient physical therapy to address the deficits listed in the problem list box on initial evaluation, provide pt/family education and to maximize pt's level of independence in the home and community environment. Pt's spiritual, cultural and educational needs considered and pt agreeable to plan of care and goals.       Anticipated barriers for therapy: none    Goals  GOALS  Short Term Goals: 3 months  Pt to be seen for reassessment in 6-8 weeks after surgery.  Pt will demonstrate 100% knowledge of lymphedema precautions and signs of infection.  Pt to obtain compression garments for prophylactic concerns according to APTA clinical guidelines published in Journal of Physical Therapy.     Long Term Goals: deferred     Updated GOALS: 12 visits  Patient to achieve full UE ROM shoulder flexion, abduction and ER pain free for improve function for ADL's.  Patient to demonstrate bed mobility transitions supine<>sit, sit<>stand without compensatory strategies and greater ease, 0/10 pain.  3. Patient to tolerate walking program 20 min 4-5x week without residual abdominal discomfort.  4. Return  to PLOF without dependency of abdominal brace     Plan     Continue with established plan of care working toward PT goals.    Josselin Rdz, PT ,CLT

## 2023-03-27 ENCOUNTER — OFFICE VISIT (OUTPATIENT)
Dept: HEMATOLOGY/ONCOLOGY | Facility: CLINIC | Age: 53
End: 2023-03-27
Payer: COMMERCIAL

## 2023-03-27 VITALS
SYSTOLIC BLOOD PRESSURE: 140 MMHG | DIASTOLIC BLOOD PRESSURE: 88 MMHG | RESPIRATION RATE: 18 BRPM | TEMPERATURE: 97 F | BODY MASS INDEX: 29.3 KG/M2 | HEIGHT: 66 IN | WEIGHT: 182.31 LBS | OXYGEN SATURATION: 94 % | HEART RATE: 70 BPM

## 2023-03-27 DIAGNOSIS — C50.912 INVASIVE DUCTAL CARCINOMA OF LEFT BREAST IN FEMALE: Primary | ICD-10-CM

## 2023-03-27 PROCEDURE — 3008F PR BODY MASS INDEX (BMI) DOCUMENTED: ICD-10-PCS | Mod: CPTII,S$GLB,, | Performed by: INTERNAL MEDICINE

## 2023-03-27 PROCEDURE — 3079F PR MOST RECENT DIASTOLIC BLOOD PRESSURE 80-89 MM HG: ICD-10-PCS | Mod: CPTII,S$GLB,, | Performed by: INTERNAL MEDICINE

## 2023-03-27 PROCEDURE — 3079F DIAST BP 80-89 MM HG: CPT | Mod: CPTII,S$GLB,, | Performed by: INTERNAL MEDICINE

## 2023-03-27 PROCEDURE — 99999 PR PBB SHADOW E&M-EST. PATIENT-LVL III: CPT | Mod: PBBFAC,,, | Performed by: INTERNAL MEDICINE

## 2023-03-27 PROCEDURE — 99214 PR OFFICE/OUTPT VISIT, EST, LEVL IV, 30-39 MIN: ICD-10-PCS | Mod: S$GLB,,, | Performed by: INTERNAL MEDICINE

## 2023-03-27 PROCEDURE — 1159F MED LIST DOCD IN RCRD: CPT | Mod: CPTII,S$GLB,, | Performed by: INTERNAL MEDICINE

## 2023-03-27 PROCEDURE — 99999 PR PBB SHADOW E&M-EST. PATIENT-LVL III: ICD-10-PCS | Mod: PBBFAC,,, | Performed by: INTERNAL MEDICINE

## 2023-03-27 PROCEDURE — 99214 OFFICE O/P EST MOD 30 MIN: CPT | Mod: S$GLB,,, | Performed by: INTERNAL MEDICINE

## 2023-03-27 PROCEDURE — 3008F BODY MASS INDEX DOCD: CPT | Mod: CPTII,S$GLB,, | Performed by: INTERNAL MEDICINE

## 2023-03-27 PROCEDURE — 1159F PR MEDICATION LIST DOCUMENTED IN MEDICAL RECORD: ICD-10-PCS | Mod: CPTII,S$GLB,, | Performed by: INTERNAL MEDICINE

## 2023-03-27 PROCEDURE — 3077F PR MOST RECENT SYSTOLIC BLOOD PRESSURE >= 140 MM HG: ICD-10-PCS | Mod: CPTII,S$GLB,, | Performed by: INTERNAL MEDICINE

## 2023-03-27 PROCEDURE — 3077F SYST BP >= 140 MM HG: CPT | Mod: CPTII,S$GLB,, | Performed by: INTERNAL MEDICINE

## 2023-04-04 ENCOUNTER — CLINICAL SUPPORT (OUTPATIENT)
Dept: REHABILITATION | Facility: HOSPITAL | Age: 53
End: 2023-04-04
Payer: COMMERCIAL

## 2023-04-04 DIAGNOSIS — C50.912 INVASIVE DUCTAL CARCINOMA OF LEFT BREAST IN FEMALE: ICD-10-CM

## 2023-04-04 DIAGNOSIS — Z91.89 AT RISK FOR LYMPHEDEMA: Primary | ICD-10-CM

## 2023-04-04 PROCEDURE — 97110 THERAPEUTIC EXERCISES: CPT | Mod: PN,CQ

## 2023-04-04 PROCEDURE — 97140 MANUAL THERAPY 1/> REGIONS: CPT | Mod: PN,CQ

## 2023-04-04 NOTE — PROGRESS NOTES
"[facility]  Physical Therapy Progress Note  Lymphedema     Visit Date: 4/4/2023    Name: Steph Lemus  MRN: [id number]  Therapy Diagnosis:   Encounter Diagnoses   Name Primary?    At risk for lymphedema Yes    Invasive ductal carcinoma of left breast in female      Surgery date: bilateral skin and nipple sparing mastectomy, left SLNB, RUTH on 01/16/2023.  Radiation: none needed  Chemotherapy: none  Evaluation Date: 10/05/2023 Prehab  Authorization: pending  Plan of Care: PT f/u 8 weeks post-op     Visit #: 2/ 3, requesting additional 2x week 6 weeks; Eval + 4  PTA visit: 1  Time In: 09:00 AM  Time Out: 10:00 AM  Total Billable Time: 60 minutes     Precautions: Standard, cancer, and avoid IV, BP, blood draws, injections in the left arm      Subjective     Pt states: that she had to cancel her last appointment due to vertigo; went to audiologist and had the procedure that put the crystals back in place and feeling a little better. Not wearing binder or spanx anymore; walking a lot and feeling pretty good, doing home exercises.  Pt states that spasms in center of abdomen have decreased but still feel hard knots in the center.  Had an ultrasound of abdomen and it was good, just some hematomas "which Dr Tirado said is normal."  Feel tight in armpit when she stretches left arm up. "Dr Tirado said that it would help to have the MLD to my abdomen and under my left arm."     Pain: 4/10   Location: central abdominal area   Objective   Pt with Mepiform scar strips along length of abdominal incision    Treatment  Pt received manual therapy x 30 minutes as follows:   MLD to left breast/lateral trunk with pre treatment short neck series, right axilla, abdomen, left inguinal triangle, followed by MLD to left breast accessing watershed areas on trunk. Pt received lymphatouch at 55 mmHg to abdominal area and left axilla.  STM to abdominal area with tightness noted centrally. Manual stretching to left " "shoulder with tightness noted at end range.     Pt performed therapeutic exercises x 30 minutes as follows:  Nustep L3 x 10 min   LTR with arms in "Y" with 10 sec hold ea x 3 reps  Diaphragmatic breathing  Side lying left shoulder abduction x 10  Pelvic tilts with TvA activation 2 x 10 incorporating diaphragm breath   PPT with TvA activation with ball between knees; holding 10 secs x 10 reps  PPT with single knee fall out x 10 each  Abdominal Brace Series (Marches, BKFOs):  -Brace core and keep pelvis stable throughout each variation. Don't forget to breathe!  1) Marches: Alternate lifting one leg at a time to table-top position  2) Bent Knee Fall Outs: Let one knee fall out to the side while keeping opposite hip stable. Do not let your pelvis rock.     LTR x 15  Pelvic clocks  Prone PPT x 10  Side Lying Clamshells with heel press 2 x 10 bilaterally      Education Provided  [x] Progress toward goals   [x] Role of therapy   [x] Activity modification  [x] Reviewed HEP    Home Exercises Provided: Patient instructed to continue previously provided HEP. Added core exercises to home program, see in pt instructions.   Exercises were reviewed and Alison was able to demonstrate them prior to the end of the session.  Alison demonstrated good  understanding of the education provided.    Assessment     Pt tolerated treatment well, with improved pliability in abdominal incision and left axilla. Educated pt on scar massage to abdominal incision.  She presents with weakness in core, LE/hip weakness as well as decreased mobility in abdominal area.  She is no longer wearing abdominal binder or spanx garments. MLD to left breast/lateral trunk and abdomen this session with pt tolerating well and expressing decreased tightness post treatment.   Steph is a 52 y.o. female referred to outpatient physical therapy with a medical diagnosis of Invasive ductal carcinoma of left breast in female, at risk for lymphedema with surgical procedure " on 10/06/2022. Pt was seen today pre-operatively to assess strength and ROM of BUEs, to take baseline circumferential measurements of BUEs to aid in the early detection of lymphedema post-operatively, and to provide pt education on exercises/precautions post-operative. Pt does not exhibit any ROM impairments currently. This pt will benefit from skilled PT for reassessment of baseline measurements 6-8 week post-operatively and to address future impairments following surgery such as pain, limited ROM, or decreased mobility. Will need to wait until pt is medically cleared to determine if pt is safe for MLD, pneumatic compression pump, or lymphatouch treatments.   Pt will continue to benefit from skilled outpatient physical therapy to address the deficits listed in the problem list box on initial evaluation, provide pt/family education and to maximize pt's level of independence in the home and community environment. Pt's spiritual, cultural and educational needs considered and pt agreeable to plan of care and goals.       Anticipated barriers for therapy: none    Goals  GOALS  Short Term Goals: 3 months  Pt to be seen for reassessment in 6-8 weeks after surgery.  Pt will demonstrate 100% knowledge of lymphedema precautions and signs of infection.  Pt to obtain compression garments for prophylactic concerns according to APTA clinical guidelines published in Journal of Physical Therapy.     Long Term Goals: deferred     Updated GOALS: 12 visits  Patient to achieve full UE ROM shoulder flexion, abduction and ER pain free for improve function for ADL's.  Patient to demonstrate bed mobility transitions supine<>sit, sit<>stand without compensatory strategies and greater ease, 0/10 pain.  3. Patient to tolerate walking program 20 min 4-5x week without residual abdominal discomfort.  4. Return to PLOF without dependency of abdominal brace     Plan     Continue with established plan of care working toward PT goals.    Adrienne ROCHE  Kate, PTA ,CLT

## 2023-04-05 ENCOUNTER — TELEPHONE (OUTPATIENT)
Dept: HEMATOLOGY/ONCOLOGY | Facility: CLINIC | Age: 53
End: 2023-04-05
Payer: COMMERCIAL

## 2023-04-05 NOTE — TELEPHONE ENCOUNTER
----- Message from Chris Meza sent at 4/5/2023  8:46 AM CDT -----  Type: Need Medical Advice   Who Called: Alison Hathaway callback number: [phone]  Additional Information: Patient called to reschedule tomorrows PT appointment   Please call to further assist, Thanks

## 2023-04-05 NOTE — TELEPHONE ENCOUNTER
Called pt she has to cancel her PT apt on 4/6, and wanted to get another day in this week. Advised pt that we don't have anything available at this time but will but her on a reminder list to call should we have a cancellation

## 2023-04-11 ENCOUNTER — CLINICAL SUPPORT (OUTPATIENT)
Dept: REHABILITATION | Facility: HOSPITAL | Age: 53
End: 2023-04-11
Payer: COMMERCIAL

## 2023-04-11 DIAGNOSIS — Z91.89 AT RISK FOR LYMPHEDEMA: Primary | ICD-10-CM

## 2023-04-11 DIAGNOSIS — C50.912 INVASIVE DUCTAL CARCINOMA OF LEFT BREAST IN FEMALE: ICD-10-CM

## 2023-04-11 PROCEDURE — 97140 MANUAL THERAPY 1/> REGIONS: CPT | Mod: PN,CQ

## 2023-04-11 PROCEDURE — 97110 THERAPEUTIC EXERCISES: CPT | Mod: PN,CQ

## 2023-04-13 ENCOUNTER — CLINICAL SUPPORT (OUTPATIENT)
Dept: REHABILITATION | Facility: HOSPITAL | Age: 53
End: 2023-04-13
Payer: COMMERCIAL

## 2023-04-13 DIAGNOSIS — Z91.89 AT RISK FOR LYMPHEDEMA: Primary | ICD-10-CM

## 2023-04-13 DIAGNOSIS — C50.912 INVASIVE DUCTAL CARCINOMA OF LEFT BREAST IN FEMALE: ICD-10-CM

## 2023-04-13 PROCEDURE — 97110 THERAPEUTIC EXERCISES: CPT | Mod: PN,CQ

## 2023-04-13 PROCEDURE — 97140 MANUAL THERAPY 1/> REGIONS: CPT | Mod: PN,CQ

## 2023-04-13 NOTE — PROGRESS NOTES
"[facility]  Physical Therapy Treatment Note  Lymphedema     Visit Date: 4/13/2023    Name: Steph Lemus  MRN: [id number]  Therapy Diagnosis:   Encounter Diagnoses   Name Primary?    At risk for lymphedema Yes    Invasive ductal carcinoma of left breast in female      Surgery date: bilateral skin and nipple sparing mastectomy, left SLNB, RUTH on 01/16/2023.  Radiation: none needed  Chemotherapy: none  Evaluation Date: 10/05/2023 Prehab  Authorization: pending  Plan of Care: PT f/u 8 weeks post-op     Visit #: 2/ 3, requesting additional 2x week 6 weeks; Eval + 6  PTA visit: 4  Time In: 09:00 AM  Time Out: 10:00 AM  Total Billable Time: 60 minutes     Precautions: Standard, cancer, and avoid IV, BP, blood draws, injections in the left arm      Subjective     Pt states: that she is not wearing binder or spanx anymore. Since Saturday, have been feeling increased tightness and pulling sensation in abdomen; "It feels like I am having a charley horse." Pt states that spasms in center of abdomen and still feel hard knots in the center.  Haven't been doing exercises or walking much due to vertigo. Tightness under the right arm is much better, but have sensitivity under right breast at incision. "I don't wear underwire bras anymore; I got the most comfortable bra that [employer] makes but by the end of the day feel uncomfortable."   Pain: 5/10 (described as discomfort)  Location: central abdominal area and left randi incision  Objective       Treatment  Pt received manual therapy x 45 minutes as follows:   MLD to left breast/lateral trunk with pre treatment short neck series, right axilla, abdomen, left inguinal triangle, followed by MLD to left breast accessing watershed areas on trunk. Pt received lymphatouch at 55 mmHg to abdominal area.  STM to abdominal area with tightness noted centrally. Advised pt to start wearing spanx again and wean off gradually.     Pt performed therapeutic exercises " "x 15 minutes as follows:  Nustep L4 x 10 min   LTR with arms in "Y" with 10 sec hold ea x 3 reps  LTR x 15  Pelvic clocks  Pelvic tilts with TvA activation 2 x 10 incorporating diaphragm breath  Diaphragmatic breathing    Deferred:  Side lying left shoulder abduction x 10  PPT with TvA activation with ball between knees; holding 10 secs x 10 reps  PPT with single knee fall out x 10 each  Abdominal Brace Series (Marches, BKFOs):  -Brace core and keep pelvis stable throughout each variation. Don't forget to breathe!  1) Marches: Alternate lifting one leg at a time to table-top position  2) Bent Knee Fall Outs: Let one knee fall out to the side while keeping opposite hip stable. Do not let your pelvis rock.     Prone PPT x 10  Side Lying Clamshells with heel press 2 x 10 bilaterally      Education Provided  [x] Progress toward goals   [x] Role of therapy   [x] Activity modification  [x] Reviewed HEP    Home Exercises Provided: Patient instructed to continue previously provided HEP. Added core exercises to home program, see in pt instructions.   Exercises were reviewed and Alison was able to demonstrate them prior to the end of the session.  Alison demonstrated good  understanding of the education provided.    Assessment     Pt tolerated treatment well, with improved pliability in abdominal incision and left axilla with subjective report of pt feeling less tight. Effect appears to be transient, so advised pt to start back wearing spanx and slowly wean off. Pt to contact Dr Tirado if symptoms persist.   Educated pt on scar massage to abdominal incision.  She presents with weakness in core, LE/hip weakness as well as decreased mobility in abdominal area.  She is no longer wearing abdominal binder or spanx garments. MLD to left breast/lateral trunk and abdomen this session with pt tolerating well and expressing decreased tightness post treatment.   Steph is a 52 y.o. female referred to outpatient physical therapy with a " medical diagnosis of Invasive ductal carcinoma of left breast in female, at risk for lymphedema with surgical procedure on 10/06/2022. Pt was seen today pre-operatively to assess strength and ROM of BUEs, to take baseline circumferential measurements of BUEs to aid in the early detection of lymphedema post-operatively, and to provide pt education on exercises/precautions post-operative. Pt does not exhibit any ROM impairments currently. This pt will benefit from skilled PT for reassessment of baseline measurements 6-8 week post-operatively and to address future impairments following surgery such as pain, limited ROM, or decreased mobility. Will need to wait until pt is medically cleared to determine if pt is safe for MLD, pneumatic compression pump, or lymphatouch treatments.   Pt will continue to benefit from skilled outpatient physical therapy to address the deficits listed in the problem list box on initial evaluation, provide pt/family education and to maximize pt's level of independence in the home and community environment. Pt's spiritual, cultural and educational needs considered and pt agreeable to plan of care and goals.       Anticipated barriers for therapy: none    Goals  GOALS  Short Term Goals: 3 months  Pt to be seen for reassessment in 6-8 weeks after surgery.  Pt will demonstrate 100% knowledge of lymphedema precautions and signs of infection.  Pt to obtain compression garments for prophylactic concerns according to APTA clinical guidelines published in Journal of Physical Therapy.     Long Term Goals: deferred     Updated GOALS: 12 visits  Patient to achieve full UE ROM shoulder flexion, abduction and ER pain free for improve function for ADL's.  Patient to demonstrate bed mobility transitions supine<>sit, sit<>stand without compensatory strategies and greater ease, 0/10 pain.  3. Patient to tolerate walking program 20 min 4-5x week without residual abdominal discomfort.  4. Return to PLOF without  dependency of abdominal brace     Plan     Continue with established plan of care working toward PT goals.    Adrienne Love, PTA ,CLT

## 2023-04-17 ENCOUNTER — CLINICAL SUPPORT (OUTPATIENT)
Dept: REHABILITATION | Facility: HOSPITAL | Age: 53
End: 2023-04-17
Payer: COMMERCIAL

## 2023-04-17 DIAGNOSIS — C50.912 INVASIVE DUCTAL CARCINOMA OF LEFT BREAST IN FEMALE: ICD-10-CM

## 2023-04-17 DIAGNOSIS — Z91.89 AT RISK FOR LYMPHEDEMA: Primary | ICD-10-CM

## 2023-04-17 PROCEDURE — 97140 MANUAL THERAPY 1/> REGIONS: CPT | Mod: PN

## 2023-04-17 NOTE — PROGRESS NOTES
"[facility]  Physical Therapy Treatment Note  Lymphedema     Visit Date: 4/17/2023    Name: Steph Lemus  MRN: [id number]  Therapy Diagnosis:   No diagnosis found.    Surgery date: bilateral skin and nipple sparing mastectomy, left SLNB, RUTH on 01/16/2023.  Radiation: none needed  Chemotherapy: none  Evaluation Date: 10/05/2023 Prehab  Authorization: pending  Plan of Care: PT f/u 8 weeks post-op     Visit #: 2/ 3, requesting additional 2x week 6 weeks; Eval + 7  PTA visit: 0  Time In: 09:08 AM  Time Out: 10:00 AM  Total Billable Time: 50 minutes     Precautions: Standard, cancer, and avoid IV, BP, blood draws, injections in the left arm      Subjective     Pt states: Started wearing the spanx again this Saturday because of the constant "charley horse" sensation in the abdomen. Still have tightness under the left axilla also. Pt states that spasms in center of abdomen and still feel hard knots in the center.  Haven't been doing exercises or walking much due to vertigo. Will be seeing and ENT soon, thinking possible vestibular neuritis. Feel like since having Covid, feel like have a lot of inflammation, Have gastritis, and the vestibular issues.   I'm seeing breast surgeons nurse practitioner at 10:30 this morning and see Dr. Tirado next week.       Pain: 5/10 (described as discomfort)  Location: central abdominal area and left randi incision  Objective       Treatment  Pt received manual therapy x 40 minutes as follows:   MLD to left breast/lateral trunk with pre treatment short neck series, right axilla, abdomen, right and left inguinal triangle, followed by MLD to left breast accessing watershed areas on trunk. Pt received lymphatouch at 55 mmHg to abdominal area.  STM to abdominal area with tightness noted centrally. Advised pt to start wearing spanx again and wean off gradually.     Pt performed therapeutic exercises x 10 minutes as follows:  Diaphragmatic breathing in " "supine  Diaphragm breaths: inhale lengthen, exhale pelvic tilt x 10 reps  Supine hook lying Pelvic tilts with hip add ball squeeze incorporating diaphragm breath    Deferred:  Nustep L4 x 10 min   LTR with arms in "Y" with 10 sec hold ea x 3 reps  LTR x 15  Pelvic clocks  Pelvic tilts with TvA activation 2 x 10 incorporating diaphragm breath  Side lying left shoulder abduction x 10  PPT with TvA activation with ball between knees; holding 10 secs x 10 reps  PPT with single knee fall out x 10 each  Abdominal Brace Series (Marches, BKFOs):  -Brace core and keep pelvis stable throughout each variation. Don't forget to breathe!  1) Marches: Alternate lifting one leg at a time to table-top position  2) Bent Knee Fall Outs: Let one knee fall out to the side while keeping opposite hip stable. Do not let your pelvis rock.     Prone PPT x 10  Side Lying Clamshells with heel press 2 x 10 bilaterally      Education Provided  [x] Progress toward goals   [x] Role of therapy   [x] Activity modification  [x] Reviewed HEP    Home Exercises Provided: Patient instructed to continue previously provided HEP. Added core exercises to home program, see in pt instructions.   Exercises were reviewed and Alison was able to demonstrate them prior to the end of the session.  Alison demonstrated good  understanding of the education provided.    Assessment     Pt tolerated treatment well, with improved pliability in abdominal incision and left axilla with subjective report of pt feeling less tight to abdomen in standing. Effect appears to be transient, so advised pt to start back wearing spanx and slowly wean off. Pt seeing breast surgeon NP today, follow up with Dr. Tirado plastic surgeon next week.  Educated pt on scar massage to abdominal incision.  She presents with weakness in core, LE/hip weakness as well as decreased mobility in abdominal area.  She is no longer wearing abdominal binder or spanx garments. MLD to left breast/lateral trunk " and abdomen this session with pt tolerating well and expressing decreased tightness post treatment.   Steph is a 52 y.o. female referred to outpatient physical therapy with a medical diagnosis of Invasive ductal carcinoma of left breast in female, at risk for lymphedema with surgical procedure on 10/06/2022. Pt was seen today pre-operatively to assess strength and ROM of BUEs, to take baseline circumferential measurements of BUEs to aid in the early detection of lymphedema post-operatively, and to provide pt education on exercises/precautions post-operative. Pt does not exhibit any ROM impairments currently. This pt will benefit from skilled PT for reassessment of baseline measurements 6-8 week post-operatively and to address future impairments following surgery such as pain, limited ROM, or decreased mobility. Will need to wait until pt is medically cleared to determine if pt is safe for MLD, pneumatic compression pump, or lymphatouch treatments.   Pt will continue to benefit from skilled outpatient physical therapy to address the deficits listed in the problem list box on initial evaluation, provide pt/family education and to maximize pt's level of independence in the home and community environment. Pt's spiritual, cultural and educational needs considered and pt agreeable to plan of care and goals.       Anticipated barriers for therapy: none    Goals  GOALS  Short Term Goals: 3 months  Pt to be seen for reassessment in 6-8 weeks after surgery.  Pt will demonstrate 100% knowledge of lymphedema precautions and signs of infection.  Pt to obtain compression garments for prophylactic concerns according to APTA clinical guidelines published in Journal of Physical Therapy.     Long Term Goals: deferred     Updated GOALS: 12 visits  Patient to achieve full UE ROM shoulder flexion, abduction and ER pain free for improve function for ADL's.  Patient to demonstrate bed mobility transitions supine<>sit, sit<>stand  without compensatory strategies and greater ease, 0/10 pain.  3. Patient to tolerate walking program 20 min 4-5x week without residual abdominal discomfort.  4. Return to PLOF without dependency of abdominal brace     Plan     Continue with established plan of care working toward PT goals.    Josselin Rdz, PT ,CLT

## 2023-04-20 ENCOUNTER — CLINICAL SUPPORT (OUTPATIENT)
Dept: REHABILITATION | Facility: HOSPITAL | Age: 53
End: 2023-04-20
Payer: COMMERCIAL

## 2023-04-20 DIAGNOSIS — C50.912 INVASIVE DUCTAL CARCINOMA OF LEFT BREAST IN FEMALE: ICD-10-CM

## 2023-04-20 DIAGNOSIS — Z91.89 AT RISK FOR LYMPHEDEMA: Primary | ICD-10-CM

## 2023-04-20 PROCEDURE — 97140 MANUAL THERAPY 1/> REGIONS: CPT | Mod: PN,CQ

## 2023-04-20 NOTE — PROGRESS NOTES
"[facility]  Physical Therapy Treatment Note  Lymphedema     Visit Date: 4/20/2023    Name: Steph Lemus  MRN: [id number]  Therapy Diagnosis:   Encounter Diagnoses   Name Primary?    At risk for lymphedema Yes    Invasive ductal carcinoma of left breast in female        Surgery date: bilateral skin and nipple sparing mastectomy, left SLNB, RUTH on 01/16/2023.  Radiation: none needed  Chemotherapy: none  Evaluation Date: 10/05/2023 Prehab  Authorization: pending  Plan of Care: PT f/u 8 weeks post-op     Visit #: 2/ 3, requesting additional 2x week 6 weeks; Eval + 8  PTA visit: 1  Time In: 09:10 AM  Time Out: 10:00 AM  Total Billable Time: 50 minutes     Precautions: Standard, cancer, and avoid IV, BP, blood draws, injections in the left arm      Subjective     Pt states: Started wearing the spanx again because of the constant "charley horse" sensation in the abdomen. Pt called Dr Tirado's office and the nurse called her back and left a message saying that "it's perfectly normal to have tightness, just keep doing physical therapy."  Pt states that spasms in center of abdomen and still feel hard knots in the center, with difficulty emptying bladder; afraid that it will cause another UTI. Have an appointment with urologist next week.   Haven't been doing exercises or walking much due to vertigo. Will be seeing and ENT soon, thinking possible vestibular neuritis. Feel like since having Covid, feel like have a lot of inflammation, Have gastritis, and the vestibular issues. Pt almost didn't come to therapy today because of the dizziness. Appointment with Dr Tirado was rescheduled until the end of May, because he and his wife are having a baby on May 6th.        Pain: 5/10 (described as discomfort)  Location: central abdominal area and left randi incision  Objective       Treatment  Pt received manual therapy x 40 minutes as follows:   MLD to left breast/lateral trunk with pre treatment short " "neck series, right axilla, abdomen, right and left inguinal triangle, followed by MLD to left breast accessing watershed areas on trunk. Pt received lymphatouch at 55 mmHg to abdominal area.  STM to abdominal area with tightness noted centrally. Advised pt to start wearing spanx again and wean off gradually.     Pt performed therapeutic exercises x 10 minutes as follows:  Diaphragmatic breathing in supine  Diaphragm breaths: inhale lengthen, exhale pelvic tilt x 10 reps  Supine hook lying Pelvic tilts with hip add ball squeeze incorporating diaphragm breath    Deferred:  Nustep L4 x 10 min   LTR with arms in "Y" with 10 sec hold ea x 3 reps  LTR x 15  Pelvic clocks  Pelvic tilts with TvA activation 2 x 10 incorporating diaphragm breath  Side lying left shoulder abduction x 10  PPT with TvA activation with ball between knees; holding 10 secs x 10 reps  PPT with single knee fall out x 10 each  Abdominal Brace Series (Marches, BKFOs):  -Brace core and keep pelvis stable throughout each variation. Don't forget to breathe!  1) Marches: Alternate lifting one leg at a time to table-top position  2) Bent Knee Fall Outs: Let one knee fall out to the side while keeping opposite hip stable. Do not let your pelvis rock.     Prone PPT x 10  Side Lying Clamshells with heel press 2 x 10 bilaterally      Education Provided  [x] Progress toward goals   [x] Role of therapy   [x] Activity modification  [x] Reviewed HEP    Home Exercises Provided: Patient instructed to continue previously provided HEP. Added core exercises to home program, see in pt instructions.   Exercises were reviewed and Alison was able to demonstrate them prior to the end of the session.  Alison demonstrated good  understanding of the education provided.    Assessment     Pt tolerated treatment well, with improved pliability in abdominal incision and left axilla with subjective report of pt feeling less tight to abdomen in standing. Effect appears to be transient, " so advised pt to start back wearing spanx and slowly wean off.  Will see urologist next week to address bladder emptying.   Educated pt on scar massage to abdominal incision.  She presents with weakness in core, LE/hip weakness as well as decreased mobility in abdominal area.  She is no longer wearing abdominal binder or spanx garments. MLD to left breast/lateral trunk and abdomen this session with pt tolerating well and expressing decreased tightness post treatment.   Steph is a 52 y.o. female referred to outpatient physical therapy with a medical diagnosis of Invasive ductal carcinoma of left breast in female, at risk for lymphedema with surgical procedure on 10/06/2022. Pt was seen today pre-operatively to assess strength and ROM of BUEs, to take baseline circumferential measurements of BUEs to aid in the early detection of lymphedema post-operatively, and to provide pt education on exercises/precautions post-operative. Pt does not exhibit any ROM impairments currently. This pt will benefit from skilled PT for reassessment of baseline measurements 6-8 week post-operatively and to address future impairments following surgery such as pain, limited ROM, or decreased mobility. Will need to wait until pt is medically cleared to determine if pt is safe for MLD, pneumatic compression pump, or lymphatouch treatments.   Pt will continue to benefit from skilled outpatient physical therapy to address the deficits listed in the problem list box on initial evaluation, provide pt/family education and to maximize pt's level of independence in the home and community environment. Pt's spiritual, cultural and educational needs considered and pt agreeable to plan of care and goals.       Anticipated barriers for therapy: none    Goals  GOALS  Short Term Goals: 3 months  Pt to be seen for reassessment in 6-8 weeks after surgery.  Pt will demonstrate 100% knowledge of lymphedema precautions and signs of infection.  Pt to obtain  compression garments for prophylactic concerns according to APTA clinical guidelines published in Journal of Physical Therapy.     Long Term Goals: deferred     Updated GOALS: 12 visits  Patient to achieve full UE ROM shoulder flexion, abduction and ER pain free for improve function for ADL's.  Patient to demonstrate bed mobility transitions supine<>sit, sit<>stand without compensatory strategies and greater ease, 0/10 pain.  3. Patient to tolerate walking program 20 min 4-5x week without residual abdominal discomfort.  4. Return to PLOF without dependency of abdominal brace     Plan     Continue with established plan of care working toward PT goals.    Adrienne Love, PTA ,CLT

## 2023-04-25 ENCOUNTER — CLINICAL SUPPORT (OUTPATIENT)
Dept: REHABILITATION | Facility: HOSPITAL | Age: 53
End: 2023-04-25
Payer: COMMERCIAL

## 2023-04-25 DIAGNOSIS — C50.912 INVASIVE DUCTAL CARCINOMA OF LEFT BREAST IN FEMALE: ICD-10-CM

## 2023-04-25 DIAGNOSIS — Z91.89 AT RISK FOR LYMPHEDEMA: Primary | ICD-10-CM

## 2023-04-25 PROCEDURE — 97140 MANUAL THERAPY 1/> REGIONS: CPT | Mod: PN,CQ

## 2023-04-25 PROCEDURE — 97110 THERAPEUTIC EXERCISES: CPT | Mod: PN,CQ

## 2023-04-25 NOTE — PROGRESS NOTES
"[facility]  Physical Therapy Treatment Note  Lymphedema     Visit Date: 4/25/2023    Name: Steph Lemus  MRN: [id number]  Therapy Diagnosis:   Encounter Diagnoses   Name Primary?    At risk for lymphedema Yes    Invasive ductal carcinoma of left breast in female        Surgery date: bilateral skin and nipple sparing mastectomy, left SLNB, RUTH on 01/16/2023.  Radiation: none needed  Chemotherapy: none  Evaluation Date: 10/05/2023 Prehab  Authorization: pending  Plan of Care: PT f/u 8 weeks post-op     Visit #: 2/ 3, requesting additional 2x week 6 weeks; Eval + 9  PTA visit: 2  Time In: 09:00 AM  Time Out: 10:00 AM  Total Billable Time: 60 minutes     Precautions: Standard, cancer, and avoid IV, BP, blood draws, injections in the left arm      Subjective     Pt states: Started wearing the spanx again because of the constant "charley horse" sensation in the abdomen.  Pt states that spasms in center of abdomen and still feel hard knots in the center, with difficulty emptying bladder; afraid that it will cause another UTI. Have an appointment with urologist tomorrow.  Yesterday was a good day, no spasms or dizziness, but started getting a headache on the way home from work and stomach tightened up and then dizziness started. Still feel bad today.  Saw ENT yesterday and did a balance test, will return next week to ENT for a hearing test and to get results;  thinking possible vestibular neuritis. Feel like since having Covid, feel like have a lot of inflammation, Have gastritis, and the vestibular issues.  Appointment with Dr Tirado was rescheduled until May 16th because he and his wife are having a baby on May 6th.        Pain: 5/10 (described as discomfort)  Location: central abdominal area and left randi incision  Objective       Treatment  Pt received manual therapy x 40 minutes as follows:   MLD to left breast/lateral trunk with pre treatment short neck series, right axilla, abdomen, " "right and left inguinal triangle, followed by MLD to left breast accessing watershed areas on trunk. Pt received lymphatouch at 55 mmHg to abdominal area.  STM to abdominal area with tightness noted centrally. Advised pt to start wearing spanx again and wean off gradually.     Pt performed therapeutic exercises x 20 minutes as follows:  Diaphragmatic breathing in supine  Diaphragm breaths: inhale lengthen, exhale pelvic tilt x 10 reps  Supine hook lying Pelvic tilts with hip add ball squeeze incorporating diaphragm breath  LTR with arms in "Y" with 10 sec hold ea x 5 reps  LTR x 15  Pelvic clocks  Pelvic tilts with TvA activation 2 x 10 incorporating diaphragm breath  Side lying left shoulder abduction x 10  PPT with TvA activation with ball between knees; holding 10 secs x 10 reps  PPT with single knee fall out x 10 each  Clamshells x 20 ea  Hip abduction with 3 sec lift/lower x 10  Sidelying hip flexor stretching 20" x 2 ea      Deferred:  Nustep L4 x 10 min   Abdominal Brace Series (Joanna, Cinthia):  -Brace core and keep pelvis stable throughout each variation. Don't forget to breathe!  1) Marches: Alternate lifting one leg at a time to table-top position  2) Bent Knee Fall Outs: Let one knee fall out to the side while keeping opposite hip stable. Do not let your pelvis rock.     Prone PPT x 10  Side Lying Clamshells with heel press 2 x 10 bilaterally      Education Provided  [x] Progress toward goals   [x] Role of therapy   [x] Activity modification  [x] Reviewed HEP    Home Exercises Provided: Patient instructed to continue previously provided HEP. Added core exercises to home program, see in pt instructions.   Exercises were reviewed and Alison was able to demonstrate them prior to the end of the session.  Alison demonstrated good  understanding of the education provided.    Assessment     Pt tolerated treatment well, with improved pliability in abdominal incision and left axilla with subjective report of pt " feeling less tight to abdomen in standing. Effect appears to be transient, so advised pt to start back wearing spanx and slowly wean off.  Will see urologist next week to address bladder emptying.   Educated pt on scar massage to abdominal incision.  She presents with weakness in core, LE/hip weakness as well as decreased mobility in abdominal area.  She is no longer wearing abdominal binder or spanx garments. MLD to left breast/lateral trunk and abdomen this session with pt tolerating well and expressing decreased tightness post treatment.   Steph is a 52 y.o. female referred to outpatient physical therapy with a medical diagnosis of Invasive ductal carcinoma of left breast in female, at risk for lymphedema with surgical procedure on 10/06/2022. Pt was seen today pre-operatively to assess strength and ROM of BUEs, to take baseline circumferential measurements of BUEs to aid in the early detection of lymphedema post-operatively, and to provide pt education on exercises/precautions post-operative. Pt does not exhibit any ROM impairments currently. This pt will benefit from skilled PT for reassessment of baseline measurements 6-8 week post-operatively and to address future impairments following surgery such as pain, limited ROM, or decreased mobility. Will need to wait until pt is medically cleared to determine if pt is safe for MLD, pneumatic compression pump, or lymphatouch treatments.   Pt will continue to benefit from skilled outpatient physical therapy to address the deficits listed in the problem list box on initial evaluation, provide pt/family education and to maximize pt's level of independence in the home and community environment. Pt's spiritual, cultural and educational needs considered and pt agreeable to plan of care and goals.       Anticipated barriers for therapy: none    Goals  GOALS  Short Term Goals: 3 months  Pt to be seen for reassessment in 6-8 weeks after surgery.  Pt will demonstrate 100%  knowledge of lymphedema precautions and signs of infection.  Pt to obtain compression garments for prophylactic concerns according to APTA clinical guidelines published in Journal of Physical Therapy.     Long Term Goals: deferred     Updated GOALS: 12 visits  Patient to achieve full UE ROM shoulder flexion, abduction and ER pain free for improve function for ADL's.  Patient to demonstrate bed mobility transitions supine<>sit, sit<>stand without compensatory strategies and greater ease, 0/10 pain.  3. Patient to tolerate walking program 20 min 4-5x week without residual abdominal discomfort.  4. Return to PLOF without dependency of abdominal brace     Plan     Continue with established plan of care working toward PT goals.    Adrienne Love, PTA ,CLT

## 2023-04-26 ENCOUNTER — OFFICE VISIT (OUTPATIENT)
Dept: UROLOGY | Facility: CLINIC | Age: 53
End: 2023-04-26
Payer: COMMERCIAL

## 2023-04-26 VITALS — WEIGHT: 182.13 LBS | HEIGHT: 66 IN | BODY MASS INDEX: 29.27 KG/M2

## 2023-04-26 DIAGNOSIS — R30.0 DYSURIA: ICD-10-CM

## 2023-04-26 DIAGNOSIS — N39.0 RECURRENT UTI: ICD-10-CM

## 2023-04-26 DIAGNOSIS — N39.3 STRESS INCONTINENCE, FEMALE: ICD-10-CM

## 2023-04-26 DIAGNOSIS — R39.9 LOWER URINARY TRACT SYMPTOMS: Primary | ICD-10-CM

## 2023-04-26 DIAGNOSIS — R39.198 URINARY STREAM SLOWING: ICD-10-CM

## 2023-04-26 LAB
BILIRUBIN, UA POC OHS: NEGATIVE
BLOOD, UA POC OHS: ABNORMAL
CLARITY, UA POC OHS: CLEAR
COLOR, UA POC OHS: YELLOW
GLUCOSE, UA POC OHS: NEGATIVE
KETONES, UA POC OHS: NEGATIVE
LEUKOCYTES, UA POC OHS: ABNORMAL
NITRITE, UA POC OHS: NEGATIVE
PH, UA POC OHS: 7
POC RESIDUAL URINE VOLUME: 5 ML (ref 0–100)
PROTEIN, UA POC OHS: NEGATIVE
SPECIFIC GRAVITY, UA POC OHS: 1.02
UROBILINOGEN, UA POC OHS: 0.2

## 2023-04-26 PROCEDURE — 1159F PR MEDICATION LIST DOCUMENTED IN MEDICAL RECORD: ICD-10-PCS | Mod: CPTII,S$GLB,, | Performed by: STUDENT IN AN ORGANIZED HEALTH CARE EDUCATION/TRAINING PROGRAM

## 2023-04-26 PROCEDURE — 81003 POCT URINALYSIS(INSTRUMENT): ICD-10-PCS | Mod: QW,S$GLB,, | Performed by: STUDENT IN AN ORGANIZED HEALTH CARE EDUCATION/TRAINING PROGRAM

## 2023-04-26 PROCEDURE — 1160F RVW MEDS BY RX/DR IN RCRD: CPT | Mod: CPTII,S$GLB,, | Performed by: STUDENT IN AN ORGANIZED HEALTH CARE EDUCATION/TRAINING PROGRAM

## 2023-04-26 PROCEDURE — 87086 URINE CULTURE/COLONY COUNT: CPT | Performed by: STUDENT IN AN ORGANIZED HEALTH CARE EDUCATION/TRAINING PROGRAM

## 2023-04-26 PROCEDURE — 1160F PR REVIEW ALL MEDS BY PRESCRIBER/CLIN PHARMACIST DOCUMENTED: ICD-10-PCS | Mod: CPTII,S$GLB,, | Performed by: STUDENT IN AN ORGANIZED HEALTH CARE EDUCATION/TRAINING PROGRAM

## 2023-04-26 PROCEDURE — 51798 POCT BLADDER SCAN: ICD-10-PCS | Mod: S$GLB,,, | Performed by: STUDENT IN AN ORGANIZED HEALTH CARE EDUCATION/TRAINING PROGRAM

## 2023-04-26 PROCEDURE — 99214 OFFICE O/P EST MOD 30 MIN: CPT | Mod: S$GLB,,, | Performed by: STUDENT IN AN ORGANIZED HEALTH CARE EDUCATION/TRAINING PROGRAM

## 2023-04-26 PROCEDURE — 99214 PR OFFICE/OUTPT VISIT, EST, LEVL IV, 30-39 MIN: ICD-10-PCS | Mod: S$GLB,,, | Performed by: STUDENT IN AN ORGANIZED HEALTH CARE EDUCATION/TRAINING PROGRAM

## 2023-04-26 PROCEDURE — 99999 PR PBB SHADOW E&M-EST. PATIENT-LVL III: ICD-10-PCS | Mod: PBBFAC,,, | Performed by: STUDENT IN AN ORGANIZED HEALTH CARE EDUCATION/TRAINING PROGRAM

## 2023-04-26 PROCEDURE — 99999 PR PBB SHADOW E&M-EST. PATIENT-LVL III: CPT | Mod: PBBFAC,,, | Performed by: STUDENT IN AN ORGANIZED HEALTH CARE EDUCATION/TRAINING PROGRAM

## 2023-04-26 PROCEDURE — 51798 US URINE CAPACITY MEASURE: CPT | Mod: S$GLB,,, | Performed by: STUDENT IN AN ORGANIZED HEALTH CARE EDUCATION/TRAINING PROGRAM

## 2023-04-26 PROCEDURE — 1159F MED LIST DOCD IN RCRD: CPT | Mod: CPTII,S$GLB,, | Performed by: STUDENT IN AN ORGANIZED HEALTH CARE EDUCATION/TRAINING PROGRAM

## 2023-04-26 PROCEDURE — 81003 URINALYSIS AUTO W/O SCOPE: CPT | Mod: QW,S$GLB,, | Performed by: STUDENT IN AN ORGANIZED HEALTH CARE EDUCATION/TRAINING PROGRAM

## 2023-04-26 PROCEDURE — 3008F PR BODY MASS INDEX (BMI) DOCUMENTED: ICD-10-PCS | Mod: CPTII,S$GLB,, | Performed by: STUDENT IN AN ORGANIZED HEALTH CARE EDUCATION/TRAINING PROGRAM

## 2023-04-26 PROCEDURE — 3008F BODY MASS INDEX DOCD: CPT | Mod: CPTII,S$GLB,, | Performed by: STUDENT IN AN ORGANIZED HEALTH CARE EDUCATION/TRAINING PROGRAM

## 2023-04-26 NOTE — PATIENT INSTRUCTIONS
Recurrent UTI Recommendations:  General recommendations include   - Increasing fluid intake to 2-3 L of water per day  - Avoiding constipation (take 1 cap of miralax daily) with a goal for 1 soft bowel movement daily or every other day  - Don't hold urine, make a point to void every 2 to 3 hours.    - void immediately after sexual intercourse  - Wiping front to back to avoid contamination.    Over the counter options include:     Cranberry pills 500 mg tabs three times per day. Options for prophylaxis include oral juice and tablet formulations as there is not sufficient evidence to support one formulation over another. In addition, there is little risk to cranberry supplements, further increasing their appeal to patients. However, it must be noted that fruit juices can be high in sugar content, which limits its use in diabetic patients.     D-Mannose 2000 U once a day, or 1000 U twice a day may be used as prophylaxis and may be particularly useful for E coli specific UTIs. There is mixed evidence to support its use but there is overall low risk to this option.     Probiotics with at least 20 billion CFU or a probiotic designed for vaginal/urinary health. Additionally you can add foods that improve gut sheila such as yogurt or other fermented foods. There is mixed evidence to support its use and further evidence is required in the future.        Topical Vaginal Estrogen     What You Should Know   Women who have gone through menopause often have vaginal dryness. This problem is also common in those who have had both of their ovaries surgically removed. In some cases, vaginal dryness can lead to pain with sex, burning vaginal discomfort or itching, recurrent urinary tract infections (UTIs) or abnormal vaginal discharge. Vaginal dryness may also lead to frequent or painful urination. These symptoms can lead women to avoid sex. Topical estrogen is a prescription treatment that can help lower or ease these symptoms.      What Causes Vaginal Dryness?   The hormone estrogen helps keep the vagina moist. Estrogen maintains thickness of the vaginal lining and keeps the tissue flexible. Estrogen maintains the vagina's acidity, which helps to prevent UTIs. During menopause, the body's level of estrogen goes down. This can cause vaginal dryness. It can also be caused by:    Surgical removal of the ovaries    Chemotherapy    Radiation therapy of the pelvis to treat cancer    Breastfeeding (estrogen returns to normal levels when breastfeeding happens less often or is stopped)     What is the Treatment for Vaginal Dryness?   There are many over-the-counter treatments for vaginal dryness. These involve moisturizers and lubricants. Prescription treatments include a vaginal estrogen cream, or insertable tablet, capsule or ring. These treatments work as long as you use the treatment. Your symptoms will return when you stop.   Using topical estrogen can ease or lessen:    Dryness and soreness in the vagina    Itching, redness or soreness of the vulva (the outer part of the female sexual organs)    Feeling an urge to urinate often or having pain while urinating    Pain during sex    Frequency of urinary tract infections     Is Topical Estrogen Safe?   Very low doses of estrogen can be used to treat vaginal dryness when it is used as a cream or an insertable tablet, capsule or ring. A small amount of the hormone is absorbed in the bloodstream. When used on a regular basis, the level of estrogen in the blood is similar to the level in postmenopausal women who are not using topical estrogen. That means there is a much lower risk of side effects such as blood clots, breast cancer and heart attack, compared with other products that have estrogen, such as hormone therapy or birth control pills. If you have a history of breast cancer, talk with your doctor about the risks and benefits of vaginal estrogen. Using topical estrogen will not protect against  osteoporosis or stop hot flashes caused by menopause.     How Do I Use Vaginal Estrogen?     Cream: Using the cream at bedtime will help the cream leak less. A tube of the estrogen comes with an applicator and details on how to use it. You squeeze the estrogen cream onto your fingertip to apply it. Your doctor will tell you how often to use the cream. Often, women use the cream every night for two weeks, then two to three times a week. Many women use the cream for years with no side effects.    [facility]    [email or web address]    ©2022 [employer]. All Rights Reserved.   [email or web address]

## 2023-04-26 NOTE — PROGRESS NOTES
[facility] - [facility]rology   Clinic Note    Subjective:     Chief Complaint: Weak Stream and Urinary Tract Infection (-Two since surgery in January; first one treated at urgent care no culture done, didn't go away with treatment and then followed up with PCP and they treated and it went away. Cultured at PCP./-Mastectomy and reconstruction (not in lymph nodes, no chemo) 1/6/2023 )    History of Present Illness:  Steph Lemus is a 52 y.o. female who presents to clinic for evaluation and management of LUTS. She is new to our clinic referred by Leda Ford NP.    She recently underwent mastectomy for breast cancer with subsequent flap reconstruction. She reports a sensation of lower abdominal discomfort which she associates with the bladder.  She has had urinary symptoms since prior to surgery was previously evaluated by Dr. Harding in 2021 with a cystoscopy which was normal.  She reports symptoms of discomfort with occasional urgency but no significant urinary frequency.  She has occasional weak stream.  She has nocturia 1-2 times.  She has occasional stress urinary incontinence and wears 1 pad per day.  Stresses mostly with exercise, squatting, or significant activity not necessarily coughing laughing or sneezing.  She drinks coffee in the mornings and water mostly during the days.  She does report a history of constipation and is on MiraLax every other day-she reports soft bowel movements 1 to 2 times a day.  She takes Cystex and is on Culturelle probiotics.    PVR by bladder scan was 5 mL.    She reports a history of recurrent UTIs but they have not been frequent enough to warrant a technical diagnosis  Component Urine Culture, Routine   Latest Ref Rng & Units    3/2/2023 Multiple organisms isolated.   2/21/2022 ESCHERICHIA COLI (A) . . .   7/30/2021 ESCHERICHIA COLI (A) . . .     She has no history of a hysterectomy and last menstrual period was 2 years ago.  She was previously on estrogen and progesterone  "supplementation with her gynecologist prior to her diagnosis of breast cancer.    Past medical, family, surgical and social history reviewed as documented in chart with pertinent positive medical, family, surgical and social history detailed in HPI.    A review systems was conducted with pertinent positive and negative findings documented in HPI.    Anticoagulation/Antiplatelets:  No    Objective:     Estimated body mass index is 29.39 kg/m² as calculated from the following:    Height as of this encounter: 5' 6" (1.676 m).    Weight as of this encounter: 82.6 kg (182 lb 1.6 oz).    Vital Signs (Most Recent)       Physical Exam  Vitals and nursing note reviewed.   Constitutional:       General: She is not in acute distress.     Appearance: She is well-developed. She is not ill-appearing or toxic-appearing.   Pulmonary:      Effort: Pulmonary effort is normal. No accessory muscle usage or respiratory distress.   Neurological:      Mental Status: She is alert.       Lab Results   Component Value Date    BUN 13 02/02/2023    CREATININE 0.8 02/02/2023    WBC 4.06 02/02/2023    HGB 11.9 (L) 02/02/2023    HCT 37.8 02/02/2023     02/02/2023    AST 23 02/02/2023    ALT 27 02/02/2023    ALKPHOS 72 02/02/2023    ALBUMIN 3.9 02/02/2023    HGBA1C 5.1 09/21/2020        Urine dipstick today showed trace blood, trace leukocyte esterase, and negative nitrite.     Assessment:     1. Lower urinary tract symptoms    2. Urinary stream slowing    3. Dysuria    4. Recurrent UTI    5. Stress incontinence, female      Plan:     We discussed her urinary symptoms.  I do not think that this is related to her recent surgery.  We discussed the contribution of constipation.  We discussed behavioral modifications including addition of fiber for bowel regimen.  Other preventative measures for recurrent urinary tract infections or urinary health were discussed and information was provided.    Urine culture today.     Discussed the addition of " vaginal estrogen cream. Discussed rationale, usage, and administration.This is safe and patients even with a history of breast cancer, however it is recommended to discuss with the patient's oncologist.  I have sent a message to Dr. Coelho.    Her stress urinary incontinence is not significant but is quite bothersome.  We discussed options including pelvic floor exercises, periurethral bulking agents, mid urethral sling.  Information provided on bulkamid. She would like to consider options once she is farther out from her most recent surgery.     Follow up PRN    Jayce Matthews MD

## 2023-04-27 ENCOUNTER — PATIENT MESSAGE (OUTPATIENT)
Dept: UROLOGY | Facility: CLINIC | Age: 53
End: 2023-04-27
Payer: COMMERCIAL

## 2023-04-27 LAB — BACTERIA UR CULT: NO GROWTH

## 2023-05-04 ENCOUNTER — CLINICAL SUPPORT (OUTPATIENT)
Dept: REHABILITATION | Facility: HOSPITAL | Age: 53
End: 2023-05-04
Payer: COMMERCIAL

## 2023-05-04 DIAGNOSIS — Z91.89 AT RISK FOR LYMPHEDEMA: Primary | ICD-10-CM

## 2023-05-04 DIAGNOSIS — C50.912 INVASIVE DUCTAL CARCINOMA OF LEFT BREAST IN FEMALE: ICD-10-CM

## 2023-05-04 PROCEDURE — 97140 MANUAL THERAPY 1/> REGIONS: CPT | Mod: PN,CQ

## 2023-05-04 PROCEDURE — 97110 THERAPEUTIC EXERCISES: CPT | Mod: PN,CQ

## 2023-05-04 NOTE — PROGRESS NOTES
"[facility]  Physical Therapy Treatment Note  Lymphedema     Visit Date: 5/4/2023    Name: Steph Lemus  MRN: [id number]  Therapy Diagnosis:   Encounter Diagnoses   Name Primary?    At risk for lymphedema Yes    Invasive ductal carcinoma of left breast in female        Surgery date: bilateral skin and nipple sparing mastectomy, left SLNB, RUTH on 01/16/2023.  Radiation: none needed  Chemotherapy: none  Evaluation Date: 10/05/2023 Prehab  Authorization: pending  Plan of Care: PT f/u 8 weeks post-op     Visit #: 2/ 3, requesting additional 2x week 6 weeks; Eval + 10  PTA visit: 3  Time In: 10:07 AM  Time Out: 11:00 AM  Total Billable Time: 53 minutes     Precautions: Standard, cancer, and avoid IV, BP, blood draws, injections in the left arm      Subjective     Pt states: Started wearing the spanx again because of the constant "charley horse" sensation in the abdomen.  Pt states that spasms in center of abdomen and still feel hard knots in the center. Pt saw urologist and he said that he doesn't think that the abdominal tightness is effecting the bladder; prescribed an estradiol cream to use.  Dizziness is resolved; ENT said that she must have had a stubborn crystal in her inner ear.  Usually abdomen is soft in the morning when she wakes up, but for the past few days she has awoken with a tight abdomen.  Appointment with Dr Tirado was rescheduled until May 16th because he and his wife are having a baby on May 6th.  "I am going to request that he do an ultrasound on my abdomen to see if the hematomas have resolved." Pt has been wearing Spanx intermittently, but doesn't find that it helps one way or the other. Pt reports that therapy helps for a day or two and then the tightness comes back.       Pain: 5/10 (described as discomfort)  Location: central abdominal area and left randi incision  Objective       Treatment  Pt received manual therapy x 40 minutes as follows:   MLD to left " "breast/lateral trunk with pre treatment short neck series, right axilla, abdomen, right and left inguinal triangle, followed by MLD to left breast accessing watershed areas on trunk. Pt received lymphatouch at 55 mmHg to abdominal area.  STM to abdominal area with tightness noted centrally.     Pt performed therapeutic exercises x 13 minutes as follows:  Diaphragmatic breathing in supine  Diaphragm breaths: inhale lengthen, exhale pelvic tilt x 10 reps  Supine hook lying Pelvic tilts with hip add ball squeeze incorporating diaphragm breath  LTR with arms in "Y" with 10 sec hold ea x 5 reps  LTR x 15  Pelvic clocks  Pelvic tilts with TvA activation 2 x 10 incorporating diaphragm breath  Marches: Alternate lifting one leg at a time to table-top position  PPT with TvA activation with ball between knees; holding 10 secs x 10 reps  Single leg bridge x 5 reps ea side with a 10 sec hold    Deferred:  Nustep L4 x 10 min   Abdominal Brace Series (Marchsvetlana, BKFOs):  -Brace core and keep pelvis stable throughout each variation. Don't forget to breathe Bent Knee Fall Outs: Let one knee fall out to the side while keeping opposite hip stable. Do not let your pelvis rock.     Prone PPT x 10  Side Lying Clamshells with heel press 2 x 10 bilaterally  Clamshells x 20 ea  Hip abduction with 3 sec lift/lower x 10  Sidelying hip flexor stretching 20" x 2 ea  Side lying left shoulder abduction x 10  PPT with single knee fall out x 10 each  Education Provided  [x] Progress toward goals   [x] Role of therapy   [x] Activity modification  [x] Reviewed HEP    Home Exercises Provided: Patient instructed to continue previously provided HEP. Added core exercises to home program, see in pt instructions.   Exercises were reviewed and Alison was able to demonstrate them prior to the end of the session.  Alison demonstrated good  understanding of the education provided.    Assessment     Pt tolerated treatment well, with improved pliability in " abdominal incision and left axilla with subjective report of pt feeling less tight to abdomen in standing.  Effect appears to be transient.   Educated pt to continue scar massage to abdominal incision.  She presents with weakness in core, LE/hip weakness as well as decreased mobility in abdominal area.  She is no longer wearing abdominal binder and only occasionally wears Spanx. MLD to left breast/lateral trunk and abdomen this session with pt tolerating well and expressing decreased tightness post treatment.   Steph is a 52 y.o. female referred to outpatient physical therapy with a medical diagnosis of Invasive ductal carcinoma of left breast in female, at risk for lymphedema with surgical procedure on 10/06/2022. Pt was seen today pre-operatively to assess strength and ROM of BUEs, to take baseline circumferential measurements of BUEs to aid in the early detection of lymphedema post-operatively, and to provide pt education on exercises/precautions post-operative. Pt does not exhibit any ROM impairments currently. This pt will benefit from skilled PT for reassessment of baseline measurements 6-8 week post-operatively and to address future impairments following surgery such as pain, limited ROM, or decreased mobility. Will need to wait until pt is medically cleared to determine if pt is safe for MLD, pneumatic compression pump, or lymphatouch treatments.   Pt will continue to benefit from skilled outpatient physical therapy to address the deficits listed in the problem list box on initial evaluation, provide pt/family education and to maximize pt's level of independence in the home and community environment. Pt's spiritual, cultural and educational needs considered and pt agreeable to plan of care and goals.       Anticipated barriers for therapy: none    Goals  GOALS  Short Term Goals: 3 months  Pt to be seen for reassessment in 6-8 weeks after surgery.  Pt will demonstrate 100% knowledge of lymphedema  precautions and signs of infection.  Pt to obtain compression garments for prophylactic concerns according to APTA clinical guidelines published in Journal of Physical Therapy.     Long Term Goals: deferred     Updated GOALS: 12 visits  Patient to achieve full UE ROM shoulder flexion, abduction and ER pain free for improve function for ADL's.  Patient to demonstrate bed mobility transitions supine<>sit, sit<>stand without compensatory strategies and greater ease, 0/10 pain.  3. Patient to tolerate walking program 20 min 4-5x week without residual abdominal discomfort.  4. Return to PLOF without dependency of abdominal brace     Plan     Continue with established plan of care working toward PT goals.    Adrienne Love, PTA ,CLT

## 2023-05-09 ENCOUNTER — OFFICE VISIT (OUTPATIENT)
Dept: FAMILY MEDICINE | Facility: CLINIC | Age: 53
End: 2023-05-09
Payer: COMMERCIAL

## 2023-05-09 VITALS
DIASTOLIC BLOOD PRESSURE: 74 MMHG | BODY MASS INDEX: 29.07 KG/M2 | HEART RATE: 69 BPM | HEIGHT: 66 IN | SYSTOLIC BLOOD PRESSURE: 122 MMHG | RESPIRATION RATE: 14 BRPM | WEIGHT: 180.88 LBS

## 2023-05-09 DIAGNOSIS — R03.0 ELEVATED BP WITHOUT DIAGNOSIS OF HYPERTENSION: ICD-10-CM

## 2023-05-09 DIAGNOSIS — R10.30 LOWER ABDOMINAL PAIN: ICD-10-CM

## 2023-05-09 DIAGNOSIS — R73.01 IMPAIRED FASTING GLUCOSE: ICD-10-CM

## 2023-05-09 DIAGNOSIS — E78.5 HYPERLIPIDEMIA, UNSPECIFIED HYPERLIPIDEMIA TYPE: ICD-10-CM

## 2023-05-09 DIAGNOSIS — Z00.00 ROUTINE GENERAL MEDICAL EXAMINATION AT A HEALTH CARE FACILITY: Primary | ICD-10-CM

## 2023-05-09 DIAGNOSIS — Z23 IMMUNIZATION DUE: ICD-10-CM

## 2023-05-09 DIAGNOSIS — C50.912 INVASIVE DUCTAL CARCINOMA OF LEFT BREAST IN FEMALE: ICD-10-CM

## 2023-05-09 PROCEDURE — 1159F MED LIST DOCD IN RCRD: CPT | Mod: CPTII,S$GLB,, | Performed by: FAMILY MEDICINE

## 2023-05-09 PROCEDURE — 3074F SYST BP LT 130 MM HG: CPT | Mod: CPTII,S$GLB,, | Performed by: FAMILY MEDICINE

## 2023-05-09 PROCEDURE — 99999 PR PBB SHADOW E&M-EST. PATIENT-LVL IV: CPT | Mod: PBBFAC,,, | Performed by: FAMILY MEDICINE

## 2023-05-09 PROCEDURE — 1160F PR REVIEW ALL MEDS BY PRESCRIBER/CLIN PHARMACIST DOCUMENTED: ICD-10-PCS | Mod: CPTII,S$GLB,, | Performed by: FAMILY MEDICINE

## 2023-05-09 PROCEDURE — 3078F DIAST BP <80 MM HG: CPT | Mod: CPTII,S$GLB,, | Performed by: FAMILY MEDICINE

## 2023-05-09 PROCEDURE — 3074F PR MOST RECENT SYSTOLIC BLOOD PRESSURE < 130 MM HG: ICD-10-PCS | Mod: CPTII,S$GLB,, | Performed by: FAMILY MEDICINE

## 2023-05-09 PROCEDURE — 99213 PR OFFICE/OUTPT VISIT, EST, LEVL III, 20-29 MIN: ICD-10-PCS | Mod: 25,S$GLB,, | Performed by: FAMILY MEDICINE

## 2023-05-09 PROCEDURE — 99396 PR PREVENTIVE VISIT,EST,40-64: ICD-10-PCS | Mod: 25,S$GLB,, | Performed by: FAMILY MEDICINE

## 2023-05-09 PROCEDURE — 90750 ZOSTER RECOMBINANT VACCINE: ICD-10-PCS | Mod: S$GLB,,, | Performed by: FAMILY MEDICINE

## 2023-05-09 PROCEDURE — 1160F RVW MEDS BY RX/DR IN RCRD: CPT | Mod: CPTII,S$GLB,, | Performed by: FAMILY MEDICINE

## 2023-05-09 PROCEDURE — 90471 ZOSTER RECOMBINANT VACCINE: ICD-10-PCS | Mod: S$GLB,,, | Performed by: FAMILY MEDICINE

## 2023-05-09 PROCEDURE — 99396 PREV VISIT EST AGE 40-64: CPT | Mod: 25,S$GLB,, | Performed by: FAMILY MEDICINE

## 2023-05-09 PROCEDURE — 99999 PR PBB SHADOW E&M-EST. PATIENT-LVL IV: ICD-10-PCS | Mod: PBBFAC,,, | Performed by: FAMILY MEDICINE

## 2023-05-09 PROCEDURE — 90471 IMMUNIZATION ADMIN: CPT | Mod: S$GLB,,, | Performed by: FAMILY MEDICINE

## 2023-05-09 PROCEDURE — 3008F BODY MASS INDEX DOCD: CPT | Mod: CPTII,S$GLB,, | Performed by: FAMILY MEDICINE

## 2023-05-09 PROCEDURE — 3078F PR MOST RECENT DIASTOLIC BLOOD PRESSURE < 80 MM HG: ICD-10-PCS | Mod: CPTII,S$GLB,, | Performed by: FAMILY MEDICINE

## 2023-05-09 PROCEDURE — 3008F PR BODY MASS INDEX (BMI) DOCUMENTED: ICD-10-PCS | Mod: CPTII,S$GLB,, | Performed by: FAMILY MEDICINE

## 2023-05-09 PROCEDURE — 99213 OFFICE O/P EST LOW 20 MIN: CPT | Mod: 25,S$GLB,, | Performed by: FAMILY MEDICINE

## 2023-05-09 PROCEDURE — 1159F PR MEDICATION LIST DOCUMENTED IN MEDICAL RECORD: ICD-10-PCS | Mod: CPTII,S$GLB,, | Performed by: FAMILY MEDICINE

## 2023-05-09 PROCEDURE — 90750 HZV VACC RECOMBINANT IM: CPT | Mod: S$GLB,,, | Performed by: FAMILY MEDICINE

## 2023-05-09 RX ORDER — MECLIZINE HYDROCHLORIDE 25 MG/1
1 TABLET ORAL
COMMUNITY
Start: 2023-05-01 | End: 2023-07-12

## 2023-05-09 RX ORDER — ROSUVASTATIN CALCIUM 5 MG/1
5 TABLET, COATED ORAL DAILY
Qty: 90 TABLET | Refills: 3 | Status: SHIPPED | OUTPATIENT
Start: 2023-05-09

## 2023-05-09 RX ORDER — WITCH HAZEL 50 %
2000 PADS, MEDICATED (EA) TOPICAL DAILY
COMMUNITY
End: 2023-07-12

## 2023-05-09 RX ORDER — SEMAGLUTIDE 0.25 MG/.5ML
0.25 INJECTION, SOLUTION SUBCUTANEOUS
Qty: 2 ML | Refills: 2 | Status: SHIPPED | OUTPATIENT
Start: 2023-05-09 | End: 2023-07-12 | Stop reason: ALTCHOICE

## 2023-05-09 NOTE — PROGRESS NOTES
Subjective:       Patient ID: Steph Lemus is a 52 y.o. female.    Chief Complaint: Annual Exam      Steph Lemus is in the office for annual exam.    HPI  Medical hx reviewed. Since lov, IDC, s/p fabricio mastectomy with flap for recon. Having issues with lower abdominal tension/discomfort that feels like a constant tino horse. In PT which hasn't helped thus far. Recalled that she had an u/s following surgery which did show some hematomas.   Past Medical History:   Diagnosis Date    Acid reflux     Breast cancer 08/2022    Stage IA (T1c, N0, M0, Grade 1, ER+/FL+/HER2 pending) Invasive ductal carcinoma of the LEFT breast    Epistaxis, recurrent     none since cauterization    Hormone replacement therapy     Hyperlipidemia     Invasive ductal carcinoma of left breast in female 09/2022     Home BP checks: she's had a few episodes where she was 140/90.  Vertigo required ent eval with meclizine.   Saw GI during vertigo, was dx with gastritis. Taking protonix prn/infrequent of late.       Current Outpatient Medications:     ALPRAZolam (XANAX) 0.5 MG tablet, Take 1 tablet (0.5 mg total) by mouth 2 (two) times daily as needed for Anxiety., Disp: 45 tablet, Rfl: 0    cyanocobalamin (VITAMIN B-12) 2000 MCG tablet, Take 2,000 mcg by mouth once daily., Disp: , Rfl:     estradioL (ESTRACE) 0.01 % (0.1 mg/gram) vaginal cream, Place 1 g vaginally once daily for 7 days, THEN 1 g twice a week., Disp: 42.5 g, Rfl: 0    famotidine (PEPCID) 40 MG tablet, Take 40 mg by mouth once daily., Disp: , Rfl:     fluticasone propionate (FLONASE) 50 mcg/actuation nasal spray, SPRAY 1 SPRAY (50 MCG TOTAL) INTO INTO EACH NOSTRIL EVERY DAY, Disp: 16 mL, Rfl: 1    multivitamin (THERAGRAN) per tablet, Take 1 tablet by mouth once daily., Disp: , Rfl:     mv-mn/iron/folic acid/herb 190 (VITAMIN D3 COMPLETE ORAL), Take 1 tablet by mouth once daily., Disp: , Rfl:     pantoprazole (PROTONIX) 40 MG tablet, Take 1 tablet (40 mg total) by mouth  once daily., Disp: 30 tablet, Rfl: 11    rosuvastatin (CRESTOR) 5 MG tablet, TAKE 1 TABLET BY MOUTH EVERY DAY, Disp: 90 tablet, Rfl: 3    meclizine (ANTIVERT) 25 mg tablet, Take 1 tablet by mouth as needed., Disp: , Rfl:     semaglutide, weight loss, (WEGOVY) 0.25 mg/0.5 mL PnIj, Inject 0.25 mg into the skin every 7 days., Disp: 2 mL, Rfl: 2  No current facility-administered medications for this visit.    Facility-Administered Medications Ordered in Other Visits:     LIDOcaine (PF) 10 mg/ml (1%) injection 10 mg, 1 mL, Intradermal, Once, Simona Cueva MD    midazolam (VERSED) 1 mg/mL injection 2 mg, 2 mg, Intravenous, See admin instructions, Simona Cueva MD    midazolam (VERSED) 1 mg/mL injection 2 mg, 2 mg, Intravenous, See admin instructions, Simona Cueva MD, 2 mg at 01/05/23 0703    piperacillin-tazobactam 4.5 g in dextrose 5 % 100 mL IVPB (ready to mix system), 4.5 g, Intravenous, On Call Procedure, Simona Cueva MD    The 10-year ASCVD risk score (Gordon CRAWLEY, et al., 2019) is: 1.2%    Values used to calculate the score:      Age: 52 years      Sex: Female      Is Non- : No      Diabetic: No      Tobacco smoker: No      Systolic Blood Pressure: 122 mmHg      Is BP treated: No      HDL Cholesterol: 64 mg/dL      Total Cholesterol: 170 mg/dL     Lab Results   Component Value Date    HGBA1C 5.1 09/21/2020     Lab Results   Component Value Date    GLUF 101 11/19/2007    LDLCALC 88.4 02/09/2021    CREATININE 0.8 02/02/2023   Labs 2023 rev.     Review of Systems   Constitutional:  Negative for fatigue and unexpected weight change.   HENT:  Negative for congestion, postnasal drip and sore throat.    Respiratory:  Negative for cough and shortness of breath.    Cardiovascular:  Negative for chest pain and palpitations (resolved).   Gastrointestinal:  Negative for abdominal pain, constipation (mild if any) and diarrhea.        No gerd c/o. But increased gas, epigastric  discomfort   Genitourinary:  Negative for difficulty urinating, dysuria and frequency.   Musculoskeletal:  Positive for myalgias (lower abdomen). Negative for joint swelling.   Neurological:  Positive for dizziness (improved). Negative for light-headedness and headaches.   Psychiatric/Behavioral:  Positive for sleep disturbance (so-so). Negative for decreased concentration. The patient is not nervous/anxious.        Objective:      Physical Exam  Vitals and nursing note reviewed.   Constitutional:       General: She is not in acute distress.     Appearance: Normal appearance. She is well-developed.   HENT:      Head: Normocephalic and atraumatic.      Right Ear: Tympanic membrane and external ear normal.      Left Ear: Tympanic membrane and external ear normal.      Nose: Nose normal.      Mouth/Throat:      Pharynx: No oropharyngeal exudate.   Eyes:      Conjunctiva/sclera: Conjunctivae normal.      Pupils: Pupils are equal, round, and reactive to light.   Neck:      Thyroid: No thyromegaly.   Cardiovascular:      Rate and Rhythm: Normal rate and regular rhythm.   Pulmonary:      Effort: Pulmonary effort is normal. No respiratory distress.      Breath sounds: Normal breath sounds. No wheezing.   Abdominal:      General: Bowel sounds are normal. There is no distension.      Palpations: Abdomen is soft. There is no mass.      Tenderness: There is abdominal tenderness (lower abd tenderness noted with sporadic areas of skin fullness and tenderness). There is no guarding or rebound.   Musculoskeletal:         General: Normal range of motion.      Cervical back: Normal range of motion and neck supple.      Right lower leg: No edema.      Left lower leg: No edema.   Lymphadenopathy:      Cervical: No cervical adenopathy.   Skin:     General: Skin is warm and dry.   Neurological:      General: No focal deficit present.      Mental Status: She is alert and oriented to person, place, and time.      Cranial Nerves: No cranial  nerve deficit.   Psychiatric:         Mood and Affect: Mood normal.         Behavior: Behavior normal.           Screening recommendations appropriate to age and health status were reviewed.    Assessment & Plan:    Routine general medical examination at a health care facility  Comments:  anticipatory guidance reviewed  Orders:  -     HEPATITIS C ANTIBODY; Future; Expected date: 05/09/2023  -     HIV 1/2 Ag/Ab (4th Gen); Future; Expected date: 05/09/2023  -     CBC Without Differential; Future; Expected date: 05/09/2023  -     Comprehensive Metabolic Panel; Future; Expected date: 05/09/2023  -     TSH; Future; Expected date: 05/09/2023  -     Urinalysis, Reflex to Urine Culture Urine, Clean Catch; Future  -     Vitamin B12; Future; Expected date: 05/09/2023  -     Lipid Panel; Future; Expected date: 05/09/2023  -     Hemoglobin A1C; Future; Expected date: 05/09/2023  -     Folate; Future; Expected date: 05/09/2023  -     Iron; Future; Expected date: 05/09/2023  -     Vitamin D; Future; Expected date: 05/09/2023    Hyperlipidemia, unspecified hyperlipidemia type    Invasive ductal carcinoma of left breast in female  Comments:  s/p mastectomy, followed by oncology    Elevated BP without diagnosis of hypertension  Comments:  reviewed home readings and targets  log with parameters discussed    Impaired fasting glucose  -     semaglutide, weight loss, (WEGOVY) 0.25 mg/0.5 mL PnIj; Inject 0.25 mg into the skin every 7 days.  Dispense: 2 mL; Refill: 2    Immunization due  -     (In Office Administered) Zoster Recombinant Vaccine    Lower abdominal pain  Comments:  possible scar tissue following surgery, cont PT, can ask about percussion massage at home as well as available procedures to correct

## 2023-05-10 ENCOUNTER — LAB VISIT (OUTPATIENT)
Dept: LAB | Facility: HOSPITAL | Age: 53
End: 2023-05-10
Attending: FAMILY MEDICINE
Payer: COMMERCIAL

## 2023-05-10 DIAGNOSIS — Z00.00 ROUTINE GENERAL MEDICAL EXAMINATION AT A HEALTH CARE FACILITY: ICD-10-CM

## 2023-05-10 LAB
25(OH)D3+25(OH)D2 SERPL-MCNC: 65 NG/ML (ref 30–96)
ALBUMIN SERPL BCP-MCNC: 4.1 G/DL (ref 3.5–5.2)
ALP SERPL-CCNC: 96 U/L (ref 55–135)
ALT SERPL W/O P-5'-P-CCNC: 26 U/L (ref 10–44)
ANION GAP SERPL CALC-SCNC: 11 MMOL/L (ref 8–16)
AST SERPL-CCNC: 23 U/L (ref 10–40)
BILIRUB SERPL-MCNC: 0.4 MG/DL (ref 0.1–1)
BUN SERPL-MCNC: 13 MG/DL (ref 6–20)
CALCIUM SERPL-MCNC: 10 MG/DL (ref 8.7–10.5)
CHLORIDE SERPL-SCNC: 106 MMOL/L (ref 95–110)
CHOLEST SERPL-MCNC: 189 MG/DL (ref 120–199)
CHOLEST/HDLC SERPL: 2.9 {RATIO} (ref 2–5)
CO2 SERPL-SCNC: 26 MMOL/L (ref 23–29)
CREAT SERPL-MCNC: 0.9 MG/DL (ref 0.5–1.4)
ERYTHROCYTE [DISTWIDTH] IN BLOOD BY AUTOMATED COUNT: 14.4 % (ref 11.5–14.5)
EST. GFR  (NO RACE VARIABLE): >60 ML/MIN/1.73 M^2
ESTIMATED AVG GLUCOSE: 108 MG/DL (ref 68–131)
GLUCOSE SERPL-MCNC: 85 MG/DL (ref 70–110)
HBA1C MFR BLD: 5.4 % (ref 4–5.6)
HCT VFR BLD AUTO: 44.3 % (ref 37–48.5)
HDLC SERPL-MCNC: 65 MG/DL (ref 40–75)
HDLC SERPL: 34.4 % (ref 20–50)
HGB BLD-MCNC: 14.1 G/DL (ref 12–16)
HIV 1+2 AB+HIV1 P24 AG SERPL QL IA: NORMAL
IRON SERPL-MCNC: 54 UG/DL (ref 30–160)
LDLC SERPL CALC-MCNC: 105.6 MG/DL (ref 63–159)
MCH RBC QN AUTO: 28.7 PG (ref 27–31)
MCHC RBC AUTO-ENTMCNC: 31.8 G/DL (ref 32–36)
MCV RBC AUTO: 90 FL (ref 82–98)
NONHDLC SERPL-MCNC: 124 MG/DL
PLATELET # BLD AUTO: 250 K/UL (ref 150–450)
PMV BLD AUTO: 9.9 FL (ref 9.2–12.9)
POTASSIUM SERPL-SCNC: 4.5 MMOL/L (ref 3.5–5.1)
PROT SERPL-MCNC: 7 G/DL (ref 6–8.4)
RBC # BLD AUTO: 4.91 M/UL (ref 4–5.4)
SODIUM SERPL-SCNC: 143 MMOL/L (ref 136–145)
TRIGL SERPL-MCNC: 92 MG/DL (ref 30–150)
TSH SERPL DL<=0.005 MIU/L-ACNC: 2.1 UIU/ML (ref 0.4–4)
VIT B12 SERPL-MCNC: 944 PG/ML (ref 210–950)
WBC # BLD AUTO: 5.83 K/UL (ref 3.9–12.7)

## 2023-05-10 PROCEDURE — 82607 VITAMIN B-12: CPT | Performed by: FAMILY MEDICINE

## 2023-05-10 PROCEDURE — 82746 ASSAY OF FOLIC ACID SERUM: CPT | Performed by: FAMILY MEDICINE

## 2023-05-10 PROCEDURE — 84443 ASSAY THYROID STIM HORMONE: CPT | Performed by: FAMILY MEDICINE

## 2023-05-10 PROCEDURE — 83540 ASSAY OF IRON: CPT | Performed by: FAMILY MEDICINE

## 2023-05-10 PROCEDURE — 83036 HEMOGLOBIN GLYCOSYLATED A1C: CPT | Performed by: FAMILY MEDICINE

## 2023-05-10 PROCEDURE — 85027 COMPLETE CBC AUTOMATED: CPT | Performed by: FAMILY MEDICINE

## 2023-05-10 PROCEDURE — 80053 COMPREHEN METABOLIC PANEL: CPT | Performed by: FAMILY MEDICINE

## 2023-05-10 PROCEDURE — 36415 COLL VENOUS BLD VENIPUNCTURE: CPT | Mod: PO | Performed by: FAMILY MEDICINE

## 2023-05-10 PROCEDURE — 86803 HEPATITIS C AB TEST: CPT | Performed by: FAMILY MEDICINE

## 2023-05-10 PROCEDURE — 82306 VITAMIN D 25 HYDROXY: CPT | Performed by: FAMILY MEDICINE

## 2023-05-10 PROCEDURE — 87389 HIV-1 AG W/HIV-1&-2 AB AG IA: CPT | Performed by: FAMILY MEDICINE

## 2023-05-10 PROCEDURE — 80061 LIPID PANEL: CPT | Performed by: FAMILY MEDICINE

## 2023-05-11 ENCOUNTER — CLINICAL SUPPORT (OUTPATIENT)
Dept: REHABILITATION | Facility: HOSPITAL | Age: 53
End: 2023-05-11
Payer: COMMERCIAL

## 2023-05-11 DIAGNOSIS — C50.912 INVASIVE DUCTAL CARCINOMA OF LEFT BREAST IN FEMALE: Primary | ICD-10-CM

## 2023-05-11 DIAGNOSIS — Z91.89 AT RISK FOR LYMPHEDEMA: ICD-10-CM

## 2023-05-11 DIAGNOSIS — M62.81 MUSCLE WEAKNESS: ICD-10-CM

## 2023-05-11 LAB
FOLATE SERPL-MCNC: 16.6 NG/ML (ref 4–24)
HCV AB SERPL QL IA: NORMAL

## 2023-05-11 PROCEDURE — 97112 NEUROMUSCULAR REEDUCATION: CPT | Mod: PN

## 2023-05-11 PROCEDURE — 97110 THERAPEUTIC EXERCISES: CPT | Mod: PN

## 2023-05-11 NOTE — PROGRESS NOTES
"[facility]  Physical Therapy Re-Assessment &Treatment Note  Lymphedema     Visit Date: 5/11/2023    Name: Steph Lemus  MRN: [id number]  Therapy Diagnosis:    Invasive ductal carcinoma of left breast in female      At risk for lymphedema      Muscle weakness      Surgery date: bilateral skin and nipple sparing mastectomy, left SLNB, RUTH on 01/16/2023.  Radiation: none needed  Chemotherapy: none  Evaluation Date: 10/05/2023 Prehab  Authorization: pending  Plan of Care: PT f/u 8 weeks post-op     Visit #: 2/ 3, requesting additional 2x week 6 weeks; Eval + 11; 5/11/2023 request 1 x week x 4 weeks   Visit # 11/15  PTA visit: 0  Time In:  2:12 PM  Time Out: 3:05 PM  Total Billable Time: 53 minutes       Precautions: Standard, cancer, and avoid IV, BP, blood draws, injections in the left arm      Subjective     Pt states: continued "charley horse" sensation in the abdomen.  Feels like she has a "hard rock" inside her stomach behind the scar. Pt states that spasms in center of abdomen and still feel hard knots in the center. Pt reports if she wears the spanx then feels like it makes it worse with hardness if she wears it all day.  Appointment with Dr Tirado was rescheduled until May 16th because he and his wife are having a baby on May 6th.  "I am going to request that he do an ultrasound on my abdomen to see if the hematomas have resolved." Pt has been wearing Spanx intermittently, but doesn't find that it helps one way or the other.    Reports walking up to 45 min at a time 4 days a week.but notices has increased pain in same spot of hardness in lower abdomen. Pt reports normal bowel movements. PT asked about her urine, has had 2 UTIs recently, normal color currently,  however she notes that her flow of urine is more normal when she has less hardness in lower abdomen, and then it is a weak stream when it is feeling hard. Pt reports can go all day without urinating however at night gets " up 2x a night to urinate.      Pain: 5/10 (described as discomfort)  Location: central abdominal area and left randi incision  Objective   Vitals: 98%, 63bpm   6MWT: 1560 feet     Treatment / Re-Assessment 53 min  Therapeutic exercise 40 min  Full AROM B UE for shoulder flexion, abduction, ER. Painfree.  Demonstrates proper log roll technique to transition out of bed and has become easier than before.   Pt with decreased ability to initiate transverse abdominus contraction on R side, with pt able to have contraction in left without pain or difficulty. Pt noted with significant glut max weakness on R as compared to left upon testing: R 3+/5, L 4+/5.   Performed single limb bridge with cues for R LE. 10 sec hold 10 reps  Core work: attempted supine knees flexed ball press with knee into ball going up and in, with hand press pt unable to decrease contraction of hip flexor on R.  Performed transverse abdominus initiation exercise supine, with L knee going toward ceiling, unable to isolate R transverse and using hip flexor.   Pt will work on this exercise for 10 sec hold 5 reps working up to 10 min  KAKB: pt able to isolate detrusor muscle without difficulty for pelvic floor.  Neuro muscular re-ed 13 min: review of posture with pt having rounded shoulders and tightness in pectoral musculature. Scapular stabilization is weak, and pt has elevated scapula with lateral rotation in standing creating increase in upper trap tension bilaterally.  Performed T-richy for scapular stabilization 10 reps 10 sec hold    deferred  Pt received manual therapy x  minutes as follows:   MLD to left breast/lateral trunk with pre treatment short neck series, right axilla, abdomen, right and left inguinal triangle, followed by MLD to left breast accessing watershed areas on trunk. Pt received lymphatouch at 55 mmHg to abdominal area.  STM to abdominal area with tightness noted centrally.   Deferred:  Pt performed therapeutic exercises x 13 minutes  "as follows:  Diaphragmatic breathing in supine  Diaphragm breaths: inhale lengthen, exhale pelvic tilt x 10 reps  Supine hook lying Pelvic tilts with hip add ball squeeze incorporating diaphragm breath  LTR with arms in "Y" with 10 sec hold ea x 5 reps  LTR x 15  Pelvic clocks  Pelvic tilts with TvA activation 2 x 10 incorporating diaphragm breath  Marches: Alternate lifting one leg at a time to table-top position  PPT with TvA activation with ball between knees; holding 10 secs x 10 reps  Single leg bridge x 5 reps ea side with a 10 sec hold    Deferred:  Nustep L4 x 10 min   Abdominal Brace Series (Joanna, Cinthia):  -Brace core and keep pelvis stable throughout each variation. Don't forget to breathe Bent Knee Fall Outs: Let one knee fall out to the side while keeping opposite hip stable. Do not let your pelvis rock.     Prone PPT x 10  Side Lying Clamshells with heel press 2 x 10 bilaterally  Clamshells x 20 ea  Hip abduction with 3 sec lift/lower x 10  Sidelying hip flexor stretching 20" x 2 ea  Side lying left shoulder abduction x 10  PPT with single knee fall out x 10 each  Education Provided  [x] Progress toward goals   [x] Role of therapy   [x] Activity modification  [x] Reviewed HEP    Home Exercises Provided: Patient instructed to continue previously provided HEP. Added core exercises to home program, see in pt instructions.   Exercises were reviewed and Alison was able to demonstrate them prior to the end of the session.  Alison demonstrated good  understanding of the education provided.    Assessment   Pt has met all LTG set, however has continued complaints of internal "rock hard" tightness that creates pain after she walks 45 min or has been up on her feet. Pt demonstrates poor postural control and decreased core initiation more so on R side than L. Noted relationship with flow of urine and how hard the symptom is in her lower abdomen. Feel she will benefit from pelvic floor PT referral to assess if scar " tissue formation in pelvic floor or surrounding area is contributing factor or if she has increased pelvic floor tone not related to her surgery.  Pt tolerated treatment well, with good understanding of muscle imbalances affecting her posture and causing neck discomfort as well as muscle imbalances with trunk. .   Educated pt to continue scar massage to abdominal incision.  She presents with weakness in core, LE/hip weakness as well as decreased mobility in abdominal area.  She is no longer wearing abdominal binder and only occasionally wears Spanx.    [facility] 52 [facility] to outpatient physical therapy with a medical diagnosis of Invasive ductal carcinoma of left breast in female, at risk for lymphedema with surgical procedure on 10/06/2022. Pt was seen today pre-operatively to assess strength and ROM of BUEs, to take baseline circumferential measurements of BUEs to aid in the early detection of lymphedema post-operatively, and to provide pt education on exercises/precautions post-operative. Pt does not exhibit any ROM impairments currently. This pt will benefit from skilled PT for reassessment of baseline measurements 6-8 week post-operatively and to address future impairments following surgery such as pain, limited ROM, or decreased mobility. Will need to wait until pt is medically cleared to determine if pt is safe for MLD, pneumatic compression pump, or lymphatouch treatments.   Pt will continue to benefit from skilled outpatient physical therapy to address the deficits listed in the problem list box on initial evaluation, provide pt/family education and to maximize pt's level of independence in the home and community environment. Pt's spiritual, cultural and educational needs considered and pt agreeable to plan of care and goals.       Anticipated barriers for therapy: none    GOALS  Short Term Goals: 3 months  Pt to be seen for reassessment in 6-8 weeks after surgery.  Pt will  demonstrate 100% knowledge of lymphedema precautions and signs of infection.  Pt to obtain compression garments for prophylactic concerns according to APTA clinical guidelines published in Journal of Physical Therapy.     Long Term Goals: deferred     Updated GOALS: 12 visits  Patient to achieve full UE ROM shoulder flexion, abduction and ER pain free for improve function for ADL's.GOAL MET 2023  Patient to demonstrate bed mobility transitions supine<>sit, sit<>stand without compensatory strategies and greater ease, 0/10 pain.GOAL MET 2023  3. Patient to tolerate walking program 20 min 4-5x week without residual abdominal discomfort.GOAL MET 2023  4. Return to PLOF without dependency of abdominal braceGOAL MET 2023  Updated GOALS:  LT visits 2023  Pt to learn how to isolate transverse abdominus and increase strength for trunk support in standing  Increase glut max to at least 4+/5 on R to assist with trunk support for walking exercise   Reduction in lower abdominal pain after 45 min walk by 50 % in intensity.  Pt to see pelvic floor PT for evaluation  5.  Demonstrate proper posture in stance with improved scapular stabilization strength testing level as good     Plan   Continue 1 x week x 4 weeks  Continue with established plan of care working toward PT goals.  May need to get pt to use video on her phone for hep.  Single limb bridge, transverse abdominus work, glut work in standing, scapular stabilization exercises with theraband to be provided next tx.  Mary Matt, PT ,CLT

## 2023-05-12 DIAGNOSIS — M62.81 MUSCLE WEAKNESS: ICD-10-CM

## 2023-05-12 DIAGNOSIS — Z91.89 AT RISK FOR LYMPHEDEMA: ICD-10-CM

## 2023-05-12 DIAGNOSIS — C50.912 INVASIVE DUCTAL CARCINOMA OF LEFT BREAST IN FEMALE: Primary | ICD-10-CM

## 2023-05-17 ENCOUNTER — CLINICAL SUPPORT (OUTPATIENT)
Dept: REHABILITATION | Facility: HOSPITAL | Age: 53
End: 2023-05-17
Payer: COMMERCIAL

## 2023-05-17 DIAGNOSIS — M62.81 MUSCLE WEAKNESS: Primary | ICD-10-CM

## 2023-05-17 DIAGNOSIS — C50.912 INVASIVE DUCTAL CARCINOMA OF LEFT BREAST IN FEMALE: ICD-10-CM

## 2023-05-17 DIAGNOSIS — Z91.89 AT RISK FOR LYMPHEDEMA: ICD-10-CM

## 2023-05-17 PROCEDURE — 97110 THERAPEUTIC EXERCISES: CPT | Mod: PN

## 2023-05-17 NOTE — PROGRESS NOTES
"[facility]  Physical Therapy Treatment Note  Lymphedema     Visit Date: 5/17/2023    Name: Steph Lemus  MRN: [id number]  Therapy Diagnosis:    Invasive ductal carcinoma of left breast in female      At risk for lymphedema      Muscle weakness      Surgery date: bilateral skin and nipple sparing mastectomy, left SLNB, RUTH on 01/16/2023.  Radiation: none needed  Chemotherapy: none  Evaluation Date: 10/05/2023 Prehab  Authorization: pending  Plan of Care: PT f/u 8 weeks post-op     Visit #: 2/ 3, requesting additional 2x week 6 weeks; Eval + 11; 5/11/2023 request 1 x week x 4 weeks   Visit # 12/15  PTA visit: 0  Time In:  09:00 AM  Time Out: 09:55 AM  Total Billable Time: 55 minutes       Precautions: Standard, cancer, and avoid IV, BP, blood draws, injections in the left arm      Subjective     Pt states: the tightness is getting better still there, it is inconsistent. My appointment with Dr. Tirado was rescheduled again.   Feels like she has a "hard rock" inside her stomach behind the scar. Pt states that spasms in center of abdomen and still feel hard knots in the center. Pt reports if she wears the spanx then feels like it makes it worse with hardness if she wears it all day.  "I am going to request that he do an ultrasound on my abdomen to see if the hematomas have resolved." Pt has been wearing Spanx intermittently, but doesn't find that it helps one way or the other.    Reports walking up to 45 min at a time 4 days a week.but notices has increased pain in same spot of hardness in lower abdomen. Pt reports normal bowel movements. PT asked about her urine, has had 2 UTIs recently, normal color currently,  however she notes that her flow of urine is more normal when she has less hardness in lower abdomen, and then it is a weak stream when it is feeling hard. Pt reports can go all day without urinating however at night gets up 2x a night to urinate.      Pain: 5/10 (described as " discomfort)  Location: central abdominal area and left randi incision  Objective   Vitals: 98%, 63bpm   6MWT: 1560 feet     Full AROM B UE for shoulder flexion, abduction, ER. Painfree.  Pt with decreased ability to initiate transverse abdominus contraction on R side, with pt able to have contraction in left without pain or difficulty. Pt noted with significant glut max weakness on R as compared to left upon testing: R 3+/5, L 4+/5.     Therapeutic exercise 55 min  Nustep L4 x 10 min   Core work: (HEP handout provided)  -Quadruped Breathing and transverse abdominus activation   -Performed single limb bridge with cues for R LE. 10 sec hold 10 reps   -Supine hook lying:transverse abdominus activation with single knee press into ball with B hand press 5 sec hold x 10 reps each  -Supine TA initiation with marches 3x10 reps  -supine bent knee fall outs with TA contraction/stabilization  -Modified Dead bug/ red physioball UE ball press into thigh, hold 10 sec,  alt R/L  Seated on green physioball:  -Performed T-richy for scapular stabilization 10 reps 10 sec hold  -Scapular retractions/rows 3x10  -set into neutral pelvis, SAP with RTB hold for 5 count     Deferred  attempted supine knees flexed ball press with knee into ball going up and in, with hand press pt unable to decrease contraction of hip flexor on R.  Performed transverse abdominus initiation exercise supine, with L knee going toward ceiling, unable to isolate R transverse and using hip flexor.   Pt will work on this exercise for 10 sec hold 5 reps working up to 10 min  KAKB: pt able to isolate detrusor muscle without difficulty for pelvic floor.  Neuro muscular re-ed 13 min: review of posture with pt having rounded shoulders and tightness in pectoral musculature. Scapular stabilization is weak, and pt has elevated scapula with lateral rotation in standing creating increase in upper trap tension bilaterally.    deferred  Pt received manual therapy x  minutes as  "follows:   MLD to left breast/lateral trunk with pre treatment short neck series, right axilla, abdomen, right and left inguinal triangle, followed by MLD to left breast accessing watershed areas on trunk. Pt received lymphatouch at 55 mmHg to abdominal area.  STM to abdominal area with tightness noted centrally.   Deferred:  Pt performed therapeutic exercises x 13 minutes as follows:  Diaphragmatic breathing in supine  Diaphragm breaths: inhale lengthen, exhale pelvic tilt x 10 reps  Supine hook lying Pelvic tilts with hip add ball squeeze incorporating diaphragm breath  LTR with arms in "Y" with 10 sec hold ea x 5 reps  LTR x 15  Pelvic clocks  Pelvic tilts with TvA activation 2 x 10 incorporating diaphragm breath  Marches: Alternate lifting one leg at a time to table-top position  PPT with TvA activation with ball between knees; holding 10 secs x 10 reps  Single leg bridge x 5 reps ea side with a 10 sec hold    Deferred:  Prone PPT x 10  Side Lying Clamshells with heel press 2 x 10 bilaterally  Clamshells x 20 ea  Hip abduction with 3 sec lift/lower x 10  Sidelying hip flexor stretching 20" x 2 ea  Side lying left shoulder abduction x 10      Education Provided  [x] Progress toward goals   [x] Role of therapy   [x] Activity modification  [x] Reviewed HEP    Home Exercises Provided: Patient instructed to continue previously provided HEP. Added core exercises to home program, see in pt instructions.   Exercises were reviewed and Alison was able to demonstrate them prior to the end of the session.  Alison demonstrated good  understanding of the education provided.    Assessment   Pt has met all LTG set, however has continued complaints of internal "rock hard" tightness that creates pain after she walks 45 min or has been up on her feet. Pt demonstrates poor postural control and decreased core initiation more so on R side than L. Noted relationship with flow of urine and how hard the symptom is in her lower abdomen. " Feel she will benefit from pelvic floor PT referral to assess if scar tissue formation in pelvic floor or surrounding area is contributing factor or if she has increased pelvic floor tone not related to her surgery.    Pt tolerated treatment well, with good understanding of muscle imbalances affecting her posture and causing neck discomfort as well as muscle imbalances with trunk. Continued transverse abdominus activation exercises, postural exercises today with handout provided to perform at home. Will monitor and progress each week as tolerated. Pt to continue scar massage to abdominal incision.  She presents with weakness in core, LE/hip weakness as well as decreased mobility in abdominal area.  She is no longer wearing abdominal binder and only occasionally wears Spanx.    [facility] 52 [facility] referred to outpatient physical therapy with a medical diagnosis of Invasive ductal carcinoma of left breast in female, at risk for lymphedema with surgical procedure on 10/06/2022. Pt was seen today pre-operatively to assess strength and ROM of BUEs, to take baseline circumferential measurements of BUEs to aid in the early detection of lymphedema post-operatively, and to provide pt education on exercises/precautions post-operative. Pt does not exhibit any ROM impairments currently. This pt will benefit from skilled PT for reassessment of baseline measurements 6-8 week post-operatively and to address future impairments following surgery such as pain, limited ROM, or decreased mobility. Will need to wait until pt is medically cleared to determine if pt is safe for MLD, pneumatic compression pump, or lymphatouch treatments.   Pt will continue to benefit from skilled outpatient physical therapy to address the deficits listed in the problem list box on initial evaluation, provide pt/family education and to maximize pt's level of independence in the home and community environment. Pt's spiritual, cultural and  educational needs considered and pt agreeable to plan of care and goals.       Anticipated barriers for therapy: none    GOALS  Short Term Goals: 3 months  Pt to be seen for reassessment in 6-8 weeks after surgery.  Pt will demonstrate 100% knowledge of lymphedema precautions and signs of infection.  Pt to obtain compression garments for prophylactic concerns according to APTA clinical guidelines published in Journal of Physical Therapy.     Long Term Goals: deferred     Updated GOALS: 12 visits  Patient to achieve full UE ROM shoulder flexion, abduction and ER pain free for improve function for ADL's.GOAL MET 2023  Patient to demonstrate bed mobility transitions supine<>sit, sit<>stand without compensatory strategies and greater ease, 0/10 pain.GOAL MET 2023  3. Patient to tolerate walking program 20 min 4-5x week without residual abdominal discomfort.GOAL MET 2023  4. Return to PLOF without dependency of abdominal braceGOAL MET 2023  Updated GOALS:  LT visits 2023  Pt to learn how to isolate transverse abdominus and increase strength for trunk support in standing  Increase glut max to at least 4+/5 on R to assist with trunk support for walking exercise   Reduction in lower abdominal pain after 45 min walk by 50 % in intensity.  Pt to see pelvic floor PT for evaluation  5.  Demonstrate proper posture in stance with improved scapular stabilization strength testing level as good     Plan   Continue 1 x week x 4 weeks  Continue with established plan of care working toward PT goals.  May need to get pt to use video on her phone for hep.  Single limb bridge, transverse abdominus work, glut work in standing, scapular stabilization exercises with theraband to be provided next tx.  Josselin Rdz, PT ,CLT

## 2023-05-19 ENCOUNTER — PATIENT MESSAGE (OUTPATIENT)
Dept: FAMILY MEDICINE | Facility: CLINIC | Age: 53
End: 2023-05-19
Payer: COMMERCIAL

## 2023-05-25 ENCOUNTER — CLINICAL SUPPORT (OUTPATIENT)
Dept: REHABILITATION | Facility: HOSPITAL | Age: 53
End: 2023-05-25
Payer: COMMERCIAL

## 2023-05-25 DIAGNOSIS — M62.81 MUSCLE WEAKNESS: Primary | ICD-10-CM

## 2023-05-25 DIAGNOSIS — C50.912 INVASIVE DUCTAL CARCINOMA OF LEFT BREAST IN FEMALE: ICD-10-CM

## 2023-05-25 DIAGNOSIS — Z91.89 AT RISK FOR LYMPHEDEMA: ICD-10-CM

## 2023-05-25 PROCEDURE — 97110 THERAPEUTIC EXERCISES: CPT | Mod: PN,CQ

## 2023-05-25 NOTE — PROGRESS NOTES
"[facility]  Physical Therapy Treatment Note  Lymphedema     Visit Date: 5/25/2023    Name: Steph Lemus  MRN: [id number]  Therapy Diagnosis:    Invasive ductal carcinoma of left breast in female      At risk for lymphedema      Muscle weakness      Surgery date: bilateral skin and nipple sparing mastectomy, left SLNB, RUTH on 01/16/2023.  Radiation: none needed  Chemotherapy: none  Evaluation Date: 10/05/2023 Prehab  Authorization: pending  Plan of Care: PT f/u 8 weeks post-op     Visit #: 3/ 3, requesting additional 2x week 6 weeks; Eval + 12; 5/11/2023 request 1 x week x 4 weeks   Visit # 13/15  PTA visit: 1  Time In:  09:00 AM  Time Out: 09:55 AM  Total Billable Time: 55 minutes       Precautions: Standard, cancer, and avoid IV, BP, blood draws, injections in the left arm      Subjective     Pt states: the tightness is getting better still there, it is inconsistent. Usually feels good in the AM, but this morning it feels tight. My appointment with Dr. Tirado was rescheduled again. Feels like she has a "hard rock" inside her stomach behind the scar; "like an egg."  Pt states that spasms in center of abdomen and still feel hard knots in the center. Pt reports if she wears the spanx then feels like it makes it worse with hardness if she wears it all day.  "I am going to request that he do an ultrasound on my abdomen to see if the hematomas have resolved." Pt has been wearing Spanx intermittently, but doesn't find that it helps one way or the other.  The only relief she can get from the hardness is to lie flat on her back.  Pt also reports soreness in bilateral groin area.  Reports walking up to 45 min at a time 4 days a week, but notices has increased pain in same spot of hardness in lower abdomen. Pt reports normal bowel movements. PT asked about her urine, has had 2 UTIs recently, normal color currently,  however she notes that her flow of urine is more normal when she has less " "hardness in lower abdomen, and then it is a weak stream when it is feeling hard. Pt reports can go all day without urinating however at night gets up 2x a night to urinate.      Pain: 5/10 (described as discomfort)  Location: central abdominal area and left randi incision  Objective     Therapeutic exercise 55 min  Nustep L4 x 10 min (O2 sats 98% p. 98  Core work: (HEP handout provided last session)  -Quadruped Breathing and transverse abdominus activation   - quadruped cat/camel  - child's pose  -Performed single limb bridge with cues for R LE. 10 sec hold 10 reps   -Supine hook lying:transverse abdominus activation with single knee press into ball with B hand press 5 sec hold x 10 reps each  -Supine TA initiation with marches 3x10 reps  -supine bent knee fall outs with TA contraction/stabilization  -Modified Dead bug/ red physioball UE ball press into thigh, hold 10 sec,  alt R/L  Seated on green physioball:  -Performed T-richy for scapular stabilization 10 reps 10 sec hold  -Scapular retractions/rows 3x10  -set into neutral pelvis, SAP with RTB hold for 5 count   - alt arm/legs x 10  - hip adduction in side lye 2 x 10 B  - butterfly stretch 20" x 2  - hip flexor stretching at barre 2 x 20 sec ea    Deferred  attempted supine knees flexed ball press with knee into ball going up and in, with hand press pt unable to decrease contraction of hip flexor on R.  Performed transverse abdominus initiation exercise supine, with L knee going toward ceiling, unable to isolate R transverse and using hip flexor.   Pt will work on this exercise for 10 sec hold 5 reps working up to 10 min  KAKB: pt able to isolate detrusor muscle without difficulty for pelvic floor.  Neuro muscular re-ed 13 min: review of posture with pt having rounded shoulders and tightness in pectoral musculature. Scapular stabilization is weak, and pt has elevated scapula with lateral rotation in standing creating increase in upper trap tension " "bilaterally.    deferred  Pt received manual therapy x  minutes as follows:   MLD to left breast/lateral trunk with pre treatment short neck series, right axilla, abdomen, right and left inguinal triangle, followed by MLD to left breast accessing watershed areas on trunk. Pt received lymphatouch at 55 mmHg to abdominal area.  STM to abdominal area with tightness noted centrally.   Deferred:  Pt performed therapeutic exercises x 13 minutes as follows:  Diaphragmatic breathing in supine  Diaphragm breaths: inhale lengthen, exhale pelvic tilt x 10 reps  Supine hook lying Pelvic tilts with hip add ball squeeze incorporating diaphragm breath  LTR with arms in "Y" with 10 sec hold ea x 5 reps  LTR x 15  Pelvic clocks  Pelvic tilts with TvA activation 2 x 10 incorporating diaphragm breath  Marches: Alternate lifting one leg at a time to table-top position  PPT with TvA activation with ball between knees; holding 10 secs x 10 reps  Single leg bridge x 5 reps ea side with a 10 sec hold    Deferred:  Prone PPT x 10  Side Lying Clamshells with heel press 2 x 10 bilaterally  Clamshells x 20 ea  Hip abduction with 3 sec lift/lower x 10  Sidelying hip flexor stretching 20" x 2 ea  Side lying left shoulder abduction x 10      Education Provided  [x] Progress toward goals   [x] Role of therapy   [x] Activity modification  [x] Reviewed HEP    Home Exercises Provided: Patient instructed to continue previously provided HEP. Added core exercises to home program, see in pt instructions.   Exercises were reviewed and Alison was able to demonstrate them prior to the end of the session.  Alison demonstrated good  understanding of the education provided.    Assessment   Pt has met all LTG set, however has continued complaints of internal "rock hard" tightness that creates pain after she walks 45 min or has been up on her feet. Pt demonstrates poor postural control and decreased core initiation more so on R side than L, and tightness in " bilateral hip flexors. Noted relationship with flow of urine and how hard the symptom is in her lower abdomen. Feel she will benefit from pelvic floor PT referral to assess if scar tissue formation in pelvic floor or surrounding area is contributing factor or if she has increased pelvic floor tone not related to her surgery.    Pt tolerated treatment well, with good understanding of muscle imbalances affecting her posture and causing neck discomfort as well as muscle imbalances with trunk. Continued transverse abdominus activation exercises, postural exercises today with handout provided to perform at home. Will monitor and progress each week as tolerated. Pt to continue scar massage to abdominal incision.  She presents with weakness in core, LE/hip weakness as well as decreased mobility in abdominal area.  She is no longer wearing abdominal binder and only occasionally wears Spanx.    [facility] 52 [facility] to outpatient physical therapy with a medical diagnosis of Invasive ductal carcinoma of left breast in female, at risk for lymphedema with surgical procedure on 10/06/2022. Pt was seen today pre-operatively to assess strength and ROM of BUEs, to take baseline circumferential measurements of BUEs to aid in the early detection of lymphedema post-operatively, and to provide pt education on exercises/precautions post-operative. Pt does not exhibit any ROM impairments currently. This pt will benefit from skilled PT for reassessment of baseline measurements 6-8 week post-operatively and to address future impairments following surgery such as pain, limited ROM, or decreased mobility. Will need to wait until pt is medically cleared to determine if pt is safe for MLD, pneumatic compression pump, or lymphatouch treatments.   Pt will continue to benefit from skilled outpatient physical therapy to address the deficits listed in the problem list box on initial evaluation, provide pt/family education  and to maximize pt's level of independence in the home and community environment. Pt's spiritual, cultural and educational needs considered and pt agreeable to plan of care and goals.       Anticipated barriers for therapy: none    GOALS  Short Term Goals: 3 months  Pt to be seen for reassessment in 6-8 weeks after surgery.  Pt will demonstrate 100% knowledge of lymphedema precautions and signs of infection.  Pt to obtain compression garments for prophylactic concerns according to APTA clinical guidelines published in Journal of Physical Therapy.     Long Term Goals: deferred     Updated GOALS: 12 visits  Patient to achieve full UE ROM shoulder flexion, abduction and ER pain free for improve function for ADL's.GOAL MET 2023  Patient to demonstrate bed mobility transitions supine<>sit, sit<>stand without compensatory strategies and greater ease, 0/10 pain.GOAL MET 2023  3. Patient to tolerate walking program 20 min 4-5x week without residual abdominal discomfort.GOAL MET 2023  4. Return to PLOF without dependency of abdominal braceGOAL MET 2023  Updated GOALS:  LT visits 2023  Pt to learn how to isolate transverse abdominus and increase strength for trunk support in standing  Increase glut max to at least 4+/5 on R to assist with trunk support for walking exercise   Reduction in lower abdominal pain after 45 min walk by 50 % in intensity.  Pt to see pelvic floor PT for evaluation  5.  Demonstrate proper posture in stance with improved scapular stabilization strength testing level as good     Plan   Continue 1 x week x 4 weeks  Continue with established plan of care working toward PT goals.  May need to get pt to use video on her phone for hep.  Single limb bridge, transverse abdominus work, glut work in standing, scapular stabilization exercises with theraband to be provided next tx.  Adrienne Love, PTA ,CLT

## 2023-06-01 ENCOUNTER — CLINICAL SUPPORT (OUTPATIENT)
Dept: REHABILITATION | Facility: HOSPITAL | Age: 53
End: 2023-06-01
Payer: COMMERCIAL

## 2023-06-01 DIAGNOSIS — C50.912 INVASIVE DUCTAL CARCINOMA OF LEFT BREAST IN FEMALE: ICD-10-CM

## 2023-06-01 DIAGNOSIS — M62.81 MUSCLE WEAKNESS: Primary | ICD-10-CM

## 2023-06-01 DIAGNOSIS — Z91.89 AT RISK FOR LYMPHEDEMA: ICD-10-CM

## 2023-06-01 PROCEDURE — 97110 THERAPEUTIC EXERCISES: CPT | Mod: PN,CQ

## 2023-06-01 NOTE — PROGRESS NOTES
"[facility]  Physical Therapy Treatment Note  Lymphedema     Visit Date: 6/1/2023    Name: Steph Lemus  MRN: [id number]  Therapy Diagnosis:    Invasive ductal carcinoma of left breast in female      At risk for lymphedema      Muscle weakness      Surgery date: bilateral skin and nipple sparing mastectomy, left SLNB, RUTH on 01/16/2023.  Radiation: none needed  Chemotherapy: none  Evaluation Date: 10/05/2023 Prehab  Authorization: pending  Plan of Care: PT f/u 8 weeks post-op     Visit #: 3/ 3, requesting additional 2x week 6 weeks; Eval + 12; 5/11/2023 request 1 x week x 4 weeks   Visit # 14/15  PTA visit: 2  Time In:  09:00 AM  Time Out: 10:00 AM  Total Billable Time: 60 minutes       Precautions: Standard, cancer, and avoid IV, BP, blood draws, injections in the left arm      Subjective     Pt states: the tightness is getting slightly better, but still there; it is inconsistent. Worse after prolonged sitting.  My appointment with Dr. Tirado is July 6th; had an ultrasound. The radiologist said that the hematomas are still there and getting fibrosed and recommended a CT scan. Dr Tirado's nurse (Leisa) called her and said that everything is normal and didn't know anything about a CT scan. She said that she would talk with Dr Tirado and get back with her (that was two days ago.).  Feels like she has a "hard rock" inside her stomach behind the scar; "like an egg."  Pt states that spasms in center of abdomen and still feel hard knots in the center. Pt states that when she experiences the tightness then it is hard to empty her bladder. Pt has had 2 UTIs recently, normal color currently,  however she notes that her flow of urine is more normal when she has less hardness in lower abdomen, and then it is a weak stream when it is feeling hard. When she feels the tightness then it hurts to exercise and stretch. Was able to get into the [place] Saturday and [place] felt so good to be in the pool and exercise and stretch. The only relief she can get from the hardness is to lie flat on her back.  Pt also reports soreness in bilateral groin area.        Pain: 3-4/10 (described as discomfort)  Location: central abdominal area and left randi incision  Objective     Therapeutic exercise 55 min  Nustep L4 x 10 min (O2 sats 98% p. 98  Core work: (HEP handout provided last session)  -Quadruped Breathing and transverse abdominus activation   - quadruped cat/camel  - child's pose  -Performed single limb bridge with cues for R LE. 10 sec hold 10 reps   -Supine hook lying:transverse abdominus activation with single knee press into ball with B hand press 5 sec hold x 10 reps each  -Supine TA initiation with marches 3x10 reps  -supine bent knee fall outs with TA contraction/stabilization  -Modified Dead bug/ red physioball UE ball press into thigh, hold 10 sec,  alt R/L  Seated on green physioball:  -Performed T-richy for scapular stabilization 10 reps 10 sec hold  -Scapular retractions/rows 3x10  -set into neutral pelvis, SAP with RTB hold for 5 count   - alt arm/legs x 10  - hip adduction in side lye 2 x 10 B  - butterfly stretch 20" x 2  - hip flexor stretching at barre 2 x 20 sec ea  -alt arm/legs on over green ball x 10  - modified plank on elevated mat   - side planks 10" hold x 2 ea    Deferred  attempted supine knees flexed ball press with knee into ball going up and in, with hand press pt unable to decrease contraction of hip flexor on R.  Performed transverse abdominus initiation exercise supine, with L knee going toward ceiling, unable to isolate R transverse and using hip flexor.   Pt will work on this exercise for 10 sec hold 5 reps working up to 10 min  KAKB: pt able to isolate detrusor muscle without difficulty for pelvic floor.  Neuro muscular re-ed 13 min: review of posture with pt having rounded shoulders and tightness in pectoral musculature. Scapular " "stabilization is weak, and pt has elevated scapula with lateral rotation in standing creating increase in upper trap tension bilaterally.    deferred  Pt received manual therapy x  minutes as follows:   MLD to left breast/lateral trunk with pre treatment short neck series, right axilla, abdomen, right and left inguinal triangle, followed by MLD to left breast accessing watershed areas on trunk. Pt received lymphatouch at 55 mmHg to abdominal area.  STM to abdominal area with tightness noted centrally.   Deferred:  Pt performed therapeutic exercises x 13 minutes as follows:  Diaphragmatic breathing in supine  Diaphragm breaths: inhale lengthen, exhale pelvic tilt x 10 reps  Supine hook lying Pelvic tilts with hip add ball squeeze incorporating diaphragm breath  LTR with arms in "Y" with 10 sec hold ea x 5 reps  LTR x 15  Pelvic clocks  Pelvic tilts with TvA activation 2 x 10 incorporating diaphragm breath  Marches: Alternate lifting one leg at a time to table-top position  PPT with TvA activation with ball between knees; holding 10 secs x 10 reps  Single leg bridge x 5 reps ea side with a 10 sec hold    Deferred:  Prone PPT x 10  Side Lying Clamshells with heel press 2 x 10 bilaterally  Clamshells x 20 ea  Hip abduction with 3 sec lift/lower x 10  Sidelying hip flexor stretching 20" x 2 ea  Side lying left shoulder abduction x 10      Education Provided  [x] Progress toward goals   [x] Role of therapy   [x] Activity modification  [x] Reviewed HEP    Home Exercises Provided: Patient instructed to continue previously provided HEP. Added core exercises to home program, see in pt instructions.   Exercises were reviewed and Alison was able to demonstrate them prior to the end of the session.  Alison demonstrated good  understanding of the education provided.    Assessment   Pt has met all LTG set, however has continued complaints of internal "rock hard" tightness that creates pain after she walks 45 min or has been up on " her feet, but worse when she doesn't exercise or stretch. Pt demonstrates poor postural control and decreased core initiation more so on R side than L, and tightness in bilateral hip flexors. Noted relationship with flow of urine and how hard the symptom is in her lower abdomen. Feel she will benefit from pelvic floor PT referral to assess if scar tissue formation in pelvic floor or surrounding area is contributing factor or if she has increased pelvic floor tone not related to her surgery.    Pt tolerated treatment well, with good understanding of muscle imbalances affecting her posture and causing neck discomfort as well as muscle imbalances with trunk. Continued transverse abdominus activation exercises, postural exercises today with handout provided to perform at home. Will monitor and progress each week as tolerated. Pt to continue scar massage to abdominal incision.  She presents with weakness in core, LE/hip weakness as well as decreased mobility in abdominal area.  She is no longer wearing abdominal binder and only occasionally wears Spanx.    [facility] 52 [facility] to outpatient physical therapy with a medical diagnosis of Invasive ductal carcinoma of left breast in female, at risk for lymphedema with surgical procedure on 10/06/2022. Pt was seen today pre-operatively to assess strength and ROM of BUEs, to take baseline circumferential measurements of BUEs to aid in the early detection of lymphedema post-operatively, and to provide pt education on exercises/precautions post-operative. Pt does not exhibit any ROM impairments currently. This pt will benefit from skilled PT for reassessment of baseline measurements 6-8 week post-operatively and to address future impairments following surgery such as pain, limited ROM, or decreased mobility. Will need to wait until pt is medically cleared to determine if pt is safe for MLD, pneumatic compression pump, or lymphatouch treatments.   Pt  will continue to benefit from skilled outpatient physical therapy to address the deficits listed in the problem list box on initial evaluation, provide pt/family education and to maximize pt's level of independence in the home and community environment. Pt's spiritual, cultural and educational needs considered and pt agreeable to plan of care and goals.       Anticipated barriers for therapy: none    GOALS  Short Term Goals: 3 months  Pt to be seen for reassessment in 6-8 weeks after surgery.  Pt will demonstrate 100% knowledge of lymphedema precautions and signs of infection.  Pt to obtain compression garments for prophylactic concerns according to APTA clinical guidelines published in Journal of Physical Therapy.     Long Term Goals: deferred     Updated GOALS: 12 visits  Patient to achieve full UE ROM shoulder flexion, abduction and ER pain free for improve function for ADL's.GOAL MET 2023  Patient to demonstrate bed mobility transitions supine<>sit, sit<>stand without compensatory strategies and greater ease, 0/10 pain.GOAL MET 2023  3. Patient to tolerate walking program 20 min 4-5x week without residual abdominal discomfort.GOAL MET 2023  4. Return to PLOF without dependency of abdominal braceGOAL MET 2023  Updated GOALS:  LT visits 2023  Pt to learn how to isolate transverse abdominus and increase strength for trunk support in standing  Increase glut max to at least 4+/5 on R to assist with trunk support for walking exercise   Reduction in lower abdominal pain after 45 min walk by 50 % in intensity.  Pt to see pelvic floor PT for evaluation  5.  Demonstrate proper posture in stance with improved scapular stabilization strength testing level as good     Plan   Continue 1 x week x 4 weeks  Continue with established plan of care working toward PT goals.  May need to get pt to use video on her phone for hep.  Single limb bridge, transverse abdominus work, glut work in standing,  scapular stabilization exercises with theraband to be provided next tx.  Adrienne Love, PTA ,CLT

## 2023-06-07 ENCOUNTER — CLINICAL SUPPORT (OUTPATIENT)
Dept: REHABILITATION | Facility: HOSPITAL | Age: 53
End: 2023-06-07
Payer: COMMERCIAL

## 2023-06-07 DIAGNOSIS — M62.81 MUSCLE WEAKNESS: Primary | ICD-10-CM

## 2023-06-07 DIAGNOSIS — Z91.89 AT RISK FOR LYMPHEDEMA: ICD-10-CM

## 2023-06-07 DIAGNOSIS — C50.912 INVASIVE DUCTAL CARCINOMA OF LEFT BREAST IN FEMALE: ICD-10-CM

## 2023-06-07 PROCEDURE — 97112 NEUROMUSCULAR REEDUCATION: CPT | Mod: PN

## 2023-06-07 PROCEDURE — 97110 THERAPEUTIC EXERCISES: CPT | Mod: PN

## 2023-06-07 NOTE — PROGRESS NOTES
"[facility]  Physical Therapy Discharge Summary and Treatment Note  Lymphedema     Visit Date: 6/7/2023    Name: Steph Lemus  MRN: [id number]  Therapy Diagnosis:    Invasive ductal carcinoma of left breast in female      At risk for lymphedema      Muscle weakness      Surgery date: bilateral skin and nipple sparing mastectomy, left SLNB, RUTH on 01/16/2023.  Radiation: none needed  Chemotherapy: none  Evaluation Date: 10/05/2023 Prehab  Authorization: 2/27/2023 - 12/31/2023 24 visits  Plan of Care: PT f/u 8 weeks post-op     Visit #: 3/ 3, requesting additional 2x week 6 weeks; Eval + 12; 5/11/2023 request 1 x week x 4 weeks   Visit # 15/15  PTA visit: 0  Time In:  09:00 AM  Time Out: 10:00 AM  Total Billable Time: 60 minutes       Precautions: Standard, cancer, and avoid IV, BP, blood draws, injections in the left arm      Subjective     Pt states: continued feeling of rock under lower abdomen feels like a contraction, Shaan horse in lower abdomen. Neck pain at end of day after sitting at her desk at work.   Prior session: My appointment with Dr. Tirado is July 6th; had an ultrasound. The radiologist said that the hematomas are still there and getting fibrosed and recommended a CT scan. Dr Tirado's nurse (Leisa) called her and said that everything is normal and didn't know anything about a CT scan. She said that she would talk with Dr Tirado and get back with her (that was two days ago.).  Feels like she has a "hard rock" inside her stomach behind the scar; "like an egg."  Pt states that spasms in center of abdomen and still feel hard knots in the center. Pt states that when she experiences the tightness then it is hard to empty her bladder. Pt has had 2 UTIs recently, normal color currently,  however she notes that her flow of urine is more normal when she has less hardness in lower abdomen, and then it is a weak stream when it is feeling hard. When she feels the tightness " "then it hurts to exercise and stretch. Was able to get into the pool on Saturday and Sunday at her daughter's house on the [facility]; felt so good to be in the pool and exercise and stretch. The only relief she can get from the hardness is to lie flat on her back.  Pt also reports soreness in bilateral groin area.        Pain: 3-4/10 (described as discomfort)  Location: central abdominal area and left randi incision  Objective   Re-assess:  5/11/2023 6MWT: 1560 feet   6/7/2023 6MWT: 1636 feet; complete her FACT survey form and placed in media    Therapeutic exercise 45 min  6MWT 1636 feet on treadmill  Reviewed and performed core strengthening exercises: Core work: transverse abdominus fair strength unable to hold PPT with hip flexion in supine, practiced this until pt able to hold PPT with TA contraction.  Hip extension L 4-/5 R 4/5 recommended pt to work on sit to stand and practiced these as well as squat holds 20 sec hold 5 reps. Pt asked to video all exercises for home program, and PT filmed and provided verbal cues throughout for her hep.  (HEP handout provided last session)    deferred-Quadruped Breathing and transverse abdominus activation   - quadruped cat/camel  - child's pose  -Performed single limb bridge with cues for R LE. 10 sec hold 10 reps   -Supine hook lying:transverse abdominus activation with single knee press into ball with B hand press 5 sec hold x 10 reps each  -Supine TA initiation with marches 3x10 reps  -supine bent knee fall outs with TA contraction/stabilization  -Modified Dead bug/ red physioball UE ball press into thigh, hold 10 sec,  alt R/L  Seated on green physioball:  -Performed T-richy for scapular stabilization 10 reps 10 sec hold  -Scapular retractions/rows 3x10  -set into neutral pelvis, SAP with RTB hold for 5 count   - alt arm/legs x 10  - hip adduction in side lye 2 x 10 B  - butterfly stretch 20" x 2  - hip flexor stretching at barre 2 x 20 sec ea  -alt arm/legs on over green " "ball x 10  - modified plank on elevated mat   - side planks 10" hold x 2 ea    Neuro muscular re-ed 10 min: review of posture with pt having rounded shoulders and tightness in pectoral musculature. Review of weight acceptance into B LE with sitting with support to entire back vertically while sitting at her desk. Practiced proper sitting posture for work.  Provided recommendations for postural support brace per pt request.     Deferred: Pt attempted supine knees flexed ball press with knee into ball going up and in, with hand press pt unable to decrease contraction of hip flexor on R.  Performed transverse abdominus initiation exercise supine, with L knee going toward ceiling, unable to isolate R transverse and using hip flexor.   Pt will work on this exercise for 10 sec hold 5 reps working up to 10 min  KAKB: pt able to isolate detrusor muscle without difficulty for pelvic floor.    deferred  Pt received manual therapy x  minutes as follows:   MLD to left breast/lateral trunk with pre treatment short neck series, right axilla, abdomen, right and left inguinal triangle, followed by MLD to left breast accessing watershed areas on trunk. Pt received lymphatouch at 55 mmHg to abdominal area.  STM to abdominal area with tightness noted centrally.   Deferred:  Pt performed therapeutic exercises x 13 minutes as follows:  Diaphragmatic breathing in supine  Diaphragm breaths: inhale lengthen, exhale pelvic tilt x 10 reps  Supine hook lying Pelvic tilts with hip add ball squeeze incorporating diaphragm breath  LTR with arms in "Y" with 10 sec hold ea x 5 reps  LTR x 15  Pelvic clocks  Pelvic tilts with TvA activation 2 x 10 incorporating diaphragm breath  Marches: Alternate lifting one leg at a time to table-top position  PPT with TvA activation with ball between knees; holding 10 secs x 10 reps  Single leg bridge x 5 reps ea side with a 10 sec hold    Deferred:  Prone PPT x 10  Side Lying Clamshells with heel press 2 x 10 " "bilaterally  Clamshells x 20 ea  Hip abduction with 3 sec lift/lower x 10  Sidelying hip flexor stretching 20" x 2 ea  Side lying left shoulder abduction x 10      Education Provided  [x] Progress toward goals   [x] Role of therapy   [x] Activity modification  [x] Reviewed HEP    Home Exercises Provided: Patient instructed to continue previously provided HEP. Added core exercises to home program, see in pt instructions.   Exercises were reviewed and Alison was able to demonstrate them prior to the end of the session.  Alison demonstrated good  understanding of the education provided.    Assessment   Feel pt needs to be seen by pelvic floor PT and provided pt with names to 3 different PTs in area. Pt has partially met LTG set, however has continued complaints of internal "rock hard" tightness that creates pain after she walks 45 min or has been up on her feet, but worse when she doesn't exercise or stretch. Noted relationship with flow of urine and how hard the symptom is in her lower abdomen. Feel she will benefit from pelvic floor PT referral to assess if scar tissue formation in pelvic floor or surrounding area is contributing factor or if she has increased pelvic floor tone not related to her surgery.Patient has received 15 visits and has hep for core strengthening exercises.     Pt tolerated treatment well, with good understanding of muscle imbalances affecting her posture and causing neck discomfort as well as muscle imbalances with trunk. Continued transverse abdominus activation exercises, postural exercises today with handout provided to perform at home. Will monitor and progress each week as tolerated. Pt to continue scar massage to abdominal incision.  She presents with weakness in core, LE/hip weakness as well as decreased mobility in abdominal area.  She is no longer wearing abdominal binder and only occasionally wears Spanx.    [facility] physical therapy " with a medical diagnosis of Invasive ductal carcinoma of left breast in female, at risk for lymphedema with surgical procedure on 10/06/2022. Pt was seen today pre-operatively to assess strength and ROM of BUEs, to take baseline circumferential measurements of BUEs to aid in the early detection of lymphedema post-operatively, and to provide pt education on exercises/precautions post-operative. Pt does not exhibit any ROM impairments currently. This pt will benefit from skilled PT for reassessment of baseline measurements 6-8 week post-operatively and to address future impairments following surgery such as pain, limited ROM, or decreased mobility. Will need to wait until pt is medically cleared to determine if pt is safe for MLD, pneumatic compression pump, or lymphatouch treatments.   Pt will continue to benefit from skilled outpatient physical therapy to address the deficits listed in the problem list box on initial evaluation, provide pt/family education and to maximize pt's level of independence in the home and community environment. Pt's spiritual, cultural and educational needs considered and pt agreeable to plan of care and goals.       Anticipated barriers for therapy: none    GOALS  Short Term Goals: 3 months  Pt to be seen for reassessment in 6-8 weeks after surgery.  Pt will demonstrate 100% knowledge of lymphedema precautions and signs of infection.  Pt to obtain compression garments for prophylactic concerns according to APTA clinical guidelines published in Journal of Physical Therapy.     Long Term Goals: deferred     Updated GOALS: 12 visits  Patient to achieve full UE ROM shoulder flexion, abduction and ER pain free for improve function for ADL's.GOAL MET 5/11/2023  Patient to demonstrate bed mobility transitions supine<>sit, sit<>stand without compensatory strategies and greater ease, 0/10 pain.GOAL MET 5/11/2023  3. Patient to tolerate walking program 20 min 4-5x week without residual abdominal  discomfort.GOAL MET 2023  4. Return to PLOF without dependency of abdominal braceGOAL MET 2023  Updated GOALS:  LT visits 2023  Pt to learn how to isolate transverse abdominus and increase strength for trunk support in standing GOAL PARTIALLY MET 2023  Increase glut max to at least 4+/5 on R to assist with trunk support for walking exercise NOT MET 2023  Reduction in lower abdominal pain after 45 min walk by 50 % in intensity. Partially met 2023  Pt to see pelvic floor PT for evaluation NOT MET 2023  5.  Demonstrate proper posture in stance with improved scapular stabilization strength testing level as good Partially met 2023     Plan   Discharge from PT at  this time, recommend pelvic floor eval from pelvic floor PT.   Mary Matt, PT ,CLT

## 2023-06-30 ENCOUNTER — TELEPHONE (OUTPATIENT)
Dept: HEMATOLOGY/ONCOLOGY | Facility: CLINIC | Age: 53
End: 2023-06-30
Payer: COMMERCIAL

## 2023-06-30 DIAGNOSIS — C50.912 INVASIVE DUCTAL CARCINOMA OF LEFT BREAST IN FEMALE: Primary | ICD-10-CM

## 2023-06-30 NOTE — NURSING
Spoke with patient regarding Survivorship. Explained to pt that a Survivorship Clinic visit is when you meet with a nurse practitioner after completing treatment for cancer. As a cancer survivor, you may face physical, psychosocial, and practical impacts from cancer and its treatment. A Survivorship Clinic visit will give you information and tools to help you after cancer treatment has ended. Patient accepted date of 7/12 at 9:00. Date, time, and location confirmed with patient.

## 2023-07-07 ENCOUNTER — OFFICE VISIT (OUTPATIENT)
Dept: OBSTETRICS AND GYNECOLOGY | Facility: CLINIC | Age: 53
End: 2023-07-07
Payer: COMMERCIAL

## 2023-07-07 VITALS
HEIGHT: 66 IN | DIASTOLIC BLOOD PRESSURE: 76 MMHG | SYSTOLIC BLOOD PRESSURE: 124 MMHG | WEIGHT: 182.56 LBS | BODY MASS INDEX: 29.34 KG/M2

## 2023-07-07 DIAGNOSIS — Z79.890 HORMONE REPLACEMENT THERAPY (HRT): ICD-10-CM

## 2023-07-07 DIAGNOSIS — R23.2 HOT FLASHES: ICD-10-CM

## 2023-07-07 DIAGNOSIS — N39.3 STRESS INCONTINENCE: ICD-10-CM

## 2023-07-07 DIAGNOSIS — Z17.0 MALIGNANT NEOPLASM OF LEFT BREAST IN FEMALE, ESTROGEN RECEPTOR POSITIVE, UNSPECIFIED SITE OF BREAST: ICD-10-CM

## 2023-07-07 DIAGNOSIS — Z01.419 ROUTINE GYNECOLOGICAL EXAMINATION: Primary | ICD-10-CM

## 2023-07-07 DIAGNOSIS — C50.912 MALIGNANT NEOPLASM OF LEFT BREAST IN FEMALE, ESTROGEN RECEPTOR POSITIVE, UNSPECIFIED SITE OF BREAST: ICD-10-CM

## 2023-07-07 PROCEDURE — 99999 PR PBB SHADOW E&M-EST. PATIENT-LVL III: ICD-10-PCS | Mod: PBBFAC,,, | Performed by: OBSTETRICS & GYNECOLOGY

## 2023-07-07 PROCEDURE — 3078F DIAST BP <80 MM HG: CPT | Mod: CPTII,S$GLB,, | Performed by: OBSTETRICS & GYNECOLOGY

## 2023-07-07 PROCEDURE — 99396 PREV VISIT EST AGE 40-64: CPT | Mod: S$GLB,,, | Performed by: OBSTETRICS & GYNECOLOGY

## 2023-07-07 PROCEDURE — 99999 PR PBB SHADOW E&M-EST. PATIENT-LVL III: CPT | Mod: PBBFAC,,, | Performed by: OBSTETRICS & GYNECOLOGY

## 2023-07-07 PROCEDURE — 3074F PR MOST RECENT SYSTOLIC BLOOD PRESSURE < 130 MM HG: ICD-10-PCS | Mod: CPTII,S$GLB,, | Performed by: OBSTETRICS & GYNECOLOGY

## 2023-07-07 PROCEDURE — 3078F PR MOST RECENT DIASTOLIC BLOOD PRESSURE < 80 MM HG: ICD-10-PCS | Mod: CPTII,S$GLB,, | Performed by: OBSTETRICS & GYNECOLOGY

## 2023-07-07 PROCEDURE — 3008F BODY MASS INDEX DOCD: CPT | Mod: CPTII,S$GLB,, | Performed by: OBSTETRICS & GYNECOLOGY

## 2023-07-07 PROCEDURE — 3008F PR BODY MASS INDEX (BMI) DOCUMENTED: ICD-10-PCS | Mod: CPTII,S$GLB,, | Performed by: OBSTETRICS & GYNECOLOGY

## 2023-07-07 PROCEDURE — 3074F SYST BP LT 130 MM HG: CPT | Mod: CPTII,S$GLB,, | Performed by: OBSTETRICS & GYNECOLOGY

## 2023-07-07 PROCEDURE — 99396 PR PREVENTIVE VISIT,EST,40-64: ICD-10-PCS | Mod: S$GLB,,, | Performed by: OBSTETRICS & GYNECOLOGY

## 2023-07-07 PROCEDURE — 3044F HG A1C LEVEL LT 7.0%: CPT | Mod: CPTII,S$GLB,, | Performed by: OBSTETRICS & GYNECOLOGY

## 2023-07-07 PROCEDURE — 88175 CYTOPATH C/V AUTO FLUID REDO: CPT | Performed by: OBSTETRICS & GYNECOLOGY

## 2023-07-07 PROCEDURE — 3044F PR MOST RECENT HEMOGLOBIN A1C LEVEL <7.0%: ICD-10-PCS | Mod: CPTII,S$GLB,, | Performed by: OBSTETRICS & GYNECOLOGY

## 2023-07-07 RX ORDER — FEZOLINETANT 45 MG/1
45 TABLET, FILM COATED ORAL DAILY
Qty: 30 TABLET | Refills: 2 | Status: SHIPPED | OUTPATIENT
Start: 2023-07-07 | End: 2023-07-11

## 2023-07-07 NOTE — PROGRESS NOTES
Chief Complaint   Patient presents with    Well Woman       History of Present Illness: Steph Lemus is a 52 y.o. female that presents today 7/7/2023 with No LMP recorded (lmp unknown). Patient is postmenopausal.  for well gyn visit.  She reports constant hot flashes and night sweats waking her up at night.     Past Medical History:   Diagnosis Date    Acid reflux     Breast cancer 08/2022    Stage IA (T1c, N0, M0, Grade 1, ER+/CA+/HER2 pending) Invasive ductal carcinoma of the LEFT breast    Epistaxis, recurrent     none since cauterization    Hyperlipidemia     Invasive ductal carcinoma of left breast in female 09/2022       Past Surgical History:   Procedure Laterality Date    APPENDECTOMY      BREAST BIOPSY Left 08/03/2022    idc    COLONOSCOPY N/A 04/15/2019    Procedure: COLONOSCOPY;  Surgeon: Tad Suarez Jr., MD;  Location: [facility];  Service: Endoscopy;  Laterality: N/A;    DILATION AND CURETTAGE OF UTERUS      for uterine polyps    ENDOMETRIAL ABLATION      ENDOSCOPIC NASAL CAUTERIZATION N/A 08/11/2020    Procedure: CAUTERIZATION, NOSE, ENDOSCOPIC;  Surgeon: Surinder Acevedo MD;  Location: [facility];  Service: ENT;  Laterality: N/A;    LAPAROSCOPIC APPENDECTOMY N/A 09/22/2020    Procedure: APPENDECTOMY, LAPAROSCOPIC;  Surgeon: Shai Ludwig MD;  Location: [facility];  Service: General;  Laterality: N/A;    RECONSTRUCTION OF BREAST WITH DEEP INFERIOR EPIGASTRIC ARTERY  (RUTH) FREE FLAP Bilateral 1/5/2023    Procedure: RECONSTRUCTION, BREAST, USING RUTH FREE FLAP;  Surgeon: Michel Tirado MD;  Location: [facility] OR;  Service: Plastics;  Laterality: Bilateral;    REPAIR, NERVE USING ALLOGRAFT Bilateral 1/5/2023    Procedure: REPAIR, NERVE USING ALLOGRAFT;  Surgeon: Michel Tirado MD;  Location: [facility] OR;  Service: Plastics;  Laterality: Bilateral;    SENTINEL LYMPH NODE BIOPSY Left 1/5/2023    Procedure: BIOPSY, LYMPH NODE, SENTINEL;  Surgeon: Simona Cueva MD;  Location: [facility] OR;   Service: General;  Laterality: Left;    SIMPLE MASTECTOMY Bilateral 1/5/2023    Procedure: MASTECTOMY, SIMPLE;  Surgeon: Simona Cueva MD;  Location: [facility] OR;  Service: General;  Laterality: Bilateral;    TONSILLECTOMY         Current Outpatient Medications   Medication Sig Dispense Refill    ALPRAZolam (XANAX) 0.5 MG tablet Take 1 tablet (0.5 mg total) by mouth 2 (two) times daily as needed for Anxiety. 45 tablet 0    cyanocobalamin 2000 MCG tablet Take 2,000 mcg by mouth once daily.      estradioL (ESTRACE) 0.01 % (0.1 mg/gram) vaginal cream Place 1 g vaginally once daily for 7 days, THEN 1 g twice a week. 42.5 g 0    famotidine (PEPCID) 40 MG tablet Take 40 mg by mouth once daily.      fluticasone propionate (FLONASE) 50 mcg/actuation nasal spray SPRAY 1 SPRAY (50 MCG TOTAL) INTO INTO EACH NOSTRIL EVERY DAY 16 mL 1    meclizine (ANTIVERT) 25 mg tablet Take 1 tablet by mouth as needed.      multivitamin (THERAGRAN) per tablet Take 1 tablet by mouth once daily.      mv-mn/iron/folic acid/herb 190 (VITAMIN D3 COMPLETE ORAL) Take 1 tablet by mouth once daily.      pantoprazole (PROTONIX) 40 MG tablet Take 1 tablet (40 mg total) by mouth once daily. 30 tablet 11    rosuvastatin (CRESTOR) 5 MG tablet Take 1 tablet (5 mg total) by mouth once daily. 90 tablet 3    semaglutide, weight loss, (WEGOVY) 0.25 mg/0.5 mL PnIj Inject 0.25 mg into the skin every 7 days. 2 mL 2    fezolinetant (VEOZAH) 45 mg Tab Take 45 mg by mouth once daily. 30 tablet 2     No current facility-administered medications for this visit.     Facility-Administered Medications Ordered in Other Visits   Medication Dose Route Frequency Provider Last Rate Last Admin    LIDOcaine (PF) 10 mg/ml (1%) injection 10 mg  1 mL Intradermal Once Simona Cueva MD        midazolam (VERSED) 1 mg/mL injection 2 mg  2 mg Intravenous See admin instructions Simona Cueva MD        midazolam (VERSED) 1 mg/mL injection 2 mg  2 mg Intravenous See admin  instructions Simona Cueva MD   2 mg at 23 0703    piperacillin-tazobactam 4.5 g in dextrose 5 % 100 mL IVPB (ready to mix system)  4.5 g Intravenous On Call Procedure Simona Cueva MD           Review of patient's allergies indicates:   Allergen Reactions    Augmentin [amoxicillin-pot clavulanate]      Stomach pains       Family History   Problem Relation Age of Onset    Hyperlipidemia Mother     Hypertension Mother     Diabetes Mother     Hypertension Father     Cancer Father         throat    Pacemaker/defibrilator Father     Diabetes Maternal Grandfather     Cancer Maternal Grandfather     Cancer Paternal Grandfather     Breast cancer Cousin 40    BRCA 1/2 Cousin     Colon cancer Neg Hx     Ovarian cancer Neg Hx        Social History     Socioeconomic History    Marital status:    Tobacco Use    Smoking status: Former     Packs/day: 0.10     Years: 25.00     Pack years: 2.50     Types: Cigarettes     Quit date: 2022     Years since quittin.9    Smokeless tobacco: Never   Substance and Sexual Activity    Alcohol use: Not Currently     Alcohol/week: 4.0 standard drinks     Types: 4 Glasses of wine per week     Comment: 6 drinks weekly    Drug use: No    Sexual activity: Yes     Partners: Male     Birth control/protection: Other-see comments     Social Determinants of Health     Financial Resource Strain: Low Risk     Difficulty of Paying Living Expenses: Not hard at all   Food Insecurity: No Food Insecurity    Worried About Running Out of Food in the Last Year: Never true    Ran Out of Food in the Last Year: Never true   Transportation Needs: No Transportation Needs    Lack of Transportation (Medical): No    Lack of Transportation (Non-Medical): No   Physical Activity: Sufficiently Active    Days of Exercise per Week: 4 days    Minutes of Exercise per Session: 40 min   Stress: No Stress Concern Present    Feeling of Stress : Not at all   Social Connections: Unknown    Frequency  "of Communication with Friends and Family: More than three times a week    Frequency of Social Gatherings with Friends and Family: More than three times a week    Active Member of Clubs or Organizations: Patient refused    Attends Club or Organization Meetings: Patient refused    Marital Status:    Housing Stability: Low Risk     Unable to Pay for Housing in the Last Year: No    Number of Places Lived in the Last Year: 1    Unstable Housing in the Last Year: No       OB History    Para Term  AB Living   3 3 3         SAB IAB Ectopic Multiple Live Births           3      # Outcome Date GA Lbr Randal/2nd Weight Sex Delivery Anes PTL Lv   3 Term            2 Term            1 Term                Review of Symptoms:  GENERAL: Denies weight gain or weight loss. Feeling well overall.   SKIN: Denies rash or lesions.   HEAD: Denies head injury or headache.   NODES: Denies enlarged lymph nodes.   CHEST: Denies chest pain or shortness of breath.   CARDIOVASCULAR: Denies palpitations or left sided chest pain.   ABDOMEN: No abdominal pain, constipation, diarrhea, nausea, vomiting or rectal bleeding.   URINARY: No frequency, dysuria, hematuria, or burning on urination.  HEMATOLOGIC: No easy bruisability or excessive bleeding.   MUSCULOSKELETAL: Denies joint pain or swelling.     /76   Ht 5' 6" (1.676 m)   Wt 82.8 kg (182 lb 8.7 oz)   LMP  (LMP Unknown)   Physical Exam:  APPEARANCE: Well nourished, well developed, in no acute distress.  SKIN: Normal skin turgor, no lesions.  NECK: Neck symmetric without masses   RESPIRATORY: Normal respiratory effort with no retractions or use of accessory muscles  CARDIOVASCULAR: Peripheral vascular system with no swelling no varicosities and palpation of pulses normal  LYMPHATIC: No enlargements of the lymph nodes noted in the neck, axillae, or groin  ABDOMEN: Soft. No tenderness or masses. No hepatosplenomegaly. No hernias.  BREASTS: scarring but Symmetrical, no  " visible lesions. No palpable masses, nipple discharge or adenopathy bilaterally.  PELVIC: Normal external female genitalia without lesions. Normal hair distribution. Adequate perineal body, normal urethral meatus. Urethra with no masses.  Bladder nontender. Vagina moist and well rugated without lesions or discharge. Cervix pink and without lesions. No significant cystocele or rectocele. Bimanual exam showed uterus normal size, shape, position, mobile and nontender. Adnexa without masses or tenderness. Urethra and bladder normal.   EXTREMITIES: No clubbing cyanosis or edema.    ASSESSMENT/PLAN:  Routine gynecological examination  -     Liquid-Based Pap Smear, Screening    Hormone replacement therapy (HRT)  Comments:  using periurethral per urology     Malignant neoplasm of left breast in female, estrogen receptor positive, unspecified site of breast    Hot flashes  -     HEPATIC FUNCTION PANEL; Future; Expected date: 07/07/2023  -     fezolinetant (VEOZAH) 45 mg Tab; Take 45 mg by mouth once daily.  Dispense: 30 tablet; Refill: 2    Stress incontinence          Patient was counseled today on Pelvic exams and Pap Smear guidelines.   We discussed STD screening if at high risk for a STD.  We discussed recommendation for breast cancer screening with mammogram every other year after the age of 40 and annually after the age of 50.    We discussed colon cancer screening when indicated.   Osteoporosis screening discussed when indicated.   She was advised to see her primary care physician for all other health maintenance.     FOLLOW-UP with me for next routine visit.

## 2023-07-11 ENCOUNTER — TELEPHONE (OUTPATIENT)
Dept: HEMATOLOGY/ONCOLOGY | Facility: CLINIC | Age: 53
End: 2023-07-11
Payer: COMMERCIAL

## 2023-07-11 ENCOUNTER — CLINICAL SUPPORT (OUTPATIENT)
Dept: FAMILY MEDICINE | Facility: CLINIC | Age: 53
End: 2023-07-11
Payer: COMMERCIAL

## 2023-07-11 DIAGNOSIS — Z23 NEED FOR VACCINATION: Primary | ICD-10-CM

## 2023-07-11 PROCEDURE — 90750 HZV VACC RECOMBINANT IM: CPT | Mod: S$GLB,,, | Performed by: FAMILY MEDICINE

## 2023-07-11 PROCEDURE — 90471 ZOSTER RECOMBINANT VACCINE: ICD-10-PCS | Mod: S$GLB,,, | Performed by: FAMILY MEDICINE

## 2023-07-11 PROCEDURE — 90471 IMMUNIZATION ADMIN: CPT | Mod: S$GLB,,, | Performed by: FAMILY MEDICINE

## 2023-07-11 PROCEDURE — 90750 ZOSTER RECOMBINANT VACCINE: ICD-10-PCS | Mod: S$GLB,,, | Performed by: FAMILY MEDICINE

## 2023-07-11 NOTE — PROGRESS NOTES
Patient presents to the office for her second Shingles Vaccine. Last Vaccine given on 05/09/2023.  Vaccine administered into right deltoid per patients request.  Patient tolerated immunization well with no complaints.

## 2023-07-12 ENCOUNTER — OFFICE VISIT (OUTPATIENT)
Dept: HEMATOLOGY/ONCOLOGY | Facility: CLINIC | Age: 53
End: 2023-07-12
Payer: COMMERCIAL

## 2023-07-12 VITALS
HEART RATE: 73 BPM | WEIGHT: 185 LBS | TEMPERATURE: 97 F | SYSTOLIC BLOOD PRESSURE: 122 MMHG | BODY MASS INDEX: 29.73 KG/M2 | HEIGHT: 66 IN | DIASTOLIC BLOOD PRESSURE: 66 MMHG | OXYGEN SATURATION: 100 %

## 2023-07-12 DIAGNOSIS — C50.912 INVASIVE DUCTAL CARCINOMA OF LEFT BREAST IN FEMALE: ICD-10-CM

## 2023-07-12 DIAGNOSIS — N95.1 HOT FLASHES DUE TO MENOPAUSE: Primary | ICD-10-CM

## 2023-07-12 DIAGNOSIS — N95.1 MENOPAUSAL SYMPTOMS: ICD-10-CM

## 2023-07-12 DIAGNOSIS — G47.09 OTHER INSOMNIA: ICD-10-CM

## 2023-07-12 PROCEDURE — 3008F PR BODY MASS INDEX (BMI) DOCUMENTED: ICD-10-PCS | Mod: CPTII,S$GLB,, | Performed by: NURSE PRACTITIONER

## 2023-07-12 PROCEDURE — 3044F PR MOST RECENT HEMOGLOBIN A1C LEVEL <7.0%: ICD-10-PCS | Mod: CPTII,S$GLB,, | Performed by: NURSE PRACTITIONER

## 2023-07-12 PROCEDURE — 99999 PR PBB SHADOW E&M-EST. PATIENT-LVL IV: ICD-10-PCS | Mod: PBBFAC,,, | Performed by: NURSE PRACTITIONER

## 2023-07-12 PROCEDURE — 1160F RVW MEDS BY RX/DR IN RCRD: CPT | Mod: CPTII,S$GLB,, | Performed by: NURSE PRACTITIONER

## 2023-07-12 PROCEDURE — 99215 PR OFFICE/OUTPT VISIT, EST, LEVL V, 40-54 MIN: ICD-10-PCS | Mod: S$GLB,,, | Performed by: NURSE PRACTITIONER

## 2023-07-12 PROCEDURE — 3078F PR MOST RECENT DIASTOLIC BLOOD PRESSURE < 80 MM HG: ICD-10-PCS | Mod: CPTII,S$GLB,, | Performed by: NURSE PRACTITIONER

## 2023-07-12 PROCEDURE — 3078F DIAST BP <80 MM HG: CPT | Mod: CPTII,S$GLB,, | Performed by: NURSE PRACTITIONER

## 2023-07-12 PROCEDURE — 3074F PR MOST RECENT SYSTOLIC BLOOD PRESSURE < 130 MM HG: ICD-10-PCS | Mod: CPTII,S$GLB,, | Performed by: NURSE PRACTITIONER

## 2023-07-12 PROCEDURE — 99999 PR PBB SHADOW E&M-EST. PATIENT-LVL IV: CPT | Mod: PBBFAC,,, | Performed by: NURSE PRACTITIONER

## 2023-07-12 PROCEDURE — 3044F HG A1C LEVEL LT 7.0%: CPT | Mod: CPTII,S$GLB,, | Performed by: NURSE PRACTITIONER

## 2023-07-12 PROCEDURE — 1159F PR MEDICATION LIST DOCUMENTED IN MEDICAL RECORD: ICD-10-PCS | Mod: CPTII,S$GLB,, | Performed by: NURSE PRACTITIONER

## 2023-07-12 PROCEDURE — 1160F PR REVIEW ALL MEDS BY PRESCRIBER/CLIN PHARMACIST DOCUMENTED: ICD-10-PCS | Mod: CPTII,S$GLB,, | Performed by: NURSE PRACTITIONER

## 2023-07-12 PROCEDURE — 1159F MED LIST DOCD IN RCRD: CPT | Mod: CPTII,S$GLB,, | Performed by: NURSE PRACTITIONER

## 2023-07-12 PROCEDURE — 3008F BODY MASS INDEX DOCD: CPT | Mod: CPTII,S$GLB,, | Performed by: NURSE PRACTITIONER

## 2023-07-12 PROCEDURE — 3074F SYST BP LT 130 MM HG: CPT | Mod: CPTII,S$GLB,, | Performed by: NURSE PRACTITIONER

## 2023-07-12 PROCEDURE — 99215 OFFICE O/P EST HI 40 MIN: CPT | Mod: S$GLB,,, | Performed by: NURSE PRACTITIONER

## 2023-07-12 RX ORDER — CHOLECALCIFEROL (VITAMIN D3) 25 MCG
1000 TABLET ORAL DAILY
COMMUNITY

## 2023-07-12 RX ORDER — VENLAFAXINE HYDROCHLORIDE 37.5 MG/1
37.5 CAPSULE, EXTENDED RELEASE ORAL DAILY
Qty: 30 CAPSULE | Refills: 1 | Status: SHIPPED | OUTPATIENT
Start: 2023-07-12 | End: 2023-08-28 | Stop reason: SDUPTHER

## 2023-07-12 NOTE — PROGRESS NOTES
PATIENT: Steph Lemus  MRN: [id number]  DATE: 7/12/2023    Chief Complaint: Survivorship Clinic    Subjective:   Oncology History: Ms. Lemus is a 52 y.o. female  with history of breast cancer who presents for survivorship clinic visit.    Survivorship Assessment:  1. Cardiac Toxicity-None  2. Anxiety/Depression/Distress-None  3. Cognitive function-No difficulties  4. Fatigue-She reports some fatigue, doesn't feel like she has much energy  5. Lymphedema-None  6. Sexual Function/Hormone related symptoms-Hot flashes/night sweats, denies vaginal dryness/painful intercourse  7. Pain- She continues to have some discomfort in her abdomen area from surgery.   8. Sleep- She sleeps until about 3am and then cannot fall back asleep, 4 hours and then restless sleep  9. Exercise/Dietary Assessment: She reports a good appetite and diet.     PCP: Dr. Melodie Hanks last on 5/9/2023  GYN: Dr. Brittani Alatorre    Last Colonoscopy:  Last WWE/Pelvic Exam: 7/7/2023    CVD Risk Assessment:  The 10-year ASCVD risk score (Gordon DK, et al., 2019) is: 1.3%    Values used to calculate the score:      Age: 52 years      Sex: Female      Is Non- : No      Diabetic: No      Tobacco smoker: No      Systolic Blood Pressure: 124 mmHg      Is BP treated: No      HDL Cholesterol: 65 mg/dL      Total Cholesterol: 189 mg/dL      Past Medical History:   Past Medical History:   Diagnosis Date    Acid reflux     Breast cancer 08/2022    Stage IA (T1c, N0, M0, Grade 1, ER+/[place]+/HER2 pending) Invasive ductal carcinoma of the LEFT breast    Epistaxis, recurrent     none since cauterization    Hyperlipidemia     Invasive ductal carcinoma of left breast in female 09/2022       Past Surgical History:   Past Surgical History:   Procedure Laterality Date    APPENDECTOMY      BREAST BIOPSY Left 08/03/2022    idc    COLONOSCOPY N/A 04/15/2019    Procedure: COLONOSCOPY;  Surgeon: Tad Suarez Jr., MD;  Location: [facility];  Service:  Endoscopy;  Laterality: N/A;    DILATION AND CURETTAGE OF UTERUS      for uterine polyps    ENDOMETRIAL ABLATION      ENDOSCOPIC NASAL CAUTERIZATION N/A 08/11/2020    Procedure: CAUTERIZATION, NOSE, ENDOSCOPIC;  Surgeon: Surinder Acevedo MD;  Location: [facility] OR;  Service: ENT;  Laterality: N/A;    LAPAROSCOPIC APPENDECTOMY N/A 09/22/2020    Procedure: APPENDECTOMY, LAPAROSCOPIC;  Surgeon: Shai Ludwig MD;  Location: [facility] OR;  Service: General;  Laterality: N/A;    RECONSTRUCTION OF BREAST WITH DEEP INFERIOR EPIGASTRIC ARTERY  (RUTH) FREE FLAP Bilateral 1/5/2023    Procedure: RECONSTRUCTION, BREAST, USING RUTH FREE FLAP;  Surgeon: Michel Tirado MD;  Location: [facility] OR;  Service: Plastics;  Laterality: Bilateral;    REPAIR, NERVE USING ALLOGRAFT Bilateral 1/5/2023    Procedure: REPAIR, NERVE USING ALLOGRAFT;  Surgeon: Michel Tirado MD;  Location: [facility] OR;  Service: Plastics;  Laterality: Bilateral;    SENTINEL LYMPH NODE BIOPSY Left 1/5/2023    Procedure: BIOPSY, LYMPH NODE, SENTINEL;  Surgeon: Simona Cueva MD;  Location: [facility] OR;  Service: General;  Laterality: Left;    SIMPLE MASTECTOMY Bilateral 1/5/2023    Procedure: MASTECTOMY, SIMPLE;  Surgeon: Simona Cueva MD;  Location: [facility] OR;  Service: General;  Laterality: Bilateral;    TONSILLECTOMY         Family History:   Family History   Problem Relation Age of Onset    Hyperlipidemia Mother     Hypertension Mother     Diabetes Mother     Hypertension Father     Cancer Father         throat    Pacemaker/defibrilator Father     Diabetes Maternal Grandfather     Cancer Maternal Grandfather     Cancer Paternal Grandfather     Breast cancer Cousin 40    BRCA 1/2 Cousin     Colon cancer Neg Hx     Ovarian cancer Neg Hx        Social History:  reports that she quit smoking about 11 months ago. Her smoking use included cigarettes. She has a 2.50 pack-year smoking history. She has never used smokeless tobacco. She reports that she does not  currently use alcohol after a past usage of about 4.0 standard drinks per week. She reports that she does not use drugs.    Allergies:  Review of patient's allergies indicates:   Allergen Reactions    Augmentin [amoxicillin-pot clavulanate]      Stomach pains       Medications:  Current Outpatient Medications   Medication Sig Dispense Refill    ALPRAZolam (XANAX) 0.5 MG tablet Take 1 tablet (0.5 mg total) by mouth 2 (two) times daily as needed for Anxiety. 45 tablet 0    cyanocobalamin 2000 MCG tablet Take 2,000 mcg by mouth once daily.      estradioL (ESTRACE) 0.01 % (0.1 mg/gram) vaginal cream Place 1 g vaginally once daily for 7 days, THEN 1 g twice a week. 42.5 g 0    famotidine (PEPCID) 40 MG tablet Take 40 mg by mouth once daily.      fluticasone propionate (FLONASE) 50 mcg/actuation nasal spray SPRAY 1 SPRAY (50 MCG TOTAL) INTO INTO EACH NOSTRIL EVERY DAY 16 mL 1    meclizine (ANTIVERT) 25 mg tablet Take 1 tablet by mouth as needed.      multivitamin (THERAGRAN) per tablet Take 1 tablet by mouth once daily.      mv-mn/iron/folic acid/herb 190 (VITAMIN D3 COMPLETE ORAL) Take 1 tablet by mouth once daily.      pantoprazole (PROTONIX) 40 MG tablet Take 1 tablet (40 mg total) by mouth once daily. 30 tablet 11    paroxetine (PAXIL) 10 MG tablet Take 1 tablet (10 mg total) by mouth once daily. 30 tablet 2    rosuvastatin (CRESTOR) 5 MG tablet Take 1 tablet (5 mg total) by mouth once daily. 90 tablet 3    semaglutide, weight loss, (WEGOVY) 0.25 mg/0.5 mL PnIj Inject 0.25 mg into the skin every 7 days. 2 mL 2    VEOZAH 45 mg Tab TAKE 45 MG BY MOUTH ONCE DAILY. 30 tablet 2     No current facility-administered medications for this visit.     Facility-Administered Medications Ordered in Other Visits   Medication Dose Route Frequency Provider Last Rate Last Admin    LIDOcaine (PF) 10 mg/ml (1%) injection 10 mg  1 mL Intradermal Once Simona Cueva MD        midazolam (VERSED) 1 mg/mL injection 2 mg  2 mg  "Intravenous See admin instructions Simona Cueva MD        midazolam (VERSED) 1 mg/mL injection 2 mg  2 mg Intravenous See admin instructions Simona Cueva MD   2 mg at 01/05/23 0703    piperacillin-tazobactam 4.5 g in dextrose 5 % 100 mL IVPB (ready to mix system)  4.5 g Intravenous On Call Procedure Simona Cueva MD            Review of Systems:  Review of Systems   Constitutional:  Positive for malaise/fatigue.        Hot flashes   Eyes: Negative.    Respiratory: Negative.     Cardiovascular: Negative.    Gastrointestinal: Negative.    Genitourinary: Negative.    Musculoskeletal: Negative.    Skin: Negative.    Neurological: Negative.    Endo/Heme/Allergies: Negative.    Psychiatric/Behavioral:  The patient has insomnia.       Objective:      Vitals:   Vitals:    07/12/23 0908   BP: 122/66   Pulse: 73   Temp: 97.3 °F (36.3 °C)   TempSrc: Temporal   SpO2: 100%   Weight: 83.9 kg (185 lb)   Height: 5' 6" (1.676 m)   BMI:   Body mass index is 29.86 kg/m².    Physical Exam:   Physical Exam  Vitals reviewed.   Constitutional:       Appearance: Normal appearance.   Neurological:      Mental Status: She is alert.   Psychiatric:         Mood and Affect: Mood normal.         Behavior: Behavior normal.      Assessment:     1. Hot flashes due to menopause    2. Invasive ductal carcinoma of left breast in female    3. Menopausal symptoms    4. Other insomnia         Plan:   Reviewed Cancer Treatment Summary with patient. Care Plan was given to the patient and all questions were answered. Survivorship handout was also reviewed and given to patient.   Counseled on healthy lifestyle and behavior modifications that could reduce risk of recurrence.  Limit alcohol to less than one drink per day, Exercise at least 150 minutes per week of moderate intensity aerobic activity or at least 75 minutes of vigorous activity, Maintaining a healthy weight, Limit red meat to no more than 2-3x per week, Reduce processed " foods and meat. Also encouraged wide variety of fruits and vegetables    Continue Pelvic Floor PT at [facility]  Effexor 37.5 mg nightly   Referral to Acupuncture  Counseled on sleep hygiene: recommended [employer] Timer destiny  Follow ups with Breast Surgery and Medical Oncology Scheduled.  Follow up with Survivorship clinic in 3 months    I spent a total of 52 minutes on the day of the visit.This includes face to face time and non-face to face time preparing to see the patient (eg, review of tests), obtaining and/or reviewing separately obtained history, documenting clinical information in the electronic or other health record, independently interpreting results and communicating results to the patient/family/caregiver, or care coordinator.

## 2023-07-14 LAB
FINAL PATHOLOGIC DIAGNOSIS: NORMAL
Lab: NORMAL

## 2023-07-20 ENCOUNTER — TELEPHONE (OUTPATIENT)
Dept: OBSTETRICS AND GYNECOLOGY | Facility: CLINIC | Age: 53
End: 2023-07-20
Payer: COMMERCIAL

## 2023-07-20 NOTE — TELEPHONE ENCOUNTER
After taking one Veozah, had a terrible headache and stopped the medication. Seen another doctor who states that an antidepressant could be taken for hot flashes as well. Weighed the risk of both medications-decided to restart Veozah, got another headache but Ibuprofen helped. They have gotten better each time. Lab work scheduled for October.

## 2023-07-31 ENCOUNTER — PATIENT MESSAGE (OUTPATIENT)
Dept: OBSTETRICS AND GYNECOLOGY | Facility: CLINIC | Age: 53
End: 2023-07-31
Payer: COMMERCIAL

## 2023-07-31 DIAGNOSIS — C50.912 MALIGNANT NEOPLASM OF LEFT BREAST IN FEMALE, ESTROGEN RECEPTOR POSITIVE, UNSPECIFIED SITE OF BREAST: ICD-10-CM

## 2023-07-31 DIAGNOSIS — Z17.0 MALIGNANT NEOPLASM OF LEFT BREAST IN FEMALE, ESTROGEN RECEPTOR POSITIVE, UNSPECIFIED SITE OF BREAST: ICD-10-CM

## 2023-07-31 DIAGNOSIS — R23.2 HOT FLASHES: Primary | ICD-10-CM

## 2023-08-03 ENCOUNTER — OFFICE VISIT (OUTPATIENT)
Dept: HEMATOLOGY/ONCOLOGY | Facility: CLINIC | Age: 53
End: 2023-08-03
Payer: COMMERCIAL

## 2023-08-03 ENCOUNTER — CLINICAL SUPPORT (OUTPATIENT)
Dept: REHABILITATION | Facility: HOSPITAL | Age: 53
End: 2023-08-03
Payer: COMMERCIAL

## 2023-08-03 VITALS
HEIGHT: 66 IN | RESPIRATION RATE: 16 BRPM | SYSTOLIC BLOOD PRESSURE: 129 MMHG | BODY MASS INDEX: 29.3 KG/M2 | OXYGEN SATURATION: 97 % | WEIGHT: 182.31 LBS | TEMPERATURE: 97 F | DIASTOLIC BLOOD PRESSURE: 85 MMHG | HEART RATE: 61 BPM

## 2023-08-03 DIAGNOSIS — C50.912 INVASIVE DUCTAL CARCINOMA OF LEFT BREAST IN FEMALE: Primary | ICD-10-CM

## 2023-08-03 DIAGNOSIS — N95.1 MENOPAUSAL SYMPTOMS: ICD-10-CM

## 2023-08-03 DIAGNOSIS — G47.09 OTHER INSOMNIA: ICD-10-CM

## 2023-08-03 DIAGNOSIS — N95.1 HOT FLASHES DUE TO MENOPAUSE: ICD-10-CM

## 2023-08-03 PROCEDURE — 3008F BODY MASS INDEX DOCD: CPT | Mod: CPTII,S$GLB,, | Performed by: INTERNAL MEDICINE

## 2023-08-03 PROCEDURE — 99999 PR PBB SHADOW E&M-EST. PATIENT-LVL III: CPT | Mod: PBBFAC,,, | Performed by: INTERNAL MEDICINE

## 2023-08-03 PROCEDURE — 1159F PR MEDICATION LIST DOCUMENTED IN MEDICAL RECORD: ICD-10-PCS | Mod: CPTII,S$GLB,, | Performed by: INTERNAL MEDICINE

## 2023-08-03 PROCEDURE — 3008F PR BODY MASS INDEX (BMI) DOCUMENTED: ICD-10-PCS | Mod: CPTII,S$GLB,, | Performed by: INTERNAL MEDICINE

## 2023-08-03 PROCEDURE — 99999 PR PBB SHADOW E&M-EST. PATIENT-LVL III: ICD-10-PCS | Mod: PBBFAC,,, | Performed by: INTERNAL MEDICINE

## 2023-08-03 PROCEDURE — 3044F PR MOST RECENT HEMOGLOBIN A1C LEVEL <7.0%: ICD-10-PCS | Mod: CPTII,S$GLB,, | Performed by: INTERNAL MEDICINE

## 2023-08-03 PROCEDURE — 1159F MED LIST DOCD IN RCRD: CPT | Mod: CPTII,S$GLB,, | Performed by: INTERNAL MEDICINE

## 2023-08-03 PROCEDURE — 97814 ACUP 1/> W/ESTIM EA ADDL 15: CPT | Mod: PN | Performed by: ACUPUNCTURIST

## 2023-08-03 PROCEDURE — 99214 OFFICE O/P EST MOD 30 MIN: CPT | Mod: S$GLB,,, | Performed by: INTERNAL MEDICINE

## 2023-08-03 PROCEDURE — 3044F HG A1C LEVEL LT 7.0%: CPT | Mod: CPTII,S$GLB,, | Performed by: INTERNAL MEDICINE

## 2023-08-03 PROCEDURE — 3079F PR MOST RECENT DIASTOLIC BLOOD PRESSURE 80-89 MM HG: ICD-10-PCS | Mod: CPTII,S$GLB,, | Performed by: INTERNAL MEDICINE

## 2023-08-03 PROCEDURE — 3074F SYST BP LT 130 MM HG: CPT | Mod: CPTII,S$GLB,, | Performed by: INTERNAL MEDICINE

## 2023-08-03 PROCEDURE — 97813 ACUP 1/> W/ESTIM 1ST 15 MIN: CPT | Mod: PN | Performed by: ACUPUNCTURIST

## 2023-08-03 PROCEDURE — 3074F PR MOST RECENT SYSTOLIC BLOOD PRESSURE < 130 MM HG: ICD-10-PCS | Mod: CPTII,S$GLB,, | Performed by: INTERNAL MEDICINE

## 2023-08-03 PROCEDURE — 3079F DIAST BP 80-89 MM HG: CPT | Mod: CPTII,S$GLB,, | Performed by: INTERNAL MEDICINE

## 2023-08-03 PROCEDURE — 99214 PR OFFICE/OUTPT VISIT, EST, LEVL IV, 30-39 MIN: ICD-10-PCS | Mod: S$GLB,,, | Performed by: INTERNAL MEDICINE

## 2023-08-03 NOTE — PROGRESS NOTES
PROGRESS NOTE     Subjective:       Patient ID: Steph Lemus is a 53 y.o. female.  MRN: [id number]  : 1970    Chief Complaint:    Stage IA (T1c, N0, M0, Grade 1, ER+/NM+/HER2 pending) Invasive ductal carcinoma of the LEFT breast    History of Present Illness:   Steph Lemus is a 53 y.o. female who is referred for newly diagnosed left breast IDC, found on screening mammogram.   She did not palpate a breast mass. She denies any pain or nipple discharge.   She is a smoker, has stopped at the time of her diagnosis.      Menarche at age 9.   . Age at first live birth 15 years old   Menopause at 50 years old. HRT-divigel now, took for 2 years     FH  Father- throat cancer- dx 73  Paternal Grandfather- Lung cancer- dx at 68  Paternal Uncle- Lung cancer, Dx at 60   Cousin- maternal- breast cancer dx at 36  Maternal cousin-Lung cancer- dx at 40    Dx with Covid 5 in October and had a difficult recovery, surgery was delayed.Had follow up imaging while she was waiting that showed stable breast mass and borderline enlarging left axillary node. It was biopsied to be benign.       Interim history:  On 23, she underwent b/l mastectomy and left sentinel node exam.  Has persistent post op abdominal pain and tightness, follows up with PT, tried pelvic floor therapy as well. Not significantly improved yet.     Not smoking at all.     Has hot flashes. Started on Veozah, seems to be helping but have issues with insurance. Will see IO for acupuncture for hot flashes as well.       Oncology History:  22: Screening mammogram  Impression:  Left  Focal Asymmetry: Left breast focal asymmetry at the anterior 12 o'clock position. Assessment: 0 - Incomplete. Diagnostic Mammogram and/or Ultrasound is recommended.      Right  There is no mammographic evidence of malignancy in the right breast.     BI-RADS Category:   Overall: 0 - Incomplete: Needs Additional Imaging  Evaluation    7/19/22:  Diagnostic mammogram:  Impression:  Left  Mass: Left breast 18 mm x 9 mm x 9 mm mass at the 12 o'clock position. Assessment: 4 - Suspicious finding. Biopsy is recommended.      An 8 mm satellite mass is present 0.5 cm remote from the aforementioned mass at 12 o'clock.      BI-RADS Category:   Overall: 4 - Suspicious     8/3/22:   1. Breast, left, 12 o'clock, 6 cm from nipple, ultrasound-guided core needle   biopsies:   - Invasive ductal carcinoma       - Present in 4 of 6 core fragments (3 mm in greatest contiguous dimension)       - Brandon-Jimenez modified SBR score 2 + 2 + 1 = 5 of 9       - Histologic grade 1 of 3       - Mitotic index = 0.3 (average mitoses per high power field) (low)   - Biomarkers, assessed by immunohistochemistry on block 1A       - Estrogen Receptor (ER):  Positive (95%; strong intensity)       - Progesterone Receptor (TN):  Positive (70%; moderate intensity)       - HER2:  Equivocal (2+)       - HER2 FISH pending; results to follow in a supplemental report       - Ki-67 proliferation index:  Approximately 20% in hot-spot (intermediate)   - Lymphovascular invasion not identified   - Ductal carcinoma in situ       - Associated with invasive tumor       - Nuclear grade 2 of 3       - Cribriform pattern       - Central comedonecrosis: Approximately 20%   - Background breast contains fragments of fibroadenoma with myxoid change   2. Breast, left, 12 o'clock, 7 cm from nipple, ultrasound-guided core needle   biopsies:   - Fibroadenoma with myxoid change     8/15/22: MRI   Impression:  Left  Mass: Left breast 15 mm x 12 mm x 20 mm mass at the 12 o'clock position. Assessment: 6 - Known biopsy, proven malignancy.      Right  There is no MR evidence of malignancy in the right breast.    1/5/23  1.  LEFT BREAST, SKIN AND NIPPLE SPARING MASTECTOMY:   - IN SITU AND INVASIVE DUCT CARCINOMA, 0.7 CM, LOW-GRADE (1, 2, 1).     - FIBROADENOMA (ADJACENT TO INVASIVE TUMOR).     - BIOPSY  SITE REPAIR REACTIONS.     2.  LEFT BREAST, RE-EXCISION ANTERIOR MARGIN:   - ADIPOSE TISSUE NEGATIVE FOR TUMOR.     3.  LEFT AXILLARY SENTINEL LYMPH NODE BIOPSY:   - FIVE LYMPH NODES, ALL NEGATIVE FOR METASTATIC CARCINOMA (0/5).     - ONE LYMPH NODE WITH CAPSULAR MERARY NEVUS.     4.  RIGHT BREAST, SKIN AND NIPPLE SPARING MASTECTOMY:   - NO TUMOR SEEN.      pT1b pN0(sn)     History:  Past Medical History:   Diagnosis Date    Acid reflux     Breast cancer 08/2022    Stage IA (T1c, N0, M0, Grade 1, ER+/NM+/HER2 pending) Invasive ductal carcinoma of the LEFT breast    Epistaxis, recurrent     none since cauterization    Hyperlipidemia     Invasive ductal carcinoma of left breast in female 09/2022      Past Surgical History:   Procedure Laterality Date    APPENDECTOMY      BREAST BIOPSY Left 08/03/2022    idc    COLONOSCOPY N/A 04/15/2019    Procedure: COLONOSCOPY;  Surgeon: Tad Suarez Jr., MD;  Location: [facility];  Service: Endoscopy;  Laterality: N/A;    DILATION AND CURETTAGE OF UTERUS      for uterine polyps    ENDOMETRIAL ABLATION      ENDOSCOPIC NASAL CAUTERIZATION N/A 08/11/2020    Procedure: CAUTERIZATION, NOSE, ENDOSCOPIC;  Surgeon: Surinder Acevedo MD;  Location: [facility];  Service: ENT;  Laterality: N/A;    LAPAROSCOPIC APPENDECTOMY N/A 09/22/2020    Procedure: APPENDECTOMY, LAPAROSCOPIC;  Surgeon: Shai Ludwig MD;  Location: [facility];  Service: General;  Laterality: N/A;    RECONSTRUCTION OF BREAST WITH DEEP INFERIOR EPIGASTRIC ARTERY  (RUTH) FREE FLAP Bilateral 1/5/2023    Procedure: RECONSTRUCTION, BREAST, USING RUTH FREE FLAP;  Surgeon: Michel Tirado MD;  Location: [facility] OR;  Service: Plastics;  Laterality: Bilateral;    REPAIR, NERVE USING ALLOGRAFT Bilateral 1/5/2023    Procedure: REPAIR, NERVE USING ALLOGRAFT;  Surgeon: Michel Tirado MD;  Location: [facility] OR;  Service: Plastics;  Laterality: Bilateral;    SENTINEL LYMPH NODE BIOPSY Left 1/5/2023    Procedure: BIOPSY, LYMPH NODE,  SENTINEL;  Surgeon: Simona Cueva MD;  Location: [facility] OR;  Service: General;  Laterality: Left;    SIMPLE MASTECTOMY Bilateral 2023    Procedure: MASTECTOMY, SIMPLE;  Surgeon: Simona Cueva MD;  Location: [facility] OR;  Service: General;  Laterality: Bilateral;    TONSILLECTOMY       Family History   Problem Relation Age of Onset    Hyperlipidemia Mother     Hypertension Mother     Diabetes Mother     Hypertension Father     Cancer Father         throat    Pacemaker/defibrilator Father     Diabetes Maternal Grandfather     Cancer Maternal Grandfather     Cancer Paternal Grandfather     Breast cancer Cousin 40    BRCA 1/2 Cousin     Colon cancer Neg Hx     Ovarian cancer Neg Hx       Social History     Tobacco Use    Smoking status: Former     Current packs/day: 0.00     Average packs/day: 0.1 packs/day for 25.0 years (2.5 ttl pk-yrs)     Types: Cigarettes     Start date: 1997     Quit date: 2022     Years since quittin.9    Smokeless tobacco: Never   Substance and Sexual Activity    Alcohol use: Not Currently     Alcohol/week: 2.0 standard drinks of alcohol     Types: 2 Glasses of wine per week    Drug use: No    Sexual activity: Yes     Partners: Male     Birth control/protection: Other-see comments        ROS:   Review of Systems   Constitutional:  Negative for fever, malaise/fatigue and weight loss.        Hot flashes   HENT:  Positive for congestion and sinus pain (chronic since covid). Negative for hearing loss, nosebleeds and sore throat.    Eyes:  Negative for double vision and photophobia.   Respiratory:  Negative for cough, hemoptysis, sputum production, shortness of breath and wheezing.    Cardiovascular:  Negative for chest pain, palpitations, orthopnea and leg swelling.   Gastrointestinal:  Negative for abdominal pain, blood in stool, constipation, diarrhea, heartburn, nausea and vomiting.   Genitourinary:  Negative for dysuria, frequency, hematuria and urgency.  "  Musculoskeletal:  Negative for back pain, joint pain and myalgias.   Skin:  Negative for itching and rash.   Neurological:  Positive for headaches (off and on). Negative for tingling, seizures and weakness.   Endo/Heme/Allergies:  Negative for polydipsia. Does not bruise/bleed easily.   Psychiatric/Behavioral:  Negative for depression and memory loss. The patient is not nervous/anxious and does not have insomnia.         Objective:     Vitals:    08/03/23 1420   BP: 129/85   Pulse: 61   Resp: 16   Temp: 97.4 °F (36.3 °C)   TempSrc: Temporal   SpO2: 97%   Weight: 82.7 kg (182 lb 5.1 oz)   Height: 5' 6" (1.676 m)   PainSc: 0-No pain       Physical Examination:   Physical Exam  Vitals and nursing note reviewed.   Constitutional:       General: She is not in acute distress.     Appearance: She is not diaphoretic.   HENT:      Head: Normocephalic.      Mouth/Throat:      Pharynx: No oropharyngeal exudate.   Eyes:      General: No scleral icterus.     Conjunctiva/sclera: Conjunctivae normal.   Neck:      Thyroid: No thyromegaly.   Cardiovascular:      Rate and Rhythm: Normal rate and regular rhythm.      Heart sounds: Normal heart sounds. No murmur heard.  Pulmonary:      Effort: Pulmonary effort is normal. No respiratory distress.      Breath sounds: No stridor. No wheezing or rales.   Chest:      Chest wall: No tenderness.   Abdominal:      General: Bowel sounds are normal. There is no distension.      Palpations: Abdomen is soft. There is no mass.      Tenderness: There is no abdominal tenderness. There is no rebound.   Musculoskeletal:         General: No tenderness or deformity. Normal range of motion.      Cervical back: Neck supple.   Lymphadenopathy:      Cervical: No cervical adenopathy.   Skin:     General: Skin is warm and dry.      Findings: No erythema or rash.      Comments: B/l mastectomy with flap reconstruction    Neurological:      Mental Status: She is alert and oriented to person, place, and time.    "   Cranial Nerves: No cranial nerve deficit.      Coordination: Coordination normal.      Gait: Gait is intact.   Psychiatric:         Mood and Affect: Affect normal.         Cognition and Memory: Memory normal.         Judgment: Judgment normal.          Diagnostic Tests:  Significant Imaging: I have reviewed and interpreted all pertinent imaging results/findings.  PET Scan No results found for this or any previous visit.    Laboratory Data:  All pertinent labs have been reviewed.    Labs:   Lab Results   Component Value Date    WBC 5.83 05/10/2023    HGB 14.1 05/10/2023    HCT 44.3 05/10/2023    MCV 90 05/10/2023     05/10/2023       Assessment/Plan:   Invasive ductal carcinoma of left breast in female  Stage IA (T1c, N0, M0, Grade 1, ER+/[place]+/HER2 2+ by IHC, FISH negative, Invasive ductal carcinoma of the LEFT breast  pT1b N0 (sn)     She is s/p b/l mastectomy with flap reconstruction.   Oncotype is low risk at 7, with 3% risk of distant recurrence with AI or Tamoxifen.   She is post menopausal based on labs and could be offered with AI or Tamoxifen vs observation as per patient preference.   See prior note for discussion. She has thought about endocrine therapy and has declined at this time. Will continue active surveillance.     Hot flashes   Will try to resume Veozah.  Willing to try acupuncture.   Reluctant to start Effexor but may consider it if cannot resume Veozah.          ECOG SCORE    0 - Fully active-able to carry on all pre-disease performance without restriction           Discussion:   No follow-ups on file.    Plan was discussed with the patient at length, and she verbalized understanding. Steph was given an opportunity to ask questions that were answered to her satisfaction, and she was advised to call in the interval if any problems or questions arise.    Electronically signed by Angelica Coelho MD      Route Chart for Scheduling    Med Onc Chart Routing      Follow up with physician 6  months.   Follow up with [facility]    Infusion scheduling note    Injection scheduling note    Labs    Imaging    Pharmacy appointment    Other referrals                     Answers submitted by the patient for this visit:  Review of Systems Questionnaire (Submitted on 7/28/2023)  appetite change : No  unexpected weight change: No  mouth sores: No  visual disturbance: No  adenopathy: No

## 2023-08-04 NOTE — PROGRESS NOTES
"  Acupuncture Evaluation Note     Name: Steph Lemus  Clinic Number: [id number]    Traditional Chinese Medicine (TCM) Diagnosis: Qi Stagnation, Blood Stasis, and Yin Deficiency  Medical Diagnosis:   Encounter Diagnoses   Name Primary?    Hot flashes due to menopause     Menopausal symptoms     Other insomnia         Evaluation Date: 8/3/2023    Visit #/Visits authorized:     Precautions: Standard    Subjective     Chief Concern: Chronic Pain (Headaches and neck pain every since Covid.  Patient was very sick and has had residual head and neck pain ever since. 6/10) and Hot Flashes (Severe hot flashes 6/10 and constant.  Was on medication which helped with the lower body but upper body still drenches her bed at night.)        Medical necessity is demonstrated by the following IMPAIRMENTS: Medical Necessity: Decreased mobility limits day to day activities, social, and emergent situations              Aggravating Factors:  movement   Relieving Factors:  rest    Symptom Description:     Quality:  Throbbing  Severity:  4-6/10  Frequency:  every day    Previous Treatments Tried:  Medication      Digestion:     Diet: in general, a "healthy" diet     Fluids:  na , is drinking moderate amounts of fluids, social drinker      Sleep: 3/10 insomnia    Energy Levels:  4/10 fatigue    Psychological Symptoms:  3/10 stress and anxiety    Other Symptoms: na    GYN Symptoms: 6/10 hot flashes    Objective     Observation: patient has tenderness at occipital ridge.  Disarticulation of the cervical spine and bilateral spasm in traps and lower back.        Pulse:        wiry       New Findings:  na    Treatment     Treatment Principles:  move qi and blood, relieve bi pain, drain dampness and nourish yin    Acupuncture points used:  4 COON, Du20, Gb20, Gb21, Gb41, Ht7, Pc6, Lu9 , Ki3, Ki6, Li11, REN12, REN6, Sp6, Sp9, St25, St36, and YIN SANCHEZ    Bilateral points:  Unilateral points:  Auricular Treatment:  ashley men    Needles In: " 36  Needles Out: 36  Needles W/ STIM placed: 305  Needles W/ STIM removed: 335      Other Traditional Chinese Medicine Modalities -  na    Assessment     After treatment, patient felt  less  pain and more relaxed.  Will provide feedback on hot flashes.     Patient prognosis is Good.     Patient will continue to benefit from acupuncture treatment to address the deficits listed in the problem list box on initial evaluation, provide patient family education and to maximize pt's level of independence in the home and community environment.     Patient's spiritual, cultural and educational needs considered and pt agreeable to plan of care and goals.     Anticipated barriers to treatment: none    Plan     Recommend 1 /week for 12 sessions then re-assess.      Education:  Patient is aware of cumulative benefit of acupuncture

## 2023-08-11 ENCOUNTER — CLINICAL SUPPORT (OUTPATIENT)
Dept: REHABILITATION | Facility: HOSPITAL | Age: 53
End: 2023-08-11
Payer: COMMERCIAL

## 2023-08-11 DIAGNOSIS — R10.2 PELVIC PAIN: Primary | ICD-10-CM

## 2023-08-11 DIAGNOSIS — R23.2 HOT FLASHES: ICD-10-CM

## 2023-08-11 DIAGNOSIS — R53.83 OTHER FATIGUE: ICD-10-CM

## 2023-08-11 PROCEDURE — 97813 ACUP 1/> W/ESTIM 1ST 15 MIN: CPT | Mod: PN | Performed by: ACUPUNCTURIST

## 2023-08-11 PROCEDURE — 97814 ACUP 1/> W/ESTIM EA ADDL 15: CPT | Mod: PN | Performed by: ACUPUNCTURIST

## 2023-08-13 NOTE — PROGRESS NOTES
Acupuncture Evaluation Note     Name: Steph Lemus  [facility] Number: [id number]    Traditional Chinese Medicine (TCM) Diagnosis: Qi Stagnation and Blood Stasis  Medical Diagnosis:   Encounter Diagnoses   Name Primary?    Pelvic pain Yes    Other fatigue     Hot flashes         Evaluation Date: 8/13/2023    Visit #/Visits authorized:     Precautions: Standard    Subjective     Chief Concern: Pelvic Pain (Patient reports pelvic pain from post surgical scar tissue.  Pain today is 2/10 particularly at the thickest adhesions.  ), Fatigue (Post treatment fatigue today is 3/10), and Hot Flashes (Today re 3/10)        Medical necessity is demonstrated by the following IMPAIRMENTS: Medical Necessity: Decreased mobility limits day to day activities, social, and emergent situations              Aggravating Factors:  movement   Relieving Factors:  rest    Symptom Description:     Quality:  Grabbing and Tight  Severity:  2  Frequency:  every day    Previous Treatments Tried:   na      Fluids:  Taste: na  Appetite: na    Sleep: 2/10    Energy Levels:  7/10    Psychological Symptoms:  0/10    Other Symptoms: headaches 3/10    GYN Symptoms: hot flashes 3/10    Objective     Observation: Patient is otherwise healthy person, tries to stay fit and eat well. Reports mild fatigue, headaches and hot flashes.  Primarily here today for post surgical scar tissue pain.     Pulse:        wiry       New Findings:  na    Treatment     Treatment Principles:  move qi and blood, relieve bi, harmonize middle magaly and ashley, nourish yin reduce adhesions.    Acupuncture points used:  4 COON, Du20, Gb34, Ki3, Ki6, REN12, REN6, Sp6, Sp9, St36, and YIN SANCHEZ    Bilateral points:  Unilateral points:local abdominal adhesions  Auricular Treatment:  ashley men    Needles In: 27  Needles Out: 27  Needles W/ STIM placed: 935  Needles W/ STIM removed: 955      Other Traditional Chinese Medicine Modalities -  na    Assessment     After treatment, patient felt  less tightness across abdomen, more energetic and less headache pain.     Patient prognosis is Excellent.     Patient will continue to benefit from acupuncture treatment to address the deficits listed in the problem list box on initial evaluation, provide patient family education and to maximize pt's level of independence in the home and community environment.     Patient's spiritual, cultural and educational needs considered and pt agreeable to plan of care and goals.     Anticipated barriers to treatment: none    Plan     Recommend 1 /week for 12 sessions then re-assess.      Education:  Patient is aware of cumulative benefit of acupuncture

## 2023-08-16 ENCOUNTER — TELEPHONE (OUTPATIENT)
Dept: HEMATOLOGY/ONCOLOGY | Facility: CLINIC | Age: 53
End: 2023-08-16
Payer: COMMERCIAL

## 2023-08-16 NOTE — TELEPHONE ENCOUNTER
----- Message from Cammie Nguyen, Patient Care Assistant sent at 8/16/2023  2:47 PM CDT -----  Type: Needs Medical Advice  Who Called:  gem  Rivas Call Back Number: [phone]    Additional Information: gem states she needs to reschedule her 8/18 to another day please call to further discuss , thank you

## 2023-08-17 ENCOUNTER — CLINICAL SUPPORT (OUTPATIENT)
Dept: REHABILITATION | Facility: HOSPITAL | Age: 53
End: 2023-08-17
Payer: COMMERCIAL

## 2023-08-17 DIAGNOSIS — G89.3 CHRONIC PAIN AFTER CANCER TREATMENT: Primary | ICD-10-CM

## 2023-08-17 PROCEDURE — 97814 ACUP 1/> W/ESTIM EA ADDL 15: CPT | Mod: PN | Performed by: ACUPUNCTURIST

## 2023-08-17 PROCEDURE — 97813 ACUP 1/> W/ESTIM 1ST 15 MIN: CPT | Mod: PN | Performed by: ACUPUNCTURIST

## 2023-08-18 NOTE — PROGRESS NOTES
Acupuncture Follow-Up Note     Name: Steph Lemus  Clinic Number: [id number]    Traditional Chinese Medicine (TCM) Diagnosis: Qi Stagnation, Blood Stasis, Qi Deficiency, and Blood Deficiency  Medical Diagnosis: No diagnosis found.     Evaluation Date: 8/18/2023    Visit #/Visits authorized:     Precautions: Standard    Subjective     Chief Concern: Low-back Pain, Chronic Pain (Chronic pain from surgical flap procedure pain is 5/10), and Hot Flashes (Patient reports most symptoms are under control except stomach scar pain and hot flashes. 5/10)       Medical necessity is demonstrated by the following IMPAIRMENTS: Medical Necessity: Decreased mobility limits day to day activities, social, and emergent situations and Decreased quality of life              Aggravating Factors:  movement     Relieving Factors:  rest    Symptom Description:     Quality:  Aching and Dull  Severity:  5  Frequency:  every day      Objective     Observation: Patient is responding well to treatment still has issues with hot flashes and scar tissue healing and causing pulling pain.     Pulse:        thready       New Findings:  na    Treatment     Treatment Principles:  move qi and blood, reduce scar tissue. Nourish yin    Acupuncture points used:  4 COON, Du20, Gb34, Kd10, Ki3, Ki6, Li11, REN12, REN6, Sp10, Sp6, Sp9, St25, and YIN SANCHEZ    Bilateral points:  Unilateral points:  Auricular Treatment:  ashley men    Needles In: 29  Needles Out: 29  [place] W/ STIM placed: 335  Needles W/ STIM removed: 355      Other Traditional Chinese Medicine Modalities -  na    Assessment     After treatment, patient felt less pain in the abdomen and will report back on hot flashes next week.      Patient prognosis is Good.     Patient will continue to benefit from acupuncture treatment to address the deficits listed in the problem list box on initial evaluation, provide patient family education and to maximize pt's level of independence in the home and  community environment.     Patient's spiritual, cultural and educational needs considered and pt agreeable to plan of care and goals.     Anticipated barriers to treatment: none    Plan     Recommend 1 /week for 12 sessions then re-assess.      Education:  Patient is aware of cumulative benefit of acupuncture

## 2023-08-25 ENCOUNTER — TELEPHONE (OUTPATIENT)
Dept: HEMATOLOGY/ONCOLOGY | Facility: CLINIC | Age: 53
End: 2023-08-25
Payer: COMMERCIAL

## 2023-08-25 NOTE — TELEPHONE ENCOUNTER
Spoke with the patient and r/s her acup appt for 9/15 as requested.      ----- Message from Maribel Washington sent at 8/24/2023  4:58 PM CDT -----  Contact: Pt  Type:  Needs Medical Advice    Who Called: Pt  Would the patient rather a call back or a response via [facility]? call  Best Call Back Number: [phone]  Additional Information: Pt has an appt tomorrow 08/25/2023, but needs to reschedule due to her possibly having Covid. Pt would like to reschedule for 09/15/2023. Please call pt back to advise.

## 2023-08-28 ENCOUNTER — OFFICE VISIT (OUTPATIENT)
Dept: FAMILY MEDICINE | Facility: CLINIC | Age: 53
End: 2023-08-28
Payer: COMMERCIAL

## 2023-08-28 VITALS
SYSTOLIC BLOOD PRESSURE: 132 MMHG | TEMPERATURE: 98 F | DIASTOLIC BLOOD PRESSURE: 82 MMHG | OXYGEN SATURATION: 98 % | WEIGHT: 174.38 LBS | HEART RATE: 76 BPM | BODY MASS INDEX: 28.03 KG/M2 | HEIGHT: 66 IN

## 2023-08-28 DIAGNOSIS — C50.912 INVASIVE DUCTAL CARCINOMA OF LEFT BREAST IN FEMALE: ICD-10-CM

## 2023-08-28 DIAGNOSIS — N95.1 HOT FLASHES DUE TO MENOPAUSE: ICD-10-CM

## 2023-08-28 DIAGNOSIS — B34.9 VIRAL SYNDROME: Primary | ICD-10-CM

## 2023-08-28 PROCEDURE — 3075F PR MOST RECENT SYSTOLIC BLOOD PRESS GE 130-139MM HG: ICD-10-PCS | Mod: CPTII,S$GLB,, | Performed by: NURSE PRACTITIONER

## 2023-08-28 PROCEDURE — 99214 OFFICE O/P EST MOD 30 MIN: CPT | Mod: S$GLB,,, | Performed by: NURSE PRACTITIONER

## 2023-08-28 PROCEDURE — 3079F PR MOST RECENT DIASTOLIC BLOOD PRESSURE 80-89 MM HG: ICD-10-PCS | Mod: CPTII,S$GLB,, | Performed by: NURSE PRACTITIONER

## 2023-08-28 PROCEDURE — 1159F MED LIST DOCD IN RCRD: CPT | Mod: CPTII,S$GLB,, | Performed by: NURSE PRACTITIONER

## 2023-08-28 PROCEDURE — 3044F PR MOST RECENT HEMOGLOBIN A1C LEVEL <7.0%: ICD-10-PCS | Mod: CPTII,S$GLB,, | Performed by: NURSE PRACTITIONER

## 2023-08-28 PROCEDURE — 3008F PR BODY MASS INDEX (BMI) DOCUMENTED: ICD-10-PCS | Mod: CPTII,S$GLB,, | Performed by: NURSE PRACTITIONER

## 2023-08-28 PROCEDURE — 3044F HG A1C LEVEL LT 7.0%: CPT | Mod: CPTII,S$GLB,, | Performed by: NURSE PRACTITIONER

## 2023-08-28 PROCEDURE — 3075F SYST BP GE 130 - 139MM HG: CPT | Mod: CPTII,S$GLB,, | Performed by: NURSE PRACTITIONER

## 2023-08-28 PROCEDURE — 3008F BODY MASS INDEX DOCD: CPT | Mod: CPTII,S$GLB,, | Performed by: NURSE PRACTITIONER

## 2023-08-28 PROCEDURE — 99999 PR PBB SHADOW E&M-EST. PATIENT-LVL III: ICD-10-PCS | Mod: PBBFAC,,, | Performed by: NURSE PRACTITIONER

## 2023-08-28 PROCEDURE — 99214 PR OFFICE/OUTPT VISIT, EST, LEVL IV, 30-39 MIN: ICD-10-PCS | Mod: S$GLB,,, | Performed by: NURSE PRACTITIONER

## 2023-08-28 PROCEDURE — 3079F DIAST BP 80-89 MM HG: CPT | Mod: CPTII,S$GLB,, | Performed by: NURSE PRACTITIONER

## 2023-08-28 PROCEDURE — 1159F PR MEDICATION LIST DOCUMENTED IN MEDICAL RECORD: ICD-10-PCS | Mod: CPTII,S$GLB,, | Performed by: NURSE PRACTITIONER

## 2023-08-28 PROCEDURE — 99999 PR PBB SHADOW E&M-EST. PATIENT-LVL III: CPT | Mod: PBBFAC,,, | Performed by: NURSE PRACTITIONER

## 2023-08-28 RX ORDER — SEMAGLUTIDE 1.34 MG/ML
0.5 INJECTION, SOLUTION SUBCUTANEOUS
COMMUNITY
End: 2023-10-12 | Stop reason: ALTCHOICE

## 2023-08-28 RX ORDER — VENLAFAXINE HYDROCHLORIDE 37.5 MG/1
37.5 CAPSULE, EXTENDED RELEASE ORAL DAILY
Qty: 30 CAPSULE | Refills: 3 | Status: SHIPPED | OUTPATIENT
Start: 2023-08-28 | End: 2023-10-12 | Stop reason: SDUPTHER

## 2023-08-28 NOTE — PROGRESS NOTES
Subjective:       Patient ID: Steph Lemus is a 53 y.o. female.    Chief Complaint: Nasal Congestion (Pt states that she went to the ER Saturday night and tested negative for flu, covid and RSV), Shortness of Breath, and Palpitations    HPI  Headache, palpitations, shortness of breath, sinus pressure, nausea onset 4 days ago  Home covid test negative x 2  Symptoms the same as previous covid infection. Covid risk score: 0  Presented to [facility] ED yesterday for eval--flu and covid negative.   EKG, labs, CXR unremarkable   Given IVF, DCd home     Invasive ductal carcinoma left breast  s/p b/l mastectomy with flap reconstruction January 2023    Symptoms are unchanged since onset  Vague--waves of nausea, weakness, congestion  Prior to onset she denies allergy symptoms, sinus pressure, otalgia     Denies cough, fever, vomiting, diarrhea     No ill contacts    She denies anxiety  Notes low mood. Hot flashes uncontrolled  Insurance not covering fezolinetant. Effexor sent by oncology--did not start. Open to trying     Vitals:    08/28/23 1038   BP: 132/82   Pulse: 76   Temp: 98.1 °F (36.7 °C)     Review of Systems   Constitutional:  Positive for diaphoresis. Negative for fever.   Respiratory:  Negative for cough.    Psychiatric/Behavioral:  Positive for dysphoric mood. The patient is not nervous/anxious.        Past Medical History:   Diagnosis Date    Acid reflux     Breast cancer 08/2022    Stage IA (T1c, N0, M0, Grade 1, ER+/IN+/HER2 pending) Invasive ductal carcinoma of the LEFT breast    Epistaxis, recurrent     none since cauterization    Hyperlipidemia     Invasive ductal carcinoma of left breast in female 09/2022     Objective:      Physical Exam  Vitals and nursing note reviewed.   Constitutional:       General: She is not in acute distress.     Appearance: She is not diaphoretic.   HENT:      Head: Normocephalic.      Right Ear: Tympanic membrane, ear canal and external ear normal.      Left Ear: Tympanic  membrane, ear canal and external ear normal.      Mouth/Throat:      Pharynx: Oropharynx is clear.   Eyes:      General:         Right eye: No discharge.         Left eye: No discharge.   Neck:      Trachea: No tracheal deviation.   Cardiovascular:      Rate and Rhythm: Normal rate and regular rhythm.      Heart sounds: Normal heart sounds.   Pulmonary:      Effort: Pulmonary effort is normal.      Breath sounds: Normal breath sounds.   Musculoskeletal:      Right lower leg: No edema.      Left lower leg: No edema.   Skin:     Coloration: Skin is not pale.   Neurological:      Mental Status: She is alert and oriented to person, place, and time.   Psychiatric:         Speech: Speech normal.         Behavior: Behavior normal.         Thought Content: Thought content normal.         Judgment: Judgment normal.         Assessment:       1. Viral syndrome    2. Hot flashes due to menopause    3. Invasive ductal carcinoma of left breast in female        Plan:       Viral syndrome   Workup reassuring. Continue supportive care    Hot flashes due to menopause  -     venlafaxine (EFFEXOR XR) 37.5 MG 24 hr capsule; Take 1 capsule (37.5 mg total) by mouth once daily.  Dispense: 30 capsule; Refill: 3    Invasive ductal carcinoma of left breast in female          Do not start effexor until feeling better   FU with oncology October as scheduled     education provided on supportive care, symptom monitoring and return precautions       Medication List with Changes/Refills   Current Medications    ALPRAZOLAM (XANAX) 0.5 MG TABLET    Take 1 tablet (0.5 mg total) by mouth 2 (two) times daily as needed for Anxiety.    FEZOLINETANT 45 MG TAB    Take 1 tablet by mouth once daily.    MULTIVITAMIN (THERAGRAN) PER TABLET    Take 1 tablet by mouth once daily.    ROSUVASTATIN (CRESTOR) 5 MG TABLET    Take 1 tablet (5 mg total) by mouth once daily.    SEMAGLUTIDE (OZEMPIC) 0.25 MG OR 0.5 MG(2 MG/1.5 ML) PEN INJECTOR    Inject 0.5 mg into the  skin every 7 days.    VITAMIN D (VITAMIN D3) 1000 UNITS TAB    Take 1,000 Units by mouth once daily.   Changed and/or Refilled Medications    Modified Medication Previous Medication    VENLAFAXINE (EFFEXOR XR) 37.5 MG 24 HR CAPSULE venlafaxine (EFFEXOR XR) 37.5 MG 24 hr capsule       Take 1 capsule (37.5 mg total) by mouth once daily.    Take 1 capsule (37.5 mg total) by mouth once daily.

## 2023-09-01 ENCOUNTER — PATIENT MESSAGE (OUTPATIENT)
Dept: FAMILY MEDICINE | Facility: CLINIC | Age: 53
End: 2023-09-01
Payer: COMMERCIAL

## 2023-09-14 ENCOUNTER — TELEPHONE (OUTPATIENT)
Dept: HEMATOLOGY/ONCOLOGY | Facility: CLINIC | Age: 53
End: 2023-09-14
Payer: COMMERCIAL

## 2023-09-14 NOTE — TELEPHONE ENCOUNTER
Spoke with patient and cancelled acup appt for tomorrow. Suggested she call her insurance company for clarification. Advised her of case # for referral and co-pay of $40. She will call to reschedule once speaks to insurance.      ----- Message from Chris Meza sent at 9/14/2023  9:40 AM CDT -----  Type: Need Medical Advice   Who Called: Patient   Best callback number: [phone]  Additional Information: Patient thought her insurance would cover the most of the bill for the sessions with acupuncture, She would like to cancel tomorrow appointment and reschedule once her bill is caught up, if you have any suggestions please fill free to call   Please call to further assist, Thanks.

## 2023-09-25 ENCOUNTER — TELEPHONE (OUTPATIENT)
Dept: FAMILY MEDICINE | Facility: CLINIC | Age: 53
End: 2023-09-25

## 2023-09-25 ENCOUNTER — ON-DEMAND VIRTUAL (OUTPATIENT)
Dept: URGENT CARE | Facility: CLINIC | Age: 53
End: 2023-09-25
Payer: COMMERCIAL

## 2023-09-25 DIAGNOSIS — L60.0 INGROWING NAIL WITH INFECTION: Primary | ICD-10-CM

## 2023-09-25 PROCEDURE — 99213 PR OFFICE/OUTPT VISIT, EST, LEVL III, 20-29 MIN: ICD-10-PCS | Mod: 95,,,

## 2023-09-25 PROCEDURE — 99213 OFFICE O/P EST LOW 20 MIN: CPT | Mod: 95,,,

## 2023-09-25 RX ORDER — DOXYCYCLINE 100 MG/1
100 CAPSULE ORAL 2 TIMES DAILY
Qty: 14 CAPSULE | Refills: 0 | Status: SHIPPED | OUTPATIENT
Start: 2023-09-25 | End: 2023-10-02

## 2023-09-25 NOTE — TELEPHONE ENCOUNTER
----- Message from Nisha Maldonado sent at 9/25/2023 10:02 AM CDT -----  Regarding: Same Day Appointment Request  Contact: patient at [phone]  Type:  Same Day Appointment Request    Caller is requesting a same day appointment.  Caller declined first available appointment listed below.      Name of Caller:  patient at [phone]    When is the first available appointment?  October  Symptoms:  infection on finger near cuticle/pus    Additional Information:   Please call and advise. Thank you

## 2023-09-25 NOTE — PROGRESS NOTES
Subjective:      Patient ID: Steph Lemus is a 53 y.o. female.    Vitals:  vitals were not taken for this visit.     Chief Complaint: finger nail      Visit Type: TELE AUDIOVISUAL    Present with the patient at the time of consultation: TELEMED PRESENT WITH PATIENT: None    Past Medical History:   Diagnosis Date    Acid reflux     Breast cancer 08/2022    Stage IA (T1c, N0, M0, Grade 1, ER+/SD+/HER2 pending) Invasive ductal carcinoma of the LEFT breast    Epistaxis, recurrent     none since cauterization    Hyperlipidemia     Invasive ductal carcinoma of left breast in female 09/2022     Past Surgical History:   Procedure Laterality Date    APPENDECTOMY      BREAST BIOPSY Left 08/03/2022    idc    COLONOSCOPY N/A 04/15/2019    Procedure: COLONOSCOPY;  Surgeon: Tad Suarez Jr., MD;  Location: [facility];  Service: Endoscopy;  Laterality: N/A;    DILATION AND CURETTAGE OF UTERUS      for uterine polyps    ENDOMETRIAL ABLATION      ENDOSCOPIC NASAL CAUTERIZATION N/A 08/11/2020    Procedure: CAUTERIZATION, NOSE, ENDOSCOPIC;  Surgeon: Surinder Acevedo MD;  Location: [facility];  Service: ENT;  Laterality: N/A;    LAPAROSCOPIC APPENDECTOMY N/A 09/22/2020    Procedure: APPENDECTOMY, LAPAROSCOPIC;  Surgeon: Shai Ludwig MD;  Location: [facility] OR;  Service: General;  Laterality: N/A;    RECONSTRUCTION OF BREAST WITH DEEP INFERIOR EPIGASTRIC ARTERY  (RUTH) FREE FLAP Bilateral 1/5/2023    Procedure: RECONSTRUCTION, BREAST, USING RUTH FREE FLAP;  Surgeon: Michel Tirado MD;  Location: [facility] OR;  Service: Plastics;  Laterality: Bilateral;    REPAIR, NERVE USING ALLOGRAFT Bilateral 1/5/2023    Procedure: REPAIR, NERVE USING ALLOGRAFT;  Surgeon: Michel Tirado MD;  Location: [facility] OR;  Service: Plastics;  Laterality: Bilateral;    SENTINEL LYMPH NODE BIOPSY Left 1/5/2023    Procedure: BIOPSY, LYMPH NODE, SENTINEL;  Surgeon: Simona Cueva MD;  Location: [facility] OR;  Service: General;  Laterality: Left;     SIMPLE MASTECTOMY Bilateral 1/5/2023    Procedure: MASTECTOMY, SIMPLE;  Surgeon: Simona Cueva MD;  Location: [facility] OR;  Service: General;  Laterality: Bilateral;    TONSILLECTOMY       Review of patient's allergies indicates:   Allergen Reactions    Augmentin [amoxicillin-pot clavulanate]      Stomach pains     Current Outpatient Medications on File Prior to Visit   Medication Sig Dispense Refill    ALPRAZolam (XANAX) 0.5 MG tablet Take 1 tablet (0.5 mg total) by mouth 2 (two) times daily as needed for Anxiety. 45 tablet 0    fezolinetant 45 mg Tab Take 1 tablet by mouth once daily. (Patient not taking: Reported on 8/28/2023) 30 tablet 5    multivitamin (THERAGRAN) per tablet Take 1 tablet by mouth once daily.      rosuvastatin (CRESTOR) 5 MG tablet Take 1 tablet (5 mg total) by mouth once daily. 90 tablet 3    semaglutide (OZEMPIC) 0.25 mg or 0.5 mg(2 mg/1.5 mL) pen injector Inject 0.5 mg into the skin every 7 days.      venlafaxine (EFFEXOR XR) 37.5 MG 24 hr capsule Take 1 capsule (37.5 mg total) by mouth once daily. 30 capsule 3    vitamin D (VITAMIN D3) 1000 units Tab Take 1,000 Units by mouth once daily.       Current Facility-Administered Medications on File Prior to Visit   Medication Dose Route Frequency Provider Last Rate Last Admin    LIDOcaine (PF) 10 mg/ml (1%) injection 10 mg  1 mL Intradermal Once Simona Cueva MD        midazolam (VERSED) 1 mg/mL injection 2 mg  2 mg Intravenous See admin instructions Simona Cueva MD        midazolam (VERSED) 1 mg/mL injection 2 mg  2 mg Intravenous See admin instructions Simona Cueva MD   2 mg at 01/05/23 0703    piperacillin-tazobactam 4.5 g in dextrose 5 % 100 mL IVPB (ready to mix system)  4.5 g Intravenous On Call Procedure Simona Cueva MD         Family History   Problem Relation Age of Onset    Hyperlipidemia Mother     Hypertension Mother     Diabetes Mother     Hypertension Father     Cancer Father         throat     Pacemaker/defibrilator Father     Diabetes Maternal Grandfather     Cancer Maternal Grandfather     Cancer Paternal Grandfather     Breast cancer Cousin 40    BRCA 1/2 Cousin     Colon cancer Neg Hx     Ovarian cancer Neg Hx        Medications Ordered                [id number] - [place], [place] - [address]   [address], [place] [address]    Telephone: [phone]   Fax: [phone]   Hours: Not open 24 hours                         E-Prescribed (1 of 1)              doxycycline (VIBRAMYCIN) 100 MG Cap    Sig: Take 1 capsule (100 mg total) by mouth 2 (two) times daily. for 7 days       Start: 9/25/23     Quantity: 14 capsule Refills: 0                           Ohs Peq Odvv Intake    9/25/2023 10:41 AM CDT - Filed by Patient   Describe your reason for todays visit Pain resness and swelling on finger next to nailbed   What is your current physical address in the event of a medical emergency? [address] [facility] [address]   Are you able to take your vital signs? No   Please attach any relevant images or files          Patient states that she woke up Friday morning with pain and inflammation noted to her third finger on her right hand. Patient states that she believes there erythema and swelling has increased. Patient denies any fever or other symptoms at this time.         Constitution: Negative.   HENT: Negative.     Neck: neck negative.   Cardiovascular: Negative.    Eyes: Negative.    Respiratory: Negative.     Gastrointestinal: Negative.    Endocrine: negative.   Genitourinary: Negative.    Musculoskeletal: Negative.    Skin:         See images   Allergic/Immunologic: Negative.    Neurological: Negative.    Hematologic/Lymphatic: Negative.    Psychiatric/Behavioral: Negative.          Objective:   The physical exam was conducted virtually.  Physical Exam   Constitutional: She is oriented to person, place, and time.   HENT:   Head: Normocephalic and atraumatic.   Eyes:  Conjunctivae are normal. Pupils are equal, round, and reactive to light. Extraocular movement intact   Neck: Neck supple.   Pulmonary/Chest: Effort normal.   Abdominal: Normal appearance.   Musculoskeletal: Normal range of motion.         General: Normal range of motion.   Neurological: no focal deficit. She is alert and oriented to person, place, and time.   Skin:         Comments: See images   Psychiatric: Her behavior is normal. Mood, judgment and thought content normal.       Assessment:     1. Ingrowing nail with infection        Plan:       Ingrowing nail with infection    Other orders  -     doxycycline (VIBRAMYCIN) 100 MG Cap; Take 1 capsule (100 mg total) by mouth 2 (two) times daily. for 7 days  Dispense: 14 capsule; Refill: 0

## 2023-09-25 NOTE — PATIENT INSTRUCTIONS
You must understand that you've received an Urgent Care treatment only and that you may be released before all your medical problems are known or treated. You, the patient, will arrange for follow up care as instructed.  Follow up with your PCP or specialty clinic as directed in the next 1-2 weeks if not improved or as needed.  You can call [phone] to schedule an appointment with the appropriate provider.  If your condition worsens we recommend that you receive another evaluation at the emergency room immediately or contact your primary medical clinics after hours call service to discuss your concerns.  Please return here or go to the Emergency Department for any concerns or worsening of condition.  Please if you smoke please consider quitting. [facility] Smoke cessation hotline number is [phone], available at this number is free counseling and medications to live a healthier life!         If you were prescribed a narcotic or controlled medication, do not drive or operate heavy equipment or machinery while taking these medications.

## 2023-09-26 ENCOUNTER — TELEPHONE (OUTPATIENT)
Dept: FAMILY MEDICINE | Facility: CLINIC | Age: 53
End: 2023-09-26
Payer: COMMERCIAL

## 2023-09-26 NOTE — TELEPHONE ENCOUNTER
----- Message from Nisha Maldonado sent at 9/25/2023 10:02 AM CDT -----  Regarding: Same Day Appointment Request  Contact: patient at [phone]  Type:  Same Day Appointment Request    Caller is requesting a same day appointment.  Caller declined first available appointment listed below.      Name of Caller:  patient at [phone]    When is the first available appointment?  October  Symptoms:  infection on finger near cuticle/pus    Additional Information:   Please call and advise. Thank you

## 2023-10-09 ENCOUNTER — LAB VISIT (OUTPATIENT)
Dept: LAB | Facility: HOSPITAL | Age: 53
End: 2023-10-09
Attending: OBSTETRICS & GYNECOLOGY
Payer: COMMERCIAL

## 2023-10-09 DIAGNOSIS — R23.2 HOT FLASHES: ICD-10-CM

## 2023-10-09 LAB
ALBUMIN SERPL BCP-MCNC: 4.1 G/DL (ref 3.5–5.2)
ALP SERPL-CCNC: 85 U/L (ref 55–135)
ALT SERPL W/O P-5'-P-CCNC: 28 U/L (ref 10–44)
AST SERPL-CCNC: 25 U/L (ref 10–40)
BILIRUB DIRECT SERPL-MCNC: 0.2 MG/DL (ref 0.1–0.3)
BILIRUB SERPL-MCNC: 0.4 MG/DL (ref 0.1–1)
PROT SERPL-MCNC: 6.6 G/DL (ref 6–8.4)

## 2023-10-09 PROCEDURE — 36415 COLL VENOUS BLD VENIPUNCTURE: CPT | Mod: PN | Performed by: OBSTETRICS & GYNECOLOGY

## 2023-10-09 PROCEDURE — 80076 HEPATIC FUNCTION PANEL: CPT | Performed by: OBSTETRICS & GYNECOLOGY

## 2023-10-12 ENCOUNTER — OFFICE VISIT (OUTPATIENT)
Dept: HEMATOLOGY/ONCOLOGY | Facility: CLINIC | Age: 53
End: 2023-10-12
Payer: COMMERCIAL

## 2023-10-12 VITALS
DIASTOLIC BLOOD PRESSURE: 70 MMHG | HEIGHT: 66 IN | SYSTOLIC BLOOD PRESSURE: 118 MMHG | BODY MASS INDEX: 28.37 KG/M2 | HEART RATE: 79 BPM | OXYGEN SATURATION: 98 % | WEIGHT: 176.5 LBS | TEMPERATURE: 97 F

## 2023-10-12 DIAGNOSIS — N95.1 MENOPAUSAL SYMPTOMS: Primary | ICD-10-CM

## 2023-10-12 DIAGNOSIS — C50.912 INVASIVE DUCTAL CARCINOMA OF LEFT BREAST IN FEMALE: ICD-10-CM

## 2023-10-12 PROCEDURE — 99999 PR PBB SHADOW E&M-EST. PATIENT-LVL III: CPT | Mod: PBBFAC,,, | Performed by: NURSE PRACTITIONER

## 2023-10-12 PROCEDURE — 99215 OFFICE O/P EST HI 40 MIN: CPT | Mod: S$GLB,,, | Performed by: NURSE PRACTITIONER

## 2023-10-12 PROCEDURE — 3008F PR BODY MASS INDEX (BMI) DOCUMENTED: ICD-10-PCS | Mod: CPTII,S$GLB,, | Performed by: NURSE PRACTITIONER

## 2023-10-12 PROCEDURE — 99999 PR PBB SHADOW E&M-EST. PATIENT-LVL III: ICD-10-PCS | Mod: PBBFAC,,, | Performed by: NURSE PRACTITIONER

## 2023-10-12 PROCEDURE — 3078F DIAST BP <80 MM HG: CPT | Mod: CPTII,S$GLB,, | Performed by: NURSE PRACTITIONER

## 2023-10-12 PROCEDURE — 99215 PR OFFICE/OUTPT VISIT, EST, LEVL V, 40-54 MIN: ICD-10-PCS | Mod: S$GLB,,, | Performed by: NURSE PRACTITIONER

## 2023-10-12 PROCEDURE — 3044F HG A1C LEVEL LT 7.0%: CPT | Mod: CPTII,S$GLB,, | Performed by: NURSE PRACTITIONER

## 2023-10-12 PROCEDURE — 3008F BODY MASS INDEX DOCD: CPT | Mod: CPTII,S$GLB,, | Performed by: NURSE PRACTITIONER

## 2023-10-12 PROCEDURE — 1159F PR MEDICATION LIST DOCUMENTED IN MEDICAL RECORD: ICD-10-PCS | Mod: CPTII,S$GLB,, | Performed by: NURSE PRACTITIONER

## 2023-10-12 PROCEDURE — 3078F PR MOST RECENT DIASTOLIC BLOOD PRESSURE < 80 MM HG: ICD-10-PCS | Mod: CPTII,S$GLB,, | Performed by: NURSE PRACTITIONER

## 2023-10-12 PROCEDURE — 1160F RVW MEDS BY RX/DR IN RCRD: CPT | Mod: CPTII,S$GLB,, | Performed by: NURSE PRACTITIONER

## 2023-10-12 PROCEDURE — 1159F MED LIST DOCD IN RCRD: CPT | Mod: CPTII,S$GLB,, | Performed by: NURSE PRACTITIONER

## 2023-10-12 PROCEDURE — 1160F PR REVIEW ALL MEDS BY PRESCRIBER/CLIN PHARMACIST DOCUMENTED: ICD-10-PCS | Mod: CPTII,S$GLB,, | Performed by: NURSE PRACTITIONER

## 2023-10-12 PROCEDURE — 3044F PR MOST RECENT HEMOGLOBIN A1C LEVEL <7.0%: ICD-10-PCS | Mod: CPTII,S$GLB,, | Performed by: NURSE PRACTITIONER

## 2023-10-12 PROCEDURE — 3074F PR MOST RECENT SYSTOLIC BLOOD PRESSURE < 130 MM HG: ICD-10-PCS | Mod: CPTII,S$GLB,, | Performed by: NURSE PRACTITIONER

## 2023-10-12 PROCEDURE — 3074F SYST BP LT 130 MM HG: CPT | Mod: CPTII,S$GLB,, | Performed by: NURSE PRACTITIONER

## 2023-10-12 RX ORDER — VENLAFAXINE HYDROCHLORIDE 37.5 MG/1
37.5 CAPSULE, EXTENDED RELEASE ORAL DAILY
Qty: 30 CAPSULE | Refills: 11 | Status: SHIPPED | OUTPATIENT
Start: 2023-10-12 | End: 2024-10-11

## 2023-10-12 NOTE — PROGRESS NOTES
"Steph Lemus  53 y.o. is here to seek an integrative approach to discuss side effects related to breast cancer treatment.     HPI  Mrs. Rosas reports her hot flashes have improved since she started Effexor. She is now having approximately 4 a day which are not as intesnse and not as often as previously. The hot flashes do not wake her up at night. She was doing acupuncture but it no longer at this time. She is sleeping well and she denies fatigue. She is walking regulary, 2 miles, 3-4 times a week. She has a good appetite and has a healthy diet. She continues to feel tightness and "hard as a rock" feeling to the area of the surgery in her abdomen. She doesn't feel anything hard when she pushes on the area.   She is  and has 3 children. She works as an .       7 pillars Assessment    Sleep  How many hours of sleep per night? 8 hours  Do you have trouble falling asleep, staying asleep or waking up earlier than you need to? no  Do you have daytime fatigue? no  Do you need medication for sleep? no  Do you use any supplements or other interventions for sleep? no    Resilience  Rate your current level of stress- low    Nutrition   Food allergies or sensitivities: no  Do you adhere to a particular type of diet? no  Do you have any concerns with your eating habits? no    Exercise  How would you describe your physical activity level? moderate  What do you do for physical activity? walking    Past Medical History  Past Medical History:   Diagnosis Date    Acid reflux     Breast cancer 08/2022    Stage IA (T1c, N0, M0, Grade 1, ER+/ME+/HER2 pending) Invasive ductal carcinoma of the LEFT breast    Epistaxis, recurrent     none since cauterization    Hyperlipidemia     Invasive ductal carcinoma of left breast in female 09/2022      Past Surgical History   Past Surgical History:   Procedure Laterality Date    APPENDECTOMY      BREAST BIOPSY Left 08/03/2022    idc    COLONOSCOPY N/A 04/15/2019    " Procedure: COLONOSCOPY;  Surgeon: Tad Suarez Jr., MD;  Location: [facility];  Service: Endoscopy;  Laterality: N/A;    DILATION AND CURETTAGE OF UTERUS      for uterine polyps    ENDOMETRIAL ABLATION      ENDOSCOPIC NASAL CAUTERIZATION N/A 08/11/2020    Procedure: CAUTERIZATION, NOSE, ENDOSCOPIC;  Surgeon: Surinder Acevedo MD;  Location: [facility];  Service: ENT;  Laterality: N/A;    LAPAROSCOPIC APPENDECTOMY N/A 09/22/2020    Procedure: APPENDECTOMY, LAPAROSCOPIC;  Surgeon: Shai Ludwig MD;  Location: [facility] OR;  Service: General;  Laterality: N/A;    RECONSTRUCTION OF BREAST WITH DEEP INFERIOR EPIGASTRIC ARTERY  (RUTH) FREE FLAP Bilateral 1/5/2023    Procedure: RECONSTRUCTION, BREAST, USING RUTH FREE FLAP;  Surgeon: Michel Tirado MD;  Location: [facility] OR;  Service: Plastics;  Laterality: Bilateral;    REPAIR, NERVE USING ALLOGRAFT Bilateral 1/5/2023    Procedure: REPAIR, NERVE USING ALLOGRAFT;  Surgeon: Michel Tirado MD;  Location: [facility] OR;  Service: Plastics;  Laterality: Bilateral;    SENTINEL LYMPH NODE BIOPSY Left 1/5/2023    Procedure: BIOPSY, LYMPH NODE, SENTINEL;  Surgeon: Simona Cueva MD;  Location: [facility] OR;  Service: General;  Laterality: Left;    SIMPLE MASTECTOMY Bilateral 1/5/2023    Procedure: MASTECTOMY, SIMPLE;  Surgeon: Simona Cueva MD;  Location: [facility] OR;  Service: General;  Laterality: Bilateral;    TONSILLECTOMY        Family History   Family History   Problem Relation Age of Onset    Hyperlipidemia Mother     Hypertension Mother     Diabetes Mother     Hypertension Father     Cancer Father         throat    Pacemaker/defibrilator Father     Diabetes Maternal Grandfather     Cancer Maternal Grandfather     Cancer Paternal Grandfather     Breast cancer Cousin 40    BRCA 1/2 Cousin     Colon cancer Neg Hx     Ovarian cancer Neg Hx       Allergies  Review of patient's allergies indicates:   Allergen Reactions    Augmentin [amoxicillin-pot clavulanate]      Stomach  pains      Current Medications:    Current Outpatient Medications:     ALPRAZolam (XANAX) 0.5 MG tablet, Take 1 tablet (0.5 mg total) by mouth 2 (two) times daily as needed for Anxiety., Disp: 45 tablet, Rfl: 0    fezolinetant 45 mg Tab, Take 1 tablet by mouth once daily. (Patient not taking: Reported on 8/28/2023), Disp: 30 tablet, Rfl: 5    multivitamin (THERAGRAN) per tablet, Take 1 tablet by mouth once daily., Disp: , Rfl:     rosuvastatin (CRESTOR) 5 MG tablet, Take 1 tablet (5 mg total) by mouth once daily., Disp: 90 tablet, Rfl: 3    semaglutide (OZEMPIC) 0.25 mg or 0.5 mg(2 mg/1.5 mL) pen injector, Inject 0.5 mg into the skin every 7 days., Disp: , Rfl:     venlafaxine (EFFEXOR XR) 37.5 MG 24 hr capsule, Take 1 capsule (37.5 mg total) by mouth once daily., Disp: 30 capsule, Rfl: 3    vitamin D (VITAMIN D3) 1000 units Tab, Take 1,000 Units by mouth once daily., Disp: , Rfl:   No current facility-administered medications for this visit.    Facility-Administered Medications Ordered in Other Visits:     LIDOcaine (PF) 10 mg/ml (1%) injection 10 mg, 1 mL, Intradermal, Once, Simona Cueva MD    midazolam (VERSED) 1 mg/mL injection 2 mg, 2 mg, Intravenous, See admin instructions, Simona Cueva MD    midazolam (VERSED) 1 mg/mL injection 2 mg, 2 mg, Intravenous, See admin instructions, Simona Cueva MD, 2 mg at 01/05/23 0703    piperacillin-tazobactam 4.5 g in dextrose 5 % 100 mL IVPB (ready to mix system), 4.5 g, Intravenous, On Call Procedure, Simona Cueva MD     Review of Systems  Review of Systems   Constitutional:         Hot flashes   HENT: Negative.     Eyes: Negative.    Respiratory: Negative.     Cardiovascular: Negative.    Gastrointestinal:  Positive for abdominal pain.   Genitourinary: Negative.    Musculoskeletal: Negative.    Skin: Negative.    Neurological: Negative.    Endo/Heme/Allergies: Negative.    Psychiatric/Behavioral:  The patient is nervous/anxious.      "  Physical Exam      Vitals:    10/12/23 1007   BP: 118/70   Pulse: 79   Temp: 97.3 °F (36.3 °C)   TempSrc: Temporal   SpO2: 98%   Weight: 80.1 kg (176 lb 8 oz)   Height: 5' 6" (1.676 m)     Body mass index is 28.49 kg/m².  Physical Exam  Vitals reviewed.   Constitutional:       Appearance: Normal appearance.   Neurological:      Mental Status: She is alert.   Psychiatric:         Mood and Affect: Mood normal.         Behavior: Behavior normal.        ASSESSMENT :  1. Menopausal symptoms    2. Invasive ductal carcinoma of left breast in female         PLAN:  Reviewed all information discussed at today's visit and all questions were answered.    Counseled on healthy lifestyle and behavior modifications: Continue to exercise with the goal of at least 150 minutes per week of moderate intensity aerobic activity or at least 75 minutes of vigorous activity, Maintaining a healthy weight, Limit red meat to no more than 2-3x per week, Reduce processed foods and meat. Also encouraged wide variety of fruits and vegetables  Continue to practice good sleep hygiene  Continue Acupuncture  I explained acupuncture can reduce fatigue,  pain, and hot flashes.  It can also assist with behavioral health such as depression and anxiety and improve overall sleep quality. Acupuncture helps improve overall symptoms from treatment.   Follow up with Dr. Tirado regarding abdominal discomfort to surgical area.   I discussed and recommended the following support services:  Korey Chi and Yoga I suggested Korey Chi and/or Yoga as these practices reduce stress, increases flexibility and muscle strength, improves balance and promotes serenity in the power of movement to help fight disease and boost your immune system.   Music and relaxation therapy and Meditation which can decrease stress by lowering blood pressure, slowing breathing, and helping you be more present in the moment.It improves sleep by relaxing the body and mind at the end of the " day.Meditation also trains you how to focus on one thing at a time, improving concentration.It also promotes emotional well-being by decreasing depression and anxiety, and helping create a more positive outlook on life.    Mrs. Lemus reports she had a 400$ bill from acupuncture when the referral showed a $40 copay. I contact Haydee, financial counselor who will reach out to Mrs. Lemus.       Follow up with [facility] in 1 year.    I spent a total of 45 minutes on the day of the visit.This includes face to face time and non-face to face time preparing to see the patient (eg, review of tests), obtaining and/or reviewing separately obtained history, documenting clinical information in the electronic or other health record, independently interpreting results and communicating results to the patient/family/caregiver, or care coordinator.

## 2023-10-16 ENCOUNTER — OFFICE VISIT (OUTPATIENT)
Dept: FAMILY MEDICINE | Facility: CLINIC | Age: 53
End: 2023-10-16
Payer: COMMERCIAL

## 2023-10-16 VITALS
OXYGEN SATURATION: 98 % | TEMPERATURE: 98 F | HEART RATE: 81 BPM | HEIGHT: 66 IN | WEIGHT: 176.81 LBS | SYSTOLIC BLOOD PRESSURE: 120 MMHG | BODY MASS INDEX: 28.42 KG/M2 | DIASTOLIC BLOOD PRESSURE: 76 MMHG

## 2023-10-16 DIAGNOSIS — B37.0 THRUSH: Primary | ICD-10-CM

## 2023-10-16 DIAGNOSIS — C50.912 INVASIVE DUCTAL CARCINOMA OF LEFT BREAST IN FEMALE: ICD-10-CM

## 2023-10-16 DIAGNOSIS — R09.82 PND (POST-NASAL DRIP): ICD-10-CM

## 2023-10-16 DIAGNOSIS — J02.9 SORE THROAT: ICD-10-CM

## 2023-10-16 LAB
CTP QC/QA: YES
MOLECULAR STREP A: NEGATIVE
POC MOLECULAR INFLUENZA A AGN: NEGATIVE
POC MOLECULAR INFLUENZA B AGN: NEGATIVE
SARS-COV-2 RDRP RESP QL NAA+PROBE: NEGATIVE

## 2023-10-16 PROCEDURE — 3074F PR MOST RECENT SYSTOLIC BLOOD PRESSURE < 130 MM HG: ICD-10-PCS | Mod: CPTII,S$GLB,, | Performed by: NURSE PRACTITIONER

## 2023-10-16 PROCEDURE — 3044F HG A1C LEVEL LT 7.0%: CPT | Mod: CPTII,S$GLB,, | Performed by: NURSE PRACTITIONER

## 2023-10-16 PROCEDURE — 99999 PR PBB SHADOW E&M-EST. PATIENT-LVL III: CPT | Mod: PBBFAC,,, | Performed by: NURSE PRACTITIONER

## 2023-10-16 PROCEDURE — 3008F PR BODY MASS INDEX (BMI) DOCUMENTED: ICD-10-PCS | Mod: CPTII,S$GLB,, | Performed by: NURSE PRACTITIONER

## 2023-10-16 PROCEDURE — 87651 POCT STREP A MOLECULAR: ICD-10-PCS | Mod: QW,S$GLB,, | Performed by: NURSE PRACTITIONER

## 2023-10-16 PROCEDURE — 87635 SARS-COV-2 COVID-19 AMP PRB: CPT | Mod: QW,S$GLB,, | Performed by: NURSE PRACTITIONER

## 2023-10-16 PROCEDURE — 87635: ICD-10-PCS | Mod: QW,S$GLB,, | Performed by: NURSE PRACTITIONER

## 2023-10-16 PROCEDURE — 3074F SYST BP LT 130 MM HG: CPT | Mod: CPTII,S$GLB,, | Performed by: NURSE PRACTITIONER

## 2023-10-16 PROCEDURE — 1159F PR MEDICATION LIST DOCUMENTED IN MEDICAL RECORD: ICD-10-PCS | Mod: CPTII,S$GLB,, | Performed by: NURSE PRACTITIONER

## 2023-10-16 PROCEDURE — 99999 PR PBB SHADOW E&M-EST. PATIENT-LVL III: ICD-10-PCS | Mod: PBBFAC,,, | Performed by: NURSE PRACTITIONER

## 2023-10-16 PROCEDURE — 1159F MED LIST DOCD IN RCRD: CPT | Mod: CPTII,S$GLB,, | Performed by: NURSE PRACTITIONER

## 2023-10-16 PROCEDURE — 3078F DIAST BP <80 MM HG: CPT | Mod: CPTII,S$GLB,, | Performed by: NURSE PRACTITIONER

## 2023-10-16 PROCEDURE — 3078F PR MOST RECENT DIASTOLIC BLOOD PRESSURE < 80 MM HG: ICD-10-PCS | Mod: CPTII,S$GLB,, | Performed by: NURSE PRACTITIONER

## 2023-10-16 PROCEDURE — 87502 INFLUENZA DNA AMP PROBE: CPT | Mod: QW,S$GLB,, | Performed by: NURSE PRACTITIONER

## 2023-10-16 PROCEDURE — 87502 POCT INFLUENZA A/B MOLECULAR: ICD-10-PCS | Mod: QW,S$GLB,, | Performed by: NURSE PRACTITIONER

## 2023-10-16 PROCEDURE — 99214 OFFICE O/P EST MOD 30 MIN: CPT | Mod: S$GLB,,, | Performed by: NURSE PRACTITIONER

## 2023-10-16 PROCEDURE — 87651 STREP A DNA AMP PROBE: CPT | Mod: QW,S$GLB,, | Performed by: NURSE PRACTITIONER

## 2023-10-16 PROCEDURE — 3008F BODY MASS INDEX DOCD: CPT | Mod: CPTII,S$GLB,, | Performed by: NURSE PRACTITIONER

## 2023-10-16 PROCEDURE — 99214 PR OFFICE/OUTPT VISIT, EST, LEVL IV, 30-39 MIN: ICD-10-PCS | Mod: S$GLB,,, | Performed by: NURSE PRACTITIONER

## 2023-10-16 PROCEDURE — 3044F PR MOST RECENT HEMOGLOBIN A1C LEVEL <7.0%: ICD-10-PCS | Mod: CPTII,S$GLB,, | Performed by: NURSE PRACTITIONER

## 2023-10-16 RX ORDER — NYSTATIN 100000 [USP'U]/ML
4 SUSPENSION ORAL 4 TIMES DAILY
Qty: 160 ML | Refills: 0 | Status: SHIPPED | OUTPATIENT
Start: 2023-10-16 | End: 2023-10-26

## 2023-10-16 NOTE — PROGRESS NOTES
"Subjective:       Patient ID: Steph Lemus is a 53 y.o. female.    Chief Complaint: Sore Throat and Sinus Problem (Pt reports sx started about 2 days ago./No fever. /Pt reports her tongue feels like it is "burnt" but never burnt her tongue )    HPI  Patient reports PND, sinus pressure, left ear fullness x 2-3 days. No discolored mucus or fever. Started zyrtec.     She also reports burning tongue sensation x 5-6 days. Constant.     Recently completed doxycycline for paronychia       Vitals:    10/16/23 1019   BP: 120/76   Pulse: 81   Temp: 97.9 °F (36.6 °C)     Review of Systems   HENT:  Positive for postnasal drip, sinus pressure and sore throat. Negative for trouble swallowing.    Respiratory:  Negative for cough and shortness of breath.        Past Medical History:   Diagnosis Date    Acid reflux     Breast cancer 08/2022    Stage IA (T1c, N0, M0, Grade 1, ER+/UT+/HER2 pending) Invasive ductal carcinoma of the LEFT breast    Epistaxis, recurrent     none since cauterization    Hyperlipidemia     Invasive ductal carcinoma of left breast in female 09/2022     Objective:      Physical Exam  Vitals and nursing note reviewed.   Constitutional:       General: She is not in acute distress.     Appearance: She is not diaphoretic.   HENT:      Head: Normocephalic.      Right Ear: No drainage or swelling. No middle ear effusion. Tympanic membrane is not perforated, erythematous or retracted.      Left Ear: No drainage or swelling. A middle ear effusion is present. Tympanic membrane is not perforated, erythematous or retracted.      Mouth/Throat:      Comments: White plaque to tongue   Eyes:      General:         Right eye: No discharge.         Left eye: No discharge.   Neck:      Trachea: No tracheal deviation.   Cardiovascular:      Rate and Rhythm: Normal rate and regular rhythm.   Pulmonary:      Effort: Pulmonary effort is normal.      Breath sounds: Normal breath sounds.   Skin:     Coloration: Skin is not pale. "   Neurological:      Mental Status: She is alert and oriented to person, place, and time.   Psychiatric:         Speech: Speech normal.         Behavior: Behavior normal.         Thought Content: Thought content normal.         Judgment: Judgment normal.         Assessment:       1. Thrush    2. Sore throat    3. PND (post-nasal drip)    4. Invasive ductal carcinoma of left breast in female        Plan:     Steph was seen today for sore throat and sinus problem.    Diagnoses and all orders for this visit:    Thrush  -     nystatin (MYCOSTATIN) 100,000 unit/mL suspension; Take 4 mLs (400,000 Units total) by mouth 4 (four) times daily. for 10 days    Sore throat  -     POCT COVID-19 Rapid Screening  -     POCT Influenza A/B Molecular  -     POCT Strep A, Molecular    PND (post-nasal drip)    Invasive ductal carcinoma of left breast in female   Following with Dr Coelho and integrative oncology            education provided on supportive care, symptom monitoring and return precautions     Follow up for further evaluation if s/s worsen, fail to improve, or new symptoms arise.    Medication List with Changes/Refills   New Medications    NYSTATIN (MYCOSTATIN) 100,000 UNIT/ML SUSPENSION    Take 4 mLs (400,000 Units total) by mouth 4 (four) times daily. for 10 days   Current Medications    ALPRAZOLAM (XANAX) 0.5 MG TABLET    Take 1 tablet (0.5 mg total) by mouth 2 (two) times daily as needed for Anxiety.    MULTIVITAMIN (THERAGRAN) PER TABLET    Take 1 tablet by mouth once daily.    ROSUVASTATIN (CRESTOR) 5 MG TABLET    Take 1 tablet (5 mg total) by mouth once daily.    VENLAFAXINE (EFFEXOR XR) 37.5 MG 24 HR CAPSULE    Take 1 capsule (37.5 mg total) by mouth once daily.    VITAMIN D (VITAMIN D3) 1000 UNITS TAB    Take 1,000 Units by mouth once daily.

## 2023-10-27 ENCOUNTER — OFFICE VISIT (OUTPATIENT)
Dept: FAMILY MEDICINE | Facility: CLINIC | Age: 53
End: 2023-10-27
Payer: COMMERCIAL

## 2023-10-27 VITALS
SYSTOLIC BLOOD PRESSURE: 132 MMHG | OXYGEN SATURATION: 99 % | HEIGHT: 69 IN | WEIGHT: 174.81 LBS | HEART RATE: 70 BPM | BODY MASS INDEX: 25.89 KG/M2 | DIASTOLIC BLOOD PRESSURE: 84 MMHG

## 2023-10-27 DIAGNOSIS — N28.89 URETERITIS: Primary | ICD-10-CM

## 2023-10-27 PROCEDURE — 3079F DIAST BP 80-89 MM HG: CPT | Mod: CPTII,S$GLB,, | Performed by: FAMILY MEDICINE

## 2023-10-27 PROCEDURE — 3079F PR MOST RECENT DIASTOLIC BLOOD PRESSURE 80-89 MM HG: ICD-10-PCS | Mod: CPTII,S$GLB,, | Performed by: FAMILY MEDICINE

## 2023-10-27 PROCEDURE — 96372 THER/PROPH/DIAG INJ SC/IM: CPT | Mod: S$GLB,,, | Performed by: FAMILY MEDICINE

## 2023-10-27 PROCEDURE — 99214 OFFICE O/P EST MOD 30 MIN: CPT | Mod: 25,S$GLB,, | Performed by: FAMILY MEDICINE

## 2023-10-27 PROCEDURE — 3075F SYST BP GE 130 - 139MM HG: CPT | Mod: CPTII,S$GLB,, | Performed by: FAMILY MEDICINE

## 2023-10-27 PROCEDURE — 3044F PR MOST RECENT HEMOGLOBIN A1C LEVEL <7.0%: ICD-10-PCS | Mod: CPTII,S$GLB,, | Performed by: FAMILY MEDICINE

## 2023-10-27 PROCEDURE — 3075F PR MOST RECENT SYSTOLIC BLOOD PRESS GE 130-139MM HG: ICD-10-PCS | Mod: CPTII,S$GLB,, | Performed by: FAMILY MEDICINE

## 2023-10-27 PROCEDURE — 3044F HG A1C LEVEL LT 7.0%: CPT | Mod: CPTII,S$GLB,, | Performed by: FAMILY MEDICINE

## 2023-10-27 PROCEDURE — 3008F BODY MASS INDEX DOCD: CPT | Mod: CPTII,S$GLB,, | Performed by: FAMILY MEDICINE

## 2023-10-27 PROCEDURE — 99999 PR PBB SHADOW E&M-EST. PATIENT-LVL IV: ICD-10-PCS | Mod: PBBFAC,,, | Performed by: FAMILY MEDICINE

## 2023-10-27 PROCEDURE — 99999 PR PBB SHADOW E&M-EST. PATIENT-LVL IV: CPT | Mod: PBBFAC,,, | Performed by: FAMILY MEDICINE

## 2023-10-27 PROCEDURE — 99214 PR OFFICE/OUTPT VISIT, EST, LEVL IV, 30-39 MIN: ICD-10-PCS | Mod: 25,S$GLB,, | Performed by: FAMILY MEDICINE

## 2023-10-27 PROCEDURE — 96372 PR INJECTION,THERAP/PROPH/DIAG2ST, IM OR SUBCUT: ICD-10-PCS | Mod: S$GLB,,, | Performed by: FAMILY MEDICINE

## 2023-10-27 PROCEDURE — 3008F PR BODY MASS INDEX (BMI) DOCUMENTED: ICD-10-PCS | Mod: CPTII,S$GLB,, | Performed by: FAMILY MEDICINE

## 2023-10-27 PROCEDURE — 1159F MED LIST DOCD IN RCRD: CPT | Mod: CPTII,S$GLB,, | Performed by: FAMILY MEDICINE

## 2023-10-27 PROCEDURE — 1159F PR MEDICATION LIST DOCUMENTED IN MEDICAL RECORD: ICD-10-PCS | Mod: CPTII,S$GLB,, | Performed by: FAMILY MEDICINE

## 2023-10-27 RX ORDER — CIPROFLOXACIN 500 MG/1
500 TABLET ORAL EVERY 12 HOURS
Qty: 14 TABLET | Refills: 0 | Status: SHIPPED | OUTPATIENT
Start: 2023-10-27 | End: 2023-12-19 | Stop reason: CLARIF

## 2023-10-27 RX ORDER — KETOROLAC TROMETHAMINE 30 MG/ML
60 INJECTION, SOLUTION INTRAMUSCULAR; INTRAVENOUS
Status: COMPLETED | OUTPATIENT
Start: 2023-10-27 | End: 2023-10-27

## 2023-10-27 RX ORDER — PHENAZOPYRIDINE HYDROCHLORIDE 200 MG/1
200 TABLET, FILM COATED ORAL
Qty: 30 TABLET | Refills: 0 | Status: SHIPPED | OUTPATIENT
Start: 2023-10-27 | End: 2023-11-06

## 2023-10-27 RX ADMIN — KETOROLAC TROMETHAMINE 60 MG: 30 INJECTION, SOLUTION INTRAMUSCULAR; INTRAVENOUS at 11:10

## 2023-10-27 NOTE — PROGRESS NOTES
THIS DOCUMENT WAS MADE IN PART WITH VOICE RECOGNITION SOFTWARE.  OCCASIONALLY THIS SOFTWARE WILL MISINTERPRET WORDS OR PHRASES.    Assessment and Plan:    1. Ureteritis  ketorolac injection 60 mg    ciprofloxacin HCl (CIPRO) 500 MG tablet    phenazopyridine (PYRIDIUM) 200 MG tablet          Intramuscular anti-inflammatory to address today symptoms   Recommended over-the-counter anti-inflammatory p.r.n. for pain.  Refilled Pyridium    Recommended continuation of Ceftin, gave ciprofloxacin use with parameters if symptoms worsen.  ER precautions given.    Encouraged p.o. hydration, D mannose    Consider follow-up with Urology if symptoms recur    ______________________________________________________________________  Subjective:    Chief Complaint:  Chief Complaint   Patient presents with    Follow-up     [facility] ER hosp f/u, review CT from ER. Pt states she still has some pain In her abdomen         HPI:  Steph is a 53 y.o. year old     53-year-old female past medical history of breast cancer, appendectomy, GERD, hyperlipidemia    Presents for ER follow-up from 10/23/2023    Presented to emergency department complaining of right lower quadrant and suprapubic pain for duration of 1 hour prior to arrival.  Preceded by 2 days of dysuria and urinary frequency.  Was diagnosed with urinary tract infection in treated with Macrobid and Pyridium prior to presentation to the emergency department.  Associated with chills.    Lab work fairly unremarkable with the exception of urinalysis showing strong evidence of urinary tract infection.  CT scan showed evidence of bilateral ureteritis.  Patient was given 1 g Rocephin, 500 cc normal saline and anti-inflammatory plus morphine and discharged home with cefdinir.    Urine culture negative (from ED)   Urine Culture from UC ( grew proteus)     Today reports still having bilateral lower quadrant pain, suprapubic pain though much improved, severity 1-3/10.  Compliant with  antibiotics.  Had report from the urgent care that the bacteria was Proteus and sensitive to her current antibiotic, was resistant to Macrobid.      Past Medical History:  Past Medical History:   Diagnosis Date    Acid reflux     Breast cancer 08/2022    Stage IA (T1c, N0, M0, Grade 1, ER+/CA+/HER2 pending) Invasive ductal carcinoma of the LEFT breast    Epistaxis, recurrent     none since cauterization    Hyperlipidemia     Invasive ductal carcinoma of left breast in female 09/2022       Past Surgical History:  Past Surgical History:   Procedure Laterality Date    APPENDECTOMY      BREAST BIOPSY Left 08/03/2022    idc    COLONOSCOPY N/A 04/15/2019    Procedure: COLONOSCOPY;  Surgeon: Tad Suarez Jr., MD;  Location: [facility] ENDO;  Service: Endoscopy;  Laterality: N/A;    DILATION AND CURETTAGE OF UTERUS      for uterine polyps    ENDOMETRIAL ABLATION      ENDOSCOPIC NASAL CAUTERIZATION N/A 08/11/2020    Procedure: CAUTERIZATION, NOSE, ENDOSCOPIC;  Surgeon: Surinder Acevedo MD;  Location: [facility] OR;  Service: ENT;  Laterality: N/A;    LAPAROSCOPIC APPENDECTOMY N/A 09/22/2020    Procedure: APPENDECTOMY, LAPAROSCOPIC;  Surgeon: Shai Ludwig MD;  Location: [facility] OR;  Service: General;  Laterality: N/A;    RECONSTRUCTION OF BREAST WITH DEEP INFERIOR EPIGASTRIC ARTERY  (RUTH) FREE FLAP Bilateral 1/5/2023    Procedure: RECONSTRUCTION, BREAST, USING RUTH FREE FLAP;  Surgeon: Michel Tirado MD;  Location: [facility] OR;  Service: Plastics;  Laterality: Bilateral;    REPAIR, NERVE USING ALLOGRAFT Bilateral 1/5/2023    Procedure: REPAIR, NERVE USING ALLOGRAFT;  Surgeon: Michel Tirado MD;  Location: [facility] OR;  Service: Plastics;  Laterality: Bilateral;    SENTINEL LYMPH NODE BIOPSY Left 1/5/2023    Procedure: BIOPSY, LYMPH NODE, SENTINEL;  Surgeon: Simona Cueva MD;  Location: [facility] OR;  Service: General;  Laterality: Left;    SIMPLE MASTECTOMY Bilateral 1/5/2023    Procedure: MASTECTOMY, SIMPLE;  Surgeon:  Simona Cueva MD;  Location: [facility];  Service: General;  Laterality: Bilateral;    TONSILLECTOMY         Family History:  Family History   Problem Relation Age of Onset    Hyperlipidemia Mother     Hypertension Mother     Diabetes Mother     Hypertension Father     Cancer Father         throat    Pacemaker/defibrilator Father     Diabetes Maternal Grandfather     Cancer Maternal Grandfather     Cancer Paternal Grandfather     Breast cancer Cousin 40    BRCA 1/2 Cousin     Colon cancer Neg Hx     Ovarian cancer Neg Hx        Social History:  Social History     Socioeconomic History    Marital status:    Tobacco Use    Smoking status: Former     Current packs/day: 0.00     Average packs/day: 0.1 packs/day for 25.0 years (2.5 ttl pk-yrs)     Types: Cigarettes     Start date: 1997     Quit date: 2022     Years since quittin.2    Smokeless tobacco: Never   Substance and Sexual Activity    Alcohol use: Not Currently     Alcohol/week: 2.0 standard drinks of alcohol     Types: 2 Glasses of wine per week    Drug use: No    Sexual activity: Yes     Partners: Male     Birth control/protection: Other-see comments     Social Determinants of Health     Financial Resource Strain: Low Risk  (10/25/2023)    Overall Financial Resource Strain (CARDIA)     Difficulty of Paying Living Expenses: Not hard at all   Food Insecurity: No Food Insecurity (10/25/2023)    Hunger Vital Sign     Worried About Running Out of Food in the Last Year: Never true     Ran Out of Food in the Last Year: Never true   Transportation Needs: No Transportation Needs (10/25/2023)    PRAPARE - Transportation     Lack of Transportation (Medical): No     Lack of Transportation (Non-Medical): No   Physical Activity: Insufficiently Active (10/25/2023)    Exercise Vital Sign     Days of Exercise per Week: 3 days     Minutes of Exercise per Session: 40 min   Stress: No Stress Concern Present (10/25/2023)    [employer] - Occupational Stress Questionnaire     Feeling of Stress : Not at all   Social Connections: Unknown (10/25/2023)    Social Connection and Isolation Panel [NHANES]     Frequency of Communication with Friends and Family: More than three times a week     Frequency of Social Gatherings with Friends and Family: More than three times a week     Active Member of Clubs or Organizations: No     Attends Club or Organization Meetings: Never     Marital Status:    Housing Stability: Low Risk  (10/25/2023)    Housing Stability Vital Sign     Unable to Pay for Housing in the Last Year: No     Number of Places Lived in the Last Year: 1     Unstable Housing in the Last Year: No       Medications:  Current Outpatient Medications on File Prior to Visit   Medication Sig Dispense Refill    cefdinir (OMNICEF) 300 MG capsule Take 1 capsule (300 mg total) by mouth 2 (two) times daily. for 10 days 20 capsule 0    multivitamin (THERAGRAN) per tablet Take 1 tablet by mouth once daily.      rosuvastatin (CRESTOR) 5 MG tablet Take 1 tablet (5 mg total) by mouth once daily. 90 tablet 3    venlafaxine (EFFEXOR XR) 37.5 MG 24 hr capsule Take 1 capsule (37.5 mg total) by mouth once daily. 30 capsule 11    vitamin D (VITAMIN D3) 1000 units Tab Take 1,000 Units by mouth once daily.      ALPRAZolam (XANAX) 0.5 MG tablet Take 1 tablet (0.5 mg total) by mouth 2 (two) times daily as needed for Anxiety. (Patient not taking: Reported on 10/27/2023) 45 tablet 0    [] nystatin (MYCOSTATIN) 100,000 unit/mL suspension Take 4 mLs (400,000 Units total) by mouth 4 (four) times daily. for 10 days (Patient not taking: Reported on 10/27/2023) 160 mL 0     Current Facility-Administered Medications on File Prior to Visit   Medication Dose Route Frequency Provider Last Rate Last Admin    LIDOcaine (PF) 10 mg/ml (1%) injection 10 mg  1 mL Intradermal Once Simona Cueva MD        midazolam (VERSED) 1 mg/mL injection 2 mg  2 mg Intravenous  "See admin instructions Simona Cueva MD        midazolam (VERSED) 1 mg/mL injection 2 mg  2 mg Intravenous See admin instructions Simona Cueva MD   2 mg at 01/05/23 0703    piperacillin-tazobactam 4.5 g in dextrose 5 % 100 mL IVPB (ready to mix system)  4.5 g Intravenous On Call Procedure Simona Cueva MD           Allergies:  Augmentin [amoxicillin-pot clavulanate]    Immunizations:  Immunization History   Administered Date(s) Administered    COVID-19, MRNA, LN-S, PF (Pfizer) (Purple Cap) 03/30/2021, 04/20/2021, 11/22/2021    Influenza 12/12/2022    Influenza - Quadrivalent 10/14/2015    Influenza - Quadrivalent - PF *Preferred* (6 months and older) 01/08/2018, 10/14/2019    Tdap 09/01/2016    Zoster Recombinant 05/09/2023, 07/11/2023       Review of Systems:  Review of Systems   All other systems reviewed and are negative.      Objective:    Vitals:  Vitals:    10/27/23 1036   BP: 132/84   Pulse: 70   SpO2: 99%   Weight: 79.3 kg (174 lb 13.2 oz)   Height: 5' 9" (1.753 m)   PainSc:   2   PainLoc: Abdomen       Physical Exam  Vitals reviewed.   Constitutional:       General: She is not in acute distress.  HENT:      Head: Normocephalic and atraumatic.   Eyes:      Pupils: Pupils are equal, round, and reactive to light.   Cardiovascular:      Rate and Rhythm: Normal rate and regular rhythm.      Heart sounds: No murmur heard.     No friction rub.   Pulmonary:      Effort: Pulmonary effort is normal.      Breath sounds: Normal breath sounds.   Abdominal:      General: Bowel sounds are normal. There is no distension.      Palpations: Abdomen is soft.      Tenderness: There is abdominal tenderness in the right lower quadrant, suprapubic area and left lower quadrant. There is no right CVA tenderness or left CVA tenderness.   Musculoskeletal:      Cervical back: Neck supple.   Skin:     General: Skin is warm and dry.      Findings: No rash.   Psychiatric:         Behavior: Behavior normal. "             Walker Farley MD  Family Medicine

## 2023-11-17 ENCOUNTER — OFFICE VISIT (OUTPATIENT)
Dept: UROLOGY | Facility: CLINIC | Age: 53
End: 2023-11-17
Payer: COMMERCIAL

## 2023-11-17 VITALS — WEIGHT: 179.88 LBS | HEIGHT: 66 IN | BODY MASS INDEX: 28.91 KG/M2

## 2023-11-17 DIAGNOSIS — N39.0 RECURRENT UTI: Primary | ICD-10-CM

## 2023-11-17 LAB
BACTERIA #/AREA URNS HPF: ABNORMAL /HPF
BILIRUBIN, UA POC OHS: NEGATIVE
BILIRUBIN, UA POC OHS: NEGATIVE
BLOOD, UA POC OHS: ABNORMAL
BLOOD, UA POC OHS: ABNORMAL
CLARITY, UA POC OHS: CLEAR
CLARITY, UA POC OHS: CLEAR
COLOR, UA POC OHS: YELLOW
COLOR, UA POC OHS: YELLOW
GLUCOSE, UA POC OHS: NEGATIVE
GLUCOSE, UA POC OHS: NEGATIVE
KETONES, UA POC OHS: NEGATIVE
KETONES, UA POC OHS: NEGATIVE
LEUKOCYTES, UA POC OHS: ABNORMAL
LEUKOCYTES, UA POC OHS: NEGATIVE
MICROSCOPIC COMMENT: ABNORMAL
NITRITE, UA POC OHS: NEGATIVE
NITRITE, UA POC OHS: NEGATIVE
PH, UA POC OHS: 6.5
PH, UA POC OHS: 7
PROTEIN, UA POC OHS: 30
PROTEIN, UA POC OHS: NEGATIVE
RBC #/AREA URNS HPF: 5 /HPF (ref 0–4)
SPECIFIC GRAVITY, UA POC OHS: 1.02
SPECIFIC GRAVITY, UA POC OHS: 1.02
SQUAMOUS #/AREA URNS HPF: 1 /HPF
UROBILINOGEN, UA POC OHS: 0.2
UROBILINOGEN, UA POC OHS: 0.2

## 2023-11-17 PROCEDURE — 99999 PR PBB SHADOW E&M-EST. PATIENT-LVL III: ICD-10-PCS | Mod: PBBFAC,,,

## 2023-11-17 PROCEDURE — 3008F BODY MASS INDEX DOCD: CPT | Mod: CPTII,S$GLB,,

## 2023-11-17 PROCEDURE — 81003 URINALYSIS AUTO W/O SCOPE: CPT | Mod: QW,S$GLB,,

## 2023-11-17 PROCEDURE — 1160F RVW MEDS BY RX/DR IN RCRD: CPT | Mod: CPTII,S$GLB,,

## 2023-11-17 PROCEDURE — 3008F PR BODY MASS INDEX (BMI) DOCUMENTED: ICD-10-PCS | Mod: CPTII,S$GLB,,

## 2023-11-17 PROCEDURE — 99214 OFFICE O/P EST MOD 30 MIN: CPT | Mod: S$GLB,,,

## 2023-11-17 PROCEDURE — 3044F HG A1C LEVEL LT 7.0%: CPT | Mod: CPTII,S$GLB,,

## 2023-11-17 PROCEDURE — 3044F PR MOST RECENT HEMOGLOBIN A1C LEVEL <7.0%: ICD-10-PCS | Mod: CPTII,S$GLB,,

## 2023-11-17 PROCEDURE — 99999 PR PBB SHADOW E&M-EST. PATIENT-LVL III: CPT | Mod: PBBFAC,,,

## 2023-11-17 PROCEDURE — 99214 PR OFFICE/OUTPT VISIT, EST, LEVL IV, 30-39 MIN: ICD-10-PCS | Mod: S$GLB,,,

## 2023-11-17 PROCEDURE — 81003 POCT URINALYSIS(INSTRUMENT): ICD-10-PCS | Mod: QW,S$GLB,,

## 2023-11-17 PROCEDURE — 1159F PR MEDICATION LIST DOCUMENTED IN MEDICAL RECORD: ICD-10-PCS | Mod: CPTII,S$GLB,,

## 2023-11-17 PROCEDURE — 1160F PR REVIEW ALL MEDS BY PRESCRIBER/CLIN PHARMACIST DOCUMENTED: ICD-10-PCS | Mod: CPTII,S$GLB,,

## 2023-11-17 PROCEDURE — 87086 URINE CULTURE/COLONY COUNT: CPT

## 2023-11-17 PROCEDURE — 81000 URINALYSIS NONAUTO W/SCOPE: CPT | Mod: PO

## 2023-11-17 PROCEDURE — 1159F MED LIST DOCD IN RCRD: CPT | Mod: CPTII,S$GLB,,

## 2023-11-17 NOTE — PROGRESS NOTES
[facility] Note  Staff: Alexa Tran FNP-C    PCP: MD Demario    Chief Complaint: UTI Follow Up    Subjective:        HPI: Steph Lemus is a 53 y.o. female new patient to me presents today for follow up UTI. She states she was diagnosed with a UTI at US 1 month ago. She was given a course of macrobid. She was later called and told her culture was positive for proteus and macrobid was not sensitive. She had subsequently gone to the ED and was given a course of omnicef. She had a CT ABD/Pelvis with contrast on 10/23/23 which showed Both ureters demonstrate abnormal enhancement with surrounding edema.  No overt hydronephrosis or obstructing stone is present.     Liver, gallbladder, spleen, pancreas and adrenals are normal.     Stomach and small bowel are normal.  There is diverticulosis without pericolonic inflammation.  No free fluid or pneumoperitoneum.     No enlarged lymph nodes.  Abdominal aorta is normal in caliber.     Urinary bladder is normal.  Uterus and adnexa are unremarkable.    I reviewed all imaging with her today. She then followed up with PCP whom gave her a course of cipro. She states she finished all abx 1 week ago. Her micro UA from the ED was negative for bacteria. She states she feels some urgency. She denies dysuria, hematuria, fever, flank pain, incontinence, and difficulty urinating. She does have constipation. She denies a history of kidney stones. We discussed pyridium TID.     Questions asked pt during office visit today:  Urgency:Yes , incontinence with urgency? No;   DysuriaNo  Gross HematuriaNo  History of UTI: Yes     History of Kidney Stones?:  No    Constipation issues?:  Yes    REVIEW OF SYSTEMS:  Review of Systems   Constitutional: Negative.  Negative for chills and fever.   HENT: Negative.     Eyes: Negative.    Respiratory: Negative.     Cardiovascular: Negative.    Gastrointestinal: Negative.  Negative for abdominal pain, constipation, diarrhea,  nausea and vomiting.   Genitourinary:  Positive for urgency. Negative for dysuria, flank pain, frequency and hematuria.   Musculoskeletal: Negative.  Negative for back pain.   Skin: Negative.    Neurological: Negative.    Endo/Heme/Allergies: Negative.    Psychiatric/Behavioral: Negative.         PMHx:  Past Medical History:   Diagnosis Date    Acid reflux     Breast cancer 08/2022    Stage IA (T1c, N0, M0, Grade 1, ER+/MA+/HER2 pending) Invasive ductal carcinoma of the LEFT breast    Epistaxis, recurrent     none since cauterization    Hyperlipidemia     Invasive ductal carcinoma of left breast in female 09/2022       PSHx:  Past Surgical History:   Procedure Laterality Date    APPENDECTOMY      BREAST BIOPSY Left 08/03/2022    idc    COLONOSCOPY N/A 04/15/2019    Procedure: COLONOSCOPY;  Surgeon: Tad Suarez Jr., MD;  Location: [facility];  Service: Endoscopy;  Laterality: N/A;    DILATION AND CURETTAGE OF UTERUS      for uterine polyps    ENDOMETRIAL ABLATION      ENDOSCOPIC NASAL CAUTERIZATION N/A 08/11/2020    Procedure: CAUTERIZATION, NOSE, ENDOSCOPIC;  Surgeon: Surinder Acevedo MD;  Location: [facility];  Service: ENT;  Laterality: N/A;    LAPAROSCOPIC APPENDECTOMY N/A 09/22/2020    Procedure: APPENDECTOMY, LAPAROSCOPIC;  Surgeon: Shai Ludwig MD;  Location: [facility];  Service: General;  Laterality: N/A;    RECONSTRUCTION OF BREAST WITH DEEP INFERIOR EPIGASTRIC ARTERY  (RUTH) FREE FLAP Bilateral 1/5/2023    Procedure: RECONSTRUCTION, BREAST, USING RUTH FREE FLAP;  Surgeon: Michel Tirado MD;  Location: [facility] OR;  Service: Plastics;  Laterality: Bilateral;    REPAIR, NERVE USING ALLOGRAFT Bilateral 1/5/2023    Procedure: REPAIR, NERVE USING ALLOGRAFT;  Surgeon: Michel Tirado MD;  Location: [facility] OR;  Service: Plastics;  Laterality: Bilateral;    SENTINEL LYMPH NODE BIOPSY Left 1/5/2023    Procedure: BIOPSY, LYMPH NODE, SENTINEL;  Surgeon: Simona Cueva MD;  Location: [facility] OR;  Service:  General;  Laterality: Left;    SIMPLE MASTECTOMY Bilateral 1/5/2023    Procedure: MASTECTOMY, SIMPLE;  Surgeon: Simona Cueva MD;  Location: [facility] OR;  Service: General;  Laterality: Bilateral;    TONSILLECTOMY         Fam Hx:   malignancies: No , gyn malignancies: No   kidney stones: No     Soc Hx:  , lives in [place]    Allergies:  Augmentin [amoxicillin-pot clavulanate]    Medications: reviewed     Objective:   There were no vitals filed for this visit.    Physical Exam  Constitutional:       Appearance: Normal appearance.   HENT:      Head: Normocephalic.      Mouth/Throat:      Mouth: Mucous membranes are moist.   Eyes:      Conjunctiva/sclera: Conjunctivae normal.   Pulmonary:      Effort: Pulmonary effort is normal.   Abdominal:      General: There is no distension.      Palpations: Abdomen is soft.      Tenderness: There is no abdominal tenderness. There is no right CVA tenderness or left CVA tenderness.   Musculoskeletal:         General: Normal range of motion.      Cervical back: Normal range of motion.   Skin:     General: Skin is warm.   Neurological:      Mental Status: She is alert and oriented to person, place, and time.   Psychiatric:         Mood and Affect: Mood normal.         Behavior: Behavior normal.           LABS REVIEW:  UA today:  cath urine  Color:Clear, Yellow  Spec. Grav.  1.020  PH  7.0  Negative for leukocytes, nitrates, protein, glucose, ketones, urobili, bili  Moderate blood    Assessment:       1. Recurrent UTI          Plan:      Cath urine sent for micro UA and urine culture  Recommend pyridium TID x 3-4 days    F/u As Needed    [facility]: Active    BENJA Mcintosh

## 2023-11-18 LAB — BACTERIA UR CULT: NO GROWTH

## 2023-11-20 ENCOUNTER — TELEPHONE (OUTPATIENT)
Dept: UROLOGY | Facility: CLINIC | Age: 53
End: 2023-11-20
Payer: COMMERCIAL

## 2023-11-20 NOTE — TELEPHONE ENCOUNTER
----- Message from Alexa Tran NP sent at 11/19/2023  8:03 PM CST -----  Urine culture negative for infection

## 2024-01-17 ENCOUNTER — TELEPHONE (OUTPATIENT)
Dept: HEMATOLOGY/ONCOLOGY | Facility: CLINIC | Age: 54
End: 2024-01-17
Payer: COMMERCIAL

## 2024-01-17 DIAGNOSIS — C50.912 INVASIVE DUCTAL CARCINOMA OF LEFT BREAST IN FEMALE: Primary | ICD-10-CM

## 2024-01-17 DIAGNOSIS — Z90.13 S/P MASTECTOMY, BILATERAL: ICD-10-CM

## 2024-01-17 DIAGNOSIS — M79.601 PAIN OF RIGHT UPPER EXTREMITY: ICD-10-CM

## 2024-01-17 NOTE — TELEPHONE ENCOUNTER
Returned call to pt; she got orders from Dr Tirado for Physical therapy to go to another facility , but wants to come here instead. I told the patient that I would reach out to the surgeon and his staff and asked her to do the same to have them send her orders here for PT, as well as it is her desire to return here for Physical therapy..  Staff message sent to Dr Tirado and voicemail left for his nurse Leisa on today

## 2024-01-19 ENCOUNTER — CLINICAL SUPPORT (OUTPATIENT)
Dept: REHABILITATION | Facility: HOSPITAL | Age: 54
End: 2024-01-19
Payer: COMMERCIAL

## 2024-01-19 DIAGNOSIS — C50.912 INVASIVE DUCTAL CARCINOMA OF LEFT BREAST IN FEMALE: Primary | ICD-10-CM

## 2024-01-19 DIAGNOSIS — M79.601 PAIN OF RIGHT UPPER EXTREMITY: ICD-10-CM

## 2024-01-19 DIAGNOSIS — Z90.13 S/P MASTECTOMY, BILATERAL: ICD-10-CM

## 2024-01-19 DIAGNOSIS — M25.611 DECREASED RANGE OF MOTION OF RIGHT SHOULDER: Primary | ICD-10-CM

## 2024-01-19 DIAGNOSIS — Z91.89 AT RISK FOR LYMPHEDEMA: ICD-10-CM

## 2024-01-19 DIAGNOSIS — C50.912 INVASIVE DUCTAL CARCINOMA OF LEFT BREAST IN FEMALE: ICD-10-CM

## 2024-01-19 PROCEDURE — 97162 PT EVAL MOD COMPLEX 30 MIN: CPT | Mod: PN

## 2024-01-19 PROCEDURE — 97140 MANUAL THERAPY 1/> REGIONS: CPT | Mod: PN

## 2024-01-19 PROCEDURE — 97110 THERAPEUTIC EXERCISES: CPT | Mod: PN

## 2024-01-19 NOTE — PROGRESS NOTES
"  [facility]  Lymphedema Physical Therapy  Initial Evaluation    Visit Date: 1/19/2024     Name: Steph Lemus  Clinic Number: [id number]  Therapy Diagnosis:   Encounter Diagnoses   Name Primary?    Pain of right upper extremity     Decreased range of motion of right shoulder Yes    Invasive ductal carcinoma of left breast in female     S/P mastectomy, bilateral      Physician: Michel Tirado MD  Physician Orders: PT Eval and Treat  Medical Diagnosis from Referral: Invasive ductal carcinoma of left breast in female, pain of right upper extremity, s/p mastectomy  Evaluation Date: 1/19/2024  Authorization: no auth  Plan of Care Expiration: 01/19/2024-03/01/2024  Reassessment Due: 3 weeks, 02/09/2024  Fact G- Completed: 1 / 2    Visit: 1 / 12  PTA Visit: -- / 5  Time In: 09:13 AM  Time Out: 10:00 AM  Total Billable Time: 47 minutes    Precautions: Standard,  history of left breast cancer    Subjective     Pt reports: 4 weeks post-op, Had the second procedure "revision" in December 21st, developed a hematoma under the left arm pit/breast. This past Sunday I started bleeding under the breast where pin holes are from incision. I went back to doctor on Monday, they didn't drain but basically applied pressure pushing it through the pin holes. Still have the stiches in. It since does feel better (the pressure and pain in the breast) however I can't fully raise my right arm without pain and pulling. The left side (which is the side the cancer was on) is ok.  Have been wearing and ace bandages around my chest but couldn't take it anymore, stopped wearing ace bange on Tuesday. Wearing compression bra with a pad under the band.  Have been sleeping on my back elevated with pillows, and pillows under my arms. Can't sleep on my sides yet, too uncomfortable.   Pt reports she has been doing the "post-surgery" ROM exercises "wall crawls, shoulder shrugs, shoulder squeezes", was told not to lift " anything over 7-10 lbs but not sure for how long. Has a follow up visit with Dr. Zarate office on Thursday January 25th.    Pain  Location: right breast, left axilla  Current 0/10, Worst 5/10, Best 0/10 (at rest)  Description: pulling/tightness with R UE elevation      Past Medical History:   Past Medical History:   Diagnosis Date    Acid reflux     Breast cancer 08/2022    Stage IA (T1c, N0, M0, Grade 1, ER+/ME+/HER2 pending) Invasive ductal carcinoma of the LEFT breast    Epistaxis, recurrent     none since cauterization    Hyperlipidemia     Invasive ductal carcinoma of left breast in female 09/2022       Past Surgical History:  has a past surgical history that includes Dilation and curettage of uterus; Endometrial ablation; Tonsillectomy; Colonoscopy (N/A, 04/15/2019); Endoscopic nasal cauterization (N/A, 08/11/2020); Laparoscopic appendectomy (N/A, 09/22/2020); Appendectomy; Breast biopsy (Left, 08/03/2022); Simple mastectomy (Bilateral, 1/5/2023); [place] lymph node biopsy (Left, 1/5/2023); Reconstruction of breast with deep inferior epigastric artery  (RUTH) free flap (Bilateral, 1/5/2023); repair, nerve using allograft (Bilateral, 1/5/2023); revision, flap, previously created for breast reconstruction (Bilateral, 12/21/2023); Mastopexy (Bilateral, 12/21/2023); and Liposuction w/ fat injection (N/A, 12/21/2023).    Medications: has a current medication list which includes the following prescription(s): alprazolam, multivitamin, rosuvastatin, venlafaxine, and vitamin d, and the following Facility-Administered Medications: albuterol, albuterol-ipratropium, diphenhydramine, hydromorphone, lactated ringers, lidocaine (pf) 10 mg/ml (1%), lidocaine (pf) 10 mg/ml (1%), lorazepam, midazolam, midazolam, ondansetron, piperacillin sodium/tazobactam, and sodium chloride 0.9%.    Allergies:   Review of patient's allergies indicates:   Allergen Reactions    Augmentin [amoxicillin-pot clavulanate]      Stomach  "pains       Chart review pertaining to Cancer Diagnosis:   Surgery date: bilateral skin and nipple sparing mastectomy, left SLNB, RUTH on 01/16/2023. Second procedure "revision" of flap created for breast reconstruction and liposuction w/fat injection on 12/21/2024.  Radiation: none needed  Chemotherapy: none    Hand Dominance: Right  Diet: well rounded, eating fruits and vegis  Habitus: well developed, well nourished     Prior Therapy/Previous treatment included: No PT this calendar year. 02/24/2023-06/07/2023 PT following RUTH Flap.  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   Prior Exercise Routine: walk, rides bike     Patient's Goals: reduce swelling and pain in right breast, be able to raise my right arm like normal before.       Objective     Mental Status: Alert/Oriented    Observation  Posture: WNL              Integumentary System  Skin Integrity: right breast incision intact, 3 scabbed areas under right breast without drainage noted. Diffuse bruising to right breast, firmness center/lateral/inferior/medial breast  Circulation: intact  Palpation: right lateral breast firm fullness, Right axillary cording extending into upper arm, pec region  Edema   Amount: moderate   Location: right breast/right lateral trunk/sub axillary region    Sensation  Light Touch: numbness right breast  Proprioception: intact bilat    A/PROM  (L) UE: WFLs  (R) UE: flexion 120 deg, abduction 110 deg, IR WFL, ER base of occiput   Limitations:  Overhead reaching, extra time required for ADL's    STRENGTH  (L) UE: WFLs  (R) UE: WFLs  Limitations:  No lifting >7-10 lbs currently    Baseline Measurements of BUEs  LANDMARK LEFT UE (cm)  Prehab  10/05/2022 RIGHT UE (cm)  Prehab  10/05/2022 LEFT UE   (cm)  Post-op  02/24/2023 RIGHT UE (cm)  Post-Op  02/24/2023 "   Axilla 33.1 cm 33.4 cm 34.5 34.5   W +  17 inches 30.1 cm 30.9 cm 33.6 34.1   W +  14  inches 26.0 cm 26.6 cm 29.3 30.7   Elbow 25.1 cm 26.2 cm 25.0 26.3   W + 7 inches 21.5 cm 22.8 cm 24.6 26.3   W +  5 inches 16.0 cm 15.8 cm 21.8 24.2   Wrist 18.7 cm 19.5 cm 16.0 16.3   DPC 6.0 cm 6.0 cm 18.9 19.4   thumb     5.9 6.2       Current Measurements  LANDMARK RIGHT UE (cm)  Eval  01/19/2024 LEFT  UE (cm)  Eval  01/19/2024   W +17  in  35.0 34.5   W + 14in 31.0 30.5   Elbow 26.5 25.5   W + 7 in 26.3 25.0   W + 5 in 23.7 22.0    Wrist 16.0 16.0   DPC 19.5 19.0   IP Thumb 6.3 6.2   Chest Circumference at Axilla  103.0   Chest Circumference at nipple line  103.7   Garments recommended: Pt was provided Komprex II lymph padding to wear against right breast/lateral trunk inside her compression bra, may wear at night as well.      Functional Mobility   Bed mobility: independent   Roll to left: independent   Roll to right: independent   Supine to prone: independent   Scooting to edge of bed: independent   Supine to sit: independent   Sit to supine: independent   Transfers to bed: independent   Transfers to toilet: independent   Sit to stand: independent   Stand pivot: independent   Car transfers: independent     Gait Assessment  AD used: none  Assistance: independent  Distance: community distances  Endurance: WFL     Gait Pattern: WFL         Treatment     Treatment Time In: 09:13 AM  Treatment Time Out: 10:00 AM  Total Treatment time separate from Evaluation: 30 minutes    Manual Therapy to develop flexibility, extensibility, desensitization, pliability, and contour for 20 minutes including:   Manual lymphatic drainage to right short neck series, cervical terminus , axilla, sternal nodes, paravertebral nodes, AAA anastomosis, and SYDNI anastomosis. Lymphatouch applied to Right posterior/lateral trunk, sub axillary region/axillary cords, lateral and superior right breast set at 60mmHg for 10 minutes.       Therapeutic Exercise  "to develop ROM and flexibility for 10 minutes including:  Deep Diaphragm breathing  Side Lying upper trunk rotation with elbow flexed/hand behind head "open book" x 10 reps  Side Lying Shoulder abduction in tolerable range x 10 reps  Shoulder rolls  Reviewed scap retractions, wall walking        Education: Instructed on general anatomy/physiology, lymphedema information (definitions, signs, symptoms, precautions), role of therapy in multi-disciplinary team, purpose of lymphedema physical therapy and the benefits/risks of treatment, risks of refusing treatment, POC, and goals for therapy were discussed with the pt.    Written Home Exercises Provided:  Pt continuing post-surgical exercises, pt has handouts from prehab visit, reviewed today with good understanding.   .  Exercises were reviewed and Alison was able to demonstrate them prior to the end of the session. Alison demonstrated good  understanding of the education provided.       Assessment     Steph is a 53 y.o. female referred to outpatient physical therapy with a medical diagnosis of Invasive ductal carcinoma of left breast in female, pain of right upper extremity, s/p mastectomy. Patient presents 4 weeks s/p "revision" of flap created for breast reconstruction and liposuction w/fat injection on 12/21/2024.Pt presents with right breast swelling with diffuse bruising throughout, R UE pain with elevation with palpable right axillary cording extending into R upper arm, right pec tightness. Functionally patient demonstrates limitations with overhead reaching, extra time required with ADL's due to pain.      Plan of care discussed with patient: Yes  Pt's spiritual, cultural and educational needs considered and patient is agreeable to the plan of care and goals as stated below:     Anticipated barriers for therapy:  none    Medical Necessity is demonstrated by the following:  History  Co-morbidities and personal factors that may impact the plan of care " Co-morbidities:   difficulty sleeping, high BMI, history of cancer, level of undertstanding of current condition, and prior abdominal surgery    Personal Factors:   none     moderate   Examination  Body Structures and Functions, activity limitations and participation restrictions that may impact the plan of care Body Systems:    gross symmetry  ROM  strength  gross coordinated movement  edema  scar formation  skin integrity  skin color    Activity limitations:   Mobility  lifting and carrying objects    Self care  dressing    Domestic Life  doing house work (cleaning house, washing dishes, laundry)    Participation Restrictions:   Currently following lifting restrictions from surgeon         high   Clinical Presentation evolving clinical presentation with changing clinical characteristics moderate   Decision Making/ Complexity Score: moderate       GOALS  Short Term Goals: 3 weeks  Patient will demonstrate 100% knowledge of lymphedema precautions and signs of infection.   Decrease chest girth by average 1 cm for improved mobility and safety with iADLs.  Patient to report compliance sleeping on 30 degree incline for lymphatic protection.   Patient will demonstrate proper posture with sitting and standing to decrease detrimental affects to adjacent structures.   Patient will tolerate HEP for better progression toward LTGs and self-management of presentation.   Increased R UE ROM to 150 deg shoulder flexion with decreased pain no greater than 3/10  Increased R UE ROM to 150 deg shoulder abduction with decreased pain no greater than 3/10    Long Term Goals: 6 weeks  Patient will be independent with HEP for self-management of symptoms and current status.   Decrease chest girth by average 1.5 cm for improved mobility and safety with iADLs.  Patient will perform self lymphatic drainage techniques for long term management of lymphedema.   Patient will report pain reduction to < or = 1 with AROM for improved safety with  iADLs (driving, household chores, yard work, job duties).   Patient to demonstrate UE AROM to WFL for improved ability to reach overhead with ADLs (self care, dressing) and iADLs (household chores, yard work).    Plan   6MWT, continue MLD, R UE ROM  Plan of Care Certification: 1/19/2024 to  01/19/2024-03/01/2024    Outpatient Physical Therapy 2 time(s) a week for 6 weeks to include the following interventions: patient education, HEP, therapeutic exercises, neuromuscular re-education, therapeutic activity, manual therapy including pneumatic compression pump and lymphatouch, self care/home management, modalities, gait training, decongestive massage, multi-layered bandaging, self massage, self bandaging, and assistance in obtaining appropriate compression garment.    Patient may be seen by PTA as part of the rehabilitation team.     Josselin Rdz, PT , CLT    Based upon review of the current findings of today's initial PT evaluation and the proposed physical therapy plan of care, I certify that it is medically necessary for Steph Lemus to receive physical therapy at [facility] as recommended above:    Signed: _____________________________________________    Michel Tirado MD

## 2024-01-25 ENCOUNTER — CLINICAL SUPPORT (OUTPATIENT)
Dept: REHABILITATION | Facility: HOSPITAL | Age: 54
End: 2024-01-25
Payer: COMMERCIAL

## 2024-01-25 DIAGNOSIS — Z90.13 S/P MASTECTOMY, BILATERAL: ICD-10-CM

## 2024-01-25 DIAGNOSIS — C50.912 INVASIVE DUCTAL CARCINOMA OF LEFT BREAST IN FEMALE: Primary | ICD-10-CM

## 2024-01-25 DIAGNOSIS — M25.611 DECREASED RANGE OF MOTION OF RIGHT SHOULDER: ICD-10-CM

## 2024-01-25 DIAGNOSIS — Z91.89 AT RISK FOR LYMPHEDEMA: ICD-10-CM

## 2024-01-25 DIAGNOSIS — M79.601 PAIN OF RIGHT UPPER EXTREMITY: ICD-10-CM

## 2024-01-25 PROCEDURE — 97110 THERAPEUTIC EXERCISES: CPT | Mod: PN,CQ

## 2024-01-25 PROCEDURE — 97140 MANUAL THERAPY 1/> REGIONS: CPT | Mod: PN,CQ

## 2024-01-25 NOTE — PROGRESS NOTES
"[facility] Outpatient  Physical Therapy Progress Note    Visit Date: 1/25/2024    Name: Steph Lemus  MRN: [id number]  Therapy Diagnosis:   Encounter Diagnoses   Name Primary?    Decreased range of motion of right shoulder     Pain of right upper extremity     S/P mastectomy, bilateral     At risk for lymphedema     Invasive ductal carcinoma of left breast in female Yes       Physician: Michel Tirado MD  Physician Orders: PT Eval and Treat  Medical Diagnosis from Referral: Invasive ductal carcinoma of left breast in female, pain of right upper extremity, s/p mastectomy  Evaluation Date: 1/19/2024  Authorization: no auth  Plan of Care Expiration: 01/19/2024-03/01/2024  Reassessment Due: 3 weeks, 02/09/2024  Fact G- Completed: 1 / 2     Visit: 2 / 12  PTA Visit: 1 / 5  Time In: 11:13 AM  Time Out: 12:00 PM  Total Billable Time: 47 minutes     Precautions: Standard,  history of left breast cancer      Subjective     Patient states: that she felt better after last session, was able to finally able to sleep on her side. Not taking pain medicine, alternating tylenol and ibuprofen. Will see Dr Tirado this afternoon.     Pain: 3/10   Location: right axillary cording    Objective     Alison participated in / received the following treatment:    Therapeutic Exercise to develop strength, endurance, ROM, flexibility, and posture for 10 minutes including:  Deep Diaphragm breathing  Side Lying upper trunk rotation with elbow flexed/hand behind head "open book" x 10 reps  Side Lying Shoulder abduction in tolerable range x 10 reps  Shoulder rolls  Reviewed scap retractions, wall walking    6 MWT 1584 feet  97% p. 65    Manual Therapy to develop flexibility, extensibility, desensitization, pliability, and contour for 37 minutes including:  Manual lymphatic drainage to right short neck series, cervical terminus , axilla, sternal nodes, paravertebral nodes, AAA anastomosis, and SYDNI anastomosis. Lymphatouch applied to " "Right posterior/lateral trunk, sub axillary region/axillary cords, lateral and superior right breast set at 60mmHg for 37 minutes.  Manual traction to axillary cords.       Education Provided  [x] Progress toward goals   [] Role of therapy   [] Activity modification  [x] Reviewed HEP      Home Exercises Provided: Patient instructed to continue previously provided HEP.  Exercises were reviewed and Alison was able to demonstrate them prior to the end of the session.  Alison demonstrated good  understanding of the education provided.   See EMR under Patient Instructions for exercises provided  1/19/2024 .    Assessment     Patient responded well to manual therapy techniques with decreased c/o discomfort at conclusion of session. Decreased bruising noted in right breast, but has hardness in area of seroma with cording up to right axilla to mid upper arm.  Right upper extremity ROM increasing.      Steph is a 53 y.o. female referred to outpatient physical therapy with a medical diagnosis of Invasive ductal carcinoma of left breast in female, pain of right upper extremity, s/p mastectomy. Patient presents 5 weeks s/p "revision" of flap created for breast reconstruction and liposuction w/fat injection on 12/21/2024.Pt presents with right breast swelling with diffuse bruising throughout, R UE pain with elevation with palpable right axillary cording extending into R upper arm, right pec tightness. Functionally patient demonstrates limitations with overhead reaching, extra time required with ADL's due to pain.        Anticipated barriers for therapy:  none    GOALS  Short Term Goals: 3 weeks  Patient will demonstrate 100% knowledge of lymphedema precautions and signs of infection.   Decrease chest girth by average 1 cm for improved mobility and safety with iADLs.  Patient to report compliance sleeping on 30 degree incline for lymphatic protection.   Patient will demonstrate proper posture with sitting and standing to " decrease detrimental affects to adjacent structures.   Patient will tolerate HEP for better progression toward LTGs and self-management of presentation.   Increased R UE ROM to 150 deg shoulder flexion with decreased pain no greater than 3/10  Increased R UE ROM to 150 deg shoulder abduction with decreased pain no greater than 3/10     Long Term Goals: 6 weeks  Patient will be independent with HEP for self-management of symptoms and current status.   Decrease chest girth by average 1.5 cm for improved mobility and safety with iADLs.  Patient will perform self lymphatic drainage techniques for long term management of lymphedema.   Patient will report pain reduction to < or = 1 with AROM for improved safety with iADLs (driving, household chores, yard work, job duties).   Patient to demonstrate UE AROM to WFL for improved ability to reach overhead with ADLs (self care, dressing) and iADLs (household chores, yard work).     Plan     Continue with established plan of care working toward PT goals.    Adrienne Love, PTA

## 2024-01-30 ENCOUNTER — CLINICAL SUPPORT (OUTPATIENT)
Dept: REHABILITATION | Facility: HOSPITAL | Age: 54
End: 2024-01-30
Payer: COMMERCIAL

## 2024-01-30 DIAGNOSIS — M79.601 PAIN OF RIGHT UPPER EXTREMITY: ICD-10-CM

## 2024-01-30 DIAGNOSIS — Z90.13 S/P MASTECTOMY, BILATERAL: ICD-10-CM

## 2024-01-30 DIAGNOSIS — M25.611 DECREASED RANGE OF MOTION OF RIGHT SHOULDER: Primary | ICD-10-CM

## 2024-01-30 PROCEDURE — 97110 THERAPEUTIC EXERCISES: CPT | Mod: PN,CQ

## 2024-01-30 PROCEDURE — 97140 MANUAL THERAPY 1/> REGIONS: CPT | Mod: PN,CQ

## 2024-01-30 NOTE — PROGRESS NOTES
"[facility] Outpatient  Physical Therapy Progress Note    Visit Date: 1/30/2024    Name: Steph Lemus  MRN: [id number]  Therapy Diagnosis:   Encounter Diagnoses   Name Primary?    Decreased range of motion of right shoulder Yes    Pain of right upper extremity     S/P mastectomy, bilateral        Physician: Michel Tirado MD  Physician Orders: PT Eval and Treat  Medical Diagnosis from Referral: Invasive ductal carcinoma of left breast in female, pain of right upper extremity, s/p mastectomy  Evaluation Date: 1/19/2024  Authorization: no auth  Plan of Care Expiration: 01/19/2024-03/01/2024  Reassessment Due: 3 weeks, 02/09/2024  Fact G- Completed: 1 / 2     Visit: 3 / 12  PTA Visit: 2 / 5  Time In: 09:12 AM  Time Out: 10:00 AM  Total Billable Time: 48 minutes     Precautions: Standard,  history of left breast cancer      Subjective     Patient states: that she felt better after last session, was able to finally able to sleep on her side but still having tightness and pain under right arm. Not taking pain medicine, alternating tylenol and ibuprofen. Dr Tirado's nurse told her to massage under her right arm but didn't tell her how.     Pain: 3/10   Location: right axillary cording    Objective     Alison participated in / received the following treatment:    Therapeutic Exercise to develop strength, endurance, ROM, flexibility, and posture for 10 minutes including:  Deep Diaphragm breathing  Side Lying upper trunk rotation with elbow flexed/hand behind head "open book" x 10 reps  Side Lying Shoulder abduction in tolerable range x 10 reps  Shoulder rolls  Reviewed scap retractions, wall walking    6 MWT 1584 feet  97% p. 65    Manual Therapy to develop flexibility, extensibility, desensitization, pliability, and contour for 38 minutes including:  Manual lymphatic drainage to right short neck series, cervical terminus , axilla, sternal nodes, paravertebral nodes, AAA anastomosis, and SYDNI anastomosis. " "Lymphatouch applied to Right posterior/lateral trunk, sub axillary region/axillary cords, lateral and superior right breast set at 60mmHg. Concurrent use of pneumatic compression pump at 35 mmHg with vest component to address axillary swelling/cording.   Manual traction to axillary cords. Rock tape in edema technique applied to right sub axillary area, instructed in wear and removal. Pt verbalized understanding.       Education Provided  [x] Progress toward goals   [] Role of therapy   [] Activity modification  [x] Reviewed HEP      Home Exercises Provided: Patient instructed to continue previously provided HEP.  Exercises were reviewed and Alison was able to demonstrate them prior to the end of the session.  Alison demonstrated good  understanding of the education provided.   See EMR under Patient Instructions for exercises provided  1/19/2024 .    Assessment     Patient responded well to manual therapy techniques with decreased c/o discomfort at conclusion of session. Decreased bruising noted in right breast, but has hardness in area of seroma with cording up to right axilla to mid upper arm.  Right upper extremity ROM increasing. Trial of Rock tape and compression pump with vest component.      Steph is a 53 y.o. female referred to outpatient physical therapy with a medical diagnosis of Invasive ductal carcinoma of left breast in female, pain of right upper extremity, s/p mastectomy. Patient presents 5 weeks s/p "revision" of flap created for breast reconstruction and liposuction w/fat injection on 12/21/2024.Pt presents with right breast swelling with diffuse bruising throughout, R UE pain with elevation with palpable right axillary cording extending into R upper arm, right pec tightness. Functionally patient demonstrates limitations with overhead reaching, extra time required with ADL's due to pain.        Anticipated barriers for therapy:  none    GOALS  Short Term Goals: 3 weeks  Patient will demonstrate " 100% knowledge of lymphedema precautions and signs of infection.   Decrease chest girth by average 1 cm for improved mobility and safety with iADLs.  Patient to report compliance sleeping on 30 degree incline for lymphatic protection.   Patient will demonstrate proper posture with sitting and standing to decrease detrimental affects to adjacent structures.   Patient will tolerate HEP for better progression toward LTGs and self-management of presentation.   Increased R UE ROM to 150 deg shoulder flexion with decreased pain no greater than 3/10  Increased R UE ROM to 150 deg shoulder abduction with decreased pain no greater than 3/10     Long Term Goals: 6 weeks  Patient will be independent with HEP for self-management of symptoms and current status.   Decrease chest girth by average 1.5 cm for improved mobility and safety with iADLs.  Patient will perform self lymphatic drainage techniques for long term management of lymphedema.   Patient will report pain reduction to < or = 1 with AROM for improved safety with iADLs (driving, household chores, yard work, job duties).   Patient to demonstrate UE AROM to WFL for improved ability to reach overhead with ADLs (self care, dressing) and iADLs (household chores, yard work).     Plan     Continue with established plan of care working toward PT goals.    Adrienne Love, PTA

## 2024-02-01 ENCOUNTER — CLINICAL SUPPORT (OUTPATIENT)
Dept: REHABILITATION | Facility: HOSPITAL | Age: 54
End: 2024-02-01
Payer: COMMERCIAL

## 2024-02-01 DIAGNOSIS — M79.601 PAIN OF RIGHT UPPER EXTREMITY: ICD-10-CM

## 2024-02-01 DIAGNOSIS — M25.611 DECREASED RANGE OF MOTION OF RIGHT SHOULDER: Primary | ICD-10-CM

## 2024-02-01 DIAGNOSIS — Z90.13 S/P MASTECTOMY, BILATERAL: ICD-10-CM

## 2024-02-01 PROCEDURE — 97140 MANUAL THERAPY 1/> REGIONS: CPT | Mod: PN,CQ

## 2024-02-01 PROCEDURE — 97110 THERAPEUTIC EXERCISES: CPT | Mod: PN,CQ

## 2024-02-01 NOTE — PROGRESS NOTES
"[facility] Outpatient  Physical Therapy Progress Note    Visit Date: 2/1/2024    Name: Steph Lemus  MRN: [id number]  Therapy Diagnosis:   Encounter Diagnoses   Name Primary?    Decreased range of motion of right shoulder Yes    Pain of right upper extremity     S/P mastectomy, bilateral        Physician: Michel Tiardo MD  Physician Orders: PT Eval and Treat  Medical Diagnosis from Referral: Invasive ductal carcinoma of left breast in female, pain of right upper extremity, s/p mastectomy  Evaluation Date: 1/19/2024  Authorization: no auth  Plan of Care Expiration: 01/19/2024-03/01/2024  Reassessment Due: 3 weeks, 02/09/2024  Fact G- Completed: 1 / 2     Visit: 4 / 12  PTA Visit: 3 / 5  Time In: 01:12 PM  Time Out: 02:00 PM  Total Billable Time: 48 minutes     Precautions: Standard,  history of left breast cancer      Subjective     Patient states: that she felt better after last session, was able to finally able to sleep on her side but still having tightness and pain under right arm, but getting better every day. Pt did fine with tape.     Pain: 3/10   Location: right axillary cording    Objective     Alison participated in / received the following treatment:    Therapeutic Exercise to develop strength, endurance, ROM, flexibility, and posture for 10 minutes including:  Deep Diaphragm breathing  Side Lying upper trunk rotation with elbow flexed/hand behind head "open book" x 10 reps  Side Lying Shoulder abduction in tolerable range x 10 reps  Shoulder rolls  Reviewed scap retractions, wall walking    6 MWT 1584 feet  97% p. 65    Manual Therapy to develop flexibility, extensibility, desensitization, pliability, and contour for 38 minutes including:  Manual lymphatic drainage to right short neck series, cervical terminus , axilla, sternal nodes, paravertebral nodes, AAA anastomosis, and SYDNI anastomosis. Lymphatouch applied to Right posterior/lateral trunk, sub axillary region/axillary cords, lateral " "and superior right breast set at 60mmHg. Concurrent use of pneumatic compression pump at 35 mmHg with vest component to address axillary swelling/cording.   Manual traction to axillary cords. Rock tape in edema technique applied to right sub axillary area, instructed in wear and removal. Pt verbalized understanding.       Education Provided  [x] Progress toward goals   [] Role of therapy   [] Activity modification  [x] Reviewed HEP      Home Exercises Provided: Patient instructed to continue previously provided HEP.  Exercises were reviewed and Alison was able to demonstrate them prior to the end of the session.  Alison demonstrated good  understanding of the education provided.   See EMR under Patient Instructions for exercises provided  1/19/2024 .    Assessment     Patient responded well to manual therapy techniques with decreased c/o discomfort at conclusion of session. Decreased bruising noted in right breast, but has hardness in area of seroma with cording up to right axilla to mid upper arm.  Right upper extremity ROM increasing. Rock tape and compression pump with vest component are helping to decrease hardness/swelling in right breast and sub axillary area.      Steph is a 53 y.o. female referred to outpatient physical therapy with a medical diagnosis of Invasive ductal carcinoma of left breast in female, pain of right upper extremity, s/p mastectomy. Patient presents 5 weeks s/p "revision" of flap created for breast reconstruction and liposuction w/fat injection on 12/21/2024.Pt presents with right breast swelling with diffuse bruising throughout, R UE pain with elevation with palpable right axillary cording extending into R upper arm, right pec tightness. Functionally patient demonstrates limitations with overhead reaching, extra time required with ADL's due to pain.        Anticipated barriers for therapy:  none    GOALS  Short Term Goals: 3 weeks  Patient will demonstrate 100% knowledge of lymphedema " precautions and signs of infection.   Decrease chest girth by average 1 cm for improved mobility and safety with iADLs.  Patient to report compliance sleeping on 30 degree incline for lymphatic protection.   Patient will demonstrate proper posture with sitting and standing to decrease detrimental affects to adjacent structures.   Patient will tolerate HEP for better progression toward LTGs and self-management of presentation.   Increased R UE ROM to 150 deg shoulder flexion with decreased pain no greater than 3/10  Increased R UE ROM to 150 deg shoulder abduction with decreased pain no greater than 3/10     Long Term Goals: 6 weeks  Patient will be independent with HEP for self-management of symptoms and current status.   Decrease chest girth by average 1.5 cm for improved mobility and safety with iADLs.  Patient will perform self lymphatic drainage techniques for long term management of lymphedema.   Patient will report pain reduction to < or = 1 with AROM for improved safety with iADLs (driving, household chores, yard work, job duties).   Patient to demonstrate UE AROM to WFL for improved ability to reach overhead with ADLs (self care, dressing) and iADLs (household chores, yard work).     Plan     Continue with established plan of care working toward PT goals.    Adrienne Love, PTA

## 2024-02-06 ENCOUNTER — CLINICAL SUPPORT (OUTPATIENT)
Dept: REHABILITATION | Facility: HOSPITAL | Age: 54
End: 2024-02-06
Payer: COMMERCIAL

## 2024-02-06 DIAGNOSIS — M25.611 DECREASED RANGE OF MOTION OF RIGHT SHOULDER: Primary | ICD-10-CM

## 2024-02-06 DIAGNOSIS — M79.601 PAIN OF RIGHT UPPER EXTREMITY: ICD-10-CM

## 2024-02-06 DIAGNOSIS — Z90.13 S/P MASTECTOMY, BILATERAL: ICD-10-CM

## 2024-02-06 PROCEDURE — 97140 MANUAL THERAPY 1/> REGIONS: CPT | Mod: PN,CQ

## 2024-02-06 NOTE — PROGRESS NOTES
"[facility] Outpatient  Physical Therapy Progress Note    Visit Date: 2/6/2024    Name: Steph Lemus  MRN: [id number]  Therapy Diagnosis:   Encounter Diagnoses   Name Primary?    Decreased range of motion of right shoulder Yes    Pain of right upper extremity     S/P mastectomy, bilateral        Physician: Michel Tirado MD  Physician Orders: PT Eval and Treat  Medical Diagnosis from Referral: Invasive ductal carcinoma of left breast in female, pain of right upper extremity, s/p mastectomy  Evaluation Date: 1/19/2024  Authorization: no auth  Plan of Care Expiration: 01/19/2024-03/01/2024  Reassessment Due: 3 weeks, 02/09/2024  Fact G- Completed: 1 / 2     Visit: 5 / 12  PTA Visit: 4 / 5  Time In: 09:12 AM  Time Out: 10:00 AM  Total Billable Time: 48 minutes     Precautions: Standard,  history of left breast cancer      Subjective     Patient states: that she is feeling better, less fullness under right arm but still having tightness and pulling.  Feels like she is getting better every day. Pt did fine with tape. Pt reports that she is a little concerned about the redness on the outside of right breast.     Pain: 3/10   Location: right axillary cording    Objective   Bruising much improved in right breast, with residual bruising noted on medial breast. Light redness noted on lateral breast with no increase in warmth noted. Advised pt that if she notices an increase in redness, swelling, warmth, then that is indicative of infection and she would need to contact MD AMADOR.   Alison participated in / received the following treatment:    Therapeutic Exercise to develop strength, endurance, ROM, flexibility, and posture for 10 minutes including:  Deep Diaphragm breathing  Side Lying upper trunk rotation with elbow flexed/hand behind head "open book" x 10 reps  Side Lying Shoulder abduction in tolerable range x 10 reps  Shoulder rolls  Reviewed scap retractions, wall walking    6 MWT 1584 feet  97% p. " "65    Manual Therapy to develop flexibility, extensibility, desensitization, pliability, and contour for 38 minutes including:  Manual lymphatic drainage to right short neck series, cervical terminus , axilla, sternal nodes, paravertebral nodes, AAA anastomosis, and SYDNI anastomosis. Lymphatouch applied to Right posterior/lateral trunk, sub axillary region/axillary cords, lateral and superior right breast set at 60mmHg. Concurrent use of pneumatic compression pump at 35 mmHg with vest component to address axillary swelling/cording.   Manual traction to axillary cords. Rock tape in edema technique applied to right sub axillary area, instructed in wear and removal. Pt verbalized understanding.       Education Provided  [x] Progress toward goals   [] Role of therapy   [] Activity modification  [x] Reviewed HEP      Home Exercises Provided: Patient instructed to continue previously provided HEP.  Exercises were reviewed and Alison was able to demonstrate them prior to the end of the session.  Alison demonstrated good  understanding of the education provided.   See EMR under Patient Instructions for exercises provided  1/19/2024 .    Assessment     Patient responded well to manual therapy techniques with decreased c/o discomfort at conclusion of session. Decreased bruising noted in right breast, but has hardness in area of seroma with cording up to right axilla to mid upper arm.  Right upper extremity ROM increasing. Rock tape and compression pump with vest component are helping to decrease hardness/swelling in right breast and sub axillary area.      Steph is a 53 y.o. female referred to outpatient physical therapy with a medical diagnosis of Invasive ductal carcinoma of left breast in female, pain of right upper extremity, s/p mastectomy. Patient presents 5 weeks s/p "revision" of flap created for breast reconstruction and liposuction w/fat injection on 12/21/2024.Pt presents with right breast swelling with diffuse " bruising throughout, R UE pain with elevation with palpable right axillary cording extending into R upper arm, right pec tightness. Functionally patient demonstrates limitations with overhead reaching, extra time required with ADL's due to pain.        Anticipated barriers for therapy:  none    GOALS  Short Term Goals: 3 weeks  Patient will demonstrate 100% knowledge of lymphedema precautions and signs of infection.   Decrease chest girth by average 1 cm for improved mobility and safety with iADLs.  Patient to report compliance sleeping on 30 degree incline for lymphatic protection.   Patient will demonstrate proper posture with sitting and standing to decrease detrimental affects to adjacent structures.   Patient will tolerate HEP for better progression toward LTGs and self-management of presentation.   Increased R UE ROM to 150 deg shoulder flexion with decreased pain no greater than 3/10  Increased R UE ROM to 150 deg shoulder abduction with decreased pain no greater than 3/10     Long Term Goals: 6 weeks  Patient will be independent with HEP for self-management of symptoms and current status.   Decrease chest girth by average 1.5 cm for improved mobility and safety with iADLs.  Patient will perform self lymphatic drainage techniques for long term management of lymphedema.   Patient will report pain reduction to < or = 1 with AROM for improved safety with iADLs (driving, household chores, yard work, job duties).   Patient to demonstrate UE AROM to WFL for improved ability to reach overhead with ADLs (self care, dressing) and iADLs (household chores, yard work).     Plan     Continue with established plan of care working toward PT goals.    Adrienne Love, PTA

## 2024-02-08 ENCOUNTER — CLINICAL SUPPORT (OUTPATIENT)
Dept: REHABILITATION | Facility: HOSPITAL | Age: 54
End: 2024-02-08
Payer: COMMERCIAL

## 2024-02-08 DIAGNOSIS — M25.611 DECREASED RANGE OF MOTION OF RIGHT SHOULDER: Primary | ICD-10-CM

## 2024-02-08 DIAGNOSIS — Z90.13 S/P MASTECTOMY, BILATERAL: ICD-10-CM

## 2024-02-08 DIAGNOSIS — M79.601 PAIN OF RIGHT UPPER EXTREMITY: ICD-10-CM

## 2024-02-08 PROCEDURE — 97140 MANUAL THERAPY 1/> REGIONS: CPT | Mod: PN,CQ

## 2024-02-08 NOTE — PROGRESS NOTES
"[facility] Outpatient  Physical Therapy Progress Note    Visit Date: 2/8/2024    Name: Steph Lemus  MRN: [id number]  Therapy Diagnosis:   Encounter Diagnoses   Name Primary?    Decreased range of motion of right shoulder Yes    Pain of right upper extremity     S/P mastectomy, bilateral        Physician: Michel Tirado MD  Physician Orders: PT Eval and Treat  Medical Diagnosis from Referral: Invasive ductal carcinoma of left breast in female, pain of right upper extremity, s/p mastectomy  Evaluation Date: 1/19/2024  Authorization: no auth  Plan of Care Expiration: 01/19/2024-03/01/2024  Reassessment Due: 3 weeks, 02/09/2024  Fact G- Completed: 1 / 2     Visit: 6 / 12  PTA Visit: 5 / 5  Time In: 09:10 AM  Time Out: 10:00 AM  Total Billable Time: 50 minutes     Precautions: Standard,  history of left breast cancer      Subjective     Patient states: that she is feeling much better, less fullness under right arm but still having slight pulling.  Feels like she is getting better every day. Pt did fine with tape. Very pleased with progress.     Pain: 2/10   Location: right axillary cording    Objective   Bruising much improved in right breast, with light residual bruising noted on medial breast.   Alison participated in / received the following treatment:    Therapeutic Exercise to develop strength, endurance, ROM, flexibility, and posture for 10 minutes including:  Deep Diaphragm breathing  Side Lying upper trunk rotation with elbow flexed/hand behind head "open book" x 10 reps  Side Lying Shoulder abduction in tolerable range x 10 reps  Shoulder rolls  Reviewed scap retractions, wall walking    6 MWT 1584 feet  97% p. 65    Manual Therapy to develop flexibility, extensibility, desensitization, pliability, and contour for 40 minutes including:  Manual lymphatic drainage to right short neck series, cervical terminus , axilla, sternal nodes, paravertebral nodes, AAA anastomosis, and SYDNI anastomosis. " "Lymphatouch applied to Right posterior/lateral trunk, sub axillary region/axillary cords, lateral and superior right breast set at 60mmHg. Concurrent use of pneumatic compression pump at 35 mmHg with vest component to address axillary swelling/cording.  Manual traction to axillary cords. Rock tape in edema technique applied to right sub axillary area, instructed in wear and removal. Pt verbalized understanding.       Education Provided  [x] Progress toward goals   [] Role of therapy   [] Activity modification  [x] Reviewed HEP      Home Exercises Provided: Patient instructed to continue previously provided HEP.  Exercises were reviewed and Alison was able to demonstrate them prior to the end of the session.  Alison demonstrated good  understanding of the education provided.   See EMR under Patient Instructions for exercises provided  1/19/2024 .    Assessment     Patient responded well to manual therapy techniques with decreased c/o discomfort at conclusion of session. Decreased bruising noted in right breast, hardness in area of seroma has improved but still have cording from right axilla to mid upper arm.  Right upper extremity ROM increasing. Rock tape, MLD,  and compression pump with vest component are helping to decrease hardness/swelling in right breast and sub axillary area.      Steph is a 53 y.o. female referred to outpatient physical therapy with a medical diagnosis of Invasive ductal carcinoma of left breast in female, pain of right upper extremity, s/p mastectomy. Patient presents 5 weeks s/p "revision" of flap created for breast reconstruction and liposuction w/fat injection on 12/21/2024.Pt presents with right breast swelling with diffuse bruising throughout, R UE pain with elevation with palpable right axillary cording extending into R upper arm, right pec tightness. Functionally patient demonstrates limitations with overhead reaching, extra time required with ADL's due to pain.        Anticipated " barriers for therapy:  none    GOALS  Short Term Goals: 3 weeks  Patient will demonstrate 100% knowledge of lymphedema precautions and signs of infection.   Decrease chest girth by average 1 cm for improved mobility and safety with iADLs.  Patient to report compliance sleeping on 30 degree incline for lymphatic protection.   Patient will demonstrate proper posture with sitting and standing to decrease detrimental affects to adjacent structures.   Patient will tolerate HEP for better progression toward LTGs and self-management of presentation.   Increased R UE ROM to 150 deg shoulder flexion with decreased pain no greater than 3/10  Increased R UE ROM to 150 deg shoulder abduction with decreased pain no greater than 3/10     Long Term Goals: 6 weeks  Patient will be independent with HEP for self-management of symptoms and current status.   Decrease chest girth by average 1.5 cm for improved mobility and safety with iADLs.  Patient will perform self lymphatic drainage techniques for long term management of lymphedema.   Patient will report pain reduction to < or = 1 with AROM for improved safety with iADLs (driving, household chores, yard work, job duties).   Patient to demonstrate UE AROM to WFL for improved ability to reach overhead with ADLs (self care, dressing) and iADLs (household chores, yard work).     Plan     Continue with established plan of care working toward PT goals.    Adrienne Love, PTA

## 2024-02-15 ENCOUNTER — CLINICAL SUPPORT (OUTPATIENT)
Dept: REHABILITATION | Facility: HOSPITAL | Age: 54
End: 2024-02-15
Payer: COMMERCIAL

## 2024-02-15 DIAGNOSIS — M79.601 PAIN OF RIGHT UPPER EXTREMITY: ICD-10-CM

## 2024-02-15 DIAGNOSIS — M25.611 DECREASED RANGE OF MOTION OF RIGHT SHOULDER: Primary | ICD-10-CM

## 2024-02-15 DIAGNOSIS — Z90.13 S/P MASTECTOMY, BILATERAL: ICD-10-CM

## 2024-02-15 PROCEDURE — 97140 MANUAL THERAPY 1/> REGIONS: CPT | Mod: PN

## 2024-02-15 PROCEDURE — 97110 THERAPEUTIC EXERCISES: CPT | Mod: PN

## 2024-02-15 NOTE — PATIENT INSTRUCTIONS
Self manual Lymphatic Drainage for the Upper Extremity    What is the lymphatic system?  The lymphatic system is made up of lymph nodes, lymph vessels and lymphoid organs (such as the tonsils, spleen, etc.). The picture below shows the lymph vessels and lymph nodes in different areas in your body. The circles show the clusters (groups) of lymph nodes that are commonly removed or radiated during some cancer treatment. Our lymphatic system collects and filters fluid from all the tissue in our body. In the lymph nodes waste products are removed or broken down into smaller parts. The lymph fluid returns to the circulatory system (heart, veins and arteries) just before the blood enters the heart. If the pathways the lymph travels through are blocked/compromised, fluid and proteins can build up in your tissues.     The Lymphatic System  Circles show where clusters (groups) of lymph nodes are in your body that are commonly removed during cancer treatment.    How does self-massage help with lymphedema?  Manual Lymph Drainage, or Lymphatic massage gently stretches the skin and helps move fluid away from an area that is swollen to areas of the body where the lymphatic system has not been affected by cancer treatment. Lymphatic massage is very different from deep muscle massage. This massage uses light pressure and gentle, rhythmic strokes to help increase the flow of lymph.    This massage helps to move fluid away from areas of your body that may be swollen or damaged due to cancer treatment. If you had surgery and/or radiation to the lymph nodes in your underarm, the areas that you will move fluid (swelling) AWAY from are your:  affected arm  trunk  chest  affected breast    The extra fluid can be moved TO your:  unaffected underarm  neck  groin     Instructions for doing self-massage  Use a light pressure and keep your hands soft and relaxed. Use just enough pressure to gently stretch the skin as far as it naturally goes  and then release the pressure and let your skin come back as it was. If you can feel your muscles underneath your fingers, then you are pressing too hard.  Use the flats of your hands instead of your finger tips. This allows more contact with the skin to stimulate the lymph vessels.  Massage towards areas of your body that have not been treated for cancer.  Make sure you are comfortable. You may want to support the arm on the side that was treated for cancer on a table or with pillows, so your shoulder and arm can relax. Self-massage can be done while seated, standing or lying down. Choose the position that is most comfortable for you.  Try to put self-massage into your life (as you do with other self-care activities) in a way that works for you.    What to avoid  Do not strain your shoulders, neck, arm or hand  Do not self-massage in a way that causes pain  Do not continue self-massage if it is causing you pain  Do not do self-massage if you have an infection in that area  If you have a port-a-cath, avoid bumping it while doing self-massage    Important: DO NOT do self massage if you have an infection in your breast(s), chest or arm(s).    Signs of an infection may include:  Swelling in these areas and redness of the skin (this redness can quickly spread)  Feeling pain in one or both breasts, chest or arms  Feeling tenderness and/or warmth in one or both breasts, chest or arms  Having a fever or chills and feeling unwell    If you have an infection or think you have an infection, go to:  Your Family Doctor  Walk-in Clinic  Urgent Care Clinic  Emergency Department    Try different ways to make self-massage a part of your routine. Some people prefer to do self-massage while they are watching TV or having a shower so that it does not take time away from their day. Other people prefer to use self massage as time for themselves or as part of their relaxation routine.     Steps 1 - 4 stimulate your lymph system    1.  "Deep Breathing    A very important part of your self-care is deep breathing. Deep breathing helps to stimulate lymphatic system in your whole body. You can practice deep breathing anytime!  Place the palms or flats of your hands on your stomach  Slowly, breathe in deeply through your nose, and let your stomach expand  Breathe out slowly through pursed lips (as if you were about to blow out a candle), and let your stomach flatten  Repeat 5 times. Take a short rest between each breath so you do not get dizzy     2. Stretch and release the skin at the front of your neck    This step helps lymph fluid drain back to your heart. You can massage one side at a time, or do both sides together by crossing your hands.  Place the flats of your 2nd and 3rd fingers on either side of your neck, just above your collarbone.  Massage down and inwards towards your collarbone. Always keep your fingers above your collarbone. Gently stretch the skin just as far as it naturally goes and release.  This massage will look like two "J" strokes facing one another.  Repeat 15 times.     3. Stretch and release the skin at the side of your neck    You can massage one side at a time, or do both sides together.  Place your flat hands on either side of your neck, just under your ears.  Gently stretch the skin back (away from your face) and down, then release.  Try to massage your neck in a slow, gentle way, following a rhythm.  Repeat 10 to 15 times.    Remember: Keep your pressure light and your hands soft and relaxed.     4. Stretch and release the skin on the back of your neck    Place your flat hands on the back of your neck, just below your hairline on either side of your spine.  Stretch the skin towards your spine and then down towards the base of your neck and release.  Repeat 10 to 15 times.     Steps 5 - 11 will help to address symptoms of lymphedema that you may be experiencing    5. Prepare your chest    This step prepares the lymph nodes " "in your under arm to take in lymph fluid from the affected side of your chest (the side you were treated for cancer).  For the following chest massage, place the arm on the side of your body that was not treated for cancer in a comfortable position slightly elevated and supported, if that feels comfortable for you.  Place your palm against your underarm on the side that was not treated for cancer.  Gently pull up and in toward your body, then release.  Repeat 10 to 15 times.     6. Massage your scar    You may begin scar massage three weeks after surgery and after all staples and clips have been removed. Your scar may feel very sensitive, tight or itchy. Scar massage will help reduce these feelings and soften the scar and allow better circulation in the area. Scar massage should always be pain-free.  Move up and down in a zigzag pattern or circular pattern along your scar.  Apply firm but gentle pressure while moving along the scar.  If possible, gently lift the skin along the scar.  Repeat 5 or 6 times on your scar(s).    Tip: You can do scar massage anytime that your scar feels tight or "stuck" to your chest wall.        7. Stretch and release the skin from your upper chest to your neck    This step will move fluid up to your neck. Try to find a position that is most comfortable for you and does not strain your wrist.  If you have had a mastectomy:  Place your hand above your scar.  Massage up your chest to your neck, gently stretch the skin as far as it naturally goes and release.  Repeat 10 to 15 times.    If you have had a lumpectomy:  Place your hand on your chest above your breast.  Massage up your chest to your neck, using several small strokes. Gently stretch the skin as far as it naturally goes and release.  Repeat 10 to 15 times.     8. Prepare your chest    This step will move fluid away from the side of you body that was treated for cancer. Try to find a position that is most comfortable for you and " does not strain your wrist.  Place your hand by your underarm on the side of your body that was treated for cancer, above any scar.  Gently massage across your chest using several small strokes. Start on close to your underarm on the side of your body that was treated for cancer and massage across your chest over to the other underarm.  Gently stretch the skin as far as it naturally goes and release.  Repeat 10 to 15 times.     9. Prepare your groin area    This step will prepare your groin to receive fluid. Do this step on the side of your body that was treated for cancer.  Place the flats of your hands over the crease at the top of your leg (as if you were putting your hand in your pocket).  Gently press in towards your body (arrow 1) and then roll up as shown in (arrow 2). Your hand will stay in one spot.  Repeat 15 times.     10. Stretch and release the skin from your upper arm to your groin    This step will direct fluid from your upper arm, down the side of your body to your groin. If you are having trouble raising your arm, try placing it on a table for more comfort. Do this step on the side of your body that was treated for cancer.  Start at the inner part of you upper arm.  Massage around to the side of your chest down towards your groin.  Gently stretch the skin as far as it naturally goes and release.  Repeat 15 times.     11. Stretch and release the skin from your chest to your groin    This step will direct fluid away from your chest to your groin. Do this step on the side of your body that was treated for cancer.  Place your hand on your chest below any scar.  Gently stretch and release the skin moving down the side of your body to your groin area.  Repeat 10 to 15 times.     12. Stretch and release the skin on your upper arm    This step will direct fluid from the inside of your arm to the back or outside of your upper arm and into your neck.  Shoulder  Start at your shoulder and gently stretch and  release the skin, moving over your shoulder toward your neck.  If it does not strain your wrist, you can also massage across the back of your shoulder to your neck.  Repeat 15 times.    Inside your arm  Stretch and release the skin from the inside of your arm to the top of your upper arm.  Repeat 15 times    Back of your arm  Stretch and release the skin from the back of your arm to your upper arm.  Repeat 15 times    You can repeat the Shoulder Step (as listed above) to finish.     13. Gently stretch and release the skin on your arm    This step will allow you to gently massage your entire arm, moving towards your head. Do this massage on the side of your body that was treated for cancer.  Start with your shoulder, gently stretch the skin, pushing the fluid upwards and release.  Shift the hand that is performing the massage down to your upper arm.  Gently stretch the skin, pushing the fluid upwards and release.  Repeat this upward motion on your forearm (elbow to wrist). Turn your arm so that your palm is facing up. This will allow you to massage the inside of your arm. Gently stretch the skin, pushing the fluid upwards and release.     14. Massage for swollen hands    Gently stretch the skin on the back of your hand towards your forearm and release. This can also be repeated on the palm of your hand.  Repeat 15 times.     15. Massage for swollen fingers    Place your index finger and thumb of your unaffected hand on the base of your swollen finger.  Gently stretch and release the skin towards your palm and massage your entire finger.  Repeat 15 times.     DARRELL Abdi., JEAN Laurent, ABDOUL Cedeno., BEL Lewis., [place], S. (2019). How to do self lymphatic massage on your upper body. [facility]. Retrieved May 5, 2022, from [email or web address]          Transverse Abdominus:  Laying down with knees bent: roll  pelvis into posterior pelvic tilt ( flat back) without using legs then perform sacral stomp pushing water out of pretend sponge.    Stretch ex: laying down hands clasp overhead and bring knees that are bent to the left side

## 2024-02-15 NOTE — PROGRESS NOTES
[facility] Outpatient  Physical Therapy Progress Note/ Discharge Summary    Visit Date: 2/15/2024    Name: Steph Lemus  MRN: [id number]  Therapy Diagnosis:   Encounter Diagnoses   Name Primary?    Decreased range of motion of right shoulder Yes    Pain of right upper extremity     S/P mastectomy, bilateral        Physician: Michel Tirado MD  Physician Orders: PT Eval and Treat  Medical Diagnosis from Referral: Invasive ductal carcinoma of left breast in female, pain of right upper extremity, s/p mastectomy  Evaluation Date: 1/19/2024  Authorization: no auth  Plan of Care Expiration: 01/19/2024-03/01/2024  Reassessment Due: 3 weeks, 02/09/2024  Fact G- Completed: 1 / 2     Visit: 7 / 12  PTA Visit: 0 / 5  Time In: 09:10 AM  Time Out: 10:05 AM  Total Billable Time: 55 minutes     Precautions: Standard,  history of left breast cancer      Subjective     Patient states: that she is feeling much better, less fullness under right arm but still having slight pulling.  Feels like she is getting better every day. Pt did fine with tape. Very pleased with progress.     Pain: 2/10   Location: right axillary cording    Objective   Bruising much improved in right breast, with light residual bruising noted on medial breast.   Full ROM in B shoulders       Baseline Measurements of BUEs        Current Measurements  LANDMARK RIGHT UE (cm)  Eval  01/19/2024 LEFT  UE (cm)  Eval  01/19/2024 R UE 2/15/2024   W +17  in  35.0 34.5 34.1   W + 14in 31.0 30.5 30.6   Elbow 26.5 25.5 26.4   W + 7 in 26.3 25.0 26.7   W + 5 in 23.7 22.0 23.0    Wrist 16.0 16.0 16.2   DPC 19.5 19.0 19.6   IP Thumb 6.3 6.2 6.0   Chest Circumference at Axilla   103.0 100.0   Chest Circumference at nipple line   113.7 110.5   Garments recommended: Pt was provided Kom[employer] II lymph padding to wear against right breast/lateral trunk inside her compression bra, may wear at night as well.     Alison participated in / received the following  "treatment:    Therapeutic Exercise to develop strength, endurance, ROM, flexibility, and posture for 15 minutes including:  Deep Diaphragm breathing  Elevation of arms overhead in supine with knees bent and turned to left for stretching to R UE  Glut max activation for support of trunk perf bridges  Glut med sidelye ex   Transverse abdominus activation with supine knees bent PPT and sacral stomp  Provided HEP for ex and self MLD  Side Lying upper trunk rotation with elbow flexed/hand behind head "open book" x 10 reps  Side Lying Shoulder abduction in tolerable range x 10 reps  Shoulder rolls  Reviewed scap retractions, wall walking    6 MWT 1584 feet  97% p. 65  6MWT  1750 feet discharge    Manual Therapy to develop flexibility, extensibility, desensitization, pliability, and contour for 40 minutes including:  Review of self MLD to cervical terminus, head and neck nodes, B axilla and abdominus and R groin with handouts provided. Manual lymphatic drainage to right short neck series, cervical terminus , axilla, sternal nodes, paravertebral nodes, AAA anastomosis, and SYDNI anastomosis. (deferred Lymphatouch applied to Right posterior/lateral trunk, sub axillary region/axillary cords, lateral and superior right breast set at 60mmHg.( Concurrent use of pneumatic compression pump at 35 mmHg with vest component to address axillary swelling/cording.  Manual traction to axillary cords. Pt verbalized understanding.       Education Provided  [x] Progress toward goals   [] Role of therapy   [] Activity modification  [x] Reviewed HEP      Home Exercises Provided: Patient instructed to continue previously provided HEP.  Exercises were reviewed and Alison was able to demonstrate them prior to the end of the session.  Alison demonstrated good  understanding of the education provided.   See EMR under Patient Instructions for exercises provided  1/19/2024 .    Assessment     Patient met all STG LTG set. Pt with full ROM in B UE " "reporting full use. Decreased bruising noted in right breast, mild cording to R shoulder with elevation  Right upper extremity ROM WNL.   At [facility], Steph is a 53 y.o. female referred to outpatient physical therapy with a medical diagnosis of Invasive ductal carcinoma of left breast in female, pain of right upper extremity, s/p mastectomy. Patient presents 5 weeks s/p "revision" of flap created for breast reconstruction and liposuction w/fat injection on 12/21/2024.Pt presents with right breast swelling with diffuse bruising throughout, R UE pain with elevation with palpable right axillary cording extending into R upper arm, right pec tightness. At [facility], functionally patient demonstrated limitations with overhead reaching, extra time required with ADL's due to pain.        Anticipated barriers for therapy:  none    GOALS  Short Term Goals: 3 weeks  Patient will demonstrate 100% knowledge of lymphedema precautions and signs of infection. GOAL MET 2/15/2024  Decrease chest girth by average 1 cm for improved mobility and safety with iADLs.GOAL MET 2/15/2024  Patient to report compliance sleeping on 30 degree incline for lymphatic protection. GOAL MET 2/15/2024  Patient will demonstrate proper posture with sitting and standing to decrease detrimental affects to adjacent structures. GOAL MET 2/15/2024  Patient will tolerate HEP for better progression toward LTGs and self-management of presentation. GOAL MET 2/15/2024  Increased R UE ROM to 150 deg shoulder flexion with decreased pain no greater than 3/10GOAL MET 2/15/2024  Increased R UE ROM to 150 deg shoulder abduction with decreased pain no greater than 3/10GOAL MET 2/15/2024     Long Term Goals: 6 weeks  Patient will be independent with HEP for self-management of symptoms and current status. GOAL MET 2/15/2024  Decrease chest girth by average 1.5 cm for improved mobility and safety with iADLs.GOAL MET 2/15/2024  Patient will perform self lymphatic drainage " techniques for long term management of lymphedema. GOAL MET 2/15/2024  Patient will report pain reduction to < or = 1 with AROM for improved safety with iADLs (driving, household chores, yard work, job duties)GOAL MET 2/15/2024.   Patient to demonstrate UE AROM to WFL for improved ability to reach overhead with ADLs (self care, dressing) and iADLs (household chores, yard work).GOAL MET 2/15/2024     Plan   Discharge to [facility] and pt to call if any further cording/edema develops.     Mary Matt, PT, CLT

## 2024-02-16 ENCOUNTER — TELEPHONE (OUTPATIENT)
Dept: HEMATOLOGY/ONCOLOGY | Facility: CLINIC | Age: 54
End: 2024-02-16
Payer: COMMERCIAL

## 2024-02-16 NOTE — TELEPHONE ENCOUNTER
Called pt to confirm that she was discharged from PT and that it was ok to cancel all of her remaining appointments for the month of Feb

## 2024-02-19 ENCOUNTER — OFFICE VISIT (OUTPATIENT)
Dept: HEMATOLOGY/ONCOLOGY | Facility: CLINIC | Age: 54
End: 2024-02-19
Payer: COMMERCIAL

## 2024-02-19 VITALS
RESPIRATION RATE: 18 BRPM | DIASTOLIC BLOOD PRESSURE: 80 MMHG | HEIGHT: 66 IN | WEIGHT: 189.63 LBS | SYSTOLIC BLOOD PRESSURE: 114 MMHG | OXYGEN SATURATION: 98 % | HEART RATE: 72 BPM | BODY MASS INDEX: 30.47 KG/M2 | TEMPERATURE: 98 F

## 2024-02-19 DIAGNOSIS — C50.912 INVASIVE DUCTAL CARCINOMA OF LEFT BREAST IN FEMALE: Primary | ICD-10-CM

## 2024-02-19 PROCEDURE — 1160F RVW MEDS BY RX/DR IN RCRD: CPT | Mod: CPTII,S$GLB,, | Performed by: INTERNAL MEDICINE

## 2024-02-19 PROCEDURE — 3008F BODY MASS INDEX DOCD: CPT | Mod: CPTII,S$GLB,, | Performed by: INTERNAL MEDICINE

## 2024-02-19 PROCEDURE — 3074F SYST BP LT 130 MM HG: CPT | Mod: CPTII,S$GLB,, | Performed by: INTERNAL MEDICINE

## 2024-02-19 PROCEDURE — 99999 PR PBB SHADOW E&M-EST. PATIENT-LVL III: CPT | Mod: PBBFAC,,, | Performed by: INTERNAL MEDICINE

## 2024-02-19 PROCEDURE — 99213 OFFICE O/P EST LOW 20 MIN: CPT | Mod: S$GLB,,, | Performed by: INTERNAL MEDICINE

## 2024-02-19 PROCEDURE — 1159F MED LIST DOCD IN RCRD: CPT | Mod: CPTII,S$GLB,, | Performed by: INTERNAL MEDICINE

## 2024-02-19 PROCEDURE — 3079F DIAST BP 80-89 MM HG: CPT | Mod: CPTII,S$GLB,, | Performed by: INTERNAL MEDICINE

## 2024-02-19 NOTE — PROGRESS NOTES
PROGRESS NOTE     Subjective:       Patient ID: Steph Lemsu is a 53 y.o. female.  MRN: [id number]  : 1970    Chief Complaint:    Stage IA (T1c, N0, M0, Grade 1, ER+/NE+/HER2 pending) Invasive ductal carcinoma of the LEFT breast    History of Present Illness:   Steph Lemus is a 53 y.o. female who is referred for newly diagnosed left breast IDC, found on screening mammogram.   She did not palpate a breast mass. She denies any pain or nipple discharge.   She is a smoker, has stopped at the time of her diagnosis.      Menarche at age 9.   . Age at first live birth 15 years old   Menopause at 50 years old. HRT-divigel now, took for 2 years     FH  Father- throat cancer- dx 73  Paternal Grandfather- Lung cancer- dx at 68  Paternal Uncle- Lung cancer, Dx at 60   Cousin- maternal- breast cancer dx at 36  Maternal cousin-Lung cancer- dx at 40    Dx with Covid 5 in October and had a difficult recovery, surgery was delayed.Had follow up imaging while she was waiting that showed stable breast mass and borderline enlarging left axillary node. It was biopsied to be benign.       Interim history:  On 23, she underwent b/l mastectomy and left sentinel node exam.    Had her last revision to reconstruction in Dec 2023, had a right chest wall hematoma, is improving. It did bleed in January, saw Dr. Tirado, it was drained and recovering well.   Has completed PT.     Not smoking at all.     Has hot flashes. Started on Veozah, but no longer covered, is on Effexor. Feels it is not as helpful.       Oncology History:  22: Screening mammogram  Impression:  Left  Focal Asymmetry: Left breast focal asymmetry at the anterior 12 o'clock position. Assessment: 0 - Incomplete. Diagnostic Mammogram and/or Ultrasound is recommended.      Right  There is no mammographic evidence of malignancy in the right breast.     BI-RADS Category:   Overall: 0 - Incomplete: Needs  Additional Imaging Evaluation    7/19/22:  Diagnostic mammogram:  Impression:  Left  Mass: Left breast 18 mm x 9 mm x 9 mm mass at the 12 o'clock position. Assessment: 4 - Suspicious finding. Biopsy is recommended.      An 8 mm satellite mass is present 0.5 cm remote from the aforementioned mass at 12 o'clock.      BI-RADS Category:   Overall: 4 - Suspicious     8/3/22:   1. Breast, left, 12 o'clock, 6 cm from nipple, ultrasound-guided core needle   biopsies:   - Invasive ductal carcinoma       - Present in 4 of 6 core fragments (3 mm in greatest contiguous dimension)       - Brandon-Jimenez modified SBR score 2 + 2 + 1 = 5 of 9       - Histologic grade 1 of 3       - Mitotic index = 0.3 (average mitoses per high power field) (low)   - Biomarkers, assessed by immunohistochemistry on block 1A       - Estrogen Receptor (ER):  Positive (95%; strong intensity)       - Progesterone Receptor (FL):  Positive (70%; moderate intensity)       - HER2:  Equivocal (2+)       - HER2 FISH pending; results to follow in a supplemental report       - Ki-67 proliferation index:  Approximately 20% in hot-spot (intermediate)   - Lymphovascular invasion not identified   - Ductal carcinoma in situ       - Associated with invasive tumor       - Nuclear grade 2 of 3       - Cribriform pattern       - Central comedonecrosis: Approximately 20%   - Background breast contains fragments of fibroadenoma with myxoid change   2. Breast, left, 12 o'clock, 7 cm from nipple, ultrasound-guided core needle   biopsies:   - Fibroadenoma with myxoid change     8/15/22: MRI   Impression:  Left  Mass: Left breast 15 mm x 12 mm x 20 mm mass at the 12 o'clock position. Assessment: 6 - Known biopsy, proven malignancy.      Right  There is no MR evidence of malignancy in the right breast.    1/5/23  1.  LEFT BREAST, SKIN AND NIPPLE SPARING MASTECTOMY:   - IN SITU AND INVASIVE DUCT CARCINOMA, 0.7 CM, LOW-GRADE (1, 2, 1).     - FIBROADENOMA (ADJACENT TO INVASIVE  TUMOR).     - BIOPSY SITE REPAIR REACTIONS.     2.  LEFT BREAST, RE-EXCISION ANTERIOR MARGIN:   - ADIPOSE TISSUE NEGATIVE FOR TUMOR.     3.  LEFT AXILLARY SENTINEL LYMPH NODE BIOPSY:   - FIVE LYMPH NODES, ALL NEGATIVE FOR METASTATIC CARCINOMA (0/5).     - ONE LYMPH NODE WITH CAPSULAR MERARY NEVUS.     4.  RIGHT BREAST, SKIN AND NIPPLE SPARING MASTECTOMY:   - NO TUMOR SEEN.      pT1b pN0(sn)     History:  Past Medical History:   Diagnosis Date    Acid reflux     Breast cancer 08/2022    Stage IA (T1c, N0, M0, Grade 1, ER+/OR+/HER2 pending) Invasive ductal carcinoma of the LEFT breast    Epistaxis, recurrent     none since cauterization    Hyperlipidemia     Invasive ductal carcinoma of left breast in female 09/2022      Past Surgical History:   Procedure Laterality Date    APPENDECTOMY      BREAST BIOPSY Left 08/03/2022    idc    COLONOSCOPY N/A 04/15/2019    Procedure: COLONOSCOPY;  Surgeon: Tad Suarez Jr., MD;  Location: [facility];  Service: Endoscopy;  Laterality: N/A;    DILATION AND CURETTAGE OF UTERUS      for uterine polyps    ENDOMETRIAL ABLATION      ENDOSCOPIC NASAL CAUTERIZATION N/A 08/11/2020    Procedure: CAUTERIZATION, NOSE, ENDOSCOPIC;  Surgeon: Surinder Acevedo MD;  Location: [facility];  Service: ENT;  Laterality: N/A;    LAPAROSCOPIC APPENDECTOMY N/A 09/22/2020    Procedure: APPENDECTOMY, LAPAROSCOPIC;  Surgeon: Shai Ludwig MD;  Location: [facility];  Service: General;  Laterality: N/A;    LIPOSUCTION W/ FAT INJECTION N/A 12/21/2023    Procedure: LIPOSUCTION, WITH FAT TRANSFER, Abdomen to Breast;  Surgeon: Michel Tirado MD;  Location: [facility] OR;  Service: Plastics;  Laterality: N/A;    MASTOPEXY Bilateral 12/21/2023    Procedure: MASTOPEXY;  Surgeon: Michel Tirado MD;  Location: [facility] OR;  Service: Plastics;  Laterality: Bilateral;    RECONSTRUCTION OF BREAST WITH DEEP INFERIOR EPIGASTRIC ARTERY  (RUTH) FREE FLAP Bilateral 1/5/2023    Procedure: RECONSTRUCTION, BREAST, USING RUTH  FREE FLAP;  Surgeon: Michel Tirado MD;  Location: [facility] OR;  Service: Plastics;  Laterality: Bilateral;    REPAIR, NERVE USING ALLOGRAFT Bilateral 2023    Procedure: REPAIR, NERVE USING ALLOGRAFT;  Surgeon: Michel Tirado MD;  Location: [facility] OR;  Service: Plastics;  Laterality: Bilateral;    REVISION, FLAP, PREVIOUSLY CREATED FOR BREAST RECONSTRUCTION Bilateral 2023    Procedure: REVISION, FLAP, PREVIOUSLY CREATED FOR BREAST RECONSTRUCTION;  Surgeon: Michel Tirado MD;  Location: [facility] OR;  Service: Plastics;  Laterality: Bilateral;    SENTINEL LYMPH NODE BIOPSY Left 2023    Procedure: BIOPSY, LYMPH NODE, SENTINEL;  Surgeon: Simona Cueva MD;  Location: [facility] OR;  Service: General;  Laterality: Left;    SIMPLE MASTECTOMY Bilateral 2023    Procedure: MASTECTOMY, SIMPLE;  Surgeon: Simona Cueva MD;  Location: [facility] OR;  Service: General;  Laterality: Bilateral;    TONSILLECTOMY       Family History   Problem Relation Age of Onset    Hyperlipidemia Mother     Hypertension Mother     Diabetes Mother     Hypertension Father     Cancer Father         throat    Pacemaker/defibrilator Father     Diabetes Maternal Grandfather     Cancer Maternal Grandfather     Cancer Paternal Grandfather     Breast cancer Cousin 40    BRCA 1/2 Cousin     Colon cancer Neg Hx     Ovarian cancer Neg Hx       Social History     Tobacco Use    Smoking status: Former     Current packs/day: 0.00     Average packs/day: 0.1 packs/day for 25.0 years (2.5 ttl pk-yrs)     Types: Cigarettes     Start date: 1997     Quit date: 2022     Years since quittin.5    Smokeless tobacco: Never   Substance and Sexual Activity    Alcohol use: Yes     Alcohol/week: 4.0 standard drinks of alcohol     Types: 4 Glasses of wine per week    Drug use: No    Sexual activity: Yes     Partners: Male     Birth control/protection: Other-see comments        ROS:   Review of Systems   Constitutional:  Negative for fever, malaise/fatigue and  "weight loss.        Hot flashes   HENT:  Positive for congestion and sinus pain (chronic since covid). Negative for hearing loss, nosebleeds and sore throat.    Eyes:  Negative for double vision and photophobia.   Respiratory:  Negative for cough, hemoptysis, sputum production, shortness of breath and wheezing.    Cardiovascular:  Negative for chest pain, palpitations, orthopnea and leg swelling.   Gastrointestinal:  Negative for abdominal pain, blood in stool, constipation, diarrhea, heartburn, nausea and vomiting.   Genitourinary:  Negative for dysuria, frequency, hematuria and urgency.   Musculoskeletal:  Negative for back pain, joint pain and myalgias.   Skin:  Negative for itching and rash.   Neurological:  Positive for headaches (off and on). Negative for tingling, seizures and weakness.   Endo/Heme/Allergies:  Negative for polydipsia. Does not bruise/bleed easily.   Psychiatric/Behavioral:  Negative for depression and memory loss. The patient is not nervous/anxious and does not have insomnia.         Objective:     Vitals:    02/19/24 1059   BP: 114/80   Pulse: 72   Resp: 18   Temp: 97.7 °F (36.5 °C)   TempSrc: Temporal   SpO2: 98%   Weight: 86 kg (189 lb 9.5 oz)   Height: 5' 6" (1.676 m)   PainSc:   1   PainLoc: Generalized         Physical Examination:   Physical Exam  Vitals and nursing note reviewed.   Constitutional:       General: She is not in acute distress.     Appearance: She is not diaphoretic.   HENT:      Head: Normocephalic.      Mouth/Throat:      Pharynx: No oropharyngeal exudate.   Eyes:      General: No scleral icterus.     Conjunctiva/sclera: Conjunctivae normal.   Neck:      Thyroid: No thyromegaly.   Cardiovascular:      Rate and Rhythm: Normal rate and regular rhythm.      Heart sounds: Normal heart sounds. No murmur heard.  Pulmonary:      Effort: Pulmonary effort is normal. No respiratory distress.      Breath sounds: No stridor. No wheezing or rales.   Chest:      Chest wall: No " tenderness.   Abdominal:      General: Bowel sounds are normal. There is no distension.      Palpations: Abdomen is soft. There is no mass.      Tenderness: There is no abdominal tenderness. There is no rebound.   Musculoskeletal:         General: No tenderness or deformity. Normal range of motion.      Cervical back: Neck supple.   Lymphadenopathy:      Cervical: No cervical adenopathy.   Skin:     General: Skin is warm and dry.      Findings: No erythema or rash.      Comments: B/l mastectomy with flap reconstruction    Neurological:      Mental Status: She is alert and oriented to person, place, and time.      Cranial Nerves: No cranial nerve deficit.      Coordination: Coordination normal.      Gait: Gait is intact.   Psychiatric:         Mood and Affect: Affect normal.         Cognition and Memory: Memory normal.         Judgment: Judgment normal.          Diagnostic Tests:  Significant Imaging: I have reviewed and interpreted all pertinent imaging results/findings.  PET Scan No results found for this or any previous visit.    Laboratory Data:  All pertinent labs have been reviewed.    Labs:   Lab Results   Component Value Date    WBC 4.51 12/21/2023    HGB 13.1 12/21/2023    HCT 39.4 12/21/2023    MCV 92 12/21/2023     12/21/2023       Assessment/Plan:   Invasive ductal carcinoma of left breast in female  Stage IA (T1c, N0, M0, Grade 1, ER+/MT+/HER2 2+ by IHC, FISH negative, Invasive ductal carcinoma of the LEFT breast  pT1b N0 (sn)     She is s/p b/l mastectomy with flap reconstruction.   Oncotype is low risk at 7, with 3% risk of distant recurrence with AI or Tamoxifen.   She is post menopausal based on labs and could be offered with AI or Tamoxifen vs observation as per patient preference.   See prior note for discussion. She has thought about endocrine therapy and has declined at this time. Will continue active surveillance.     Hot flashes   Will try to resume Veozah if insurance approves.    Meanwhile, continue Effexor.         ECOG SCORE    0 - Fully active-able to carry on all pre-disease performance without restriction           Discussion:   No follow-ups on file.    Plan was discussed with the patient at length, and she verbalized understanding. Steph was given an opportunity to ask questions that were answered to her satisfaction, and she was advised to call in the interval if any problems or questions arise.    Electronically signed by Angelica Coelho MD      Route Chart for Scheduling    Med Onc Chart Routing      Follow up with physician 6 months. breast med onc   Follow up with [facility]    Infusion scheduling note    Injection scheduling note    Labs    Imaging    Pharmacy appointment    Other referrals

## 2024-03-26 ENCOUNTER — OFFICE VISIT (OUTPATIENT)
Dept: OBSTETRICS AND GYNECOLOGY | Facility: CLINIC | Age: 54
End: 2024-03-26
Attending: OBSTETRICS & GYNECOLOGY
Payer: COMMERCIAL

## 2024-03-26 ENCOUNTER — LAB VISIT (OUTPATIENT)
Dept: LAB | Facility: OTHER | Age: 54
End: 2024-03-26
Attending: OBSTETRICS & GYNECOLOGY
Payer: COMMERCIAL

## 2024-03-26 VITALS
DIASTOLIC BLOOD PRESSURE: 86 MMHG | HEIGHT: 66 IN | BODY MASS INDEX: 30.53 KG/M2 | WEIGHT: 190 LBS | SYSTOLIC BLOOD PRESSURE: 127 MMHG

## 2024-03-26 DIAGNOSIS — N95.2 POSTMENOPAUSAL ATROPHIC VAGINITIS: ICD-10-CM

## 2024-03-26 DIAGNOSIS — N95.1 MENOPAUSAL SYNDROME: Primary | ICD-10-CM

## 2024-03-26 DIAGNOSIS — N95.1 MENOPAUSAL SYNDROME: ICD-10-CM

## 2024-03-26 DIAGNOSIS — Z85.3 PERSONAL HISTORY OF BREAST CANCER: ICD-10-CM

## 2024-03-26 LAB
ALBUMIN SERPL BCP-MCNC: 4.5 G/DL (ref 3.5–5.2)
ALP SERPL-CCNC: 105 U/L (ref 55–135)
ALT SERPL W/O P-5'-P-CCNC: 34 U/L (ref 10–44)
ANION GAP SERPL CALC-SCNC: 10 MMOL/L (ref 8–16)
AST SERPL-CCNC: 29 U/L (ref 10–40)
BASOPHILS # BLD AUTO: 0.02 K/UL (ref 0–0.2)
BASOPHILS NFR BLD: 0.4 % (ref 0–1.9)
BILIRUB SERPL-MCNC: 0.4 MG/DL (ref 0.1–1)
BUN SERPL-MCNC: 18 MG/DL (ref 6–20)
CALCIUM SERPL-MCNC: 10.5 MG/DL (ref 8.7–10.5)
CHLORIDE SERPL-SCNC: 105 MMOL/L (ref 95–110)
CO2 SERPL-SCNC: 25 MMOL/L (ref 23–29)
CREAT SERPL-MCNC: 0.9 MG/DL (ref 0.5–1.4)
DIFFERENTIAL METHOD BLD: ABNORMAL
EOSINOPHIL # BLD AUTO: 0 K/UL (ref 0–0.5)
EOSINOPHIL NFR BLD: 0.6 % (ref 0–8)
ERYTHROCYTE [DISTWIDTH] IN BLOOD BY AUTOMATED COUNT: 13.6 % (ref 11.5–14.5)
EST. GFR  (NO RACE VARIABLE): >60 ML/MIN/1.73 M^2
GLUCOSE SERPL-MCNC: 92 MG/DL (ref 70–110)
HCT VFR BLD AUTO: 44.8 % (ref 37–48.5)
HGB BLD-MCNC: 14.7 G/DL (ref 12–16)
IMM GRANULOCYTES # BLD AUTO: 0 K/UL (ref 0–0.04)
IMM GRANULOCYTES NFR BLD AUTO: 0 % (ref 0–0.5)
LYMPHOCYTES # BLD AUTO: 1.9 K/UL (ref 1–4.8)
LYMPHOCYTES NFR BLD: 35.6 % (ref 18–48)
MCH RBC QN AUTO: 29.9 PG (ref 27–31)
MCHC RBC AUTO-ENTMCNC: 32.8 G/DL (ref 32–36)
MCV RBC AUTO: 91 FL (ref 82–98)
MONOCYTES # BLD AUTO: 0.4 K/UL (ref 0.3–1)
MONOCYTES NFR BLD: 7.5 % (ref 4–15)
NEUTROPHILS # BLD AUTO: 3 K/UL (ref 1.8–7.7)
NEUTROPHILS NFR BLD: 55.9 % (ref 38–73)
NRBC BLD-RTO: 0 /100 WBC
PLATELET # BLD AUTO: 260 K/UL (ref 150–450)
PMV BLD AUTO: 9.1 FL (ref 9.2–12.9)
POTASSIUM SERPL-SCNC: 4.5 MMOL/L (ref 3.5–5.1)
PROT SERPL-MCNC: 7.8 G/DL (ref 6–8.4)
RBC # BLD AUTO: 4.91 M/UL (ref 4–5.4)
SODIUM SERPL-SCNC: 140 MMOL/L (ref 136–145)
WBC # BLD AUTO: 5.31 K/UL (ref 3.9–12.7)

## 2024-03-26 PROCEDURE — 80053 COMPREHEN METABOLIC PANEL: CPT | Performed by: OBSTETRICS & GYNECOLOGY

## 2024-03-26 PROCEDURE — 3008F BODY MASS INDEX DOCD: CPT | Mod: CPTII,S$GLB,, | Performed by: OBSTETRICS & GYNECOLOGY

## 2024-03-26 PROCEDURE — 36415 COLL VENOUS BLD VENIPUNCTURE: CPT | Performed by: OBSTETRICS & GYNECOLOGY

## 2024-03-26 PROCEDURE — 3079F DIAST BP 80-89 MM HG: CPT | Mod: CPTII,S$GLB,, | Performed by: OBSTETRICS & GYNECOLOGY

## 2024-03-26 PROCEDURE — 99999 PR PBB SHADOW E&M-EST. PATIENT-LVL III: CPT | Mod: PBBFAC,,, | Performed by: OBSTETRICS & GYNECOLOGY

## 2024-03-26 PROCEDURE — 85025 COMPLETE CBC W/AUTO DIFF WBC: CPT | Performed by: OBSTETRICS & GYNECOLOGY

## 2024-03-26 PROCEDURE — 99214 OFFICE O/P EST MOD 30 MIN: CPT | Mod: S$GLB,,, | Performed by: OBSTETRICS & GYNECOLOGY

## 2024-03-26 PROCEDURE — 1159F MED LIST DOCD IN RCRD: CPT | Mod: CPTII,S$GLB,, | Performed by: OBSTETRICS & GYNECOLOGY

## 2024-03-26 PROCEDURE — 3074F SYST BP LT 130 MM HG: CPT | Mod: CPTII,S$GLB,, | Performed by: OBSTETRICS & GYNECOLOGY

## 2024-03-26 RX ORDER — ESTRADIOL 0.1 MG/G
1 CREAM VAGINAL
Qty: 42 G | Refills: 3 | Status: SHIPPED | OUTPATIENT
Start: 2024-03-28

## 2024-03-26 RX ORDER — FEZOLINETANT 45 MG/1
45 TABLET, FILM COATED ORAL DAILY
Qty: 30 TABLET | Refills: 6 | Status: SHIPPED | OUTPATIENT
Start: 2024-03-26

## 2024-03-26 NOTE — PROGRESS NOTES
" Steph Lemus is a 53 y.o. female who presents to discuss hormone replacement therapy.  Menarche occurred at age 9 and the patient went into menopause at 45 years of age, which was 7 years ago.   Her history is pertinent for having had an endometrial ablation  om 2012 and began having menopausal symptoms a few years later and was started on Estradiol and progesterone. Her symptoms were resolved. She was then diagnosed with Breast cancer in August 2022 and subsequently underwent Bilateral mastectomy with subsequent reconstruction  in January 2023.  She states she was told had an Onco Score of 7 and has opted not to take Hormone blockers. She is currently on vaginal estradiol for dryness and recurrent UTI's which was approved by her oncologist. She developed severe vasomotor symptoms when she had to stop her HRT at the time of her Breast cancer diagnosis, and states was initially placed on Veozah with good response , but states her Insurance did not cover it all (had an exclusion) and her cost  would have been over $700 /month which she could not afford. She was then placed on Effexor which has not alleviated her symptoms at all. Patient is requesting hormone replacement therapy due to hot flashes, insomnia, no energy, vaginal dryness, and brain fog, "not feeling herself with a feeling of agitation/electricity in her brain) , Joints Aching, Irritability, and more recently has also had trouble reaching orgasm. (This began when she was started on Effexor). ,Patient denies post-menopausal vaginal bleeding. The patient is sexually active.  She denies the following contraindications to HRT:  Vaginal bleeding, history of VTE/PE, thrombophilia,   or active liver disease.       PCP: Dr. Melodie Hanks       Routine labs: December 2023  Pap smear: 7/14/2023  Mammogram: Bilateral mastectomy 1-5-2023  DEXA: NA  Colonoscopy: 2019([facility])    Lab Visit on 03/26/2024   Component Date Value Ref Range Status    Sodium " 03/26/2024 140  136 - 145 mmol/L Final    Potassium 03/26/2024 4.5  3.5 - 5.1 mmol/L Final    Chloride 03/26/2024 105  95 - 110 mmol/L Final    CO2 03/26/2024 25  23 - 29 mmol/L Final    Glucose 03/26/2024 92  70 - 110 mg/dL Final    BUN 03/26/2024 18  6 - 20 mg/dL Final    Creatinine 03/26/2024 0.9  0.5 - 1.4 mg/dL Final    Calcium 03/26/2024 10.5  8.7 - 10.5 mg/dL Final    Total Protein 03/26/2024 7.8  6.0 - 8.4 g/dL Final    Albumin 03/26/2024 4.5  3.5 - 5.2 g/dL Final    Total Bilirubin 03/26/2024 0.4  0.1 - 1.0 mg/dL Final    Alkaline Phosphatase 03/26/2024 105  55 - 135 U/L Final    AST 03/26/2024 29  10 - 40 U/L Final    ALT 03/26/2024 34  10 - 44 U/L Final    eGFR 03/26/2024 >60  >60 mL/min/1.73 m^2 Final    Anion Gap 03/26/2024 10  8 - 16 mmol/L Final    WBC 03/26/2024 5.31  3.90 - 12.70 K/uL Final    RBC 03/26/2024 4.91  4.00 - 5.40 M/uL Final    Hemoglobin 03/26/2024 14.7  12.0 - 16.0 g/dL Final    Hematocrit 03/26/2024 44.8  37.0 - 48.5 % Final    MCV 03/26/2024 91  82 - 98 fL Final    MCH 03/26/2024 29.9  27.0 - 31.0 pg Final    MCHC 03/26/2024 32.8  32.0 - 36.0 g/dL Final    RDW 03/26/2024 13.6  11.5 - 14.5 % Final    Platelets 03/26/2024 260  150 - 450 K/uL Final    MPV 03/26/2024 9.1 (L)  9.2 - 12.9 fL Final    Immature Granulocytes 03/26/2024 0.0  0.0 - 0.5 % Final    Gran # (ANC) 03/26/2024 3.0  1.8 - 7.7 K/uL Final    Immature Grans (Abs) 03/26/2024 0.00  0.00 - 0.04 K/uL Final    Lymph # 03/26/2024 1.9  1.0 - 4.8 K/uL Final    Mono # 03/26/2024 0.4  0.3 - 1.0 K/uL Final    Eos # 03/26/2024 0.0  0.0 - 0.5 K/uL Final    Baso # 03/26/2024 0.02  0.00 - 0.20 K/uL Final    nRBC 03/26/2024 0  0 /100 WBC Final    Gran % 03/26/2024 55.9  38.0 - 73.0 % Final    Lymph % 03/26/2024 35.6  18.0 - 48.0 % Final    Mono % 03/26/2024 7.5  4.0 - 15.0 % Final    Eosinophil % 03/26/2024 0.6  0.0 - 8.0 % Final    Basophil % 03/26/2024 0.4  0.0 - 1.9 % Final    Differential Method 03/26/2024 Automated   Final        Past Medical History:   Diagnosis Date    Acid reflux     Breast cancer 08/2022    Stage IA (T1c, N0, M0, Grade 1, ER+/SD+/HER2 pending) Invasive ductal carcinoma of the LEFT breast    Epistaxis, recurrent     none since cauterization    Hyperlipidemia     Invasive ductal carcinoma of left breast in female 09/2022     Past Surgical History:   Procedure Laterality Date    APPENDECTOMY      BREAST BIOPSY Left 08/03/2022    idc    COLONOSCOPY N/A 04/15/2019    Procedure: COLONOSCOPY;  Surgeon: Tad Suarez Jr., MD;  Location: [facility] ENDO;  Service: Endoscopy;  Laterality: N/A;    DILATION AND CURETTAGE OF UTERUS      for uterine polyps    ENDOMETRIAL ABLATION  2012    ENDOSCOPIC NASAL CAUTERIZATION N/A 08/11/2020    Procedure: CAUTERIZATION, NOSE, ENDOSCOPIC;  Surgeon: Surinder Acevedo MD;  Location: [facility] OR;  Service: ENT;  Laterality: N/A;    LAPAROSCOPIC APPENDECTOMY N/A 09/22/2020    Procedure: APPENDECTOMY, LAPAROSCOPIC;  Surgeon: Shai Ludwig MD;  Location: [facility] OR;  Service: General;  Laterality: N/A;    LIPOSUCTION W/ FAT INJECTION N/A 12/21/2023    Procedure: LIPOSUCTION, WITH FAT TRANSFER, Abdomen to Breast;  Surgeon: Michel Tirado MD;  Location: [facility] OR;  Service: Plastics;  Laterality: N/A;    MASTOPEXY Bilateral 12/21/2023    Procedure: MASTOPEXY;  Surgeon: Michel Tirado MD;  Location: [facility] OR;  Service: Plastics;  Laterality: Bilateral;    RECONSTRUCTION OF BREAST WITH DEEP INFERIOR EPIGASTRIC ARTERY  (RUTH) FREE FLAP Bilateral 01/05/2023    Procedure: RECONSTRUCTION, BREAST, USING RUTH FREE FLAP;  Surgeon: Michel Tirado MD;  Location: [facility] OR;  Service: Plastics;  Laterality: Bilateral;    REPAIR, NERVE USING ALLOGRAFT Bilateral 01/05/2023    Procedure: REPAIR, NERVE USING ALLOGRAFT;  Surgeon: Michel Tirado MD;  Location: [facility] OR;  Service: Plastics;  Laterality: Bilateral;    REVISION, FLAP, PREVIOUSLY CREATED FOR BREAST RECONSTRUCTION Bilateral 12/21/2023    Procedure:  REVISION, FLAP, PREVIOUSLY CREATED FOR BREAST RECONSTRUCTION;  Surgeon: Michel Tirado MD;  Location: [facility] OR;  Service: Plastics;  Laterality: Bilateral;    SENTINEL LYMPH NODE BIOPSY Left 2023    Procedure: BIOPSY, LYMPH NODE, SENTINEL;  Surgeon: Simona Cueva MD;  Location: [facility] OR;  Service: General;  Laterality: Left;    SIMPLE MASTECTOMY Bilateral 2023    Procedure: MASTECTOMY, SIMPLE;  Surgeon: Simona Cueva MD;  Location: [facility] OR;  Service: General;  Laterality: Bilateral;    TONSILLECTOMY       Social History     Tobacco Use    Smoking status: Former     Current packs/day: 0.00     Average packs/day: 0.1 packs/day for 25.0 years (2.5 ttl pk-yrs)     Types: Cigarettes     Start date: 1997     Quit date: 2022     Years since quittin.6    Smokeless tobacco: Never   Substance Use Topics    Alcohol use: Yes     Alcohol/week: 6.0 standard drinks of alcohol     Types: 6 Glasses of wine per week    Drug use: No     Family History   Problem Relation Age of Onset    Lung cancer Paternal Grandfather     Cancer Paternal Grandfather 75        lung cancer- smoker    Colon cancer Maternal Grandfather 80    Diabetes Maternal Grandfather     Hypertension Father     Cancer Father         throat    Pacemaker/defibrilator Father     Hyperlipidemia Mother     Hypertension Mother     Diabetes Mother     Breast cancer Cousin 40    BRCA 1/2 Cousin     Lung cancer Paternal Uncle 65        smoker    Ovarian cancer Neg Hx     Stroke Neg Hx      OB History    Para Term  AB Living   3 3 3     3   SAB IAB Ectopic Multiple Live Births           3      # Outcome Date GA Lbr Randal/2nd Weight Sex Delivery Anes PTL Lv   3 Term      Vag-Spont   GUERRERO   2 Term      Vag-Spont   GUERRERO   1 Term      Vag-Spont   GUERRERO       Current Outpatient Medications:     multivitamin (THERAGRAN) per tablet, Take 1 tablet by mouth once daily., Disp: , Rfl:     rosuvastatin (CRESTOR) 5 MG tablet, Take 1 tablet (5 mg  total) by mouth once daily. (Patient taking differently: Take 5 mg by mouth every evening.), Disp: 90 tablet, Rfl: 3    venlafaxine (EFFEXOR XR) 37.5 MG 24 hr capsule, Take 1 capsule (37.5 mg total) by mouth once daily., Disp: 30 capsule, Rfl: 11    vitamin D (VITAMIN D3) 1000 units Tab, Take 1,000 Units by mouth once daily., Disp: , Rfl:     ALPRAZolam (XANAX) 0.5 MG tablet, Take 1 tablet (0.5 mg total) by mouth 2 (two) times daily as needed for Anxiety., Disp: 45 tablet, Rfl: 0    [START ON 3/28/2024] estradioL (ESTRACE) 0.01 % (0.1 mg/gram) vaginal cream, Place 1 g vaginally twice a week., Disp: 42 g, Rfl: 3    fezolinetant (VEOZAH) 45 mg Tab, Take 45 mg by mouth Daily., Disp: 30 tablet, Rfl: 6  No current facility-administered medications for this visit.    Facility-Administered Medications Ordered in Other Visits:     albuterol nebulizer solution 2.5 mg, 2.5 mg, Nebulization, Q15 Min PRN, Eric Acuna MD    albuterol-ipratropium 2.5 mg-0.5 mg/3 mL nebulizer solution 3 mL, 3 mL, Nebulization, PRN, Eric Acuna MD    diphenhydrAMINE injection 25 mg, 25 mg, Intravenous, Q6H PRN, Erci Acuna MD    HYDROmorphone injection 0.5 mg, 0.5 mg, Intravenous, Q5 Min PRN, Eric Acuna MD, 0.5 mg at 12/21/23 1556    lactated ringers infusion, , Intravenous, Continuous, Eric Acuna MD, New Bag at 12/21/23 1324    LIDOcaine (PF) 10 mg/ml (1%) injection 10 mg, 1 mL, Intradermal, Once, Simona Cueva MD    LIDOcaine (PF) 10 mg/ml (1%) injection 10 mg, 1 mL, Intradermal, Once, Eric Acuna MD    LORazepam injection 0.25 mg, 0.25 mg, Intravenous, Q15 Min PRN, Eric Acuna MD    midazolam (VERSED) 1 mg/mL injection 2 mg, 2 mg, Intravenous, See admin instructions, Simona Cueva MD    midazolam (VERSED) 1 mg/mL injection 2 mg, 2 mg, Intravenous, See admin instructions, Simona Cueva MD, 2 mg at 01/05/23 0703    ondansetron injection 4 mg, 4 mg, Intravenous, Q15 Min PRN,  "Eric Acuna MD    piperacillin-tazobactam 4.5 g in dextrose 5 % 100 mL IVPB (ready to mix system), 4.5 g, Intravenous, On Call Procedure, Simona Cueva MD    sodium chloride 0.9% flush 3 mL, 3 mL, Intravenous, PRN, Eric Acuna MD    Review of Systems:  General: No fever, chills, or weight loss.  Chest: No chest pain, shortness of breath, or palpitations.  Breast: No pain, masses, or nipple discharge.  Vulva: No pain, lesions, or itching.  Vagina: No relaxation, itching, discharge, or lesions.  Abdomen: No pain, nausea, vomiting, diarrhea, or constipation.  Urinary: No incontinence, nocturia, frequency, or dysuria.  Extremities:  No leg cramps, edema, or calf pain.  Neurologic: No headaches, dizziness, or visual changes.    Vitals:    03/26/24 0957   BP: 127/86   Weight: 86.2 kg (190 lb)   Height: 5' 6" (1.676 m)   PainSc: 0-No pain     Body mass index is 30.67 kg/m².    Assessment:    Menopausal syndrome  -     fezolinetant (VEOZAH) 45 mg Tab; Take 45 mg by mouth Daily.  Dispense: 30 tablet; Refill: 6  -     Comprehensive Metabolic Panel; Future; Expected date: 03/26/2024  -     CBC Auto Differential; Future; Expected date: 03/26/2024    Personal history of breast cancer  -     fezolinetant (VEOZAH) 45 mg Tab; Take 45 mg by mouth Daily.  Dispense: 30 tablet; Refill: 6    Postmenopausal atrophic vaginitis  -     estradioL (ESTRACE) 0.01 % (0.1 mg/gram) vaginal cream; Place 1 g vaginally twice a week.  Dispense: 42 g; Refill: 3        Plan:   Menopausal symptoms with various treatment options discussed in view of breast cancer diagnosis.  Discussed acupuncture, (she has tried with mixed/uncertain results but no longlasting effect)      Plan:  Continue Vaginal estradiol cream  Begin Veozah(will attempt to get coverage for treatment)  Will wean off Effexor as this has not been effective and seems to be causing sexual dysfunction        Follow up in 3 months  Instructed patient to call if she " experiences any side effects or has any questions.  I spent 45 minutes with this patient today, >50% counseling.

## 2024-04-09 ENCOUNTER — OFFICE VISIT (OUTPATIENT)
Dept: ORTHOPEDICS | Facility: CLINIC | Age: 54
End: 2024-04-09
Payer: COMMERCIAL

## 2024-04-09 DIAGNOSIS — S66.912A SPRAIN AND STRAIN OF LEFT WRIST: ICD-10-CM

## 2024-04-09 DIAGNOSIS — S63.502A SPRAIN AND STRAIN OF LEFT WRIST: ICD-10-CM

## 2024-04-09 PROCEDURE — 1160F RVW MEDS BY RX/DR IN RCRD: CPT | Mod: CPTII,S$GLB,, | Performed by: PHYSICIAN ASSISTANT

## 2024-04-09 PROCEDURE — 99203 OFFICE O/P NEW LOW 30 MIN: CPT | Mod: S$GLB,,, | Performed by: PHYSICIAN ASSISTANT

## 2024-04-09 PROCEDURE — 99999 PR PBB SHADOW E&M-EST. PATIENT-LVL IV: CPT | Mod: PBBFAC,,, | Performed by: PHYSICIAN ASSISTANT

## 2024-04-09 PROCEDURE — 1159F MED LIST DOCD IN RCRD: CPT | Mod: CPTII,S$GLB,, | Performed by: PHYSICIAN ASSISTANT

## 2024-04-09 NOTE — PROGRESS NOTES
4/9/2024    Chief Complaint:  Chief Complaint   Patient presents with    Left Wrist - Injury, Pain, Swelling     Caught cabbage at parade 04/06/2024       HPI:  Steph Lemus is a 53 y.o. female, who presents to clinic today for evaluation of her left wrist injury.  States approximately 3 days ago she caught a CABG at a parade and injured her left wrist.  States this prompted her to follow up with urgent care.  States x-rays were taken which showed no acute fracture.  States he was placed in a splint and instructed to follow up with our clinic.  Denies any other complaints at this time.    PMHX:  Past Medical History:   Diagnosis Date    Acid reflux     Breast cancer 08/2022    Stage IA (T1c, N0, M0, Grade 1, ER+/SC+/HER2 pending) Invasive ductal carcinoma of the LEFT breast    Epistaxis, recurrent     none since cauterization    Hyperlipidemia     Invasive ductal carcinoma of left breast in female 09/2022       PSHX:  Past Surgical History:   Procedure Laterality Date    APPENDECTOMY      BREAST BIOPSY Left 08/03/2022    idc    COLONOSCOPY N/A 04/15/2019    Procedure: COLONOSCOPY;  Surgeon: Tad Suarez Jr., MD;  Location: [facility] ENDO;  Service: Endoscopy;  Laterality: N/A;    DILATION AND CURETTAGE OF UTERUS      for uterine polyps    ENDOMETRIAL ABLATION  2012    ENDOSCOPIC NASAL CAUTERIZATION N/A 08/11/2020    Procedure: CAUTERIZATION, NOSE, ENDOSCOPIC;  Surgeon: Surinder Acevedo MD;  Location: [facility] OR;  Service: ENT;  Laterality: N/A;    LAPAROSCOPIC APPENDECTOMY N/A 09/22/2020    Procedure: APPENDECTOMY, LAPAROSCOPIC;  Surgeon: Shai Ludwig MD;  Location: [facility] OR;  Service: General;  Laterality: N/A;    LIPOSUCTION W/ FAT INJECTION N/A 12/21/2023    Procedure: LIPOSUCTION, WITH FAT TRANSFER, Abdomen to Breast;  Surgeon: Michel Tirado MD;  Location: [facility] OR;  Service: Plastics;  Laterality: N/A;    MASTOPEXY Bilateral 12/21/2023    Procedure: MASTOPEXY;  Surgeon: Michel Tirado MD;  Location:  [facility] OR;  Service: Plastics;  Laterality: Bilateral;    RECONSTRUCTION OF BREAST WITH DEEP INFERIOR EPIGASTRIC ARTERY  (RUTH) FREE FLAP Bilateral 2023    Procedure: RECONSTRUCTION, BREAST, USING RUTH FREE FLAP;  Surgeon: Michel Tirado MD;  Location: [facility] OR;  Service: Plastics;  Laterality: Bilateral;    REPAIR, NERVE USING ALLOGRAFT Bilateral 2023    Procedure: REPAIR, NERVE USING ALLOGRAFT;  Surgeon: Michel Tirado MD;  Location: [facility] OR;  Service: Plastics;  Laterality: Bilateral;    REVISION, FLAP, PREVIOUSLY CREATED FOR BREAST RECONSTRUCTION Bilateral 2023    Procedure: REVISION, FLAP, PREVIOUSLY CREATED FOR BREAST RECONSTRUCTION;  Surgeon: Michel Tirado MD;  Location: [facility] OR;  Service: Plastics;  Laterality: Bilateral;    SENTINEL LYMPH NODE BIOPSY Left 2023    Procedure: BIOPSY, LYMPH NODE, SENTINEL;  Surgeon: Simona Cueva MD;  Location: [facility] OR;  Service: General;  Laterality: Left;    SIMPLE MASTECTOMY Bilateral 2023    Procedure: MASTECTOMY, SIMPLE;  Surgeon: Simona Cueva MD;  Location: [facility] OR;  Service: General;  Laterality: Bilateral;    TONSILLECTOMY         FMHX:  Family History   Problem Relation Age of Onset    Lung cancer Paternal Grandfather     Cancer Paternal Grandfather 75        lung cancer- smoker    Colon cancer Maternal Grandfather 80    Diabetes Maternal Grandfather     Hypertension Father     Cancer Father         throat    Pacemaker/defibrilator Father     Hyperlipidemia Mother     Hypertension Mother     Diabetes Mother     Breast cancer Cousin 40    BRCA 1/2 Cousin     Lung cancer Paternal Uncle 65        smoker    Ovarian cancer Neg Hx     Stroke Neg Hx        SOCHX:  Social History     Tobacco Use    Smoking status: Former     Current packs/day: 0.00     Average packs/day: 0.1 packs/day for 25.0 years (2.5 ttl pk-yrs)     Types: Cigarettes     Start date: 1997     Quit date: 2022     Years since quittin.6    Smokeless  tobacco: Never   Substance Use Topics    Alcohol use: Yes     Alcohol/week: 6.0 standard drinks of alcohol     Types: 6 Glasses of wine per week       ALLERGIES:  Augmentin [amoxicillin-pot clavulanate]    CURRENT MEDICATIONS:  Current Outpatient Medications on File Prior to Visit   Medication Sig Dispense Refill    estradioL (ESTRACE) 0.01 % (0.1 mg/gram) vaginal cream Place 1 g vaginally twice a week. 42 g 3    fezolinetant (VEOZAH) 45 mg Tab Take 45 mg by mouth Daily. 30 tablet 6    multivitamin (THERAGRAN) per tablet Take 1 tablet by mouth once daily.      rosuvastatin (CRESTOR) 5 MG tablet Take 1 tablet (5 mg total) by mouth once daily. (Patient taking differently: Take 5 mg by mouth every evening.) 90 tablet 3    venlafaxine (EFFEXOR XR) 37.5 MG 24 hr capsule Take 1 capsule (37.5 mg total) by mouth once daily. 30 capsule 11    vitamin D (VITAMIN D3) 1000 units Tab Take 1,000 Units by mouth once daily.      ALPRAZolam (XANAX) 0.5 MG tablet Take 1 tablet (0.5 mg total) by mouth 2 (two) times daily as needed for Anxiety. 45 tablet 0     Current Facility-Administered Medications on File Prior to Visit   Medication Dose Route Frequency Provider Last Rate Last Admin    albuterol nebulizer solution 2.5 mg  2.5 mg Nebulization Q15 Min PRN Eric Acuna MD        albuterol-ipratropium 2.5 mg-0.5 mg/3 mL nebulizer solution 3 mL  3 mL Nebulization PRN Eric Acuna MD        diphenhydrAMINE injection 25 mg  25 mg Intravenous Q6H PRN Eric Acuna MD        HYDROmorphone injection 0.5 mg  0.5 mg Intravenous Q5 Min PRN Eric Acuna MD   0.5 mg at 12/21/23 1556    lactated ringers infusion   Intravenous Continuous Eric Acuna MD   New Bag at 12/21/23 1324    LIDOcaine (PF) 10 mg/ml (1%) injection 10 mg  1 mL Intradermal Once Simona Cueva MD        LIDOcaine (PF) 10 mg/ml (1%) injection 10 mg  1 mL Intradermal Once Eric Acuna MD        LORazepam injection 0.25 mg  0.25 mg Intravenous  Q15 Min PRN Eric Acuna MD        midazolam (VERSED) 1 mg/mL injection 2 mg  2 mg Intravenous See admin instructions Simona Cueva MD        midazolam (VERSED) 1 mg/mL injection 2 mg  2 mg Intravenous See admin instructions Simona Cueva MD   2 mg at 01/05/23 0703    ondansetron injection 4 mg  4 mg Intravenous Q15 Min PRN Eric Acuna MD        piperacillin-tazobactam 4.5 g in dextrose 5 % 100 mL IVPB (ready to mix system)  4.5 g Intravenous On Call Procedure Simona Cueva MD        sodium chloride 0.9% flush 3 mL  3 mL Intravenous PRN Eric Acuna MD           REVIEW OF SYSTEMS:  Review of Systems   Constitutional: Negative.    HENT: Negative.     Eyes: Negative.    Respiratory: Negative.     Cardiovascular: Negative.    Gastrointestinal: Negative.    Genitourinary: Negative.    Musculoskeletal:  Positive for joint pain.   Skin: Negative.    Neurological:  Positive for weakness.   Endo/Heme/Allergies: Negative.    Psychiatric/Behavioral: Negative.       GENERAL PHYSICAL EXAM:   LMP  (LMP Unknown)    GEN: well developed, well nourished, no acute distress   HENT: Normocephalic, atraumatic   EYES: No discharge, conjunctiva normal   NECK: Supple, non-tender   PULM: No wheezing, no respiratory distress   CV: RRR   ABD: Soft, non-tender    ORTHO EXAM:   Examination of the left wrist reveals mild edema over the ulnar portion of the left wrist.  No erythema, ecchymosis, or skin breakdown.  No snuffbox tenderness noted.  Tenderness to palpation over the ulnar portion of the left wrist, particularly over the area of the TFCC.  Strength not tested secondary to injury.  Normal sensation in the radial, ulnar, median nerve distributions.  Capillary refill less than 2 seconds.    RADIOLOGY:   X-rays of the left wrist were taken 2 days ago.  X-rays reviewed by myself in clinic today.  Imaging showed no acute fracture or dislocation.  No subluxation.  Presence of a questionable linear  lucency of the scaphoid waist, but given the lack of pain on physical examination of the snuffbox is likely not nondisplaced fracture.  We will continue to monitor with repeat imaging.  Presence of ulnar positive variance with associated states exchanges over the ulnar portion of the lunate likely due to ulnar impaction syndrome no other significant bony abnormalities.    ASSESSMENT:   Left wrist injury, ulnar impaction syndrome    PLAN:  1. I discussed with Steph Lemus the ulnar impaction syndrome pathology and treatment options in detail during today's visit.  After treatment options were discussed, we decided the best course of action this time is to proceed with immobilization via a removable Velcro short wrist splint of the left wrist.  She verbally agreed with the treatment plan    2. She was placed in a removable Velcro short wrist splint of the left wrist in clinic today.  She was instructed to wear it at all times only to remove for bathing.  She verbalized understanding     3. I would like her follow up in clinic in 8 days for repeat evaluation at which time perform repeat x-rays of the left wrist.  She verbally agreed with the treatment plan.

## 2024-04-12 DIAGNOSIS — S63.502A SPRAIN AND STRAIN OF LEFT WRIST: Primary | ICD-10-CM

## 2024-04-12 DIAGNOSIS — S66.912A SPRAIN AND STRAIN OF LEFT WRIST: Primary | ICD-10-CM

## 2024-04-15 ENCOUNTER — PATIENT MESSAGE (OUTPATIENT)
Dept: OBSTETRICS AND GYNECOLOGY | Facility: CLINIC | Age: 54
End: 2024-04-15
Payer: COMMERCIAL

## 2024-04-16 ENCOUNTER — PATIENT MESSAGE (OUTPATIENT)
Dept: ORTHOPEDICS | Facility: CLINIC | Age: 54
End: 2024-04-16
Payer: COMMERCIAL

## 2024-04-17 DIAGNOSIS — M25.531 RIGHT WRIST PAIN: Primary | ICD-10-CM

## 2024-04-19 ENCOUNTER — HOSPITAL ENCOUNTER (OUTPATIENT)
Dept: RADIOLOGY | Facility: HOSPITAL | Age: 54
Discharge: HOME | End: 2024-04-19
Attending: PHYSICIAN ASSISTANT
Payer: COMMERCIAL

## 2024-04-19 ENCOUNTER — OFFICE VISIT (OUTPATIENT)
Dept: ORTHOPEDICS | Facility: CLINIC | Age: 54
End: 2024-04-19
Payer: COMMERCIAL

## 2024-04-19 DIAGNOSIS — S63.502A SPRAIN AND STRAIN OF LEFT WRIST: ICD-10-CM

## 2024-04-19 DIAGNOSIS — S66.912A SPRAIN AND STRAIN OF LEFT WRIST: ICD-10-CM

## 2024-04-19 DIAGNOSIS — M25.531 RIGHT WRIST PAIN: ICD-10-CM

## 2024-04-19 DIAGNOSIS — S66.912A SPRAIN AND STRAIN OF LEFT WRIST: Primary | ICD-10-CM

## 2024-04-19 DIAGNOSIS — S63.502A SPRAIN AND STRAIN OF LEFT WRIST: Primary | ICD-10-CM

## 2024-04-19 PROCEDURE — 99999 PR PBB SHADOW E&M-EST. PATIENT-LVL III: CPT | Mod: PBBFAC,,, | Performed by: PHYSICIAN ASSISTANT

## 2024-04-19 PROCEDURE — 73110 X-RAY EXAM OF WRIST: CPT | Mod: TC,PO,LT

## 2024-04-19 PROCEDURE — 1160F RVW MEDS BY RX/DR IN RCRD: CPT | Mod: CPTII,S$GLB,, | Performed by: PHYSICIAN ASSISTANT

## 2024-04-19 PROCEDURE — 99213 OFFICE O/P EST LOW 20 MIN: CPT | Mod: S$GLB,,, | Performed by: PHYSICIAN ASSISTANT

## 2024-04-19 PROCEDURE — 1159F MED LIST DOCD IN RCRD: CPT | Mod: CPTII,S$GLB,, | Performed by: PHYSICIAN ASSISTANT

## 2024-04-19 PROCEDURE — 73110 X-RAY EXAM OF WRIST: CPT | Mod: TC,PO,RT

## 2024-04-19 PROCEDURE — 73110 X-RAY EXAM OF WRIST: CPT | Mod: 26,76,RT, | Performed by: RADIOLOGY

## 2024-04-19 PROCEDURE — 73110 X-RAY EXAM OF WRIST: CPT | Mod: 26,LT,, | Performed by: RADIOLOGY

## 2024-04-19 RX ORDER — MELOXICAM 15 MG/1
15 TABLET ORAL DAILY
Qty: 28 TABLET | Refills: 0 | Status: SHIPPED | OUTPATIENT
Start: 2024-04-19 | End: 2024-06-07

## 2024-04-22 NOTE — PROGRESS NOTES
4/22/2024    HPI:  Steph Lemus is a 53 y.o. female, who presents to clinic today for continued evaluation of her left wrist injury.  States he has had no significant improvement of her symptoms since last visit.  States he is noticed some pain of the right hand, but it is relatively mild.  Denies any acute injuries since last visit.  Denies any other complaints at this time.    PMHX:  Past Medical History:   Diagnosis Date    Acid reflux     Breast cancer 08/2022    Stage IA (T1c, N0, M0, Grade 1, ER+/ME+/HER2 pending) Invasive ductal carcinoma of the LEFT breast    Epistaxis, recurrent     none since cauterization    Hyperlipidemia     Invasive ductal carcinoma of left breast in female 09/2022       PSHX:  Past Surgical History:   Procedure Laterality Date    APPENDECTOMY      BREAST BIOPSY Left 08/03/2022    idc    COLONOSCOPY N/A 04/15/2019    Procedure: COLONOSCOPY;  Surgeon: Tad Suarez Jr., MD;  Location: [facility];  Service: Endoscopy;  Laterality: N/A;    DILATION AND CURETTAGE OF UTERUS      for uterine polyps    ENDOMETRIAL ABLATION  2012    ENDOSCOPIC NASAL CAUTERIZATION N/A 08/11/2020    Procedure: CAUTERIZATION, NOSE, ENDOSCOPIC;  Surgeon: Surinder Acevedo MD;  Location: [facility] OR;  Service: ENT;  Laterality: N/A;    LAPAROSCOPIC APPENDECTOMY N/A 09/22/2020    Procedure: APPENDECTOMY, LAPAROSCOPIC;  Surgeon: Shai Ludwig MD;  Location: [facility] OR;  Service: General;  Laterality: N/A;    LIPOSUCTION W/ FAT INJECTION N/A 12/21/2023    Procedure: LIPOSUCTION, WITH FAT TRANSFER, Abdomen to Breast;  Surgeon: Michel Tirado MD;  Location: [facility] OR;  Service: Plastics;  Laterality: N/A;    MASTOPEXY Bilateral 12/21/2023    Procedure: MASTOPEXY;  Surgeon: Michel Tirado MD;  Location: [facility] OR;  Service: Plastics;  Laterality: Bilateral;    RECONSTRUCTION OF BREAST WITH DEEP INFERIOR EPIGASTRIC ARTERY  (RUTH) FREE FLAP Bilateral 01/05/2023    Procedure: RECONSTRUCTION, BREAST, USING RUTH  FREE FLAP;  Surgeon: Michel Tirado MD;  Location: [facility] OR;  Service: Plastics;  Laterality: Bilateral;    REPAIR, NERVE USING ALLOGRAFT Bilateral 2023    Procedure: REPAIR, NERVE USING ALLOGRAFT;  Surgeon: Michel Tirado MD;  Location: [facility] OR;  Service: Plastics;  Laterality: Bilateral;    REVISION, FLAP, PREVIOUSLY CREATED FOR BREAST RECONSTRUCTION Bilateral 2023    Procedure: REVISION, FLAP, PREVIOUSLY CREATED FOR BREAST RECONSTRUCTION;  Surgeon: Michel Tirado MD;  Location: [facility] OR;  Service: Plastics;  Laterality: Bilateral;    SENTINEL LYMPH NODE BIOPSY Left 2023    Procedure: BIOPSY, LYMPH NODE, SENTINEL;  Surgeon: Simona Cueva MD;  [facility];  Service: General;  Laterality: Left;    SIMPLE MASTECTOMY Bilateral 2023    Procedure: MASTECTOMY, SIMPLE;  Surgeon: Simona Cueva MD;  Location: [facility] OR;  Service: General;  Laterality: Bilateral;    TONSILLECTOMY         FMHX:  Family History   Problem Relation Name Age of Onset    Lung cancer Paternal Grandfather Franny     Cancer Paternal Grandfather Franny 75        lung cancer- smoker    Colon cancer Maternal Grandfather Surinder 80    Diabetes Maternal Grandfather Surinder     Hypertension Father      Cancer Father          throat    Pacemaker/defibrilator Father      Hyperlipidemia Mother      Hypertension Mother      Diabetes Mother      Breast cancer Cousin maternal 1st 40    BRCA 1/2 Cousin maternal 1st     Lung cancer Paternal Uncle  65        smoker    Ovarian cancer Neg Hx      Stroke Neg Hx         SOCHX:  Social History     Tobacco Use    Smoking status: Former     Current packs/day: 0.00     Average packs/day: 0.1 packs/day for 25.0 years (2.5 ttl pk-yrs)     Types: Cigarettes     Start date: 1997     Quit date: 2022     Years since quittin.7    Smokeless tobacco: Never   Substance Use Topics    Alcohol use: Yes     Alcohol/week: 6.0 standard drinks of alcohol     Types: 6 Glasses of wine per week        ALLERGIES:  Augmentin [amoxicillin-pot clavulanate]    CURRENT MEDICATIONS:  Current Outpatient Medications on File Prior to Visit   Medication Sig Dispense Refill    estradioL (ESTRACE) 0.01 % (0.1 mg/gram) vaginal cream Place 1 g vaginally twice a week. 42 g 3    fezolinetant (VEOZAH) 45 mg Tab Take 45 mg by mouth Daily. 30 tablet 6    multivitamin (THERAGRAN) per tablet Take 1 tablet by mouth once daily.      rosuvastatin (CRESTOR) 5 MG tablet Take 1 tablet (5 mg total) by mouth once daily. (Patient taking differently: Take 5 mg by mouth every evening.) 90 tablet 3    venlafaxine (EFFEXOR XR) 37.5 MG 24 hr capsule Take 1 capsule (37.5 mg total) by mouth once daily. 30 capsule 11    vitamin D (VITAMIN D3) 1000 units Tab Take 1,000 Units by mouth once daily.      ALPRAZolam (XANAX) 0.5 MG tablet Take 1 tablet (0.5 mg total) by mouth 2 (two) times daily as needed for Anxiety. 45 tablet 0     Current Facility-Administered Medications on File Prior to Visit   Medication Dose Route Frequency Provider Last Rate Last Admin    albuterol nebulizer solution 2.5 mg  2.5 mg Nebulization Q15 Min PRN Eric Acuna MD        albuterol-ipratropium 2.5 mg-0.5 mg/3 mL nebulizer solution 3 mL  3 mL Nebulization PRN Eric Acuna MD        diphenhydrAMINE injection 25 mg  25 mg Intravenous Q6H PRN Eric Acuna MD        HYDROmorphone injection 0.5 mg  0.5 mg Intravenous Q5 Min PRN Eric Acuna MD   0.5 mg at 12/21/23 1556    lactated ringers infusion   Intravenous Continuous Eric Acuna MD   New Bag at 12/21/23 1324    LIDOcaine (PF) 10 mg/ml (1%) injection 10 mg  1 mL Intradermal Once Simona Cueva MD        LIDOcaine (PF) 10 mg/ml (1%) injection 10 mg  1 mL Intradermal Once Eric Acuna MD        LORazepam injection 0.25 mg  0.25 mg Intravenous Q15 Min PRN Eric Acuna MD        midazolam (VERSED) 1 mg/mL injection 2 mg  2 mg Intravenous See admin instructions Simona Cueva MD         midazolam (VERSED) 1 mg/mL injection 2 mg  2 mg Intravenous See admin instructions Simona Cueva MD   2 mg at 01/05/23 0703    ondansetron injection 4 mg  4 mg Intravenous Q15 Min PRN Eric Acuna MD        piperacillin-tazobactam 4.5 g in dextrose 5 % 100 mL IVPB (ready to mix system)  4.5 g Intravenous On Call Procedure Simona Cueva MD        sodium chloride 0.9% flush 3 mL  3 mL Intravenous PRN Eric Acuna MD           REVIEW OF SYSTEMS:  Review of Systems Complete; Negative, unless noted above.    GENERAL PHYSICAL EXAM:   LMP  (LMP Unknown)    GEN: well developed, well nourished, no acute distress   PULM: No wheezing, no respiratory distress   CV: RRR    ORTHO EXAM:   Examination of the left hand/wrist reveals mild edema over the ulnar portion of the left wrist.  No erythema, ecchymosis, or skin breakdown.  Able make composite fist and fully extend all fingers.  Able to flex extend the wrist appropriately.  Marked tenderness to palpation over the area of the TFCC.  Normal sensation in the radial, ulnar, median nerve distributions.  Capillary refill less than 2 seconds.    RADIOLOGY:   X-rays of the left wrist were taken today in clinic.  X-rays reviewed by myself.  Imaging showed no acute fracture or dislocation no subluxation no radiopaque foreign body or mass noted.  No osseous destructive/erosive processes noted.  No other significant bony abnormalities noted.   X-rays of the right wrist were taken today in clinic.  X-rays reviewed by myself.  Imaging showed no acute fracture or dislocation no subluxation no radiopaque foreign body or mass noted.  No osseous destructive/erosive processes noted.  No other significant bony abnormalities noted.    ASSESSMENT:   Left wrist injury with associated ulnar impaction syndrome    PLAN:  1. I discussed with Steph Lemus that the best course of action this time is to continue immobilization via the removable Velcro wrist splint at  all times only to remove for bathing I have limited weight-bearing of the left hand/wrist until seen again in clinic for the next few weeks.  We did discuss if she fails to improve would consider performing an MRI of the left wrist without contrast to further evaluate the soft tissue structures, particularly the TFCC.  She verbally agreed with the treatment plan     2. I would like her follow up in clinic in 3 weeks for repeat evaluation.  She was instructed to contact clinic for any problems or concerns in the interim.

## 2024-05-03 ENCOUNTER — OFFICE VISIT (OUTPATIENT)
Dept: ORTHOPEDICS | Facility: CLINIC | Age: 54
End: 2024-05-03
Payer: COMMERCIAL

## 2024-05-03 VITALS — HEIGHT: 66 IN | BODY MASS INDEX: 30.69 KG/M2 | WEIGHT: 190.94 LBS

## 2024-05-03 DIAGNOSIS — S66.912A SPRAIN AND STRAIN OF LEFT WRIST: Primary | ICD-10-CM

## 2024-05-03 DIAGNOSIS — S63.502A SPRAIN AND STRAIN OF LEFT WRIST: Primary | ICD-10-CM

## 2024-05-03 PROCEDURE — 1159F MED LIST DOCD IN RCRD: CPT | Mod: CPTII,S$GLB,, | Performed by: PHYSICIAN ASSISTANT

## 2024-05-03 PROCEDURE — 3008F BODY MASS INDEX DOCD: CPT | Mod: CPTII,S$GLB,, | Performed by: PHYSICIAN ASSISTANT

## 2024-05-03 PROCEDURE — 1160F RVW MEDS BY RX/DR IN RCRD: CPT | Mod: CPTII,S$GLB,, | Performed by: PHYSICIAN ASSISTANT

## 2024-05-03 PROCEDURE — 99999 PR PBB SHADOW E&M-EST. PATIENT-LVL IV: CPT | Mod: PBBFAC,,, | Performed by: PHYSICIAN ASSISTANT

## 2024-05-03 PROCEDURE — 99213 OFFICE O/P EST LOW 20 MIN: CPT | Mod: S$GLB,,, | Performed by: PHYSICIAN ASSISTANT

## 2024-05-06 NOTE — PROGRESS NOTES
5/6/2024    HPI:  Steph Lemus is a 53 y.o. female, who presents to clinic today for continued evaluation of her left wrist injury and ulnar impaction syndrome.  States wearing the brace and significantly improved her symptoms.  Denies any acute injuries since last visit.  Denies any other complaints at this time.    PMHX:  Past Medical History:   Diagnosis Date    Acid reflux     Breast cancer 08/2022    Stage IA (T1c, N0, M0, Grade 1, ER+/FL+/HER2 pending) Invasive ductal carcinoma of the LEFT breast    Epistaxis, recurrent     none since cauterization    Hyperlipidemia     Invasive ductal carcinoma of left breast in female 09/2022       PSHX:  Past Surgical History:   Procedure Laterality Date    APPENDECTOMY      BREAST BIOPSY Left 08/03/2022    idc    COLONOSCOPY N/A 04/15/2019    Procedure: COLONOSCOPY;  Surgeon: Tad Suarez Jr., MD;  Location: [facility] ENDO;  Service: Endoscopy;  Laterality: N/A;    DILATION AND CURETTAGE OF UTERUS      for uterine polyps    ENDOMETRIAL ABLATION  2012    ENDOSCOPIC NASAL CAUTERIZATION N/A 08/11/2020    Procedure: CAUTERIZATION, NOSE, ENDOSCOPIC;  Surgeon: Surinder Acevedo MD;  Location: [facility] OR;  Service: ENT;  Laterality: N/A;    LAPAROSCOPIC APPENDECTOMY N/A 09/22/2020    Procedure: APPENDECTOMY, LAPAROSCOPIC;  Surgeon: Shai Ludwig MD;  Location: [facility] OR;  Service: General;  Laterality: N/A;    LIPOSUCTION W/ FAT INJECTION N/A 12/21/2023    Procedure: LIPOSUCTION, WITH FAT TRANSFER, Abdomen to Breast;  Surgeon: Michel Tirado MD;  Location: [facility] OR;  Service: Plastics;  Laterality: N/A;    MASTOPEXY Bilateral 12/21/2023    Procedure: MASTOPEXY;  Surgeon: Michel Tirado MD;  Location: [facility] OR;  Service: Plastics;  Laterality: Bilateral;    RECONSTRUCTION OF BREAST WITH DEEP INFERIOR EPIGASTRIC ARTERY  (RUTH) FREE FLAP Bilateral 01/05/2023    Procedure: RECONSTRUCTION, BREAST, USING RUTH FREE FLAP;  Surgeon: Michel Tirado MD;  Location: [facility] OR;   Service: Plastics;  Laterality: Bilateral;    REPAIR, NERVE USING ALLOGRAFT Bilateral 2023    Procedure: REPAIR, NERVE USING ALLOGRAFT;  Surgeon: Michel Tirado MD;  Location: [facility] OR;  Service: Plastics;  Laterality: Bilateral;    REVISION, FLAP, PREVIOUSLY CREATED FOR BREAST RECONSTRUCTION Bilateral 2023    Procedure: REVISION, FLAP, PREVIOUSLY CREATED FOR BREAST RECONSTRUCTION;  Surgeon: Michel Tirado MD;  Location: [facility] OR;  Service: Plastics;  Laterality: Bilateral;    SENTINEL LYMPH NODE BIOPSY Left 2023    Procedure: BIOPSY, LYMPH NODE, SENTINEL;  Surgeon: Simona Cueva MD;  Location: [facility] OR;  Service: General;  Laterality: Left;    SIMPLE MASTECTOMY Bilateral 2023    Procedure: MASTECTOMY, SIMPLE;  Surgeon: Simona Cueva MD;  Location: [facility] OR;  Service: General;  Laterality: Bilateral;    TONSILLECTOMY         FMHX:  Family History   Problem Relation Name Age of Onset    Lung cancer Paternal Grandfather Franny     Cancer Paternal Grandfather Franny 75        lung cancer- smoker    Colon cancer Maternal Grandfather Surinder 80    Diabetes Maternal Grandfather Surinder     Hypertension Father      Cancer Father          throat    Pacemaker/defibrilator Father      Hyperlipidemia Mother      Hypertension Mother      Diabetes Mother      Breast cancer Cousin maternal 1st 40    BRCA 1/2 Cousin maternal 1st     Lung cancer Paternal Uncle  65        smoker    Ovarian cancer Neg Hx      Stroke Neg Hx         SOCHX:  Social History     Tobacco Use    Smoking status: Former     Current packs/day: 0.00     Average packs/day: 0.1 packs/day for 25.0 years (2.5 ttl pk-yrs)     Types: Cigarettes     Start date: 1997     Quit date: 2022     Years since quittin.7    Smokeless tobacco: Never   Substance Use Topics    Alcohol use: Yes     Alcohol/week: 6.0 standard drinks of alcohol     Types: 6 Glasses of wine per week       ALLERGIES:  Augmentin [amoxicillin-pot  clavulanate]    CURRENT MEDICATIONS:  Current Outpatient Medications on File Prior to Visit   Medication Sig Dispense Refill    estradioL (ESTRACE) 0.01 % (0.1 mg/gram) vaginal cream Place 1 g vaginally twice a week. 42 g 3    fezolinetant (VEOZAH) 45 mg Tab Take 45 mg by mouth Daily. 30 tablet 6    meloxicam (MOBIC) 15 MG tablet Take 1 tablet (15 mg total) by mouth once daily. 28 tablet 0    multivitamin (THERAGRAN) per tablet Take 1 tablet by mouth once daily.      rosuvastatin (CRESTOR) 5 MG tablet Take 1 tablet (5 mg total) by mouth once daily. (Patient taking differently: Take 5 mg by mouth every evening.) 90 tablet 3    venlafaxine (EFFEXOR XR) 37.5 MG 24 hr capsule Take 1 capsule (37.5 mg total) by mouth once daily. 30 capsule 11    vitamin D (VITAMIN D3) 1000 units Tab Take 1,000 Units by mouth once daily.      ALPRAZolam (XANAX) 0.5 MG tablet Take 1 tablet (0.5 mg total) by mouth 2 (two) times daily as needed for Anxiety. 45 tablet 0     Current Facility-Administered Medications on File Prior to Visit   Medication Dose Route Frequency Provider Last Rate Last Admin    albuterol nebulizer solution 2.5 mg  2.5 mg Nebulization Q15 Min PRN Eric Acuna MD        albuterol-ipratropium 2.5 mg-0.5 mg/3 mL nebulizer solution 3 mL  3 mL Nebulization PRN Eric Acuna MD        diphenhydrAMINE injection 25 mg  25 mg Intravenous Q6H PRN Eric Acuna MD        HYDROmorphone injection 0.5 mg  0.5 mg Intravenous Q5 Min PRN Eric Acuna MD   0.5 mg at 12/21/23 1556    lactated ringers infusion   Intravenous Continuous Eric Acuna MD   New Bag at 12/21/23 1324    LIDOcaine (PF) 10 mg/ml (1%) injection 10 mg  1 mL Intradermal Once Simona Cueva MD        LIDOcaine (PF) 10 mg/ml (1%) injection 10 mg  1 mL Intradermal Once Eric Acuna MD        LORazepam injection 0.25 mg  0.25 mg Intravenous Q15 Min PRN Eric Acuna MD        midazolam (VERSED) 1 mg/mL injection 2 mg  2 mg  "Intravenous See admin instructions Simona Cueva MD        midazolam (VERSED) 1 mg/mL injection 2 mg  2 mg Intravenous See admin instructions Simona Cueva MD   2 mg at 01/05/23 0703    ondansetron injection 4 mg  4 mg Intravenous Q15 Min PRN Eric Acuna MD        piperacillin-tazobactam 4.5 g in dextrose 5 % 100 mL IVPB (ready to mix system)  4.5 g Intravenous On Call Procedure Simona Cueva MD        sodium chloride 0.9% flush 3 mL  3 mL Intravenous PRN Eric Acuna MD           REVIEW OF SYSTEMS:  Review of Systems Complete; Negative, unless noted above.    GENERAL PHYSICAL EXAM:   Ht 5' 6" (1.676 m)   Wt 86.6 kg (190 lb 14.7 oz)   LMP  (LMP Unknown)   BMI 30.81 kg/m²    GEN: well developed, well nourished, no acute distress   PULM: No wheezing, no respiratory distress   CV: RRR    ORTHO EXAM:   Examination of the left wrist reveals no edema, erythema, ecchymosis, or skin breakdown.  No significant tenderness palpation of the ulnar aspect of the left wrist.  Able to flex extend the wrist appropriately with no pain.  5/5 /intrinsic strength.  Normal sensation in the radial, ulnar, and median nerve distributions.  Capillary refill less than 2 seconds.    RADIOLOGY:   None.    ASSESSMENT:   Left wrist injury, ulnar impaction syndrome of the left wrist    PLAN:  1. I discussed with Steph PEREZ Mariah that considering she is significantly improved since last visit, the best course of action this time is transition to wearing the removable Velcro short wrist splint for heavier activities only for the next few weeks, at which time she can discontinue the brace.  We did discuss for any recurrence of her pain to contact clinic.  She verbally agreed with the treatment plan.    2. I would like her follow up in clinic on a p.r.n. basis for any worsening of her symptoms or for any hand, wrist, elbow problems/concerns.  She was instructed to contact clinic for any problems or concerns " in the interim.

## 2024-05-22 DIAGNOSIS — R73.03 PREDIABETES: ICD-10-CM

## 2024-05-28 ENCOUNTER — PATIENT MESSAGE (OUTPATIENT)
Dept: FAMILY MEDICINE | Facility: CLINIC | Age: 54
End: 2024-05-28
Payer: COMMERCIAL

## 2024-05-31 ENCOUNTER — LAB VISIT (OUTPATIENT)
Dept: LAB | Facility: HOSPITAL | Age: 54
End: 2024-05-31
Attending: FAMILY MEDICINE
Payer: COMMERCIAL

## 2024-05-31 DIAGNOSIS — R73.03 PREDIABETES: ICD-10-CM

## 2024-05-31 LAB
ESTIMATED AVG GLUCOSE: 108 MG/DL (ref 68–131)
HBA1C MFR BLD: 5.4 % (ref 4–5.6)

## 2024-05-31 PROCEDURE — 83036 HEMOGLOBIN GLYCOSYLATED A1C: CPT | Performed by: FAMILY MEDICINE

## 2024-05-31 PROCEDURE — 36415 COLL VENOUS BLD VENIPUNCTURE: CPT | Mod: PO | Performed by: FAMILY MEDICINE

## 2024-06-07 ENCOUNTER — OFFICE VISIT (OUTPATIENT)
Dept: FAMILY MEDICINE | Facility: CLINIC | Age: 54
End: 2024-06-07
Payer: COMMERCIAL

## 2024-06-07 ENCOUNTER — PATIENT MESSAGE (OUTPATIENT)
Dept: FAMILY MEDICINE | Facility: CLINIC | Age: 54
End: 2024-06-07

## 2024-06-07 ENCOUNTER — LAB VISIT (OUTPATIENT)
Dept: LAB | Facility: HOSPITAL | Age: 54
End: 2024-06-07
Attending: FAMILY MEDICINE
Payer: COMMERCIAL

## 2024-06-07 VITALS
SYSTOLIC BLOOD PRESSURE: 122 MMHG | BODY MASS INDEX: 30.36 KG/M2 | HEART RATE: 71 BPM | DIASTOLIC BLOOD PRESSURE: 76 MMHG | OXYGEN SATURATION: 97 % | WEIGHT: 188.94 LBS | HEIGHT: 66 IN

## 2024-06-07 DIAGNOSIS — Z00.00 ROUTINE GENERAL MEDICAL EXAMINATION AT A HEALTH CARE FACILITY: Primary | ICD-10-CM

## 2024-06-07 DIAGNOSIS — N30.80 ACUTE BACTERIAL SIMPLE CYSTITIS: Primary | ICD-10-CM

## 2024-06-07 DIAGNOSIS — Z23 IMMUNIZATION DUE: ICD-10-CM

## 2024-06-07 DIAGNOSIS — Z00.00 ROUTINE GENERAL MEDICAL EXAMINATION AT A HEALTH CARE FACILITY: ICD-10-CM

## 2024-06-07 DIAGNOSIS — B96.89 ACUTE BACTERIAL SIMPLE CYSTITIS: Primary | ICD-10-CM

## 2024-06-07 DIAGNOSIS — E78.5 HYPERLIPIDEMIA, UNSPECIFIED HYPERLIPIDEMIA TYPE: ICD-10-CM

## 2024-06-07 PROBLEM — R04.0 RECURRENT EPISTAXIS: Status: RESOLVED | Noted: 2020-08-11 | Resolved: 2024-06-07

## 2024-06-07 PROBLEM — R00.2 PALPITATIONS: Status: RESOLVED | Noted: 2020-12-10 | Resolved: 2024-06-07

## 2024-06-07 PROBLEM — M25.611 DECREASED RANGE OF MOTION OF RIGHT SHOULDER: Status: RESOLVED | Noted: 2024-02-15 | Resolved: 2024-06-07

## 2024-06-07 PROBLEM — M79.601 PAIN OF RIGHT UPPER EXTREMITY: Status: RESOLVED | Noted: 2024-02-15 | Resolved: 2024-06-07

## 2024-06-07 PROBLEM — M62.81 MUSCLE WEAKNESS: Status: RESOLVED | Noted: 2023-05-11 | Resolved: 2024-06-07

## 2024-06-07 LAB
25(OH)D3+25(OH)D2 SERPL-MCNC: 68 NG/ML (ref 30–96)
ALBUMIN SERPL BCP-MCNC: 4.2 G/DL (ref 3.5–5.2)
ALP SERPL-CCNC: 88 U/L (ref 55–135)
ALT SERPL W/O P-5'-P-CCNC: 27 U/L (ref 10–44)
ANION GAP SERPL CALC-SCNC: 10 MMOL/L (ref 8–16)
AST SERPL-CCNC: 26 U/L (ref 10–40)
BILIRUB SERPL-MCNC: 0.5 MG/DL (ref 0.1–1)
BUN SERPL-MCNC: 16 MG/DL (ref 6–20)
CALCIUM SERPL-MCNC: 10.1 MG/DL (ref 8.7–10.5)
CHLORIDE SERPL-SCNC: 108 MMOL/L (ref 95–110)
CHOLEST SERPL-MCNC: 149 MG/DL (ref 120–199)
CHOLEST/HDLC SERPL: 2.6 {RATIO} (ref 2–5)
CO2 SERPL-SCNC: 25 MMOL/L (ref 23–29)
CREAT SERPL-MCNC: 0.8 MG/DL (ref 0.5–1.4)
EST. GFR  (NO RACE VARIABLE): >60 ML/MIN/1.73 M^2
GLUCOSE SERPL-MCNC: 85 MG/DL (ref 70–110)
HDLC SERPL-MCNC: 58 MG/DL (ref 40–75)
HDLC SERPL: 38.9 % (ref 20–50)
LDLC SERPL CALC-MCNC: 77.4 MG/DL (ref 63–159)
NONHDLC SERPL-MCNC: 91 MG/DL
POTASSIUM SERPL-SCNC: 4.9 MMOL/L (ref 3.5–5.1)
PROT SERPL-MCNC: 6.9 G/DL (ref 6–8.4)
SODIUM SERPL-SCNC: 143 MMOL/L (ref 136–145)
TRIGL SERPL-MCNC: 68 MG/DL (ref 30–150)
TSH SERPL DL<=0.005 MIU/L-ACNC: 1.24 UIU/ML (ref 0.4–4)

## 2024-06-07 PROCEDURE — 3078F DIAST BP <80 MM HG: CPT | Mod: CPTII,S$GLB,, | Performed by: FAMILY MEDICINE

## 2024-06-07 PROCEDURE — 1160F RVW MEDS BY RX/DR IN RCRD: CPT | Mod: CPTII,S$GLB,, | Performed by: FAMILY MEDICINE

## 2024-06-07 PROCEDURE — 99999 PR PBB SHADOW E&M-EST. PATIENT-LVL III: CPT | Mod: PBBFAC,,, | Performed by: FAMILY MEDICINE

## 2024-06-07 PROCEDURE — 90471 IMMUNIZATION ADMIN: CPT | Mod: S$GLB,,, | Performed by: FAMILY MEDICINE

## 2024-06-07 PROCEDURE — 3044F HG A1C LEVEL LT 7.0%: CPT | Mod: CPTII,S$GLB,, | Performed by: FAMILY MEDICINE

## 2024-06-07 PROCEDURE — 84443 ASSAY THYROID STIM HORMONE: CPT | Performed by: FAMILY MEDICINE

## 2024-06-07 PROCEDURE — 80061 LIPID PANEL: CPT | Performed by: FAMILY MEDICINE

## 2024-06-07 PROCEDURE — 1159F MED LIST DOCD IN RCRD: CPT | Mod: CPTII,S$GLB,, | Performed by: FAMILY MEDICINE

## 2024-06-07 PROCEDURE — 80053 COMPREHEN METABOLIC PANEL: CPT | Performed by: FAMILY MEDICINE

## 2024-06-07 PROCEDURE — 90677 PCV20 VACCINE IM: CPT | Mod: S$GLB,,, | Performed by: FAMILY MEDICINE

## 2024-06-07 PROCEDURE — 82306 VITAMIN D 25 HYDROXY: CPT | Performed by: FAMILY MEDICINE

## 2024-06-07 PROCEDURE — 3008F BODY MASS INDEX DOCD: CPT | Mod: CPTII,S$GLB,, | Performed by: FAMILY MEDICINE

## 2024-06-07 PROCEDURE — 99396 PREV VISIT EST AGE 40-64: CPT | Mod: 25,S$GLB,, | Performed by: FAMILY MEDICINE

## 2024-06-07 PROCEDURE — 3074F SYST BP LT 130 MM HG: CPT | Mod: CPTII,S$GLB,, | Performed by: FAMILY MEDICINE

## 2024-06-07 PROCEDURE — 36415 COLL VENOUS BLD VENIPUNCTURE: CPT | Mod: PN | Performed by: FAMILY MEDICINE

## 2024-06-07 RX ORDER — ROSUVASTATIN CALCIUM 5 MG/1
5 TABLET, COATED ORAL DAILY
Qty: 90 TABLET | Refills: 3 | Status: SHIPPED | OUTPATIENT
Start: 2024-06-07

## 2024-06-07 NOTE — PROGRESS NOTES
Subjective:       Patient ID: Steph Lemus is a 53 y.o. female.    Chief Complaint: Annual Exam      Steph Lemus is in the office for annual exam.    HPI  Medical hx reviewed.   Since lov, had revision of breast surgery with lipo. Hematoma to R axilla, healed, otherwise doing ok. She maintains f/u with oncology.   Past Medical History:   Diagnosis Date    Acid reflux     Breast cancer 08/2022    Stage IA (T1c, N0, M0, Grade 1, ER+/AZ+/HER2 pending) Invasive ductal carcinoma of the LEFT breast    Epistaxis, recurrent     none since cauterization    Hyperlipidemia     Invasive ductal carcinoma of left breast in female 09/2022     Weaning off effexor slowly in lieu of veozah which was sig more effective for hot flashes.     Current Outpatient Medications:     estradioL (ESTRACE) 0.01 % (0.1 mg/gram) vaginal cream, Place 1 g vaginally twice a week., Disp: 42 g, Rfl: 3    fezolinetant (VEOZAH) 45 mg Tab, Take 45 mg by mouth Daily., Disp: 30 tablet, Rfl: 6    multivitamin (THERAGRAN) per tablet, Take 1 tablet by mouth once daily., Disp: , Rfl:     venlafaxine (EFFEXOR XR) 37.5 MG 24 hr capsule, Take 1 capsule (37.5 mg total) by mouth once daily., Disp: 30 capsule, Rfl: 11    vitamin D (VITAMIN D3) 1000 units Tab, Take 1,000 Units by mouth once daily., Disp: , Rfl:     ALPRAZolam (XANAX) 0.5 MG tablet, Take 1 tablet (0.5 mg total) by mouth 2 (two) times daily as needed for Anxiety., Disp: 45 tablet, Rfl: 0    rosuvastatin (CRESTOR) 5 MG tablet, Take 1 tablet (5 mg total) by mouth once daily., Disp: 90 tablet, Rfl: 3    Current Facility-Administered Medications:     pneumoc 20-sara conj-dip cr(PF) (PREVNAR-20 (PF)) injection Syrg 0.5 mL, 0.5 mL, Intramuscular, 1 time in Clinic/HOD,     The 10-year ASCVD risk score (Gordon CRAWLEY, et al., 2019) is: 1.5%    Values used to calculate the score:      Age: 53 years      Sex: Female      Is Non- : No      Diabetic: No      Tobacco smoker: No       Systolic Blood Pressure: 122 mmHg      Is BP treated: No      HDL Cholesterol: 65 mg/dL      Total Cholesterol: 189 mg/dL     Lab Results   Component Value Date    HGBA1C 5.4 05/31/2024    HGBA1C 5.4 05/10/2023    HGBA1C 5.1 09/21/2020     Lab Results   Component Value Date    GLUF 101 11/19/2007    LDLCALC 105.6 05/10/2023    CREATININE 0.9 03/26/2024   Labs 2024 rev.     Review of Systems   Constitutional:  Negative for activity change, fatigue and unexpected weight change.   HENT:  Negative for hearing loss, rhinorrhea and trouble swallowing.    Eyes:  Negative for discharge and visual disturbance.   Respiratory:  Negative for chest tightness and wheezing.    Cardiovascular:  Positive for palpitations. Negative for chest pain.   Gastrointestinal:  Negative for blood in stool, constipation, diarrhea and vomiting.   Endocrine: Negative for polydipsia and polyuria.   Genitourinary:  Negative for difficulty urinating, dysuria, hematuria and menstrual problem.   Musculoskeletal:  Positive for arthralgias. Negative for joint swelling and neck pain.   Neurological:  Negative for weakness and headaches.   Psychiatric/Behavioral:  Negative for confusion and dysphoric mood.        Objective:      Physical Exam  Vitals and nursing note reviewed.   Constitutional:       General: She is not in acute distress.     Appearance: Normal appearance. She is well-developed. She is obese.   HENT:      Head: Normocephalic and atraumatic.      Right Ear: Tympanic membrane and external ear normal.      Left Ear: Tympanic membrane and external ear normal.      Nose: Nose normal.      Mouth/Throat:      Pharynx: No oropharyngeal exudate.   Eyes:      Conjunctiva/sclera: Conjunctivae normal.      Pupils: Pupils are equal, round, and reactive to light.   Neck:      Thyroid: No thyromegaly.   Cardiovascular:      Rate and Rhythm: Normal rate and regular rhythm.   Pulmonary:      Effort: Pulmonary effort is normal. No respiratory distress.       Breath sounds: Normal breath sounds. No wheezing.   Abdominal:      General: Bowel sounds are normal. There is no distension.      Palpations: Abdomen is soft. There is no mass.      Tenderness: There is no abdominal tenderness. There is no guarding or rebound.   Musculoskeletal:      Cervical back: Neck supple.      Right lower leg: No edema.      Left lower leg: No edema.   Lymphadenopathy:      Cervical: No cervical adenopathy.   Skin:     General: Skin is warm and dry.   Neurological:      General: No focal deficit present.      Mental Status: She is alert and oriented to person, place, and time.      Cranial Nerves: No cranial nerve deficit.   Psychiatric:         Mood and Affect: Mood normal.         Behavior: Behavior normal.             Screening recommendations appropriate to age and health status were reviewed.    Assessment & Plan:    Routine general medical examination at a health care facility  Comments:  anticipatory guidance reviewed  Orders:  -     Vitamin D; Future; Expected date: 06/07/2024  -     Comprehensive Metabolic Panel; Future; Expected date: 06/07/2024  -     TSH; Future; Expected date: 06/07/2024  -     Lipid Panel; Future; Expected date: 06/07/2024    Hyperlipidemia, unspecified hyperlipidemia type  Comments:  cont crestor  Orders:  -     rosuvastatin (CRESTOR) 5 MG tablet; Take 1 tablet (5 mg total) by mouth once daily.  Dispense: 90 tablet; Refill: 3    Immunization due  -     pneumoc 20-sara conj-dip cr(PF) (PREVNAR-20 (PF)) injection Syrg 0.5 mL

## 2024-06-07 NOTE — PATIENT INSTRUCTIONS
"Water: 60oz-80oz/day - coffee and tea don't count.   Consider trying glucosamine chondroitin (ie osteo-biflex of similar) that may help with joint pain.   Magnesium glycinate 200-400mg at bedtime.   Physical activity - pilates (google: "beginner pilates mat"), yoga  "

## 2024-06-11 NOTE — TELEPHONE ENCOUNTER
Reviewed. Can dc the separate zinc as she's already getting a full daily recommended in current multi. The D and B12 are fine. The vit C is ok - can also hold off until cold/flu season if desired. Turmeric is ok if helpful. Remainder are fine.

## 2024-06-13 ENCOUNTER — TELEPHONE (OUTPATIENT)
Dept: HEMATOLOGY/ONCOLOGY | Facility: CLINIC | Age: 54
End: 2024-06-13
Payer: COMMERCIAL

## 2024-06-13 NOTE — TELEPHONE ENCOUNTER
Spk to pt about r/s appt on 10/11/24 due to provider schedule change.Appt was changed with the pt. Pt verbalized understanding and confirmed new date/time.  I thanked the pt and apologized for any inconvenience.

## 2024-06-14 RX ORDER — NITROFURANTOIN 25; 75 MG/1; MG/1
100 CAPSULE ORAL 2 TIMES DAILY
Qty: 10 CAPSULE | Refills: 0 | Status: SHIPPED | OUTPATIENT
Start: 2024-06-14

## 2024-07-01 ENCOUNTER — OFFICE VISIT (OUTPATIENT)
Dept: FAMILY MEDICINE | Facility: CLINIC | Age: 54
End: 2024-07-01
Payer: COMMERCIAL

## 2024-07-01 DIAGNOSIS — J20.9 ACUTE BRONCHITIS, UNSPECIFIED ORGANISM: ICD-10-CM

## 2024-07-01 DIAGNOSIS — U07.1 COVID-19: Primary | ICD-10-CM

## 2024-07-01 PROCEDURE — 1159F MED LIST DOCD IN RCRD: CPT | Mod: CPTII,95,, | Performed by: FAMILY MEDICINE

## 2024-07-01 PROCEDURE — 3044F HG A1C LEVEL LT 7.0%: CPT | Mod: CPTII,95,, | Performed by: FAMILY MEDICINE

## 2024-07-01 PROCEDURE — 99213 OFFICE O/P EST LOW 20 MIN: CPT | Mod: 95,,, | Performed by: FAMILY MEDICINE

## 2024-07-01 PROCEDURE — 1160F RVW MEDS BY RX/DR IN RCRD: CPT | Mod: CPTII,95,, | Performed by: FAMILY MEDICINE

## 2024-07-01 RX ORDER — FLUTICASONE PROPIONATE AND SALMETEROL 250; 50 UG/1; UG/1
1 POWDER RESPIRATORY (INHALATION) 2 TIMES DAILY
Qty: 60 EACH | Refills: 0 | Status: SHIPPED | OUTPATIENT
Start: 2024-07-01 | End: 2025-07-01

## 2024-07-01 RX ORDER — CODEINE PHOSPHATE AND GUAIFENESIN 10; 100 MG/5ML; MG/5ML
5 SOLUTION ORAL EVERY 6 HOURS PRN
Qty: 120 ML | Refills: 0 | Status: SHIPPED | OUTPATIENT
Start: 2024-07-01 | End: 2024-07-11

## 2024-07-01 NOTE — PROGRESS NOTES
Subjective:       Patient ID: Steph Lemus is a 53 y.o. female.    Chief Complaint: Cough    The patient location is: [place]  The chief complaint leading to consultation is: URI    Visit type: audiovisual    Face to Face time with patient: 15  20 minutes of total time spent on the encounter, which includes face to face time and non-face to face time preparing to see the patient (eg, review of tests), Obtaining and/or reviewing separately obtained history, Documenting clinical information in the electronic or other health record, Independently interpreting results (not separately reported) and communicating results to the patient/family/caregiver, or Care coordination (not separately reported).     Each patient to whom he or she provides medical services by telemedicine is:  (1) informed of the relationship between the physician and patient and the respective role of any other health care provider with respect to management of the patient; and (2) notified that he or she may decline to receive medical services by telemedicine and may withdraw from such care at any time.     Here today for an acute visit.  She is a patient of Dr. Hanks, new to me today.   She is here today c/o URI symptoms.  Tested positive for COVID last week. Symptoms started on Friday, June 21st while on a Cruise. Ran fever on Sunday.  Tested positive on Wednesday June 26th.  Having wheezing, cough and congestion.  She is using Mucinex.  Of note, she started Amoxil while on the Cruise prior to her positive COVID test.      Cough  This is a new problem. The current episode started 1 to 4 weeks ago. The problem has been waxing and waning. The problem occurs hourly. The cough is Productive of purulent sputum. Associated symptoms include ear congestion, heartburn, nasal congestion, a sore throat and shortness of breath. Nothing aggravates the symptoms. She has tried OTC cough suppressant and rest for the symptoms. The treatment provided mild  relief. There is no history of asthma, bronchiectasis, bronchitis, COPD, emphysema, environmental allergies or pneumonia.     Review of Systems   HENT:  Positive for sore throat.    Respiratory:  Positive for cough and shortness of breath.    Gastrointestinal:  Positive for heartburn.   Allergic/Immunologic: Negative for environmental allergies.       Objective:      There were no vitals filed for this visit.   Physical Exam  Constitutional:       Appearance: Normal appearance.   Neurological:      General: No focal deficit present.      Mental Status: She is alert and oriented to person, place, and time.   Psychiatric:         Mood and Affect: Mood normal.         Behavior: Behavior normal.         Thought Content: Thought content normal.         Judgment: Judgment normal.            Assessment:       1. COVID-19    2. Acute bronchitis, unspecified organism        Plan:       COVID-19    Acute bronchitis, unspecified organism  -     fluticasone-salmeterol diskus inhaler 250-50 mcg; Inhale 1 puff into the lungs 2 (two) times daily. Controller  Dispense: 60 each; Refill: 0  -     guaiFENesin-codeine 100-10 mg/5 ml (TUSSI-ORGANIDIN NR)  mg/5 mL syrup; Take 5 mLs by mouth every 6 (six) hours as needed for Cough.  Dispense: 120 mL; Refill: 0    Start cough medication and inhaler.  If worsening, consider CXR.      Medication List with Changes/Refills   New Medications    FLUTICASONE-SALMETEROL DISKUS INHALER 250-50 MCG    Inhale 1 puff into the lungs 2 (two) times daily. Controller    GUAIFENESIN-CODEINE 100-10 MG/5 ML (TUSSI-ORGANIDIN NR)  MG/5 ML SYRUP    Take 5 mLs by mouth every 6 (six) hours as needed for Cough.   Current Medications    ALPRAZOLAM (XANAX) 0.5 MG TABLET    Take 1 tablet (0.5 mg total) by mouth 2 (two) times daily as needed for Anxiety.    ESTRADIOL (ESTRACE) 0.01 % (0.1 MG/GRAM) VAGINAL CREAM    Place 1 g vaginally twice a week.    FEZOLINETANT (VEOZAH) 45 MG TAB    Take 45 mg by mouth  Daily.    MULTIVITAMIN (THERAGRAN) PER TABLET    Take 1 tablet by mouth once daily.    NITROFURANTOIN, MACROCRYSTAL-MONOHYDRATE, (MACROBID) 100 MG CAPSULE    Take 1 capsule (100 mg total) by mouth 2 (two) times daily.    ROSUVASTATIN (CRESTOR) 5 MG TABLET    Take 1 tablet (5 mg total) by mouth once daily.    VENLAFAXINE (EFFEXOR XR) 37.5 MG 24 HR CAPSULE    Take 1 capsule (37.5 mg total) by mouth once daily.    VITAMIN D (VITAMIN D3) 1000 UNITS TAB    Take 1,000 Units by mouth once daily.

## 2024-07-08 ENCOUNTER — PATIENT MESSAGE (OUTPATIENT)
Dept: FAMILY MEDICINE | Facility: CLINIC | Age: 54
End: 2024-07-08
Payer: COMMERCIAL

## 2024-07-08 ENCOUNTER — HOSPITAL ENCOUNTER (OUTPATIENT)
Dept: RADIOLOGY | Facility: HOSPITAL | Age: 54
Discharge: HOME | End: 2024-07-08
Attending: FAMILY MEDICINE
Payer: COMMERCIAL

## 2024-07-08 DIAGNOSIS — J20.9 ACUTE BRONCHITIS, UNSPECIFIED ORGANISM: ICD-10-CM

## 2024-07-08 DIAGNOSIS — U07.1 COVID-19: ICD-10-CM

## 2024-07-08 DIAGNOSIS — U07.1 COVID-19: Primary | ICD-10-CM

## 2024-07-08 PROCEDURE — 71046 X-RAY EXAM CHEST 2 VIEWS: CPT | Mod: TC,FY,PO

## 2024-07-08 PROCEDURE — 71046 X-RAY EXAM CHEST 2 VIEWS: CPT | Mod: 26,,, | Performed by: RADIOLOGY

## 2024-07-11 ENCOUNTER — OFFICE VISIT (OUTPATIENT)
Dept: OBSTETRICS AND GYNECOLOGY | Facility: CLINIC | Age: 54
End: 2024-07-11
Payer: COMMERCIAL

## 2024-07-11 VITALS
SYSTOLIC BLOOD PRESSURE: 120 MMHG | HEIGHT: 66 IN | BODY MASS INDEX: 30.65 KG/M2 | DIASTOLIC BLOOD PRESSURE: 76 MMHG | WEIGHT: 190.69 LBS

## 2024-07-11 DIAGNOSIS — Z90.13 S/P MASTECTOMY, BILATERAL: ICD-10-CM

## 2024-07-11 DIAGNOSIS — R23.2 HOT FLASHES: ICD-10-CM

## 2024-07-11 DIAGNOSIS — R39.9 UTI SYMPTOMS: ICD-10-CM

## 2024-07-11 DIAGNOSIS — Z01.419 ROUTINE GYNECOLOGICAL EXAMINATION: Primary | ICD-10-CM

## 2024-07-11 DIAGNOSIS — Z85.3 PERSONAL HISTORY OF BREAST CANCER: ICD-10-CM

## 2024-07-11 DIAGNOSIS — N95.1 MENOPAUSAL SYNDROME: ICD-10-CM

## 2024-07-11 LAB
BILIRUBIN, UA POC OHS: NEGATIVE
BLOOD, UA POC OHS: ABNORMAL
CLARITY, UA POC OHS: CLEAR
COLOR, UA POC OHS: YELLOW
GLUCOSE, UA POC OHS: NEGATIVE
KETONES, UA POC OHS: NEGATIVE
LEUKOCYTES, UA POC OHS: NEGATIVE
NITRITE, UA POC OHS: NEGATIVE
PH, UA POC OHS: 5.5
PROTEIN, UA POC OHS: NEGATIVE
SPECIFIC GRAVITY, UA POC OHS: 1.02
UROBILINOGEN, UA POC OHS: 0.2

## 2024-07-11 PROCEDURE — 87086 URINE CULTURE/COLONY COUNT: CPT | Performed by: OBSTETRICS & GYNECOLOGY

## 2024-07-11 PROCEDURE — 3044F HG A1C LEVEL LT 7.0%: CPT | Mod: CPTII,S$GLB,, | Performed by: OBSTETRICS & GYNECOLOGY

## 2024-07-11 PROCEDURE — 99396 PREV VISIT EST AGE 40-64: CPT | Mod: S$GLB,,, | Performed by: OBSTETRICS & GYNECOLOGY

## 2024-07-11 PROCEDURE — 81003 URINALYSIS AUTO W/O SCOPE: CPT | Mod: QW,S$GLB,, | Performed by: OBSTETRICS & GYNECOLOGY

## 2024-07-11 PROCEDURE — 3008F BODY MASS INDEX DOCD: CPT | Mod: CPTII,S$GLB,, | Performed by: OBSTETRICS & GYNECOLOGY

## 2024-07-11 PROCEDURE — 3078F DIAST BP <80 MM HG: CPT | Mod: CPTII,S$GLB,, | Performed by: OBSTETRICS & GYNECOLOGY

## 2024-07-11 PROCEDURE — 99999 PR PBB SHADOW E&M-EST. PATIENT-LVL III: CPT | Mod: PBBFAC,,, | Performed by: OBSTETRICS & GYNECOLOGY

## 2024-07-11 PROCEDURE — 1159F MED LIST DOCD IN RCRD: CPT | Mod: CPTII,S$GLB,, | Performed by: OBSTETRICS & GYNECOLOGY

## 2024-07-11 PROCEDURE — 3074F SYST BP LT 130 MM HG: CPT | Mod: CPTII,S$GLB,, | Performed by: OBSTETRICS & GYNECOLOGY

## 2024-07-11 NOTE — PROGRESS NOTES
Chief Complaint   Patient presents with    Well Woman    she was just treated for a UTI. but not sureit is cleared up       History of Present Illness: Steph Lemus is a 53 y.o. female that presents today 7/11/2024 with No LMP recorded (lmp unknown). Patient is postmenopausal.  for well gyn visit.    Past Medical History:   Diagnosis Date    Acid reflux     Breast cancer 08/2022    Stage IA (T1c, N0, M0, Grade 1, ER+/SC+/HER2 pending) Invasive ductal carcinoma of the LEFT breast    Epistaxis, recurrent     none since cauterization    Hyperlipidemia     Invasive ductal carcinoma of left breast in female 09/2022       Past Surgical History:   Procedure Laterality Date    APPENDECTOMY      BREAST BIOPSY Left 08/03/2022    idc    COLONOSCOPY N/A 04/15/2019    Procedure: COLONOSCOPY;  Surgeon: Tad Suarez Jr., MD;  Location: [facility];  Service: Endoscopy;  Laterality: N/A;    DILATION AND CURETTAGE OF UTERUS      for uterine polyps    ENDOMETRIAL ABLATION  2012    ENDOSCOPIC NASAL CAUTERIZATION N/A 08/11/2020    Procedure: CAUTERIZATION, NOSE, ENDOSCOPIC;  Surgeon: Surinder Acevedo MD;  Location: [facility] OR;  Service: ENT;  Laterality: N/A;    LAPAROSCOPIC APPENDECTOMY N/A 09/22/2020    Procedure: APPENDECTOMY, LAPAROSCOPIC;  Surgeon: Shai Ludwig MD;  Location: [facility] OR;  Service: General;  Laterality: N/A;    LIPOSUCTION W/ FAT INJECTION N/A 12/21/2023    Procedure: LIPOSUCTION, WITH FAT TRANSFER, Abdomen to Breast;  Surgeon: Michel Tirado MD;  Location: [facility] OR;  Service: Plastics;  Laterality: N/A;    MASTOPEXY Bilateral 12/21/2023    Procedure: MASTOPEXY;  Surgeon: Michel Tirado MD;  Location: [facility] OR;  Service: Plastics;  Laterality: Bilateral;    RECONSTRUCTION OF BREAST WITH DEEP INFERIOR EPIGASTRIC ARTERY  (RUTH) FREE FLAP Bilateral 01/05/2023    Procedure: RECONSTRUCTION, BREAST, USING RUTH FREE FLAP;  Surgeon: Michel Tirado MD;  Location: [facility] OR;  Service: Plastics;  Laterality:  Bilateral;    REPAIR, NERVE USING ALLOGRAFT Bilateral 01/05/2023    Procedure: REPAIR, NERVE USING ALLOGRAFT;  Surgeon: Michel Tirado MD;  Location: [facility] OR;  Service: Plastics;  Laterality: Bilateral;    REVISION, FLAP, PREVIOUSLY CREATED FOR BREAST RECONSTRUCTION Bilateral 12/21/2023    Procedure: REVISION, FLAP, PREVIOUSLY CREATED FOR BREAST RECONSTRUCTION;  Surgeon: Michel Tirado MD;  Location: [facility] OR;  Service: Plastics;  Laterality: Bilateral;    SENTINEL LYMPH NODE BIOPSY Left 01/05/2023    Procedure: BIOPSY, LYMPH NODE, SENTINEL;  Surgeon: Simona Cueva MD;  Location: [facility] OR;  Service: General;  Laterality: Left;    SIMPLE MASTECTOMY Bilateral 01/05/2023    Procedure: MASTECTOMY, SIMPLE;  Surgeon: Simona Cueva MD;  Location: [facility] OR;  Service: General;  Laterality: Bilateral;    TONSILLECTOMY         Outpatient Medications Prior to Visit   Medication Sig Dispense Refill    ALPRAZolam (XANAX) 0.5 MG tablet Take 1 tablet (0.5 mg total) by mouth 2 (two) times daily as needed for Anxiety. 45 tablet 0    estradioL (ESTRACE) 0.01 % (0.1 mg/gram) vaginal cream Place 1 g vaginally twice a week. 42 g 3    fezolinetant (VEOZAH) 45 mg Tab Take 45 mg by mouth Daily. 30 tablet 6    multivitamin (THERAGRAN) per tablet Take 1 tablet by mouth once daily.      rosuvastatin (CRESTOR) 5 MG tablet Take 1 tablet (5 mg total) by mouth once daily. 90 tablet 3    vitamin D (VITAMIN D3) 1000 units Tab Take 1,000 Units by mouth once daily.      fluticasone-salmeterol diskus inhaler 250-50 mcg Inhale 1 puff into the lungs 2 (two) times daily. Controller (Patient not taking: Reported on 7/11/2024) 60 each 0    nitrofurantoin, macrocrystal-monohydrate, (MACROBID) 100 MG capsule Take 1 capsule (100 mg total) by mouth 2 (two) times daily. 10 capsule 0    guaiFENesin-codeine 100-10 mg/5 ml (TUSSI-ORGANIDIN NR)  mg/5 mL syrup Take 5 mLs by mouth every 6 (six) hours as needed for Cough. 120 mL 0    venlafaxine (EFFEXOR  XR) 37.5 MG 24 hr capsule Take 1 capsule (37.5 mg total) by mouth once daily. 30 capsule 11     No facility-administered medications prior to visit.       Review of patient's allergies indicates:   Allergen Reactions    Augmentin [amoxicillin-pot clavulanate]      Stomach pains       Family History   Problem Relation Name Age of Onset    Lung cancer Paternal Grandfather Franny     Cancer Paternal Grandfather Franny 75        lung cancer- smoker    Colon cancer Maternal Grandfather Surinder 80    Diabetes Maternal Grandfather Surinder     Hypertension Father      Cancer Father          throat    Pacemaker/defibrilator Father      Hyperlipidemia Mother      Hypertension Mother      Diabetes Mother      Breast cancer Cousin maternal 1st 40    BRCA 1/2 Cousin maternal 1st     Lung cancer Paternal Uncle  65        smoker    Ovarian cancer Neg Hx      Stroke Neg Hx         Social History     Socioeconomic History    Marital status:    Tobacco Use    Smoking status: Former     Current packs/day: 0.00     Average packs/day: 0.1 packs/day for 25.0 years (2.5 ttl pk-yrs)     Types: Cigarettes     Start date: 1997     Quit date: 2022     Years since quittin.9    Smokeless tobacco: Never   Substance and Sexual Activity    Alcohol use: Yes     Alcohol/week: 6.0 standard drinks of alcohol     Types: 6 Glasses of wine per week    Drug use: No    Sexual activity: Yes     Partners: Male     Birth control/protection: Other-see comments     Comment: radha martin since 2006     Social Determinants of Health     Financial Resource Strain: Low Risk  (2023)    Overall Financial Resource Strain (CARDIA)     Difficulty of Paying Living Expenses: Not hard at all   Food Insecurity: No Food Insecurity (2023)    Hunger Vital Sign     Worried About Running Out of Food in the Last Year: Never true     Ran Out of Food in the Last Year: Never true   Transportation Needs: No Transportation Needs (2023)    PRAPARE -  "Transportation     Lack of Transportation (Medical): No     Lack of Transportation (Non-Medical): No   Physical Activity: Insufficiently Active (2023)    Exercise Vital Sign     Days of Exercise per Week: 3 days     Minutes of Exercise per Session: 40 min   Stress: No Stress Concern Present (2023)    [employer] - Occupational Stress Questionnaire     Feeling of Stress : Not at all   Housing Stability: Low Risk  (2023)    Housing Stability Vital Sign     Unable to Pay for Housing in the Last Year: No     Number of Places Lived in the Last Year: 1     Unstable Housing in the Last Year: No       OB History    Para Term  AB Living   3 3 3     3   SAB IAB Ectopic Multiple Live Births           3      # Outcome Date GA Lbr Randal/2nd Weight Sex Type Anes PTL Lv   3 Term      Vag-Spont   GUERRERO   2 Term      Vag-Spont   GUERRERO   1 Term      Vag-Spont   GUERRERO       Review of Symptoms:  GENERAL: Denies weight gain or weight loss. Feeling well overall.   SKIN: Denies rash or lesions.   HEAD: Denies head injury or headache.   NODES: Denies enlarged lymph nodes.   CHEST: Denies chest pain or shortness of breath.   CARDIOVASCULAR: Denies palpitations or left sided chest pain.   ABDOMEN: No abdominal pain, constipation, diarrhea, nausea, vomiting or rectal bleeding.   URINARY: No frequency, dysuria, hematuria, or burning on urination.  HEMATOLOGIC: No easy bruisability or excessive bleeding.   MUSCULOSKELETAL: Denies joint pain or swelling.     /76   Ht 5' 6" (1.676 m)   Wt 86.5 kg (190 lb 11.2 oz)   LMP  (LMP Unknown)   Physical Exam:  APPEARANCE: Well nourished, well developed, in no acute distress.  SKIN: Normal skin turgor, no lesions.  NECK: Neck symmetric without masses   RESPIRATORY: Normal respiratory effort with no retractions or use of accessory muscles  CARDIOVASCULAR: Peripheral vascular system with no swelling no varicosities and palpation of pulses " normal  LYMPHATIC: No enlargements of the lymph nodes noted in the neck, axillae, or groin  ABDOMEN: Soft. No tenderness or masses. No hepatosplenomegaly. No hernias.  PELVIC: Normal external female genitalia without lesions. Normal hair distribution. Adequate perineal body, normal urethral meatus. Urethra with no masses.  Bladder nontender. Vagina moist and well rugated without lesions or discharge. Cervix pink and without lesions. No significant cystocele or rectocele. Bimanual exam showed uterus normal size, shape, position, mobile and nontender. Adnexa without masses or tenderness. Urethra and bladder normal.   EXTREMITIES: No clubbing cyanosis or edema.    ASSESSMENT/PLAN:  Routine gynecological examination    UTI symptoms  -     POCT Urinalysis(Instrument)  -     Urine culture    Hot flashes  Comments:  improved on VEOZAH  Orders:  -     HEPATIC FUNCTION PANEL; Future; Expected date: 07/11/2024    Menopausal syndrome    Personal history of breast cancer    S/P mastectomy, bilateral          Patient was counseled today on Pelvic exams and Pap Smear guidelines.   We discussed STD screening if at high risk for a STD.  We discussed recommendation for breast cancer screening with mammogram every other year after the age of 40 and annually after the age of 50.    We discussed colon cancer screening when indicated.   Osteoporosis screening discussed when indicated.   She was advised to see her primary care physician for all other health maintenance.     FOLLOW-UP with me for next routine visit.

## 2024-07-12 LAB — BACTERIA UR CULT: NO GROWTH

## 2024-07-29 DIAGNOSIS — J20.9 ACUTE BRONCHITIS, UNSPECIFIED ORGANISM: ICD-10-CM

## 2024-07-29 NOTE — TELEPHONE ENCOUNTER
No care due was identified.  [facility] Embedded Care Due Messages. Reference number: [id number].   7/29/2024 12:16:41 AM CDT

## 2024-07-30 RX ORDER — FLUTICASONE PROPIONATE AND SALMETEROL 250; 50 UG/1; UG/1
1 POWDER RESPIRATORY (INHALATION) 2 TIMES DAILY
Qty: 60 EACH | Refills: 3 | Status: SHIPPED | OUTPATIENT
Start: 2024-07-30

## 2024-07-30 NOTE — TELEPHONE ENCOUNTER
Refill Routing Note   Medication(s) are not appropriate for processing by [facility] for the following reason(s):        New or recently adjusted medication  No active prescription written by provider    [facility] action(s):  Defer        Medication Therapy Plan: Last ordered: 4 weeks ago (7/1/2024) by Tino Malone MD      Appointments  past 12m or future 3m with PCP    Date Provider   Last Visit   6/7/2024 Melodie Hanks MD   Next Visit   Visit date not found Melodie Hanks MD   ED visits in past 90 days: 0        Note composed:7:08 PM 07/29/2024

## 2024-08-26 ENCOUNTER — OFFICE VISIT (OUTPATIENT)
Dept: HEMATOLOGY/ONCOLOGY | Facility: CLINIC | Age: 54
End: 2024-08-26
Payer: COMMERCIAL

## 2024-08-26 VITALS
TEMPERATURE: 97 F | WEIGHT: 185.44 LBS | RESPIRATION RATE: 17 BRPM | HEART RATE: 99 BPM | OXYGEN SATURATION: 93 % | DIASTOLIC BLOOD PRESSURE: 80 MMHG | BODY MASS INDEX: 29.8 KG/M2 | SYSTOLIC BLOOD PRESSURE: 120 MMHG | HEIGHT: 66 IN

## 2024-08-26 DIAGNOSIS — R10.9 RIGHT FLANK PAIN, CHRONIC: ICD-10-CM

## 2024-08-26 DIAGNOSIS — N95.1 MENOPAUSAL SYMPTOMS: ICD-10-CM

## 2024-08-26 DIAGNOSIS — G89.29 RIGHT FLANK PAIN, CHRONIC: ICD-10-CM

## 2024-08-26 DIAGNOSIS — C50.912 INVASIVE DUCTAL CARCINOMA OF LEFT BREAST IN FEMALE: Primary | ICD-10-CM

## 2024-08-26 PROCEDURE — 1159F MED LIST DOCD IN RCRD: CPT | Mod: CPTII,S$GLB,, | Performed by: INTERNAL MEDICINE

## 2024-08-26 PROCEDURE — 3008F BODY MASS INDEX DOCD: CPT | Mod: CPTII,S$GLB,, | Performed by: INTERNAL MEDICINE

## 2024-08-26 PROCEDURE — 99999 PR PBB SHADOW E&M-EST. PATIENT-LVL IV: CPT | Mod: PBBFAC,,, | Performed by: INTERNAL MEDICINE

## 2024-08-26 PROCEDURE — 3079F DIAST BP 80-89 MM HG: CPT | Mod: CPTII,S$GLB,, | Performed by: INTERNAL MEDICINE

## 2024-08-26 PROCEDURE — 3074F SYST BP LT 130 MM HG: CPT | Mod: CPTII,S$GLB,, | Performed by: INTERNAL MEDICINE

## 2024-08-26 PROCEDURE — 99214 OFFICE O/P EST MOD 30 MIN: CPT | Mod: S$GLB,,, | Performed by: INTERNAL MEDICINE

## 2024-08-26 PROCEDURE — 3044F HG A1C LEVEL LT 7.0%: CPT | Mod: CPTII,S$GLB,, | Performed by: INTERNAL MEDICINE

## 2024-08-26 NOTE — PROGRESS NOTES
"Name: Steph Lemus  MRN:  [id number]  :  1970 Age 54 y.o.  Date of Service: 2024    Reason for visit:  Steph Lemus is a 54 y.o. female here regarding...    #Invasive Ductal Carcinoma of the Left Breast   Date of Original Diagnosis: 8/3/22  Original Stage: Stage 1A pT1b pN0 cM0 ER  95% NE 70% HER 2+/negative by FISH grade 1, KI67 20%; Oncotype 7  Current Sites of Disease: none  Current Goals of Therapy: curative  Current Therapy: active surveillance-clinical exam      Oncologic History/History of Present Illness:   Detected by screening imaging, no symptoms    22 Mammogram with US: irregularly shaped, hypoechoic mass with angular margins seen in the left breast at 12 o'clock, 4 cm from the nipple consistent with known IDC     8/3/22 Biopsy + for IDC    23, she underwent b/l mastectomy and left sentinel node exam, RUTH flap    23 RUTH flap revision     She was followed by Dr. Coelho, after shared decision making, she opted to no endocrine therapy     24- establishes care with me, reports ongoing breast numbness and feeling as they are cold, states the midline scar at the waist feels like a small "bowling ball" sensation in her abd.     PHYSICAL EXAMINATION:  /80 (BP Location: Right arm, Patient Position: Sitting, BP Method: Medium (Automatic))   Pulse 99   Temp 97.1 °F (36.2 °C) (Temporal)   Resp 17   Ht 5' 6" (1.676 m)   Wt 84.1 kg (185 lb 6.5 oz)   LMP  (LMP Unknown)   SpO2 (!) 93%   BMI 29.93 kg/m²   Wt Readings from Last 3 Encounters:   24 84.1 kg (185 lb 6.5 oz)   24 86.5 kg (190 lb 11.2 oz)   24 85.7 kg (188 lb 15 oz)     ECOG PERFORMANCE STATUS: 0  Physical Exam   General:  Well-appearing, nontoxic  Eyes:  Equal and round pupils, EOMI, no scleral icterus  Mouth:  No lesions, moist  Cardiovascular:  Warm, well-perfused, no peripheral edema  Lungs:  Unlabored on room air, no wheezing  Neurologic:  Awake, alert and oriented, participating " in the exam  Psych:  Appropriate mood and affect  Skin:  Normal pallor, No rashes  Heme:  No petechiae, no purpura  Breasts: post-mastectomy site well healed and free of suspicious changes, surgical scars noted at RUTH flap.      LABORATORY:  CBC  Lab Results   Component Value Date    WBC 5.31 03/26/2024    HGB 14.7 03/26/2024    HCT 44.8 03/26/2024    MCV 91 03/26/2024     03/26/2024         BMP  Lab Results   Component Value Date     06/07/2024    K 4.9 06/07/2024     06/07/2024    CO2 25 06/07/2024    BUN 16 06/07/2024    CREATININE 0.8 06/07/2024    CALCIUM 10.1 06/07/2024    ANIONGAP 10 06/07/2024    ESTGFRAFRICA >60 10/14/2021    EGFRNONAA >60 10/14/2021         PATHOLOGY:  8/3/22:   1. Breast, left, 12 o'clock, 6 cm from nipple, ultrasound-guided core needle   biopsies:   - Invasive ductal carcinoma       - Estrogen Receptor (ER):  Positive (95%; strong intensity)       - Progesterone Receptor (KY):  Positive (70%; moderate intensity)       - HER2:  Equivocal (2+)       - HER2 FISH pending; results to follow in a supplemental report       - Ki-67 proliferation index:  Approximately 20% in hot-spot (intermediate)   1/5/23  1.  LEFT BREAST, SKIN AND NIPPLE SPARING MASTECTOMY:   - FIVE LYMPH NODES, ALL NEGATIVE FOR METASTATIC CARCINOMA (0/5).     - ONE LYMPH NODE WITH CAPSULAR MERARY NEVUS.    pT1b pN0(sn)       RADIOLOGY:  andrey      ASSESSMENT AND PLAN:  Steph Lemus is a 54 y.o. female with...  #Invasive Ductal Carcinoma of the Left Breast   Date of Original Diagnosis: 8/3/22  Original Stage: Stage 1A pT1b pN0 cM0 ER  95% [place] 70% HER 2+/negative by FISH grade 1, KI67 20%; Oncotype 7  Current Sites of Disease: none  Current Goals of Therapy: curative  Current Therapy: active surveillance-clinical exam    She is s/p b/l mastectomy with flap reconstruction, no mammogram or imaging required moving forward, wants to ask MILADIS Rodriguez about an annual US of the axilla for peace of mind .    Oncotype is low risk at 7, with 3% risk of distant recurrence with AI or Tamoxifen.   Declined endocrine therapy with Dr. Coelho, will continue with ongoing clinical exam,     #right flank pain   -reports she notices this in the bed when she rolls a certain direction   -getting kidney US but sounds positional and likely MSK related     #Hot flashes   - continue Effexor.       Route Chart for Scheduling    Med Onc Chart Routing      Follow up with physician 6 months and 1 year. in a gown   Follow up with ALBARO    Infusion scheduling note    Injection scheduling note    Labs    Imaging   US kidney now   Pharmacy appointment    Other referrals

## 2024-08-27 ENCOUNTER — HOSPITAL ENCOUNTER (OUTPATIENT)
Dept: RADIOLOGY | Facility: HOSPITAL | Age: 54
Discharge: HOME | End: 2024-08-27
Attending: INTERNAL MEDICINE
Payer: COMMERCIAL

## 2024-08-27 DIAGNOSIS — R10.9 RIGHT FLANK PAIN, CHRONIC: ICD-10-CM

## 2024-08-27 DIAGNOSIS — G89.29 RIGHT FLANK PAIN, CHRONIC: ICD-10-CM

## 2024-08-27 PROCEDURE — 76770 US EXAM ABDO BACK WALL COMP: CPT | Mod: 26,,, | Performed by: RADIOLOGY

## 2024-08-27 PROCEDURE — 76770 US EXAM ABDO BACK WALL COMP: CPT | Mod: TC,PO

## 2024-09-05 ENCOUNTER — LAB VISIT (OUTPATIENT)
Dept: LAB | Facility: HOSPITAL | Age: 54
End: 2024-09-05
Attending: OBSTETRICS & GYNECOLOGY
Payer: COMMERCIAL

## 2024-09-05 DIAGNOSIS — R23.2 HOT FLASHES: ICD-10-CM

## 2024-09-05 PROCEDURE — 36415 COLL VENOUS BLD VENIPUNCTURE: CPT | Mod: PO | Performed by: OBSTETRICS & GYNECOLOGY

## 2024-09-05 PROCEDURE — 80076 HEPATIC FUNCTION PANEL: CPT | Performed by: OBSTETRICS & GYNECOLOGY

## 2024-09-06 LAB
ALBUMIN SERPL BCP-MCNC: 4.5 G/DL (ref 3.5–5.2)
ALP SERPL-CCNC: 84 U/L (ref 55–135)
ALT SERPL W/O P-5'-P-CCNC: 31 U/L (ref 10–44)
AST SERPL-CCNC: 26 U/L (ref 10–40)
BILIRUB DIRECT SERPL-MCNC: 0.2 MG/DL (ref 0.1–0.3)
BILIRUB SERPL-MCNC: 0.4 MG/DL (ref 0.1–1)
PROT SERPL-MCNC: 7.3 G/DL (ref 6–8.4)

## 2024-09-12 ENCOUNTER — PATIENT MESSAGE (OUTPATIENT)
Dept: FAMILY MEDICINE | Facility: CLINIC | Age: 54
End: 2024-09-12
Payer: COMMERCIAL

## 2024-09-12 ENCOUNTER — E-VISIT (OUTPATIENT)
Dept: FAMILY MEDICINE | Facility: CLINIC | Age: 54
End: 2024-09-12
Payer: COMMERCIAL

## 2024-09-12 DIAGNOSIS — N30.00 ACUTE CYSTITIS WITHOUT HEMATURIA: Primary | ICD-10-CM

## 2024-09-12 RX ORDER — PHENAZOPYRIDINE HYDROCHLORIDE 200 MG/1
200 TABLET, FILM COATED ORAL 3 TIMES DAILY PRN
Qty: 9 TABLET | Refills: 0 | Status: SHIPPED | OUTPATIENT
Start: 2024-09-12 | End: 2024-09-15

## 2024-09-12 RX ORDER — NITROFURANTOIN 25; 75 MG/1; MG/1
100 CAPSULE ORAL 2 TIMES DAILY
Qty: 10 CAPSULE | Refills: 0 | Status: SHIPPED | OUTPATIENT
Start: 2024-09-12 | End: 2024-09-17

## 2024-09-12 NOTE — PROGRESS NOTES
Patient ID: Steph Lemus is a 54 y.o. female.    Chief Complaint: Urinary Tract Infection (Entered automatically based on patient selection in [facility].)          274}  The patient initiated a request through [facility] on 9/12/2024 for evaluation and management with a chief complaint of Urinary Tract Infection (Entered automatically based on patient selection in [facility].)     I evaluated the questionnaire submission on 09/12/2024 .    Total Time (in minutes): 11     Ohs Peq Evisit Uti Questionnaire    9/12/2024 11:07 AM CDT - Filed by Patient   Do you agree to participate in an E-Visit? Yes   If you have any of the following symptoms, please present to your local emergency room or call 911:  I acknowledge   Choose the state of your primary residence [place]   What is the main issue you would like addressed today? Pain and discomfort in urinary tract and feeling of urgency to urinate   What symptoms do you currently have? Pain while passing urine   When did your symptoms first appear? 9/11/2024   List what you have done or taken to help your symptoms. Taking Pyridium RX   Please indicate whether you have had the following symptoms during the past 24 hours     Urgent urination (a sudden and uncontrollable urge to urinate) Severe   Frequent urination of small amounts of urine (going to the toilet very often) Severe   Burning pain when urinating Moderate   Incomplete bladder emptying (still feel like you need to urinate again after urination) Moderate   Pain not associated with urination in the lower abdomen below the belly button) Mild   What does your urine look like? I am not sure   Blood seen in the urine None   Flank pain (pain in one or both sides of the lower back) None   Abnormal Vaginal Discharge (abnormal amount, color and/or odor) None   Discharge from the urethra (urinary opening) without urination None   High body temperature/fever? None-<99.5   Please rate how much discomfort you have experience  because of the symptoms in the past 24 hours: Severe   Please indicate how the symptoms have interfered with your every day activities/work in the past 24 hours: Severe   Please indicate how these symptoms have interfered with your social activities (visiting people, meeting with friends, etc.) in the past 24 hours? Severe   Are you a diabetic? No   Please indicate whether you have the following at the time of completion of this questionnaire: Signs of menopause syndrome (hot flashes)   Provide any additional information you feel is important. Please prescribe antibiotic for UTI - Can not take Augmentin   Please attach any relevant images or files (if you have performed a home test for UTI, please submit a photo of results)    Are you able to take your vital signs? Yes   Systolic Blood Pressure: 125   Diastolic Blood Pressure: 87   Weight: 181   Height: 66   Pulse: 62   Temperature: 96   Respiration rate: 70   Pulse Oxygen: 100          Active Problem List with Overview Notes    Diagnosis Date Noted    S/P mastectomy, bilateral 02/15/2024    Menopausal symptoms 07/12/2023    Other insomnia 07/12/2023    At risk for lymphedema 05/11/2023    Anxiety 10/13/2022    Invasive ductal carcinoma of left breast in female 08/13/2022    Hyperlipidemia 01/21/2019      Recent Labs Obtained:  Lab Results   Component Value Date    WBC 5.31 03/26/2024    HGB 14.7 03/26/2024    HCT 44.8 03/26/2024    MCV 91 03/26/2024     03/26/2024     06/07/2024    K 4.9 06/07/2024    GLU 85 06/07/2024    CREATININE 0.8 06/07/2024    EGFRNORACEVR >60.0 06/07/2024    HGBA1C 5.4 05/31/2024    TSH 1.242 06/07/2024      Review of patient's allergies indicates:   Allergen Reactions    Augmentin [amoxicillin-pot clavulanate]      Stomach pains       Encounter Diagnosis   Name Primary?    Acute cystitis without hematuria Yes        No orders of the defined types were placed in this encounter.     Medications Ordered This Encounter    Medications    nitrofurantoin, macrocrystal-monohydrate, (MACROBID) 100 MG capsule     Sig: Take 1 capsule (100 mg total) by mouth 2 (two) times daily. for 5 days     Dispense:  10 capsule     Refill:  0    phenazopyridine (PYRIDIUM) 200 MG tablet     Sig: Take 1 tablet (200 mg total) by mouth 3 (three) times daily as needed for Pain.     Dispense:  9 tablet     Refill:  0        E-Visit Time Tracking:    Day 1 Time (in minutes): 11    Total Time (in minutes): 11      274}

## 2024-10-10 ENCOUNTER — OFFICE VISIT (OUTPATIENT)
Dept: HEMATOLOGY/ONCOLOGY | Facility: CLINIC | Age: 54
End: 2024-10-10
Payer: COMMERCIAL

## 2024-10-10 VITALS
TEMPERATURE: 97 F | BODY MASS INDEX: 29.51 KG/M2 | DIASTOLIC BLOOD PRESSURE: 78 MMHG | WEIGHT: 183.63 LBS | HEIGHT: 66 IN | SYSTOLIC BLOOD PRESSURE: 120 MMHG | OXYGEN SATURATION: 99 % | HEART RATE: 79 BPM

## 2024-10-10 DIAGNOSIS — N95.1 MENOPAUSAL SYMPTOMS: ICD-10-CM

## 2024-10-10 DIAGNOSIS — C50.912 INVASIVE DUCTAL CARCINOMA OF LEFT BREAST IN FEMALE: Primary | ICD-10-CM

## 2024-10-10 PROBLEM — G47.09 OTHER INSOMNIA: Status: RESOLVED | Noted: 2023-07-12 | Resolved: 2024-10-10

## 2024-10-10 PROCEDURE — 1159F MED LIST DOCD IN RCRD: CPT | Mod: CPTII,S$GLB,, | Performed by: NURSE PRACTITIONER

## 2024-10-10 PROCEDURE — 3008F BODY MASS INDEX DOCD: CPT | Mod: CPTII,S$GLB,, | Performed by: NURSE PRACTITIONER

## 2024-10-10 PROCEDURE — 3078F DIAST BP <80 MM HG: CPT | Mod: CPTII,S$GLB,, | Performed by: NURSE PRACTITIONER

## 2024-10-10 PROCEDURE — 99214 OFFICE O/P EST MOD 30 MIN: CPT | Mod: S$GLB,,, | Performed by: NURSE PRACTITIONER

## 2024-10-10 PROCEDURE — 99999 PR PBB SHADOW E&M-EST. PATIENT-LVL III: CPT | Mod: PBBFAC,,, | Performed by: NURSE PRACTITIONER

## 2024-10-10 PROCEDURE — 3074F SYST BP LT 130 MM HG: CPT | Mod: CPTII,S$GLB,, | Performed by: NURSE PRACTITIONER

## 2024-10-10 PROCEDURE — 1160F RVW MEDS BY RX/DR IN RCRD: CPT | Mod: CPTII,S$GLB,, | Performed by: NURSE PRACTITIONER

## 2024-10-10 PROCEDURE — 3044F HG A1C LEVEL LT 7.0%: CPT | Mod: CPTII,S$GLB,, | Performed by: NURSE PRACTITIONER

## 2024-10-10 NOTE — PROGRESS NOTES
"Steph Lemus  54 y.o. is here to seek an integrative approach to discuss side effects related to breast cancer treatment.     HPI  Mrs. Rosas reports her hot flashes have improved since she started Effexor. She is now having approximately 4 a day which are not as intense and not as often as previously. The hot flashes do not wake her up at night. She was doing acupuncture but it no longer at this time. She is sleeping well and she denies fatigue. She is walking regularly, 2 miles, 3-4 times a week. She has a good appetite and has a healthy diet. She continues to feel tightness and "hard as a rock" feeling to the area of the surgery in her abdomen. She doesn't feel anything hard when she pushes on the area.   She is  and has 3 children. She works as an .     Today's Visit  Mrs. Lemus reports she is doing well. She reports the Veozah has helped with the hot flashes. She is sleeping well, 8 hours. She denies fatigue. She continues to have low stress and anxiety. She has a good appetite. She is currently on semaglutide and is steadily losing weight. She is walking or riding her bike at least 3 times a week.   Overall, she is doing well.       Pillars Assessment    Sleep  How many hours of sleep per night? 8 hours  Do you have trouble falling asleep, staying asleep or waking up earlier than you need to? no  Do you have daytime fatigue? no  Do you need medication for sleep? no  Do you use any supplements or other interventions for sleep? no    Resilience  Rate your current level of stress- low    Nutrition   Food allergies or sensitivities: no  Do you adhere to a particular type of diet? no  Do you have any concerns with your eating habits? no    Exercise  How would you describe your physical activity level? moderate  What do you do for physical activity? walking    Past Medical History  Past Medical History:   Diagnosis Date    Acid reflux     Breast cancer 08/2022    Stage IA (T1c, N0, M0, " Grade 1, ER+/HI+/HER2 pending) Invasive ductal carcinoma of the LEFT breast    Epistaxis, recurrent     none since cauterization    Hyperlipidemia     Invasive ductal carcinoma of left breast in female 09/2022      Past Surgical History   Past Surgical History:   Procedure Laterality Date    APPENDECTOMY      BREAST BIOPSY Left 08/03/2022    idc    COLONOSCOPY N/A 04/15/2019    Procedure: COLONOSCOPY;  Surgeon: Tad Suarez Jr., MD;  Location: [facility] ENDO;  Service: Endoscopy;  Laterality: N/A;    DILATION AND CURETTAGE OF UTERUS      for uterine polyps    ENDOMETRIAL ABLATION  2012    ENDOSCOPIC NASAL CAUTERIZATION N/A 08/11/2020    Procedure: CAUTERIZATION, NOSE, ENDOSCOPIC;  Surgeon: Surinder Acevedo MD;  Location: [facility] OR;  Service: ENT;  Laterality: N/A;    LAPAROSCOPIC APPENDECTOMY N/A 09/22/2020    Procedure: APPENDECTOMY, LAPAROSCOPIC;  Surgeon: Shai Ludwig MD;  Location: [facility] OR;  Service: General;  Laterality: N/A;    LIPOSUCTION W/ FAT INJECTION N/A 12/21/2023    Procedure: LIPOSUCTION, WITH FAT TRANSFER, Abdomen to Breast;  Surgeon: Michel Tirado MD;  Location: [facility] OR;  Service: Plastics;  Laterality: N/A;    MASTOPEXY Bilateral 12/21/2023    Procedure: MASTOPEXY;  Surgeon: Michel Tirado MD;  Location: [facility] OR;  Service: Plastics;  Laterality: Bilateral;    RECONSTRUCTION OF BREAST WITH DEEP INFERIOR EPIGASTRIC ARTERY  (RUTH) FREE FLAP Bilateral 01/05/2023    Procedure: RECONSTRUCTION, BREAST, USING RUTH FREE FLAP;  Surgeon: Michel Tirado MD;  Location: [facility] OR;  Service: Plastics;  Laterality: Bilateral;    REPAIR, NERVE USING ALLOGRAFT Bilateral 01/05/2023    Procedure: REPAIR, NERVE USING ALLOGRAFT;  Surgeon: Michel Tirado MD;  Location: [facility] OR;  Service: Plastics;  Laterality: Bilateral;    REVISION, FLAP, PREVIOUSLY CREATED FOR BREAST RECONSTRUCTION Bilateral 12/21/2023    Procedure: REVISION, FLAP, PREVIOUSLY CREATED FOR BREAST RECONSTRUCTION;  Surgeon: Michel Tirado MD;   Location: [facility] OR;  Service: Plastics;  Laterality: Bilateral;    SENTINEL LYMPH NODE BIOPSY Left 01/05/2023    Procedure: BIOPSY, LYMPH NODE, SENTINEL;  Surgeon: Simona Cueva MD;  Location: [facility] OR;  Service: General;  Laterality: Left;    SIMPLE MASTECTOMY Bilateral 01/05/2023    Procedure: MASTECTOMY, SIMPLE;  Surgeon: Simona Cueva MD;  Location: [facility] OR;  Service: General;  Laterality: Bilateral;    TONSILLECTOMY        Family History   Family History   Problem Relation Name Age of Onset    Lung cancer Paternal Grandfather Franny     Cancer Paternal Grandfather Franny 75        lung cancer- smoker    Colon cancer Maternal Grandfather Surinder 80    Diabetes Maternal Grandfather Surinder     Hypertension Father      Cancer Father          throat    Pacemaker/defibrilator Father      Hyperlipidemia Mother      Hypertension Mother      Diabetes Mother      Breast cancer Cousin maternal 1st 40    BRCA 1/2 Cousin maternal 1st     Lung cancer Paternal Uncle  65        smoker    Ovarian cancer Neg Hx      Stroke Neg Hx        Allergies  Review of patient's allergies indicates:   Allergen Reactions    Augmentin [amoxicillin-pot clavulanate]      Stomach pains      Current Medications:    Current Outpatient Medications:     fezolinetant (VEOZAH) 45 mg Tab, Take 45 mg by mouth Daily., Disp: 30 tablet, Rfl: 6    multivitamin (THERAGRAN) per tablet, Take 1 tablet by mouth once daily., Disp: , Rfl:     rosuvastatin (CRESTOR) 5 MG tablet, Take 1 tablet (5 mg total) by mouth once daily., Disp: 90 tablet, Rfl: 3    ALPRAZolam (XANAX) 0.5 MG tablet, Take 1 tablet (0.5 mg total) by mouth 2 (two) times daily as needed for Anxiety., Disp: 45 tablet, Rfl: 0    estradioL (ESTRACE) 0.01 % (0.1 mg/gram) vaginal cream, Place 1 g vaginally twice a week. (Patient not taking: Reported on 10/10/2024), Disp: 42 g, Rfl: 3    vitamin D (VITAMIN D3) 1000 units Tab, Take 1,000 Units by mouth once daily., Disp: , Rfl:      Review of  "Systems  Review of Systems   Constitutional:         Hot flashes   HENT: Negative.     Eyes: Negative.    Respiratory: Negative.     Cardiovascular: Negative.    Gastrointestinal: Negative.    Genitourinary: Negative.    Musculoskeletal: Negative.    Skin: Negative.    Neurological: Negative.    Endo/Heme/Allergies: Negative.    Psychiatric/Behavioral: Negative.        Physical Exam      Vitals:    10/10/24 0932   BP: 120/78   Pulse: 79   Temp: 97 °F (36.1 °C)   TempSrc: Temporal   SpO2: 99%   Weight: 83.3 kg (183 lb 9.6 oz)   Height: 5' 6" (1.676 m)     Body mass index is 29.63 kg/m².  Physical Exam  Vitals reviewed.   Constitutional:       Appearance: Normal appearance.   Neurological:      Mental Status: She is alert.   Psychiatric:         Mood and Affect: Mood normal.         Behavior: Behavior normal.        ASSESSMENT :  1. Invasive ductal carcinoma of left breast in female    2. Menopausal symptoms       PLAN:  Reviewed all information discussed at today's visit and all questions were answered.    Counseled on healthy lifestyle and behavior modifications: Continue exercising with the goal of increasing.   Continue Veozah, managed by Dr. Sands, ob/gyn  I discussed and recommended the following support services:  Korey Chi and Yoga I suggested Korey Chi and/or Yoga as these practices reduce stress, increases flexibility and muscle strength, improves balance and promotes serenity in the power of movement to help fight disease and boost your immune system.   Music and relaxation therapy and Meditation which can decrease stress by lowering blood pressure, slowing breathing, and helping you be more present in the moment.It improves sleep by relaxing the body and mind at the end of the day.Meditation also trains you how to focus on one thing at a time, improving concentration.It also promotes emotional well-being by decreasing depression and anxiety, and helping create a more positive outlook on life.  Art " Therapy    Follow up with Integrative Services as needed    I spent a total of 30 minutes on the day of the visit.This includes face to face time and non-face to face time preparing to see the patient (eg, review of tests), obtaining and/or reviewing separately obtained history, documenting clinical information in the electronic or other health record, independently interpreting results and communicating results to the patient/family/caregiver, or care coordinator.

## 2025-02-05 ENCOUNTER — PATIENT MESSAGE (OUTPATIENT)
Dept: OBSTETRICS AND GYNECOLOGY | Facility: CLINIC | Age: 55
End: 2025-02-05
Payer: COMMERCIAL

## 2025-02-05 DIAGNOSIS — Z79.890 HORMONE REPLACEMENT THERAPY (HRT): Primary | ICD-10-CM

## 2025-02-05 DIAGNOSIS — N95.1 MENOPAUSAL SYNDROME: ICD-10-CM

## 2025-02-05 DIAGNOSIS — Z85.3 PERSONAL HISTORY OF BREAST CANCER: ICD-10-CM

## 2025-02-06 RX ORDER — FEZOLINETANT 45 MG/1
45 TABLET, FILM COATED ORAL DAILY
Qty: 30 TABLET | Refills: 6 | Status: SHIPPED | OUTPATIENT
Start: 2025-02-06

## 2025-02-07 ENCOUNTER — LAB VISIT (OUTPATIENT)
Dept: LAB | Facility: HOSPITAL | Age: 55
End: 2025-02-07
Attending: OBSTETRICS & GYNECOLOGY
Payer: COMMERCIAL

## 2025-02-07 DIAGNOSIS — Z79.890 HORMONE REPLACEMENT THERAPY (HRT): ICD-10-CM

## 2025-02-07 LAB
ALBUMIN SERPL BCP-MCNC: 4.2 G/DL (ref 3.5–5.2)
ALP SERPL-CCNC: 71 U/L (ref 40–150)
ALT SERPL W/O P-5'-P-CCNC: 25 U/L (ref 10–44)
AST SERPL-CCNC: 26 U/L (ref 10–40)
BILIRUB DIRECT SERPL-MCNC: 0.2 MG/DL (ref 0.1–0.3)
BILIRUB SERPL-MCNC: 0.4 MG/DL (ref 0.1–1)
PROT SERPL-MCNC: 7 G/DL (ref 6–8.4)

## 2025-02-07 PROCEDURE — 36415 COLL VENOUS BLD VENIPUNCTURE: CPT | Mod: PN | Performed by: OBSTETRICS & GYNECOLOGY

## 2025-02-07 PROCEDURE — 80076 HEPATIC FUNCTION PANEL: CPT | Performed by: OBSTETRICS & GYNECOLOGY

## 2025-02-11 DIAGNOSIS — R89.9 ELEVATED LABORATORY TEST RESULT: Primary | ICD-10-CM

## 2025-03-11 ENCOUNTER — OFFICE VISIT (OUTPATIENT)
Dept: HEMATOLOGY/ONCOLOGY | Facility: CLINIC | Age: 55
End: 2025-03-11
Payer: COMMERCIAL

## 2025-03-11 VITALS
DIASTOLIC BLOOD PRESSURE: 73 MMHG | SYSTOLIC BLOOD PRESSURE: 109 MMHG | RESPIRATION RATE: 17 BRPM | WEIGHT: 183.44 LBS | HEART RATE: 67 BPM | OXYGEN SATURATION: 97 % | BODY MASS INDEX: 29.48 KG/M2 | TEMPERATURE: 98 F | HEIGHT: 66 IN

## 2025-03-11 DIAGNOSIS — C50.912 INVASIVE DUCTAL CARCINOMA OF LEFT BREAST IN FEMALE: ICD-10-CM

## 2025-03-11 DIAGNOSIS — R42 DIZZINESS AND GIDDINESS: Primary | ICD-10-CM

## 2025-03-11 PROCEDURE — 1159F MED LIST DOCD IN RCRD: CPT | Mod: CPTII,S$GLB,, | Performed by: INTERNAL MEDICINE

## 2025-03-11 PROCEDURE — 3078F DIAST BP <80 MM HG: CPT | Mod: CPTII,S$GLB,, | Performed by: INTERNAL MEDICINE

## 2025-03-11 PROCEDURE — 3074F SYST BP LT 130 MM HG: CPT | Mod: CPTII,S$GLB,, | Performed by: INTERNAL MEDICINE

## 2025-03-11 PROCEDURE — 99214 OFFICE O/P EST MOD 30 MIN: CPT | Mod: S$GLB,,, | Performed by: INTERNAL MEDICINE

## 2025-03-11 PROCEDURE — 3008F BODY MASS INDEX DOCD: CPT | Mod: CPTII,S$GLB,, | Performed by: INTERNAL MEDICINE

## 2025-03-11 PROCEDURE — 99999 PR PBB SHADOW E&M-EST. PATIENT-LVL III: CPT | Mod: PBBFAC,,, | Performed by: INTERNAL MEDICINE

## 2025-03-11 NOTE — PROGRESS NOTES
"Name: Steph Lemus  MRN:  [id number]  :  1970 Age 54 y.o.  Date of Service: 3/11/2025    Reason for visit:  Steph Lemus is a 54 y.o. female here regarding...    #Invasive Ductal Carcinoma of the Left Breast   Date of Original Diagnosis: 8/3/22  Original Stage: Stage 1A pT1b pN0 cM0 ER  95% OH 70% HER 2+/negative by FISH grade 1, KI67 20%; Oncotype 7  Current Sites of Disease: none  Current Goals of Therapy: curative  Current Therapy: active surveillance    Oncologic History/History of Present Illness:   Detected by screening imaging, no symptoms    22 Mammogram with US: irregularly shaped, hypoechoic mass with angular margins seen in the left breast at 12 o'clock, 4 cm from the nipple consistent with known IDC     8/3/22 Biopsy + for IDC    23, she underwent b/l mastectomy and left sentinel node exam, RUTH flap    23 RUTH flap revision     She was followed by Dr. Coelho, after shared decision making, she opted to no endocrine therapy     24- establishes care with me, reports ongoing breast numbness and feeling as they are cold, states the midline scar at the waist feels like a small "bowling ball" sensation in her abd.     Interval Hx 2025  No breast concerns   Reports she had vertigo/dizziness type symptoms for the last 1.5 months, reports this is new and different  Taking Veozah for hot flashes, reports 75% improvement in hot flashes with medication     PHYSICAL EXAMINATION:  /73 (BP Location: Right arm, Patient Position: Sitting)   Pulse 67   Temp 97.7 °F (36.5 °C) (Temporal)   Resp 17   Ht 5' 6" (1.676 m)   Wt 83.2 kg (183 lb 6.8 oz)   LMP  (LMP Unknown)   SpO2 97%   BMI 29.61 kg/m²   Wt Readings from Last 3 Encounters:   25 83.2 kg (183 lb 6.8 oz)   10/25/24 83.3 kg (183 lb 10.3 oz)   10/10/24 83.3 kg (183 lb 9.6 oz)     ECOG PERFORMANCE STATUS: 0  Physical Exam   General:  Well-appearing, nontoxic  Eyes:  Equal and round pupils, EOMI, no scleral " icterus  Mouth:  No lesions, moist  Cardiovascular:  Warm, well-perfused, no peripheral edema  Lungs:  Unlabored on room air, no wheezing  Neurologic:  Awake, alert and oriented, participating in the exam  Psych:  Appropriate mood and affect  Skin:  Normal pallor, No rashes  Heme:  No petechiae, no purpura  Breasts: post-mastectomy site well healed and free of suspicious changes, surgical scars noted at RUTH flap.      LABORATORY:  CBC  Lab Results   Component Value Date    WBC 5.31 03/26/2024    HGB 14.7 03/26/2024    HCT 44.8 03/26/2024    MCV 91 03/26/2024     03/26/2024         BMP  Lab Results   Component Value Date     06/07/2024    K 4.9 06/07/2024     06/07/2024    CO2 25 06/07/2024    BUN 16 06/07/2024    CREATININE 0.8 06/07/2024    CALCIUM 10.1 06/07/2024    ANIONGAP 10 06/07/2024    ESTGFRAFRICA >60 10/14/2021    EGFRNONAA >60 10/14/2021         PATHOLOGY:  8/3/22:   1. Breast, left, 12 o'clock, 6 cm from nipple, ultrasound-guided core needle   biopsies:   - Invasive ductal carcinoma       - Estrogen Receptor (ER):  Positive (95%; strong intensity)       - Progesterone Receptor (AZ):  Positive (70%; moderate intensity)       - HER2:  Equivocal (2+)       - HER2 FISH pending; results to follow in a supplemental report       - Ki-67 proliferation index:  Approximately 20% in hot-spot (intermediate)   1/5/23  1.  LEFT BREAST, SKIN AND NIPPLE SPARING MASTECTOMY:   - FIVE LYMPH NODES, ALL NEGATIVE FOR METASTATIC CARCINOMA (0/5).     - ONE LYMPH NODE WITH CAPSULAR MERARY NEVUS.    pT1b pN0(sn)       RADIOLOGY:  noen      ASSESSMENT AND PLAN:  Steph Lemus is a 54 y.o. female with...    #Invasive Ductal Carcinoma of the Left Breast   Date of Original Diagnosis: 8/3/22  Original Stage: Stage 1A pT1b pN0 cM0 ER  95% AZ 70% HER 2+/negative by FISH grade 1, KI67 20%; Oncotype 7  Current Sites of Disease: none  Current Goals of Therapy: curative  Current Therapy: active surveillance    She is  s/p b/l mastectomy with flap reconstruction, no mammogram or imaging required moving forward, wants to ask NP Michelle Rodriguez about an annual US of the axilla for peace of mind . Bilateral US 11/1/25   BI-RADS Category 1: Negative Finding  Oncotype is low risk at 7, with 3% risk of distant recurrence with AI or Tamoxifen.   Declined endocrine therapy with Dr. Coelho, will continue with ongoing clinical exam,     #Dizziness with vertigo  -new, maybe medication induced? Getting MRI brain to rule out brain mets, unlikely     #right flank pain   -reports she notices this in the bed when she rolls a certain direction   -getting kidney US but sounds positional and likely MSK related---US 8/27/24  No sonographically evident abnormalities involving the kidneys or bladder.      #Hot flashes   -markedly improved with Veozah       Route Chart for Scheduling    Med Onc Chart Routing      Follow up with physician 1 year. in a gown no labs   Follow up with [facility]    Infusion scheduling note    Injection scheduling note    Labs    Imaging   MRI brain now   Pharmacy appointment    Other referrals

## 2025-03-19 ENCOUNTER — PATIENT MESSAGE (OUTPATIENT)
Dept: FAMILY MEDICINE | Facility: CLINIC | Age: 55
End: 2025-03-19
Payer: COMMERCIAL

## 2025-03-25 ENCOUNTER — RESULTS FOLLOW-UP (OUTPATIENT)
Dept: HEMATOLOGY/ONCOLOGY | Facility: CLINIC | Age: 55
End: 2025-03-25

## 2025-03-26 DIAGNOSIS — R42 VERTIGO: Primary | ICD-10-CM

## 2025-04-07 ENCOUNTER — RESULTS FOLLOW-UP (OUTPATIENT)
Dept: FAMILY MEDICINE | Facility: CLINIC | Age: 55
End: 2025-04-07

## 2025-04-07 ENCOUNTER — OFFICE VISIT (OUTPATIENT)
Dept: FAMILY MEDICINE | Facility: CLINIC | Age: 55
End: 2025-04-07
Payer: COMMERCIAL

## 2025-04-07 VITALS
WEIGHT: 183.19 LBS | BODY MASS INDEX: 29.44 KG/M2 | HEART RATE: 84 BPM | SYSTOLIC BLOOD PRESSURE: 110 MMHG | TEMPERATURE: 98 F | HEIGHT: 66 IN | OXYGEN SATURATION: 98 % | DIASTOLIC BLOOD PRESSURE: 72 MMHG

## 2025-04-07 DIAGNOSIS — J22 LOWER RESPIRATORY INFECTION: Primary | ICD-10-CM

## 2025-04-07 DIAGNOSIS — J32.9 SINUSITIS, UNSPECIFIED CHRONICITY, UNSPECIFIED LOCATION: ICD-10-CM

## 2025-04-07 PROCEDURE — 87502 INFLUENZA DNA AMP PROBE: CPT | Mod: QW,S$GLB,, | Performed by: INTERNAL MEDICINE

## 2025-04-07 PROCEDURE — 87651 STREP A DNA AMP PROBE: CPT | Mod: QW,S$GLB,, | Performed by: INTERNAL MEDICINE

## 2025-04-07 PROCEDURE — 99999 PR PBB SHADOW E&M-EST. PATIENT-LVL III: CPT | Mod: PBBFAC,,, | Performed by: INTERNAL MEDICINE

## 2025-04-07 RX ORDER — FLUOCINONIDE TOPICAL SOLUTION USP, 0.05% 0.5 MG/ML
SOLUTION TOPICAL 2 TIMES DAILY PRN
COMMUNITY
Start: 2025-01-24

## 2025-04-07 RX ORDER — BENZONATATE 100 MG/1
CAPSULE ORAL
Qty: 45 CAPSULE | Refills: 0 | Status: SHIPPED | OUTPATIENT
Start: 2025-04-07

## 2025-04-07 RX ORDER — CEFUROXIME AXETIL 500 MG/1
500 TABLET ORAL 2 TIMES DAILY
Qty: 20 TABLET | Refills: 0 | Status: SHIPPED | OUTPATIENT
Start: 2025-04-07 | End: 2025-04-17

## 2025-04-07 RX ORDER — METHYLPREDNISOLONE 4 MG/1
TABLET ORAL
Qty: 21 TABLET | Refills: 0 | Status: SHIPPED | OUTPATIENT
Start: 2025-04-07 | End: 2026-04-07

## 2025-04-07 RX ORDER — CODEINE PHOSPHATE AND GUAIFENESIN 10; 100 MG/5ML; MG/5ML
5 SOLUTION ORAL EVERY 6 HOURS PRN
Qty: 180 ML | Refills: 0 | Status: SHIPPED | OUTPATIENT
Start: 2025-04-07 | End: 2025-04-17

## 2025-04-07 NOTE — PROGRESS NOTES
Subjective:       Patient ID: Steph Lemus is a 54 y.o. female.  Chief Complaint: Sore Throat, Sinusitis, Cough, and Fatigue     Sore Throat   This is a new problem. The current episode started in the past 7 days. The problem has been waxing and waning. Neither side of throat is experiencing more pain than the other. There has been no fever. The pain is at a severity of 6/10. The pain is moderate. Associated symptoms include congestion, coughing, headaches, a hoarse voice and a plugged ear sensation. Pertinent negatives include no abdominal pain, diarrhea, drooling, ear discharge, ear pain, neck pain, shortness of breath, stridor, swollen glands, trouble swallowing or vomiting. She has tried NSAIDs, cool liquids and gargles for the symptoms. The treatment provided moderate relief.     C/o 5 days of severe sore throat.  Started with sinus congestion.  No progressed to green sputum cough.  + chills, no body aches.  + feeling run down.  + frontal HA      Assessment:       1. Lower respiratory infection    2. Sinusitis, unspecified chronicity, unspecified location        Plan:       Lower respiratory infection  -     cefUROXime (CEFTIN) 500 MG tablet; Take 1 tablet (500 mg total) by mouth 2 (two) times daily. for 10 days  Dispense: 20 tablet; Refill: 0  -     benzonatate (TESSALON) 100 MG capsule; 1 - 2 po every 6 hours prn cough  Dispense: 45 capsule; Refill: 0  -     guaiFENesin-codeine 100-10 mg/5 ml (GUAIFENESIN AC)  mg/5 mL syrup; Take 5 mLs by mouth every 6 (six) hours as needed for Cough.  Dispense: 180 mL; Refill: 0    Sinusitis, unspecified chronicity, unspecified location  -     methylPREDNISolone (MEDROL DOSEPACK) 4 mg tablet; Take as directed  Dispense: 21 tablet; Refill: 0            Continue current management and monitor.  Other diagnoses were reviewed and found stable and will continue to monitor.  Counseled on regular exercise, maintenance of a healthy weight, balanced diet rich in  fruits/vegetables and lean protein, and avoidance of unhealthy habits like smoking and excessive alcohol intake.   Also, counseled on importance of being compliant with medication, health appointments, diet and exercise.     No follow-ups on file.      Medication List with Changes/Refills   New Medications    BENZONATATE (TESSALON) 100 MG CAPSULE    1 - 2 po every 6 hours prn cough    CEFUROXIME (CEFTIN) 500 MG TABLET    Take 1 tablet (500 mg total) by mouth 2 (two) times daily. for 10 days    GUAIFENESIN-CODEINE 100-10 MG/5 ML (GUAIFENESIN AC)  MG/5 ML SYRUP    Take 5 mLs by mouth every 6 (six) hours as needed for Cough.    METHYLPREDNISOLONE (MEDROL DOSEPACK) 4 MG TABLET    Take as directed   Current Medications    FEZOLINETANT (VEOZAH) 45 MG TAB    Take 45 mg by mouth Daily.    FLUOCINONIDE (LIDEX) 0.05 % EXTERNAL SOLUTION    Apply topically 2 (two) times daily as needed.    MULTIVITAMIN (THERAGRAN) PER TABLET    Take 1 tablet by mouth once daily.    ROSUVASTATIN (CRESTOR) 5 MG TABLET    Take 1 tablet (5 mg total) by mouth once daily.       BP Readings from Last 3 Encounters:   04/07/25 110/72   03/11/25 109/73   10/25/24 126/78     Hemoglobin A1C   Date Value Ref Range Status   05/31/2024 5.4 4.0 - 5.6 % Final     Comment:     ADA Screening Guidelines:  5.7-6.4%  Consistent with prediabetes  >or=6.5%  Consistent with diabetes    High levels of fetal hemoglobin interfere with the HbA1C  assay. Heterozygous hemoglobin variants (HbS, HgC, etc)do  not significantly interfere with this assay.   However, presence of multiple variants may affect accuracy.     05/10/2023 5.4 4.0 - 5.6 % Final     Comment:     ADA Screening Guidelines:  5.7-6.4%  Consistent with prediabetes  >or=6.5%  Consistent with diabetes    High levels of fetal hemoglobin interfere with the HbA1C  assay. Heterozygous hemoglobin variants (HbS, HgC, etc)do  not significantly interfere with this assay.   However, presence of multiple variants may  affect accuracy.     09/21/2020 5.1 0.0 - 5.6 % Final     Comment:     Reference Interval:  5.0 - 5.6 Normal   5.7 - 6.4 High Risk   > 6.5 Diabetic    Hgb A1c results are standardized based on the (NGSP) National   Glycohemoglobin Standardization Program.    Hemoglobin A1C levels are related to mean serum/plasma glucose   during the preceding 2-3 months.          Lab Results   Component Value Date    TSH 1.242 06/07/2024     Lab Results   Component Value Date    LDLCALC 77.4 06/07/2024    LDLCALC 105.6 05/10/2023    LDLCALC 88.4 02/09/2021     Lab Results   Component Value Date    TRIG 68 06/07/2024    TRIG 92 05/10/2023    TRIG 88 02/09/2021     Wt Readings from Last 3 Encounters:   04/07/25 83.1 kg (183 lb 3.2 oz)   03/11/25 83.2 kg (183 lb 6.8 oz)   10/25/24 83.3 kg (183 lb 10.3 oz)     Lab Results   Component Value Date    HGB 14.7 03/26/2024    HCT 44.8 03/26/2024    WBC 5.31 03/26/2024    ALT 25 02/07/2025    AST 26 02/07/2025     06/07/2024    K 4.9 06/07/2024    CREATININE 0.8 06/07/2024           Review of Systems   HENT:  Positive for congestion, hoarse voice and sore throat. Negative for drooling, ear discharge, ear pain and trouble swallowing.    Respiratory:  Positive for cough. Negative for shortness of breath and stridor.    Gastrointestinal:  Negative for abdominal pain, diarrhea and vomiting.   Musculoskeletal:  Negative for neck pain.   Neurological:  Positive for headaches.           Objective:      Vitals:    04/07/25 0850   BP: 110/72   Pulse: 84   Temp: 98.3 °F (36.8 °C)     Physical Exam  Vitals reviewed.   HENT:      Right Ear: Tympanic membrane normal.      Left Ear: Tympanic membrane normal.      Mouth/Throat:      Pharynx: Posterior oropharyngeal erythema present.   Eyes:      Conjunctiva/sclera: Conjunctivae normal.   Neck:      Thyroid: No thyromegaly.      Trachea: Trachea normal.   Cardiovascular:      Heart sounds: Normal heart sounds.      Comments: Edema negative  Pulmonary:       Effort: Pulmonary effort is normal.      Breath sounds: Normal breath sounds.   Abdominal:      Palpations: There is no hepatomegaly.   Lymphadenopathy:      Head:      Right side of head: No tonsillar adenopathy.      Left side of head: No tonsillar adenopathy.      Upper Body:      Right upper body: No supraclavicular adenopathy.      Left upper body: No supraclavicular adenopathy.   Psychiatric:      Comments: Alert and Oriented

## 2025-04-28 ENCOUNTER — RESULTS FOLLOW-UP (OUTPATIENT)
Dept: FAMILY MEDICINE | Facility: CLINIC | Age: 55
End: 2025-04-28

## 2025-04-28 DIAGNOSIS — E04.1 THYROID NODULE: ICD-10-CM

## 2025-05-06 ENCOUNTER — PATIENT MESSAGE (OUTPATIENT)
Dept: HEMATOLOGY/ONCOLOGY | Facility: CLINIC | Age: 55
End: 2025-05-06
Payer: COMMERCIAL

## 2025-05-07 ENCOUNTER — TELEPHONE (OUTPATIENT)
Dept: HEMATOLOGY/ONCOLOGY | Facility: CLINIC | Age: 55
End: 2025-05-07
Payer: COMMERCIAL

## 2025-05-07 NOTE — TELEPHONE ENCOUNTER
Returned call to pt, scheduled pt for 5/27 for our [facility]. Discussed visit, time and location with patient. Pt verbalized understanding and thanked me for returning her call.  ----- Message from Yazmin sent at 5/7/2025 10:22 AM CDT -----  Type: Needs Medical AdviceWho Called:  pt Symptoms (please be specific):  How long has patient had these symptoms:  Pharmacy name and phone #:  Best Call Back Number: [phone] Information: pt is requesting a call back to sign up for class on 5/27

## 2025-05-12 ENCOUNTER — LAB VISIT (OUTPATIENT)
Dept: LAB | Facility: HOSPITAL | Age: 55
End: 2025-05-12
Attending: OBSTETRICS & GYNECOLOGY
Payer: COMMERCIAL

## 2025-05-12 DIAGNOSIS — R89.9 ELEVATED LABORATORY TEST RESULT: ICD-10-CM

## 2025-05-12 LAB
ALBUMIN SERPL BCP-MCNC: 3.8 G/DL (ref 3.5–5.2)
ALP SERPL-CCNC: 83 UNIT/L (ref 40–150)
ALT SERPL W/O P-5'-P-CCNC: 30 UNIT/L (ref 10–44)
AST SERPL-CCNC: 25 UNIT/L (ref 11–45)
BILIRUB DIRECT SERPL-MCNC: 0.2 MG/DL (ref 0.1–0.3)
BILIRUB SERPL-MCNC: 0.4 MG/DL (ref 0.1–1)
PROT SERPL-MCNC: 6.5 GM/DL (ref 6–8.4)

## 2025-05-12 PROCEDURE — 36415 COLL VENOUS BLD VENIPUNCTURE: CPT | Mod: PN

## 2025-05-12 PROCEDURE — 82040 ASSAY OF SERUM ALBUMIN: CPT

## 2025-05-13 ENCOUNTER — RESULTS FOLLOW-UP (OUTPATIENT)
Dept: OBSTETRICS AND GYNECOLOGY | Facility: CLINIC | Age: 55
End: 2025-05-13

## 2025-05-22 ENCOUNTER — PATIENT MESSAGE (OUTPATIENT)
Dept: HEMATOLOGY/ONCOLOGY | Facility: CLINIC | Age: 55
End: 2025-05-22
Payer: COMMERCIAL

## 2025-05-23 ENCOUNTER — TELEPHONE (OUTPATIENT)
Dept: HEMATOLOGY/ONCOLOGY | Facility: CLINIC | Age: 55
End: 2025-05-23
Payer: COMMERCIAL

## 2025-05-23 NOTE — TELEPHONE ENCOUNTER
Spoke to pt to remind of shared medical visit appt on 5/28/25 with Eufemia Barreto NP. Pt verbalized understanding and confirmed appt Location was discussed.

## 2025-05-27 ENCOUNTER — OFFICE VISIT (OUTPATIENT)
Dept: HEMATOLOGY/ONCOLOGY | Facility: CLINIC | Age: 55
End: 2025-05-27
Payer: COMMERCIAL

## 2025-05-27 VITALS
OXYGEN SATURATION: 98 % | SYSTOLIC BLOOD PRESSURE: 116 MMHG | HEART RATE: 65 BPM | DIASTOLIC BLOOD PRESSURE: 66 MMHG | TEMPERATURE: 97 F

## 2025-05-27 DIAGNOSIS — N95.1 HOT FLASHES DUE TO MENOPAUSE: Primary | ICD-10-CM

## 2025-05-27 DIAGNOSIS — C50.912 INVASIVE DUCTAL CARCINOMA OF LEFT BREAST IN FEMALE: ICD-10-CM

## 2025-05-27 DIAGNOSIS — F41.9 ANXIETY: ICD-10-CM

## 2025-05-27 DIAGNOSIS — G47.00 INSOMNIA, UNSPECIFIED TYPE: ICD-10-CM

## 2025-05-27 PROCEDURE — 3078F DIAST BP <80 MM HG: CPT | Mod: CPTII,S$GLB,, | Performed by: NURSE PRACTITIONER

## 2025-05-27 PROCEDURE — 1160F RVW MEDS BY RX/DR IN RCRD: CPT | Mod: CPTII,S$GLB,, | Performed by: NURSE PRACTITIONER

## 2025-05-27 PROCEDURE — 99999 PR PBB SHADOW E&M-EST. PATIENT-LVL III: CPT | Mod: PBBFAC,,, | Performed by: NURSE PRACTITIONER

## 2025-05-27 PROCEDURE — 3074F SYST BP LT 130 MM HG: CPT | Mod: CPTII,S$GLB,, | Performed by: NURSE PRACTITIONER

## 2025-05-27 PROCEDURE — 99214 OFFICE O/P EST MOD 30 MIN: CPT | Mod: S$GLB,,, | Performed by: NURSE PRACTITIONER

## 2025-05-27 PROCEDURE — 1159F MED LIST DOCD IN RCRD: CPT | Mod: CPTII,S$GLB,, | Performed by: NURSE PRACTITIONER

## 2025-05-27 NOTE — PROGRESS NOTES
Hot Flashes Shared Medical Visit  Steph Lemus  54 y.o. is here to seek an integrative approach to reduce hot flashes    HPI  Mrs. Lemus has a history of breast cancer. She reports menopausal hot flashes which are currently controlled with Veozah.  She is interested in discontinuing Veozah and will speak with her gynecologist about this at her appointment next month. She states she is worried about stopping the medication since she notices worsening hot flashes even when she is a few hours late taking the Veozah.     Past Medical History  Past Medical History:   Diagnosis Date    Acid reflux     Breast cancer 08/2022    Stage IA (T1c, N0, M0, Grade 1, ER+/NH+/HER2 pending) Invasive ductal carcinoma of the LEFT breast    Epistaxis, recurrent     none since cauterization    Hyperlipidemia     Invasive ductal carcinoma of left breast in female 09/2022      Past Surgical History   Past Surgical History:   Procedure Laterality Date    APPENDECTOMY      BREAST BIOPSY Left 08/03/2022    idc    COLONOSCOPY N/A 04/15/2019    Procedure: COLONOSCOPY;  Surgeon: Tad Suarez Jr., MD;  Location: [facility];  Service: Endoscopy;  Laterality: N/A;    DILATION AND CURETTAGE OF UTERUS      for uterine polyps    ENDOMETRIAL ABLATION  2012    ENDOSCOPIC NASAL CAUTERIZATION N/A 08/11/2020    Procedure: CAUTERIZATION, NOSE, ENDOSCOPIC;  Surgeon: Surinder Acevedo MD;  Location: [facility] OR;  Service: ENT;  Laterality: N/A;    LAPAROSCOPIC APPENDECTOMY N/A 09/22/2020    Procedure: APPENDECTOMY, LAPAROSCOPIC;  Surgeon: Shai Ludwig MD;  Location: [facility] OR;  Service: General;  Laterality: N/A;    LIPOSUCTION W/ FAT INJECTION N/A 12/21/2023    Procedure: LIPOSUCTION, WITH FAT TRANSFER, Abdomen to Breast;  Surgeon: Michel Tirado MD;  Location: [facility] OR;  Service: Plastics;  Laterality: N/A;    MASTOPEXY Bilateral 12/21/2023    Procedure: MASTOPEXY;  Surgeon: Michel Tirado MD;  Location: [facility] OR;  Service: Plastics;  Laterality:  Bilateral;    RECONSTRUCTION OF BREAST WITH DEEP INFERIOR EPIGASTRIC ARTERY  (RUTH) FREE FLAP Bilateral 01/05/2023    Procedure: RECONSTRUCTION, BREAST, USING RUTH FREE FLAP;  Surgeon: Michel Tirado MD;  Location: [facility] OR;  Service: Plastics;  Laterality: Bilateral;    REPAIR, NERVE USING ALLOGRAFT Bilateral 01/05/2023    Procedure: REPAIR, NERVE USING ALLOGRAFT;  Surgeon: Michel Tirado MD;  Location: [facility] OR;  Service: Plastics;  Laterality: Bilateral;    REVISION, FLAP, PREVIOUSLY CREATED FOR BREAST RECONSTRUCTION Bilateral 12/21/2023    Procedure: REVISION, FLAP, PREVIOUSLY CREATED FOR BREAST RECONSTRUCTION;  Surgeon: Michel Tirado MD;  Location: [facility] OR;  Service: Plastics;  Laterality: Bilateral;    SENTINEL LYMPH NODE BIOPSY Left 01/05/2023    Procedure: BIOPSY, LYMPH NODE, SENTINEL;  Surgeon: Simona Cueva MD;  Location: [facility] OR;  Service: General;  Laterality: Left;    SIMPLE MASTECTOMY Bilateral 01/05/2023    Procedure: MASTECTOMY, SIMPLE;  Surgeon: Simona Cueva MD;  Location: [facility] OR;  Service: General;  Laterality: Bilateral;    TONSILLECTOMY        Family History   Family History   Problem Relation Name Age of Onset    Lung cancer Paternal Grandfather Franny     Cancer Paternal Grandfather Franny 75        lung cancer- smoker    Colon cancer Maternal Grandfather Surinder 80    Diabetes Maternal Grandfather Surinder     Hypertension Father      Cancer Father          throat    Pacemaker/defibrilator Father      Hyperlipidemia Mother      Hypertension Mother      Diabetes Mother      Breast cancer Cousin maternal 1st 40    BRCA 1/2 Cousin maternal 1st     Lung cancer Paternal Uncle  65        smoker    Ovarian cancer Neg Hx      Stroke Neg Hx        Allergies  Review of patient's allergies indicates:   Allergen Reactions    Augmentin [amoxicillin-pot clavulanate]      Stomach pains      Current Medications:  Current Medications[1]     Review of Systems  Review of Systems   Constitutional:   Positive for malaise/fatigue.        Hot flashes   Eyes: Negative.    Respiratory: Negative.     Cardiovascular: Negative.    Gastrointestinal: Negative.    Genitourinary: Negative.    Musculoskeletal: Negative.    Neurological: Negative.    Endo/Heme/Allergies: Negative.    Psychiatric/Behavioral:  The patient is nervous/anxious and has insomnia.       Physical Exam      Vitals:    05/27/25 0842   BP: 116/66   BP Location: Right arm   Patient Position: Sitting   Pulse: 65   Temp: 97.3 °F (36.3 °C)   TempSrc: Temporal   SpO2: 98%     There is no height or weight on file to calculate BMI.  Physical Exam  Vitals reviewed.   Constitutional:       Appearance: Normal appearance.   Neurological:      Mental Status: She is alert.   Psychiatric:         Mood and Affect: Mood normal.         Behavior: Behavior normal.        ASSESSMENT :  1. Hot flashes due to menopause    2. Anxiety    3. Insomnia, unspecified type    4. Invasive ductal carcinoma of left breast in female      Today Steph Lemus learned about causes of hot flashes and ways to fight hot flashes by using mind-body techniques, exercise, and plant-focused diet.  Effective coping practices and lifestyle choices were taught and recommended to help fight the effects of hot flashes and to ensure greater well-being through cancer journey.   Today the health topic was: Decreasing hot flashes through treatment and survivorship  Today our mindfulness activity was medication led by Tahira Ruiz LCSW  Today the acupuncturist, Dr. Saadia Peters discussed ways acupuncture can help decrease hot flashes.   Today our dietitian addressed the group and gave examples of both food and drinks and specific food groups that both worsen and decrease hot flashes, answered questions regarding supplements.   Fruit parfaits were provided for a snack and the recipe included  Dr. Heaton, oncology psychology, spoke to the group and gave a detailed handout   Today the following  entities were present during the visit: Nurse Practitioner,  RD, Psychologist, Acupuncturist, LCSW for meditation, ,FNP-C  Spiritual Care provided by chaplain Susan    Encouraged to start exercising 10-15 minutes a day 3-5 days a week with the goal of exercising at least 150 minutes per week of moderate intensity aerobic activity or at least 75 minutes of vigorous activity,     PLAN:  Reviewed all information discussed at today's visit and all questions were answered.    Recommended Magnesium Glycinate 400mg nightly  I discussed and recommended the following support services:  Korey Chi and Yoga I suggested Korey Chi and/or Yoga as these practices reduce stress, increases flexibility and muscle strength, improves balance and promotes serenity in the power of movement to help fight disease and boost your immune system.   Music and relaxation therapy and Meditation which can decrease stress by lowering blood pressure, slowing breathing, and helping you be more present in the moment. It improves sleep by relaxing the body and mind at the end of the day.Meditation also trains you how to focus on one thing at a time, improving concentration. It also promotes emotional well-being by decreasing depression and anxiety, and helping create a more positive outlook on life.    Follow up with [facility] as needed        [1]   Current Outpatient Medications:     benzonatate (TESSALON) 100 MG capsule, 1 - 2 po every 6 hours prn cough, Disp: 45 capsule, Rfl: 0    fezolinetant (VEOZAH) 45 mg Tab, Take 45 mg by mouth Daily., Disp: 30 tablet, Rfl: 6    fluocinonide (LIDEX) 0.05 % external solution, Apply topically 2 (two) times daily as needed., Disp: , Rfl:     methylPREDNISolone (MEDROL DOSEPACK) 4 mg tablet, Take as directed, Disp: 21 tablet, Rfl: 0    multivitamin (THERAGRAN) per tablet, Take 1 tablet by mouth once daily., Disp: , Rfl:     rosuvastatin (CRESTOR) 5 MG tablet, Take 1 tablet (5 mg  total) by mouth once daily., Disp: 90 tablet, Rfl: 3

## 2025-06-09 ENCOUNTER — OFFICE VISIT (OUTPATIENT)
Dept: FAMILY MEDICINE | Facility: CLINIC | Age: 55
End: 2025-06-09
Payer: COMMERCIAL

## 2025-06-09 ENCOUNTER — LAB VISIT (OUTPATIENT)
Dept: LAB | Facility: HOSPITAL | Age: 55
End: 2025-06-09
Attending: FAMILY MEDICINE
Payer: COMMERCIAL

## 2025-06-09 VITALS
HEIGHT: 66 IN | HEART RATE: 67 BPM | BODY MASS INDEX: 30.17 KG/M2 | OXYGEN SATURATION: 98 % | DIASTOLIC BLOOD PRESSURE: 80 MMHG | WEIGHT: 187.75 LBS | SYSTOLIC BLOOD PRESSURE: 122 MMHG

## 2025-06-09 DIAGNOSIS — G44.229 CHRONIC TENSION-TYPE HEADACHE, NOT INTRACTABLE: ICD-10-CM

## 2025-06-09 DIAGNOSIS — R73.03 PREDIABETES: ICD-10-CM

## 2025-06-09 DIAGNOSIS — Z00.00 ROUTINE GENERAL MEDICAL EXAMINATION AT A HEALTH CARE FACILITY: Primary | ICD-10-CM

## 2025-06-09 DIAGNOSIS — N95.1 MENOPAUSAL SYNDROME: ICD-10-CM

## 2025-06-09 DIAGNOSIS — Z00.00 ROUTINE GENERAL MEDICAL EXAMINATION AT A HEALTH CARE FACILITY: ICD-10-CM

## 2025-06-09 LAB
25(OH)D3+25(OH)D2 SERPL-MCNC: 62 NG/ML (ref 30–96)
ABSOLUTE EOSINOPHIL (OHS): 0.05 K/UL
ABSOLUTE MONOCYTE (OHS): 0.39 K/UL (ref 0.3–1)
ABSOLUTE NEUTROPHIL COUNT (OHS): 2.68 K/UL (ref 1.8–7.7)
ALBUMIN SERPL BCP-MCNC: 4.5 G/DL (ref 3.5–5.2)
ALP SERPL-CCNC: 73 UNIT/L (ref 40–150)
ALT SERPL W/O P-5'-P-CCNC: 25 UNIT/L (ref 10–44)
ANION GAP (OHS): 8 MMOL/L (ref 8–16)
AST SERPL-CCNC: 27 UNIT/L (ref 11–45)
BASOPHILS # BLD AUTO: 0.03 K/UL
BASOPHILS NFR BLD AUTO: 0.6 %
BILIRUB SERPL-MCNC: 0.4 MG/DL (ref 0.1–1)
BILIRUB UR QL STRIP.AUTO: NEGATIVE
BUN SERPL-MCNC: 17 MG/DL (ref 6–20)
CALCIUM SERPL-MCNC: 9.5 MG/DL (ref 8.7–10.5)
CHLORIDE SERPL-SCNC: 109 MMOL/L (ref 95–110)
CHOLEST SERPL-MCNC: 176 MG/DL (ref 120–199)
CHOLEST/HDLC SERPL: 2.6 {RATIO} (ref 2–5)
CLARITY UR: CLEAR
CO2 SERPL-SCNC: 26 MMOL/L (ref 23–29)
COLOR UR AUTO: YELLOW
CREAT SERPL-MCNC: 0.8 MG/DL (ref 0.5–1.4)
EAG (OHS): 108 MG/DL (ref 68–131)
ERYTHROCYTE [DISTWIDTH] IN BLOOD BY AUTOMATED COUNT: 14.5 % (ref 11.5–14.5)
FOLATE SERPL-MCNC: 13.9 NG/ML (ref 4–24)
GFR SERPLBLD CREATININE-BSD FMLA CKD-EPI: >60 ML/MIN/1.73/M2
GLUCOSE SERPL-MCNC: 83 MG/DL (ref 70–110)
GLUCOSE UR QL STRIP: NEGATIVE
HBA1C MFR BLD: 5.4 % (ref 4–5.6)
HCT VFR BLD AUTO: 42.4 % (ref 37–48.5)
HDLC SERPL-MCNC: 69 MG/DL (ref 40–75)
HDLC SERPL: 39.2 % (ref 20–50)
HGB BLD-MCNC: 13.7 GM/DL (ref 12–16)
HGB UR QL STRIP: NEGATIVE
IMM GRANULOCYTES # BLD AUTO: 0.01 K/UL (ref 0–0.04)
IMM GRANULOCYTES NFR BLD AUTO: 0.2 % (ref 0–0.5)
IRON SERPL-MCNC: 118 UG/DL (ref 30–160)
KETONES UR QL STRIP: NEGATIVE
LDLC SERPL CALC-MCNC: 84.8 MG/DL (ref 63–159)
LEUKOCYTE ESTERASE UR QL STRIP: NEGATIVE
LYMPHOCYTES # BLD AUTO: 2.05 K/UL (ref 1–4.8)
MCH RBC QN AUTO: 30.6 PG (ref 27–31)
MCHC RBC AUTO-ENTMCNC: 32.3 G/DL (ref 32–36)
MCV RBC AUTO: 95 FL (ref 82–98)
NITRITE UR QL STRIP: NEGATIVE
NONHDLC SERPL-MCNC: 107 MG/DL
NUCLEATED RBC (/100WBC) (OHS): 0 /100 WBC
PH UR STRIP: 7 [PH]
PLATELET # BLD AUTO: 244 K/UL (ref 150–450)
PMV BLD AUTO: 9.6 FL (ref 9.2–12.9)
POTASSIUM SERPL-SCNC: 4.9 MMOL/L (ref 3.5–5.1)
PROT SERPL-MCNC: 7 GM/DL (ref 6–8.4)
PROT UR QL STRIP: NEGATIVE
RBC # BLD AUTO: 4.48 M/UL (ref 4–5.4)
RELATIVE EOSINOPHIL (OHS): 1 %
RELATIVE LYMPHOCYTE (OHS): 39.3 % (ref 18–48)
RELATIVE MONOCYTE (OHS): 7.5 % (ref 4–15)
RELATIVE NEUTROPHIL (OHS): 51.4 % (ref 38–73)
SODIUM SERPL-SCNC: 143 MMOL/L (ref 136–145)
SP GR UR STRIP: 1.02
TRIGL SERPL-MCNC: 111 MG/DL (ref 30–150)
TSH SERPL-ACNC: 1.54 UIU/ML (ref 0.4–4)
UROBILINOGEN UR STRIP-ACNC: NEGATIVE EU/DL
VIT B12 SERPL-MCNC: 951 PG/ML (ref 210–950)
WBC # BLD AUTO: 5.21 K/UL (ref 3.9–12.7)

## 2025-06-09 PROCEDURE — 99999 PR PBB SHADOW E&M-EST. PATIENT-LVL IV: CPT | Mod: PBBFAC,,, | Performed by: FAMILY MEDICINE

## 2025-06-09 PROCEDURE — 99396 PREV VISIT EST AGE 40-64: CPT | Mod: S$GLB,,, | Performed by: FAMILY MEDICINE

## 2025-06-09 PROCEDURE — 1160F RVW MEDS BY RX/DR IN RCRD: CPT | Mod: CPTII,S$GLB,, | Performed by: FAMILY MEDICINE

## 2025-06-09 PROCEDURE — 82306 VITAMIN D 25 HYDROXY: CPT

## 2025-06-09 PROCEDURE — 3079F DIAST BP 80-89 MM HG: CPT | Mod: CPTII,S$GLB,, | Performed by: FAMILY MEDICINE

## 2025-06-09 PROCEDURE — 80061 LIPID PANEL: CPT

## 2025-06-09 PROCEDURE — 85025 COMPLETE CBC W/AUTO DIFF WBC: CPT

## 2025-06-09 PROCEDURE — 36415 COLL VENOUS BLD VENIPUNCTURE: CPT | Mod: PN

## 2025-06-09 PROCEDURE — 82607 VITAMIN B-12: CPT

## 2025-06-09 PROCEDURE — 83540 ASSAY OF IRON: CPT

## 2025-06-09 PROCEDURE — 3074F SYST BP LT 130 MM HG: CPT | Mod: CPTII,S$GLB,, | Performed by: FAMILY MEDICINE

## 2025-06-09 PROCEDURE — 99213 OFFICE O/P EST LOW 20 MIN: CPT | Mod: 25,S$GLB,, | Performed by: FAMILY MEDICINE

## 2025-06-09 PROCEDURE — 84075 ASSAY ALKALINE PHOSPHATASE: CPT

## 2025-06-09 PROCEDURE — 84443 ASSAY THYROID STIM HORMONE: CPT

## 2025-06-09 PROCEDURE — 81003 URINALYSIS AUTO W/O SCOPE: CPT | Performed by: FAMILY MEDICINE

## 2025-06-09 PROCEDURE — 1159F MED LIST DOCD IN RCRD: CPT | Mod: CPTII,S$GLB,, | Performed by: FAMILY MEDICINE

## 2025-06-09 PROCEDURE — 83036 HEMOGLOBIN GLYCOSYLATED A1C: CPT

## 2025-06-09 PROCEDURE — 82746 ASSAY OF FOLIC ACID SERUM: CPT

## 2025-06-09 PROCEDURE — 3008F BODY MASS INDEX DOCD: CPT | Mod: CPTII,S$GLB,, | Performed by: FAMILY MEDICINE

## 2025-06-09 RX ORDER — IBUPROFEN 100 MG/5ML
1000 SUSPENSION, ORAL (FINAL DOSE FORM) ORAL DAILY
COMMUNITY

## 2025-06-09 RX ORDER — CHOLECALCIFEROL (VITAMIN D3) 25 MCG
1000 TABLET ORAL DAILY
COMMUNITY

## 2025-06-09 NOTE — PROGRESS NOTES
Chief Complaint:  Chief Complaint   Patient presents with    Annual Exam        HPI:  Steph is a 54 y.o. year old     History of Present Illness    CHIEF COMPLAINT:  Steph presents today for follow up of headaches    HEADACHE:  She reports headaches since COVID infection in 2020, experiencing both pressure and pounding headaches in the frontal region at least weekly, typically in afternoons. Headaches are generally mild (1-3/10 severity), managed with 2-3 ibuprofen providing relief within 30 minutes. She had one severe frontal headache episode two months ago associated with feeling ill the following day. Neurological evaluation by Dr. Arguello including imaging was normal.    MENOPAUSE SYMPTOMS:  She has been taking Veozah since March 2023 and reports some breakthrough hot flashes at night, though less severe than before treatment. Current episodes involve only heat sensation, whereas previously she experienced facial pressure and palpitations with the heat.    MEDICATIONS:  She continues Veozah (started March 2023) and low-dose Crestor.    SUPPLEMENTS:  She takes augie ultra women's multivitamin, vitamin C, and collagen peptides every morning. She previously took vitamin D but has run out.    DIET:  She drinks coffee in the morning followed by approximately 64 ounces of water throughout the day.          Review of Systems   Constitutional:  Negative for activity change, fatigue and unexpected weight change.   HENT:  Negative for hearing loss, rhinorrhea and trouble swallowing.    Eyes:  Negative for discharge and visual disturbance.   Respiratory:  Negative for cough, chest tightness and wheezing.    Cardiovascular:  Negative for chest pain and palpitations.   Gastrointestinal:  Negative for blood in stool, constipation, diarrhea and vomiting.        No gerd c/o   Endocrine: Negative for polydipsia and polyuria.   Genitourinary:  Negative for difficulty urinating, dysuria, hematuria and menstrual problem.  "  Musculoskeletal:  Negative for arthralgias, joint swelling and neck pain.        Joint aches have improved with addition of collagen peptides to morning coffee. Started going to the gym a few days/week - hiit.    Neurological:  Positive for headaches. Negative for weakness.   Psychiatric/Behavioral:  Negative for confusion, dysphoric mood and sleep disturbance.          Past Medical History:  Past Medical History:   Diagnosis Date    Acid reflux     Breast cancer 08/2022    Stage IA (T1c, N0, M0, Grade 1, ER+/SD+/HER2 pending) Invasive ductal carcinoma of the LEFT breast    Epistaxis, recurrent     none since cauterization    Hyperlipidemia     Invasive ductal carcinoma of left breast in female 09/2022         Vitals:  Vitals:    06/09/25 1010   BP: 122/80   Pulse: 67   SpO2: 98%   Weight: 85.2 kg (187 lb 11.6 oz)   Height: 5' 6" (1.676 m)   PainSc: 0-No pain       BP Readings from Last 5 Encounters:   06/09/25 122/80   05/27/25 116/66   04/07/25 110/72   03/11/25 109/73   10/25/24 126/78       The 10-year ASCVD risk score (Gordon CRWALEY, et al., 2019) is: 1.4%    Values used to calculate the score:      Age: 54 years      Sex: Female      Is Non- : No      Diabetic: No      Tobacco smoker: No      Systolic Blood Pressure: 122 mmHg      Is BP treated: No      HDL Cholesterol: 58 mg/dL      Total Cholesterol: 149 mg/dL      Physical Exam:  Physical Exam  Vitals and nursing note reviewed.   Constitutional:       General: She is not in acute distress.     Appearance: Normal appearance. She is well-developed.   HENT:      Head: Normocephalic and atraumatic.      Right Ear: Tympanic membrane normal.      Left Ear: Tympanic membrane normal.   Eyes:      General: No scleral icterus.        Right eye: No discharge.         Left eye: No discharge.      Conjunctiva/sclera: Conjunctivae normal.   Cardiovascular:      Rate and Rhythm: Normal rate and regular rhythm.   Pulmonary:      Effort: Pulmonary " effort is normal. No respiratory distress.      Breath sounds: Normal breath sounds.   Abdominal:      Palpations: Abdomen is soft.      Tenderness: There is no guarding.   Musculoskeletal:         General: No deformity or signs of injury.      Cervical back: Neck supple.   Lymphadenopathy:      Cervical: No cervical adenopathy.   Skin:     General: Skin is warm and dry.      Findings: No rash.   Neurological:      General: No focal deficit present.      Mental Status: She is alert and oriented to person, place, and time.   Psychiatric:         Mood and Affect: Mood normal.         Behavior: Behavior normal.             Assessment & Plan:  Routine general medical examination at a health care facility  Comments:  anticipatory guidance reviewed  Orders:  -     Lipid Panel; Future; Expected date: 06/09/2025  -     TSH; Future; Expected date: 06/09/2025  -     Comprehensive Metabolic Panel; Future; Expected date: 06/09/2025  -     Vitamin D; Future; Expected date: 06/09/2025  -     Iron; Future; Expected date: 06/09/2025  -     Vitamin B12; Future; Expected date: 06/09/2025  -     Folate; Future; Expected date: 06/09/2025  -     Urinalysis, Reflex to Urine Culture Urine, Clean Catch  -     GREY TOP URINE HOLD    Prediabetes  -     Hemoglobin A1C (Prediabetes); Future; Expected date: 06/09/2025    Menopausal syndrome  -     CBC Auto Differential; Future; Expected date: 06/09/2025    Chronic tension-type headache, not intractable  Comments:  discussed common triggers, considerations; given reported 1 HA/week and responds to otc ibu, recommend cont veozah; neuro consult prn         Assessment & Plan    ORDERS:   Ordered CBC, CMP, lipid panel, thyroid function tests, fasting glucose, vitamin D level, iron studies, vitamin B12 level, and urinalysis.    IMPRESSION:  Assessed headaches, occurring approximately once per week and responding to ibuprofen.  Reviewed MRA, MRI which showed no significant abnormalities.  Considered  Veozah benefits for hot flash management outweigh current headache concerns.  Evaluated potential headache triggers including dehydration, low blood sugar, and weather changes.    PLAN SUMMARY:   Increase daily fluid intake to 3 32-oz bottles of water   Add 8 oz of electrolyte drink daily   Implement intermittent fasting, gradually increasing to 14-hour window   Continue Veozah at current dose for hot flashes   Start magnesium glycinate 400 mg daily at bedtime for headache prevention   Ordered CBC, CMP, lipid panel, thyroid function tests, fasting glucose, vitamin D level, iron studies, vitamin B12 level, and urinalysis   Continue current exercise regimen of 3 days/week at gym   Add 15-20 minute at-home workouts on non-gym days, including weight-bearing exercises    PATIENT EDUCATION:   Explained importance of weight-bearing exercises for body composition changes.    ACTION ITEMS/LIFESTYLE:   Discussed weight management strategies and benefits of intermittent fasting, recommending gradual increase of fasting window to 14 hours.   Steph to increase daily fluid intake to approximately 3 32-oz bottles of water.   Steph to add 8 oz of electrolyte drink (e.g. Gatorade) daily.   Steph to continue current exercise regimen of 3 days per week at the gym.   Recommend adding 15-20 minute at-home workouts on non-gym days, such as bar exercises.    MEDICATIONS:   Discussed benefits of magnesium supplementation for headache prevention and muscle relaxation.   Started magnesium glycinate 400 mg daily at bedtime for headache prevention.   Continued Veozah at current dose for management of hot flashes.         This note was generated with the assistance of ambient listening technology. Verbal consent was obtained by the patient and accompanying visitor(s) for the recording of patient appointment to facilitate this note. I attest to having reviewed and edited the generated note for accuracy, though some syntax or spelling  errors may persist. Please contact the author of this note for any clarification.

## 2025-06-10 ENCOUNTER — RESULTS FOLLOW-UP (OUTPATIENT)
Dept: FAMILY MEDICINE | Facility: CLINIC | Age: 55
End: 2025-06-10

## 2025-06-14 ENCOUNTER — PATIENT MESSAGE (OUTPATIENT)
Dept: OBSTETRICS AND GYNECOLOGY | Facility: CLINIC | Age: 55
End: 2025-06-14
Payer: COMMERCIAL

## 2025-07-17 ENCOUNTER — PATIENT MESSAGE (OUTPATIENT)
Dept: FAMILY MEDICINE | Facility: CLINIC | Age: 55
End: 2025-07-17
Payer: COMMERCIAL

## 2025-07-18 NOTE — TELEPHONE ENCOUNTER
Most/benign thyroid nodules are not a contraindication to use of ozempic or mounjaro.   In animal studies, the medications may have increased the number of thyroid nodules, including those associated with medullary thyroid cancer.   Would recommend we update a thyroid u/s after 6mos of use if taking the GLP1s rather than 1 year as initially planned.

## 2025-07-23 ENCOUNTER — OFFICE VISIT (OUTPATIENT)
Dept: OBSTETRICS AND GYNECOLOGY | Facility: CLINIC | Age: 55
End: 2025-07-23
Payer: COMMERCIAL

## 2025-07-23 VITALS
BODY MASS INDEX: 30.17 KG/M2 | SYSTOLIC BLOOD PRESSURE: 102 MMHG | WEIGHT: 186.94 LBS | DIASTOLIC BLOOD PRESSURE: 80 MMHG

## 2025-07-23 DIAGNOSIS — E66.811 CLASS 1 OBESITY WITH BODY MASS INDEX (BMI) OF 30.0 TO 30.9 IN ADULT, UNSPECIFIED OBESITY TYPE, UNSPECIFIED WHETHER SERIOUS COMORBIDITY PRESENT: ICD-10-CM

## 2025-07-23 DIAGNOSIS — N95.1 MENOPAUSAL SYNDROME: ICD-10-CM

## 2025-07-23 DIAGNOSIS — Z91.89 AT RISK FOR LYMPHEDEMA: ICD-10-CM

## 2025-07-23 DIAGNOSIS — Z90.13 S/P MASTECTOMY, BILATERAL: ICD-10-CM

## 2025-07-23 DIAGNOSIS — E04.1 THYROID NODULE: ICD-10-CM

## 2025-07-23 DIAGNOSIS — Z85.3 PERSONAL HISTORY OF BREAST CANCER: ICD-10-CM

## 2025-07-23 DIAGNOSIS — E78.5 HYPERLIPIDEMIA, UNSPECIFIED HYPERLIPIDEMIA TYPE: ICD-10-CM

## 2025-07-23 DIAGNOSIS — Z01.419 ROUTINE GYNECOLOGICAL EXAMINATION: Primary | ICD-10-CM

## 2025-07-23 PROCEDURE — 3074F SYST BP LT 130 MM HG: CPT | Mod: CPTII,S$GLB,, | Performed by: OBSTETRICS & GYNECOLOGY

## 2025-07-23 PROCEDURE — 3044F HG A1C LEVEL LT 7.0%: CPT | Mod: CPTII,S$GLB,, | Performed by: OBSTETRICS & GYNECOLOGY

## 2025-07-23 PROCEDURE — 99396 PREV VISIT EST AGE 40-64: CPT | Mod: S$GLB,,, | Performed by: OBSTETRICS & GYNECOLOGY

## 2025-07-23 PROCEDURE — 3079F DIAST BP 80-89 MM HG: CPT | Mod: CPTII,S$GLB,, | Performed by: OBSTETRICS & GYNECOLOGY

## 2025-07-23 PROCEDURE — 99999 PR PBB SHADOW E&M-EST. PATIENT-LVL III: CPT | Mod: PBBFAC,,, | Performed by: OBSTETRICS & GYNECOLOGY

## 2025-07-23 PROCEDURE — 3008F BODY MASS INDEX DOCD: CPT | Mod: CPTII,S$GLB,, | Performed by: OBSTETRICS & GYNECOLOGY

## 2025-07-23 RX ORDER — TIRZEPATIDE 5 MG/.5ML
5 INJECTION, SOLUTION SUBCUTANEOUS
Qty: 2 ML | Refills: 0 | Status: SHIPPED | OUTPATIENT
Start: 2025-08-21 | End: 2025-09-18

## 2025-07-23 RX ORDER — FEZOLINETANT 45 MG/1
45 TABLET, FILM COATED ORAL DAILY
Qty: 90 TABLET | Refills: 3 | Status: SHIPPED | OUTPATIENT
Start: 2025-07-23

## 2025-07-23 RX ORDER — TIRZEPATIDE 2.5 MG/.5ML
2.5 INJECTION, SOLUTION SUBCUTANEOUS
Qty: 2 ML | Refills: 0 | Status: SHIPPED | OUTPATIENT
Start: 2025-07-23 | End: 2025-08-20

## 2025-07-23 NOTE — PROGRESS NOTES
Chief Complaint   Patient presents with    Annual Exam     Discuss veozah       History of Present Illness: Steph Lemus is a 55 y.o. female that presents today 7/23/2025 with No LMP recorded (lmp unknown). Patient is postmenopausal.  for well gyn visit.    Past Medical History:   Diagnosis Date    Acid reflux     Breast cancer 08/2022    Stage IA (T1c, N0, M0, Grade 1, ER+/PA+/HER2 pending) Invasive ductal carcinoma of the LEFT breast    Epistaxis, recurrent     none since cauterization    Hyperlipidemia     Invasive ductal carcinoma of left breast in female 09/2022       Past Surgical History:   Procedure Laterality Date    APPENDECTOMY      BREAST BIOPSY Left 08/03/2022    idc    COLONOSCOPY N/A 04/15/2019    Procedure: COLONOSCOPY;  Surgeon: Tad Suarez Jr., MD;  Location: [facility];  Service: Endoscopy;  Laterality: N/A;    DILATION AND CURETTAGE OF UTERUS      for uterine polyps    ENDOMETRIAL ABLATION  2012    ENDOSCOPIC NASAL CAUTERIZATION N/A 08/11/2020    Procedure: CAUTERIZATION, NOSE, ENDOSCOPIC;  Surgeon: Surinder Acevedo MD;  [email or web address] OR;  Service: ENT;  Laterality: N/A;    LAPAROSCOPIC APPENDECTOMY N/A 09/22/2020    Procedure: APPENDECTOMY, LAPAROSCOPIC;  Surgeon: Shai Ludwig MD;  Location: [email or web address] OR;  Service: General;  Laterality: N/A;    LIPOSUCTION W/ FAT INJECTION N/A 12/21/2023    Procedure: LIPOSUCTION, WITH FAT TRANSFER, Abdomen to Breast;  Surgeon: Michel Tirado MD;  Location: [email or web address] OR;  Service: Plastics;  Laterality: N/A;    MASTOPEXY Bilateral 12/21/2023    Procedure: MASTOPEXY;  Surgeon: Michel Tirado MD;  Location: [email or web address] OR;  Service: Plastics;  Laterality: Bilateral;    RECONSTRUCTION OF BREAST WITH DEEP INFERIOR EPIGASTRIC ARTERY  (RUTH) FREE FLAP Bilateral 01/05/2023    Procedure: RECONSTRUCTION, BREAST, USING RUTH FREE FLAP;  Surgeon: Michel Tirado MD;  Location: [email or web address] OR;  Service: Plastics;  Laterality: Bilateral;    REPAIR, NERVE USING ALLOGRAFT  Bilateral 2023    Procedure: REPAIR, NERVE USING ALLOGRAFT;  Surgeon: Michel Tirado MD;  Location: [facility] OR;  Service: Plastics;  Laterality: Bilateral;    REVISION, FLAP, PREVIOUSLY CREATED FOR BREAST RECONSTRUCTION Bilateral 2023    Procedure: REVISION, FLAP, PREVIOUSLY CREATED FOR BREAST RECONSTRUCTION;  Surgeon: Michel Tirado MD;  Location: [facility] OR;  Service: Plastics;  Laterality: Bilateral;    SENTINEL LYMPH NODE BIOPSY Left 2023    Procedure: BIOPSY, LYMPH NODE, SENTINEL;  Surgeon: Simona Cueva MD;  Location: [facility] OR;  Service: General;  Laterality: Left;    SIMPLE MASTECTOMY Bilateral 2023    Procedure: MASTECTOMY, SIMPLE;  Surgeon: Simona Cueva MD;  Location: [facility] OR;  Service: General;  Laterality: Bilateral;    TONSILLECTOMY         Medications Prior to Visit[1]    Review of patient's allergies indicates:   Allergen Reactions    Augmentin [amoxicillin-pot clavulanate]      Stomach pains       Family History   Problem Relation Name Age of Onset    Lung cancer Paternal Grandfather Franny     Cancer Paternal Grandfather Franny 75        lung cancer- smoker    Colon cancer Maternal Grandfather Surinder 80    Diabetes Maternal Grandfather Surinder     Hypertension Father      Cancer Father          throat    Pacemaker/defibrilator Father      Hyperlipidemia Mother      Hypertension Mother      Diabetes Mother      Breast cancer Cousin maternal 1st 40    BRCA 1/2 Cousin maternal 1st     Lung cancer Paternal Uncle  65        smoker    Ovarian cancer Neg Hx      Stroke Neg Hx         Social History     Tobacco Use    Smoking status: Former     Current packs/day: 0.00     Average packs/day: 0.1 packs/day for 25.0 years (2.5 ttl pk-yrs)     Types: Cigarettes     Start date: 1997     Quit date: 2022     Years since quittin.9    Smokeless tobacco: Never   Substance Use Topics    Alcohol use: Yes     Alcohol/week: 6.0 standard drinks of alcohol     Types: 6 Glasses of wine per  week       Social History     Substance and Sexual Activity   Sexual Activity Yes    Partners: Male    Birth control/protection: Other-see comments    Comment: mario, together since        OB History    Para Term  AB Living   3 3 3   3   SAB IAB Ectopic Multiple Live Births       3      # Outcome Date GA Lbr Randal/2nd Weight Sex Type Anes PTL Lv   3 Term      Vag-Spont   GUERRERO   2 Term      Vag-Spont   GUERRERO   1 Term      Vag-Spont   GUERRERO       Review of Symptoms:  GENERAL: Denies weight gain or weight loss. Feeling well overall.   SKIN: Denies rash or lesions.   HEAD: Denies head injury or headache.   NODES: Denies enlarged lymph nodes.   CHEST: Denies chest pain or shortness of breath.   CARDIOVASCULAR: Denies palpitations or left sided chest pain.   ABDOMEN: No abdominal pain, constipation, diarrhea, nausea, vomiting or rectal bleeding.   URINARY: No frequency, dysuria, hematuria, or burning on urination.  HEMATOLOGIC: No easy bruisability or excessive bleeding.   MUSCULOSKELETAL: Denies joint pain or swelling.     /80   Wt 84.8 kg (186 lb 15.2 oz)   LMP  (LMP Unknown)   Body mass index is 30.17 kg/m².      Physical Exam:  APPEARANCE: Well nourished, well developed, in no acute distress.  SKIN: Normal skin turgor, no lesions.  NECK: Neck symmetric without masses   RESPIRATORY: Normal respiratory effort with no retractions or use of accessory muscles  CARDIOVASCULAR: Peripheral vascular system with no swelling no varicosities and palpation of pulses normal  LYMPHATIC: No enlargements of the lymph nodes noted in the neck, axillae, or groin  ABDOMEN: Soft. No tenderness or masses. No hepatosplenomegaly. No hernias.  BREASTS: Symmetrical, no skin changes or visible lesions. No palpable masses, nipple discharge or adenopathy bilaterally.  PELVIC: Normal external female genitalia without lesions. Normal hair distribution. Adequate perineal body, normal urethral meatus. Urethra with no masses.   Bladder nontender. Vagina moist and well rugated without lesions or discharge. Cervix pink and without lesions. No significant cystocele or rectocele. Bimanual exam showed uterus normal size, shape, position, mobile and nontender. Adnexa without masses or tenderness. Urethra and bladder normal.   EXTREMITIES: No clubbing cyanosis or edema.    ASSESSMENT/PLAN:  Routine gynecological examination  -     Liquid-Based Pap Smear, Screening    Personal history of breast cancer  -     fezolinetant (VEOZAH) 45 mg Tab; Take 45 mg by mouth Daily.  Dispense: 90 tablet; Refill: 3    Menopausal syndrome  -     fezolinetant (VEOZAH) 45 mg Tab; Take 45 mg by mouth Daily.  Dispense: 90 tablet; Refill: 3    S/P mastectomy, bilateral    Thyroid nodule    Class 1 obesity with body mass index (BMI) of 30.0 to 30.9 in adult, unspecified obesity type, unspecified whether serious comorbidity present  -     tirzepatide, weight loss, (ZEPBOUND) 2.5 mg/0.5 mL PnIj; Inject 2.5 mg into the skin every 7 days.  Dispense: 2 mL; Refill: 0  -     tirzepatide, weight loss, (ZEPBOUND) 5 mg/0.5 mL PnIj; Inject 5 mg into the skin every 7 days.  Dispense: 2 mL; Refill: 0    Hyperlipidemia, unspecified hyperlipidemia type  -     tirzepatide, weight loss, (ZEPBOUND) 2.5 mg/0.5 mL PnIj; Inject 2.5 mg into the skin every 7 days.  Dispense: 2 mL; Refill: 0  -     tirzepatide, weight loss, (ZEPBOUND) 5 mg/0.5 mL PnIj; Inject 5 mg into the skin every 7 days.  Dispense: 2 mL; Refill: 0    At risk for lymphedema          Patient was counseled today on Pelvic exams and Pap Smear guidelines.   We discussed STD screening if at high risk for a STD.  We discussed recommendation for breast cancer screening with mammogram every other year after the age of 40 and annually after the age of 50.    We discussed colon cancer screening when indicated.   Osteoporosis screening discussed when indicated.   She was advised to see her primary care physician for all other health  maintenance.     FOLLOW-UP with me for next routine visit.          [1]   Outpatient Medications Prior to Visit   Medication Sig Dispense Refill    ascorbic acid, vitamin C, (VITAMIN C) 1000 MG tablet Take 1,000 mg by mouth once daily.      multivitamin (THERAGRAN) per tablet Take 1 tablet by mouth once daily.      mv-min/iron/folic/calcium/vitK (WOMEN'S MULTIVITAMIN ORAL) Take by mouth.      rosuvastatin (CRESTOR) 5 MG tablet Take 1 tablet (5 mg total) by mouth once daily. 90 tablet 3    fezolinetant (VEOZAH) 45 mg Tab Take 45 mg by mouth Daily. (Patient taking differently: Take 45 mg by mouth Daily. Cutting in half) 30 tablet 6    fluocinonide (LIDEX) 0.05 % external solution Apply topically 2 (two) times daily as needed. (Patient not taking: Reported on 7/23/2025)      vitamin D (VITAMIN D3) 1000 units Tab Take 1,000 Units by mouth once daily.       No facility-administered medications prior to visit.

## 2025-08-25 ENCOUNTER — PATIENT MESSAGE (OUTPATIENT)
Dept: FAMILY MEDICINE | Facility: CLINIC | Age: 55
End: 2025-08-25
Payer: COMMERCIAL

## 2025-08-25 ENCOUNTER — E-VISIT (OUTPATIENT)
Dept: URGENT CARE | Facility: CLINIC | Age: 55
End: 2025-08-25
Payer: COMMERCIAL

## 2025-08-25 DIAGNOSIS — J32.0 MAXILLARY SINUSITIS, UNSPECIFIED CHRONICITY: ICD-10-CM

## 2025-08-25 DIAGNOSIS — R05.2 SUBACUTE COUGH: Primary | ICD-10-CM

## 2025-08-25 DIAGNOSIS — E78.5 HYPERLIPIDEMIA, UNSPECIFIED HYPERLIPIDEMIA TYPE: ICD-10-CM

## 2025-08-25 RX ORDER — DESLORATADINE 5 MG/1
5 TABLET ORAL DAILY
Qty: 7 TABLET | Refills: 0 | Status: SHIPPED | OUTPATIENT
Start: 2025-08-25 | End: 2025-09-01

## 2025-08-25 RX ORDER — PROMETHAZINE HYDROCHLORIDE AND DEXTROMETHORPHAN HYDROBROMIDE 6.25; 15 MG/5ML; MG/5ML
5 SYRUP ORAL EVERY 6 HOURS PRN
Qty: 118 ML | Refills: 0 | Status: SHIPPED | OUTPATIENT
Start: 2025-08-25 | End: 2025-09-01

## 2025-08-25 RX ORDER — DOXYCYCLINE HYCLATE 100 MG
100 TABLET ORAL EVERY 12 HOURS
Qty: 14 TABLET | Refills: 0 | Status: SHIPPED | OUTPATIENT
Start: 2025-08-25 | End: 2025-09-01

## 2025-08-26 RX ORDER — ROSUVASTATIN CALCIUM 5 MG/1
5 TABLET, COATED ORAL DAILY
Qty: 90 TABLET | Refills: 3 | Status: SHIPPED | OUTPATIENT
Start: 2025-08-26